# Patient Record
Sex: FEMALE | Race: BLACK OR AFRICAN AMERICAN | NOT HISPANIC OR LATINO | Employment: OTHER | URBAN - METROPOLITAN AREA
[De-identification: names, ages, dates, MRNs, and addresses within clinical notes are randomized per-mention and may not be internally consistent; named-entity substitution may affect disease eponyms.]

---

## 2017-12-12 ENCOUNTER — TELEPHONE (OUTPATIENT)
Dept: NEUROLOGY | Facility: CLINIC | Age: 37
End: 2017-12-12

## 2017-12-12 NOTE — TELEPHONE ENCOUNTER
Reviewed records and MRIs from 2014.  MRI brain is consistent with MS. Likely lesion at C4, but nothing obvious in T-spine.  She has existing diagnosis of MS. She was last seen by Dr. Bruce Goins on 11/30/17. He referred her for medication management. She is currently on Rebif, but Dr. Goins feels she may benefit from ocrelizuman.     She uses a walker.   She has left APD, right ELMA, nystagmus  Strength is 5/5 in UE; 4/5 LE, right Babsinki; unable to tandem walk  She is POLLO Virus positive.

## 2017-12-12 NOTE — TELEPHONE ENCOUNTER
----- Message from Saqib Sinclair sent at 12/12/2017  1:33 PM CST -----  Regarding: appt needed   Pt is being referred to Dr Bai for MS. I have scanned referral and records received into media. Please review and contact pt for scheduling. If any additional info is needed let me know, thanks.

## 2017-12-15 NOTE — TELEPHONE ENCOUNTER
Called to schedule.  Patient mailbox is full and unable to leave a message.  Appt letter will be mailed to patient

## 2017-12-22 ENCOUNTER — TELEPHONE (OUTPATIENT)
Dept: NEUROLOGY | Facility: CLINIC | Age: 37
End: 2017-12-22

## 2018-02-19 ENCOUNTER — TELEPHONE (OUTPATIENT)
Dept: NEUROLOGY | Facility: CLINIC | Age: 38
End: 2018-02-19

## 2018-02-19 ENCOUNTER — HOSPITAL ENCOUNTER (EMERGENCY)
Facility: HOSPITAL | Age: 38
Discharge: HOME | End: 2018-02-19
Attending: FAMILY MEDICINE
Payer: MEDICARE

## 2018-02-19 ENCOUNTER — OFFICE VISIT (OUTPATIENT)
Dept: NEUROLOGY | Facility: CLINIC | Age: 38
End: 2018-02-19
Payer: MEDICARE

## 2018-02-19 VITALS
WEIGHT: 250 LBS | BODY MASS INDEX: 41.65 KG/M2 | SYSTOLIC BLOOD PRESSURE: 225 MMHG | DIASTOLIC BLOOD PRESSURE: 93 MMHG | RESPIRATION RATE: 18 BRPM | TEMPERATURE: 98 F | HEIGHT: 65 IN | OXYGEN SATURATION: 100 % | HEART RATE: 69 BPM

## 2018-02-19 VITALS
SYSTOLIC BLOOD PRESSURE: 200 MMHG | BODY MASS INDEX: 39.15 KG/M2 | DIASTOLIC BLOOD PRESSURE: 106 MMHG | WEIGHT: 235 LBS | HEIGHT: 65 IN | HEART RATE: 70 BPM

## 2018-02-19 DIAGNOSIS — R20.2 NUMBNESS AND TINGLING IN BOTH HANDS: ICD-10-CM

## 2018-02-19 DIAGNOSIS — E55.9 HYPOVITAMINOSIS D: ICD-10-CM

## 2018-02-19 DIAGNOSIS — R53.83 FATIGUE, UNSPECIFIED TYPE: ICD-10-CM

## 2018-02-19 DIAGNOSIS — I10 HTN (HYPERTENSION), MALIGNANT: ICD-10-CM

## 2018-02-19 DIAGNOSIS — N39.46 URGE AND STRESS INCONTINENCE: ICD-10-CM

## 2018-02-19 DIAGNOSIS — R41.89 COGNITIVE CHANGE: ICD-10-CM

## 2018-02-19 DIAGNOSIS — R20.0 NUMBNESS AND TINGLING IN BOTH HANDS: ICD-10-CM

## 2018-02-19 DIAGNOSIS — G35 MULTIPLE SCLEROSIS: Primary | ICD-10-CM

## 2018-02-19 LAB
B-HCG UR QL: NEGATIVE
BILIRUB UR QL STRIP: NEGATIVE
BUN SERPL-MCNC: 12 MG/DL (ref 6–30)
CHLORIDE SERPL-SCNC: 101 MMOL/L (ref 95–110)
CLARITY UR REFRACT.AUTO: CLEAR
COLOR UR AUTO: YELLOW
CREAT SERPL-MCNC: 0.8 MG/DL (ref 0.5–1.4)
CTP QC/QA: YES
GLUCOSE SERPL-MCNC: 84 MG/DL (ref 70–110)
GLUCOSE UR QL STRIP: NEGATIVE
HCT VFR BLD CALC: 35 %PCV (ref 36–54)
HGB UR QL STRIP: NEGATIVE
KETONES UR QL STRIP: NEGATIVE
LEUKOCYTE ESTERASE UR QL STRIP: NEGATIVE
NITRITE UR QL STRIP: NEGATIVE
PH UR STRIP: 6 [PH] (ref 5–8)
POC IONIZED CALCIUM: 1.12 MMOL/L (ref 1.06–1.42)
POC TCO2 (MEASURED): 26 MMOL/L (ref 23–29)
POTASSIUM BLD-SCNC: 3.6 MMOL/L (ref 3.5–5.1)
PROT UR QL STRIP: NEGATIVE
SAMPLE: ABNORMAL
SODIUM BLD-SCNC: 138 MMOL/L (ref 136–145)
SP GR UR STRIP: 1.01 (ref 1–1.03)
URN SPEC COLLECT METH UR: NORMAL
UROBILINOGEN UR STRIP-ACNC: NEGATIVE EU/DL

## 2018-02-19 PROCEDURE — 25000003 PHARM REV CODE 250: Performed by: STUDENT IN AN ORGANIZED HEALTH CARE EDUCATION/TRAINING PROGRAM

## 2018-02-19 PROCEDURE — 99284 EMERGENCY DEPT VISIT MOD MDM: CPT | Mod: ,,, | Performed by: EMERGENCY MEDICINE

## 2018-02-19 PROCEDURE — 99284 EMERGENCY DEPT VISIT MOD MDM: CPT | Mod: 25

## 2018-02-19 PROCEDURE — 81025 URINE PREGNANCY TEST: CPT | Performed by: PHYSICIAN ASSISTANT

## 2018-02-19 PROCEDURE — 25000003 PHARM REV CODE 250: Performed by: EMERGENCY MEDICINE

## 2018-02-19 PROCEDURE — 99205 OFFICE O/P NEW HI 60 MIN: CPT | Mod: S$PBB,,, | Performed by: PSYCHIATRY & NEUROLOGY

## 2018-02-19 PROCEDURE — 93010 ELECTROCARDIOGRAM REPORT: CPT | Mod: ,,, | Performed by: INTERNAL MEDICINE

## 2018-02-19 PROCEDURE — 81003 URINALYSIS AUTO W/O SCOPE: CPT

## 2018-02-19 PROCEDURE — 99999 PR PBB SHADOW E&M-EST. PATIENT-LVL IV: CPT | Mod: PBBFAC,,, | Performed by: PSYCHIATRY & NEUROLOGY

## 2018-02-19 PROCEDURE — 93005 ELECTROCARDIOGRAM TRACING: CPT

## 2018-02-19 PROCEDURE — 99214 OFFICE O/P EST MOD 30 MIN: CPT | Mod: PBBFAC,25,27 | Performed by: PSYCHIATRY & NEUROLOGY

## 2018-02-19 RX ORDER — HYDROCHLOROTHIAZIDE 12.5 MG/1
12.5 TABLET ORAL
Status: COMPLETED | OUTPATIENT
Start: 2018-02-19 | End: 2018-02-19

## 2018-02-19 RX ORDER — CARVEDILOL 12.5 MG/1
12.5 TABLET ORAL 2 TIMES DAILY WITH MEALS
COMMUNITY
End: 2018-07-03 | Stop reason: SDUPTHER

## 2018-02-19 RX ORDER — IBUPROFEN 600 MG/1
600 TABLET ORAL
Status: COMPLETED | OUTPATIENT
Start: 2018-02-19 | End: 2018-02-19

## 2018-02-19 RX ORDER — CHOLECALCIFEROL (VITAMIN D3) 1250 MCG
1000 TABLET ORAL DAILY
COMMUNITY
End: 2018-06-11 | Stop reason: CLARIF

## 2018-02-19 RX ORDER — CARVEDILOL 25 MG/1
25 TABLET ORAL DAILY
COMMUNITY
End: 2018-02-19

## 2018-02-19 RX ORDER — CARVEDILOL 12.5 MG/1
12.5 TABLET ORAL ONCE
Status: COMPLETED | OUTPATIENT
Start: 2018-02-19 | End: 2018-02-19

## 2018-02-19 RX ORDER — HYDROCHLOROTHIAZIDE 25 MG/1
12.5 TABLET ORAL DAILY
Qty: 30 TABLET | Refills: 0 | Status: SHIPPED | OUTPATIENT
Start: 2018-02-19 | End: 2018-07-03

## 2018-02-19 RX ORDER — CARVEDILOL 12.5 MG/1
12.5 TABLET ORAL
Status: DISCONTINUED | OUTPATIENT
Start: 2018-02-19 | End: 2018-02-19

## 2018-02-19 RX ADMIN — HYDROCHLOROTHIAZIDE 12.5 MG: 12.5 TABLET ORAL at 06:02

## 2018-02-19 RX ADMIN — IBUPROFEN 600 MG: 600 TABLET, FILM COATED ORAL at 06:02

## 2018-02-19 RX ADMIN — CARVEDILOL 12.5 MG: 12.5 TABLET, FILM COATED ORAL at 06:02

## 2018-02-19 NOTE — ED NOTES
"Patient identifiers verified and correct for Ms Avery  C/C: Elevated blood pressure  APPEARANCE: awake and alert in NAD.  SKIN: warm, dry and intact. No breakdown or bruising.  MUSCULOSKELETAL: Patient moving all extremities spontaneously, no obvious swelling or deformities noted. Ambulates with walker, right eye "jumping" PTA  RESPIRATORY: Denies shortness of breath.Respirations unlabored.   CARDIAC: Denies CP, 2+ distal pulses; no peripheral edema  ABDOMEN: S/ND/NT, Denies nausea  : voids spontaneously, denies difficulty, smaller amounts  Neurologic: AAO x 4; follows commands equal strength in all extremities; denies numbness/tingling. Denies dizziness denies weakness frequent headaches, none on admit. Left leg  And left arm weakness PTA with h/o MS.   "

## 2018-02-19 NOTE — PROVIDER PROGRESS NOTES - EMERGENCY DEPT.
Encounter Date: 2/19/2018    ED Physician Progress Notes           37 year old female with medical history of Multiple Sclerosis presenting to the ED with the chief complaint of elevated blood pressure. Patient is normally followed by [facility] and was referred to [facility] for establishment of care. Patient's blood pressure was elevated at her Neurology appointment today (200/106) and was referred to the ED for evaluation. She reports she has baseline left-sided hemiparesis and confusion 2/2 to her multiple sclerosis. She ambulates with a walker at her baseline. She denies headache, speech changes, chest pain, shortness of breath, abdominal pain, dysuria, hematuria. She reports having reduced urinary output, otherwise denies other complaints at this time. She believes she takes Coreg BID for her blood pressure, but does not know the dosing.     Blood pressure elevated 251/123 on arrival. Physical exam reveals 3/5 RUE and RLE strength. 5/5 LUE and LLE strength. There is mild horizontal nystagmus of the right eye.. Patient has slowed cerebellar functioning. Patient reports these deficits are chronic 2/2 to her MS. Patient otherwise neurovascularly intact.     Will sign out to a pod given concern for hypertensive urgency versus emergency. Chem8 and UA ordered. Will hold off on BP meds at this time. I have discussed the care of this patient with my supervising physician.     I initially evaluated this patient and ordered workup while in intake.  The patient will receive a full evaluation in an ED pod when space is available.  All results from tests ordered in intake will not be followed by the intake team, including myself. All results will be followed by the ED Pod team.

## 2018-02-19 NOTE — ED TRIAGE NOTES
Patient states she was sent from neurology office for high blood pressure, h/o same. Did not take meds this am. States supposed to be taking Coreg BID, has only been taking only once daily x 1 month, not taking BID as rx. Usually goes to [facility]

## 2018-02-19 NOTE — PROGRESS NOTES
"Subjective:       Patient ID: Jaylon Bowles is a 37 y.o. female who presents today to establish care for MS.  Previously seen by [facility] in 2014.    MS HPI:  Symptom onset was in 2006, had episode of bilateral ON. Diagnosed in 2007 in [place]. Symptoms of dizziness, vertigo, numbness in legs, tingling in fingers, and gait imbalance with falls. Had MRI, LP - based on this diagnosed with Ms. Initially on Avonex (ineffective), but switched to Copaxone. Had one relapse in 2008 with symptoms of worse gait imbalance. Received steroids which helped. Then switched to Betaseron (allergic, broke out in hives).  Started on Rebif in 2012. No further relapses. Reports more imbalance, uses walker outside the home but cane indoors. Suffers from pain in left leg and stiffness. Pain in left leg, responds to prednisone in past.    Migraines: previously with intermittent headaches in past, but worse in past couple months. Bifrontal pain than radiates back. Phonophobia, photophobia present. +Nausea, sometimes vomiting. +retroorbital pain behind right eye. Takes ibuprofen (daily, 800mg daily or more), sleep, and rest in quiet area with meditation.       SOCIAL HISTORY  Social History   Substance Use Topics    Smoking status: Never Smoker    Smokeless tobacco: Not on file    Alcohol use No      Comment: social     Living arrangements - the patient lives with her mother.  Employment: Works at [employer].    MS ROS:  · Fatigue: Yes - take multiple breaks during the day  · Seizures: none  · Sleep Disturbance: Yes - restless, no sleep study. "Mind is always running."  · Bladder Dysfunction: Yes - polyuria, urge incontinence. No urologist.  · Bowel Dysfunction: none   · Spasticity: Yes - mostly affects left side, leg > arm. Baclofen as needed  · Visual Symptoms: Yes - wears glasses. Feels as if vision is worsen, also describes oscillopsia in right eye.  · Cognitive: Yes - poor memory and slowed cognitive processing. Coworkers have to " keep repeating themselves.  · Mood Disorder: Yes - depression. Worse after Rebif injections  · Gait Disturbance: Yes - uses walker. Never tried Ampyra  · Falls: Yes - lots of near falls, infrequent falls when she uses walker  · Hand Dysfunction: Yes - numbness in hands, sometimes tremors  · Pain: Yes - left leg pain  · Sexual Dysfunction: Not Assessed  · Skin Breakdown: Yes - scarring on abdomen and backs of arms  · Tremors: Yes - b/l hands with reaching for things  · Dysphagia:  Not daily, but worse prior to flare  · Dysarthria:    · Heat sensitivity:  Yes - symptoms, balance and cognition, worsens  · Copay Assist?  Yes - needed        Objective:        1. 25 foot timed walk:  No flowsheet data found.  2. SDMT:  3. 9 Hole Peg Test:  No flowsheet data found.    Neurologic Exam      MENTAL STATUS: language is fluent, normal verbal comprehension, short-term and remote memory is intact, attention is normal, patient is alert and oriented x 3, fund of knowledge is appropriate by vocabulary .     CRANIAL NERVE EXAM: b/l ELMA with intermittent nystagmus. Vertical gaze intact. PERRL. No facial asymmetry. Facial sensation is intact bilaterally. There is no dysarthria. Uvula is midline, and palate moves symmetrically. Shoulder shrug intact bilaterally. Tongue protrusion is midline. Hearing is grossly intact.     MOTOR EXAM: Normal bulk and tone throughout UE and LE bilaterally.   No pronator drift; rapid sequential movements are normal; Strength is  5/5 except  5- left biceps, DF and Hf.     REFLEXES: 3+ biceps and patellar, rest 2+    SENSORY EXAM: LT decreased on left body    COORDINATION: Normal finger-to-nose exam     GAIT: wide based, required walker      Imaging:     Results for orders placed during the hospital encounter of 06/12/14   MRI Brain W WO Contrast    Impression  Age advanced cerebral volume loss with numerous scattered foci of T2/flair signal abnormality throughout the supra-and infratentorial parenchyma  majority centered within the periventricular white matter which in light of history is concerning for   sequela of prior demyelination.  Overall most compatible with a moderate degree demyelinating plaque burden.    No evidence for diffusion signal abnormality or enhancement to suggest active demyelination.  Clinical correlation and follow-up advised       Electronically signed by: NAIN DOOLEY DO  Date:     06/12/14  Time:    11:43      Results for orders placed during the hospital encounter of 06/12/14   MRI Cervical Spine W WO Cont    Impression  Ill-defined short segment cord edema signal ventrally at the C4 vertebral body level which in light of history and intracranial pathology concerning for sequela of prior demyelination.    Well-circumscribed central area of cystic change within the cervical spinal cord at the C6 vertebral body level most compatible with syringohydromyelia.  No evidence for abnormal intrathecal enhancement    Otherwise study by motion artifact.  Within limits of the study no definite cord signal abnormality to suggest edema throughout the thoracic spine.    No definite abnormal intrathecal enhancement.            Electronically signed by: NAIN DOOLEY DO  Date:     06/12/14  Time:    11:29      Results for orders placed during the hospital encounter of 06/12/14   MRI Thoracic Spine W WO Cont    Addendum Addendum: There is a partially visualized pedunculated lesion extending  off the inferior aspect of the left lobe of the thyroid gland measuring  approximately 1.8 cm clinical correlation and further evaluation as  warranted.   Question enlargement of the ascending aorta though limited by technique  and motion measuring at least 3.8 cm in transverse dimension.  Clinical  correlation and further evaluation advised.    Electronically signed by: NAIN DOOLEY DO Date:     06/12/14 Time:    11:31       Nain Dooley DO 6/12/2014 11:32 AM          Impression  Ill-defined short segment cord  edema signal ventrally at the C4 vertebral body level which in light of history and intracranial pathology concerning for sequela of prior demyelination.    Well-circumscribed central area of cystic change within the cervical spinal cord at the C6 vertebral body level most compatible with syringohydromyelia.  No evidence for abnormal intrathecal enhancement    Otherwise study by motion artifact.  Within limits of the study no definite cord signal abnormality to suggest edema throughout the thoracic spine.    No definite abnormal intrathecal enhancement.            Electronically signed by: ALBAN HAIR DO  Date:     06/12/14  Time:    11:29          Labs:     No results found for: XJBGOMZE32RE  No results found for: JCVINDEX, JCVANTIBODY  No results found for: RC5DJRKN, ABSOLUTECD3, YW1PIAKI, ABSOLUTECD8, KV6OZFYO, ABSOLUTECD4, LABCD48  Lab Results   Component Value Date    WBC 9.58 06/12/2014    RBC 4.63 06/12/2014    HGB 12.6 06/12/2014    HCT 38.1 06/12/2014    MCV 82 06/12/2014    MCH 27.2 06/12/2014    MCHC 33.1 06/12/2014    RDW 14.0 06/12/2014     06/12/2014    MPV 12.2 06/12/2014    GRAN 6.5 06/12/2014    GRAN 67.8 06/12/2014    LYMPH 2.2 06/12/2014    LYMPH 22.8 06/12/2014    MONO 0.7 06/12/2014    MONO 7.2 06/12/2014    EOS 0.2 06/12/2014    BASO 0.04 06/12/2014    EOSINOPHIL 1.6 06/12/2014    BASOPHIL 0.4 06/12/2014     Sodium   Date Value Ref Range Status   03/29/2014 137 136 - 145 mmol/L Final     Potassium   Date Value Ref Range Status   03/29/2014 3.5 3.5 - 5.1 mmol/L Final     Chloride   Date Value Ref Range Status   03/29/2014 101 95 - 110 mmol/L Final     CO2   Date Value Ref Range Status   03/29/2014 29 23 - 29 mmol/L Final     Glucose   Date Value Ref Range Status   03/29/2014 75 70 - 110 mg/dL Final     BUN, Bld   Date Value Ref Range Status   03/29/2014 10 6 - 20 mg/dL Final     Creatinine   Date Value Ref Range Status   03/29/2014 0.9 0.5 - 1.4 mg/dL Final     Calcium   Date Value Ref  Range Status   03/29/2014 9.3 8.7 - 10.5 mg/dL Final     Total Protein   Date Value Ref Range Status   06/12/2014 8.6 (H) 6.0 - 8.4 g/dL Final     Albumin   Date Value Ref Range Status   06/12/2014 3.6 3.5 - 5.2 g/dL Final     Total Bilirubin   Date Value Ref Range Status   06/12/2014 0.6 0.1 - 1.0 mg/dL Final     Comment:     For infants and newborns, interpretation of results should be based  on gestational age, weight and in agreement with clinical  observations.  Premature Infant recommended reference ranges:  Up to 24 hours.............<8.0 mg/dL  Up to 48 hours............<12.0 mg/dL  3-5 days..................<15.0 mg/dL  6-29 days.................<15.0 mg/dL       Alkaline Phosphatase   Date Value Ref Range Status   06/12/2014 79 55 - 135 U/L Final     AST   Date Value Ref Range Status   06/12/2014 33 10 - 40 U/L Final     ALT   Date Value Ref Range Status   06/12/2014 24 10 - 44 U/L Final     Anion Gap   Date Value Ref Range Status   03/29/2014 7 (L) 8 - 16 mmol/L Final     eGFR if    Date Value Ref Range Status   03/29/2014 >60.0 >60 mL/min/1.73 m^2 Final     eGFR if non    Date Value Ref Range Status   03/29/2014 >60.0 >60 mL/min/1.73 m^2 Final     Comment:     Calculation used to obtain the estimated glomerular filtration  rate (eGFR) is the CKD-EPI equation. Since race is unknown   in our information system, the eGFR values for   -American and Non--American patients are given   for each creatinine result.         Diagnosis/Assessment/Plan:    1. Multiple Sclerosis  · Assessment: Agree with diagnosis, however, patient reports subjective worsening in the last several years in terms of walking and pain in left leg. She has no new imaging available to me today and is in need of updated MRI to eval for new radiographic worsening, which indicates that she need more effective DMT to help manage her MS.   · Imaging: MRI brain and c/t spine w/wo contrast  · Labs:  cbc, cmp, helper/suppressor ratio, folate, tsh, free t4, vit b12, vit d  · Disease Modifying Therapies: continue Rebif for now  · The the patient was counseled about the importance of vitamin D supplementation in multiple sclerosis, and the fact that recent data suggests that high circulating blood levels of vitamin D has an ameliorating effect on the disease course in multiple sclerosis in general. Will check vit D today and supplement as needed for goal of 50-75ng/dL    2. MS Symptom Assessment / Management  · Fatigue: eval for thyroid or nutritional disorder as cause of symptoms. If negative, consider sleep eval for SATYA  · Bladder Dysfunction: referral to urology  · Visual Symptoms: referral to ophthalmology  · Cognitive: neuropsych test to eval cognitive changes  · Mood Disorder: currently taking prozac, seems to help, but may benefit from therapy as well  · Gait Disturbance: may benefit from PT/OT to help with strength exercises and gait imbalance. Consider Ampyra at next visit  · Migraines: migraines and analgesic overuse counseling given. Advised to decreased OTC pain meds to 2 days per week. Start magnesium oxide 400mg daily for preventative        Over 50% of this 40 minute visit was spent in direct face to face counseling of the patient about MS, DMT considerations, and MS symptom management.         There are no diagnoses linked to this encounter.

## 2018-02-20 ENCOUNTER — LAB VISIT (OUTPATIENT)
Dept: LAB | Facility: HOSPITAL | Age: 38
End: 2018-02-20
Attending: PSYCHIATRY & NEUROLOGY
Payer: MEDICARE

## 2018-02-20 DIAGNOSIS — R41.89 COGNITIVE CHANGE: ICD-10-CM

## 2018-02-20 DIAGNOSIS — E55.9 HYPOVITAMINOSIS D: ICD-10-CM

## 2018-02-20 DIAGNOSIS — G35 MULTIPLE SCLEROSIS: ICD-10-CM

## 2018-02-20 DIAGNOSIS — R20.0 NUMBNESS AND TINGLING IN BOTH HANDS: ICD-10-CM

## 2018-02-20 DIAGNOSIS — R20.2 NUMBNESS AND TINGLING IN BOTH HANDS: ICD-10-CM

## 2018-02-20 DIAGNOSIS — R53.83 FATIGUE, UNSPECIFIED TYPE: ICD-10-CM

## 2018-02-20 LAB
25(OH)D3+25(OH)D2 SERPL-MCNC: 26 NG/ML
ALBUMIN SERPL BCP-MCNC: 3.6 G/DL
ALP SERPL-CCNC: 76 U/L
ALT SERPL W/O P-5'-P-CCNC: 34 U/L
ANION GAP SERPL CALC-SCNC: 9 MMOL/L
AST SERPL-CCNC: 44 U/L
BASOPHILS # BLD AUTO: 0.04 K/UL
BASOPHILS NFR BLD: 0.4 %
BILIRUB SERPL-MCNC: 0.6 MG/DL
BUN SERPL-MCNC: 12 MG/DL
CALCIUM SERPL-MCNC: 9.8 MG/DL
CHLORIDE SERPL-SCNC: 103 MMOL/L
CO2 SERPL-SCNC: 27 MMOL/L
CREAT SERPL-MCNC: 1 MG/DL
DIFFERENTIAL METHOD: ABNORMAL
EOSINOPHIL # BLD AUTO: 0.2 K/UL
EOSINOPHIL NFR BLD: 1.5 %
ERYTHROCYTE [DISTWIDTH] IN BLOOD BY AUTOMATED COUNT: 14.3 %
EST. GFR  (AFRICAN AMERICAN): >60 ML/MIN/1.73 M^2
EST. GFR  (NON AFRICAN AMERICAN): >60 ML/MIN/1.73 M^2
FOLATE SERPL-MCNC: 7.3 NG/ML
GLUCOSE SERPL-MCNC: 116 MG/DL
HCT VFR BLD AUTO: 34.8 %
HCYS SERPL-SCNC: 11.5 UMOL/L
HGB BLD-MCNC: 11.7 G/DL
IMM GRANULOCYTES # BLD AUTO: 0.04 K/UL
IMM GRANULOCYTES NFR BLD AUTO: 0.4 %
LYMPHOCYTES # BLD AUTO: 1.9 K/UL
LYMPHOCYTES NFR BLD: 18.9 %
MCH RBC QN AUTO: 27.1 PG
MCHC RBC AUTO-ENTMCNC: 33.6 G/DL
MCV RBC AUTO: 81 FL
MONOCYTES # BLD AUTO: 0.7 K/UL
MONOCYTES NFR BLD: 6.5 %
NEUTROPHILS # BLD AUTO: 7.4 K/UL
NEUTROPHILS NFR BLD: 72.3 %
NRBC BLD-RTO: 0 /100 WBC
PLATELET # BLD AUTO: 251 K/UL
PMV BLD AUTO: 13 FL
POTASSIUM SERPL-SCNC: 4 MMOL/L
PROT SERPL-MCNC: 8.9 G/DL
RBC # BLD AUTO: 4.31 M/UL
SODIUM SERPL-SCNC: 139 MMOL/L
T4 FREE SERPL-MCNC: 0.81 NG/DL
TSH SERPL DL<=0.005 MIU/L-ACNC: 18.07 UIU/ML
VIT B12 SERPL-MCNC: 375 PG/ML
WBC # BLD AUTO: 10.24 K/UL

## 2018-02-20 PROCEDURE — 82607 VITAMIN B-12: CPT

## 2018-02-20 PROCEDURE — 36415 COLL VENOUS BLD VENIPUNCTURE: CPT

## 2018-02-20 PROCEDURE — 86359 T CELLS TOTAL COUNT: CPT

## 2018-02-20 PROCEDURE — 80053 COMPREHEN METABOLIC PANEL: CPT

## 2018-02-20 PROCEDURE — 83090 ASSAY OF HOMOCYSTEINE: CPT

## 2018-02-20 PROCEDURE — 84443 ASSAY THYROID STIM HORMONE: CPT

## 2018-02-20 PROCEDURE — 85025 COMPLETE CBC W/AUTO DIFF WBC: CPT

## 2018-02-20 PROCEDURE — 82746 ASSAY OF FOLIC ACID SERUM: CPT

## 2018-02-20 PROCEDURE — 84439 ASSAY OF FREE THYROXINE: CPT

## 2018-02-20 PROCEDURE — 82306 VITAMIN D 25 HYDROXY: CPT

## 2018-02-20 PROCEDURE — 86360 T CELL ABSOLUTE COUNT/RATIO: CPT

## 2018-02-20 NOTE — ED PROVIDER NOTES
Encounter Date: 2/19/2018    SCRIBE #1 NOTE: I, Bernard Aguilar, am scribing for, and in the presence of,  Dr. Morgan. I have scribed the following portions of the note - the Resident attestation and the EKG reading.       History     Chief Complaint   Patient presents with    Hypertension     States was told by clinic to come to ER for hypertension. Patient states she forgot to take BP medication today.      Ms. Bowles is a 38 y/o lady with MS and HTN who presents today from Neurology clinic for elevated BP. Patient states she forgot to take her BP medication this AM, and BP recorded at clinic was 200/100, so she was referred to the ED by her neurologist. Today, she endorses a mild, right frontal/orbital headache, which she states is chronic with her MS. Otherwise, she denies light-headedness/dizziness, changes in vision, CP, SOB, n/v/d.           Review of patient's allergies indicates:   Allergen Reactions    Betaseron [interferon beta-1b] Hives     Past Medical History:   Diagnosis Date    Depression     Hypertension     Migraine headache     Multiple sclerosis     Thyroid disease      History reviewed. No pertinent surgical history.  Family History   Problem Relation Age of Onset    Hypertension Mother     Diabetes Father     Stroke Father      Social History   Substance Use Topics    Smoking status: Never Smoker    Smokeless tobacco: Never Used    Alcohol use No      Comment: social     Review of Systems   Constitutional: Negative for chills and fever.   HENT: Negative for congestion and sore throat.    Eyes: Negative for photophobia and visual disturbance.   Respiratory: Negative for cough, chest tightness and shortness of breath.    Cardiovascular: Negative for chest pain, palpitations and leg swelling.   Gastrointestinal: Negative for abdominal pain, constipation, diarrhea, nausea and vomiting.   Genitourinary: Negative for dysuria and hematuria.   Musculoskeletal: Negative for arthralgias and  myalgias.   Skin: Negative for color change and rash.   Neurological: Positive for headaches. Negative for dizziness, weakness, light-headedness and numbness.       Physical Exam     Initial Vitals [02/19/18 1656]   BP Pulse Resp Temp SpO2   (!) 251/123 88 18 98.3 °F (36.8 °C) 100 %      MAP       165.67         Physical Exam    Constitutional: She appears well-developed and well-nourished. She is not diaphoretic. No distress.   Patient pleasant and resting comfortably in chair.    HENT:   Head: Normocephalic and atraumatic.   Mouth/Throat: Oropharynx is clear and moist.   Eyes: Conjunctivae are normal. Pupils are equal, round, and reactive to light.   Horizontal nystagmus.    Neck: Normal range of motion. Neck supple. No JVD present.   Cardiovascular: Normal rate, regular rhythm, normal heart sounds and intact distal pulses.   No murmur heard.  Pulmonary/Chest: Breath sounds normal. No respiratory distress. She has no wheezes. She has no rales.   Abdominal: Soft. Bowel sounds are normal. She exhibits no distension. There is no tenderness.   Musculoskeletal: Normal range of motion. She exhibits no edema.   Neurological: She is alert and oriented to person, place, and time. She has normal strength.   Skin: Skin is warm and dry. Capillary refill takes less than 2 seconds.         ED Course   Procedures  Labs Reviewed   ISTAT PROCEDURE - Abnormal; Notable for the following:        Result Value    POC Hematocrit 35 (*)     All other components within normal limits   URINALYSIS, REFLEX TO URINE CULTURE    Narrative:     Preferred Collection Type->Urine, Clean Catch   POCT URINE PREGNANCY     EKG Readings: (Independently Interpreted)   NSR at rate of 67 bpm. Left axis deviation. T-wave inversion in lead 3. There is LVH. ST segments are normal.           Medical Decision Making:   History:   Old Medical Records: I decided to obtain old medical records.  Initial Assessment:   38 y/o lady presents with elevated BP.    Differential Diagnosis:   DDx includes HTN urgency, HTN emergency, secondary HTN, electrolyte derangement  Independently Interpreted Test(s):   I have ordered and independently interpreted EKG Reading(s) - see prior notes  Clinical Tests:   Lab Tests: Ordered and Reviewed  Medical Tests: Ordered and Reviewed  ED Management:  38 y/o lady presents with HTN urgency. On arrival, patient resting comfortably, afebrile, with asymptomatic /123. Patient restarted on home Coreg 12.5 and was also given HCTZ 12.5. BMP WNL, EKG showed no evidence of ischemia. Patient's BP came down to 225/93 within one hour of starting medications. She was discharged to home with a Rx of HCTZ 12.5 QD and PCP f/u for HTN in one week.             Scribe Attestation:   Scribe #1: I performed the above scribed service and the documentation accurately describes the services I performed. I attest to the accuracy of the note.    Attending Attestation:   Physician Attestation Statement for Resident:  As the supervising MD   Physician Attestation Statement: I have personally seen and examined this patient.   I agree with the above history. -: Hypertensive urgency given noncompliance with home coreg. Neuro exam with mild horizontal nystagmus of the right eye and mild weakness in LUE when compared to the right. No acute changes from baseline. Pt denies any CP, SOB, change in visions, weakness, or paresthesias. She does endorse mild right-sided headache consistent with previous migraines. It resolved with ibuprofen in ED. ISTAT revealed creatinine at baseline. I see no signs of hypertensive emergency at this time. We added low dose of HCTZ to her outpatient medication regimen. Pt was encouraged compliance with home BP medications and instructed to perform BP journals at home. Extensively advised indications to return.    As the supervising MD I agree with the above PE.    As the supervising MD I agree with the above treatment, course, plan, and  disposition.  I have reviewed and agree with the residents interpretation of the following: lab data and EKG.  I have reviewed the following: old records at this facility.          Physician Attestation for Scribe:      Comments: I, Dr. Stevo Morgan, personally performed the services described in this documentation. All medical record entries made by the scribe were at my direction and in my presence.  I have reviewed the chart and agree that the record reflects my personal performance and is accurate and complete. Stevo Morgan MD.  9:34 PM 02/19/2018                 Clinical Impression:   The encounter diagnosis was HTN (hypertension), malignant.                           Petar Guerrero MD  Resident  02/19/18 2048       Stevo Morgan MD  02/19/18 213

## 2018-02-21 LAB
ABSOLUTE CD3: 1231 CELLS/UL (ref 700–2100)
ABSOLUTE CD8: 284 CELLS/UL (ref 200–900)
CD3%: 62.1 % (ref 55–83)
CD3+CD4+ CELLS # BLD: 947 CELLS/UL (ref 300–1400)
CD3+CD4+ CELLS NFR BLD: 47.7 % (ref 28–57)
CD4/CD8 RATIO: 3.34 (ref 0.9–3.6)
CD8 % SUPPRESSOR T CELL: 14.3 % (ref 10–39)

## 2018-02-26 ENCOUNTER — INITIAL CONSULT (OUTPATIENT)
Dept: OPHTHALMOLOGY | Facility: CLINIC | Age: 38
End: 2018-02-26
Payer: MEDICARE

## 2018-02-26 DIAGNOSIS — H51.23 INO (INTERNUCLEAR OPHTHALMOPLEGIA), BILATERAL: ICD-10-CM

## 2018-02-26 DIAGNOSIS — G35 MULTIPLE SCLEROSIS: Primary | ICD-10-CM

## 2018-02-26 DIAGNOSIS — H55.04: ICD-10-CM

## 2018-02-26 DIAGNOSIS — H47.293 PARTIAL OPTIC ATROPHY OF BOTH EYES: ICD-10-CM

## 2018-02-26 PROCEDURE — 99999 PR PBB SHADOW E&M-EST. PATIENT-LVL II: CPT | Mod: PBBFAC,,, | Performed by: OPHTHALMOLOGY

## 2018-02-26 PROCEDURE — 92004 COMPRE OPH EXAM NEW PT 1/>: CPT | Mod: S$PBB,,, | Performed by: OPHTHALMOLOGY

## 2018-02-26 PROCEDURE — 99212 OFFICE O/P EST SF 10 MIN: CPT | Mod: PBBFAC | Performed by: OPHTHALMOLOGY

## 2018-02-26 RX ORDER — BUTALBITAL, ACETAMINOPHEN AND CAFFEINE 50; 325; 40 MG/1; MG/1; MG/1
TABLET ORAL
COMMUNITY
Start: 2017-11-30 | End: 2018-07-03

## 2018-02-26 RX ORDER — NORTRIPTYLINE HYDROCHLORIDE 10 MG/1
CAPSULE ORAL
COMMUNITY
Start: 2017-11-30 | End: 2018-06-11

## 2018-02-26 RX ORDER — METHYLPREDNISOLONE 4 MG/1
TABLET ORAL
COMMUNITY
Start: 2017-11-30 | End: 2018-06-11

## 2018-02-26 NOTE — LETTER
February 26, 2018      Portia Mitchell MD  [address]  [facility] [address]           [facility]  [address]  [facility] [address]  Phone: [phone]  Fax: [phone]          Patient: Jaylon Bowles   MR Number: [id number]   YOB: 1980   Date of Visit: 2/26/2018       Dear Dr. Portia Mitchell:    Thank you for referring Jaylon Bowles to me for evaluation. Attached you will find relevant portions of my assessment and plan of care.    If you have questions, please do not hesitate to call me. I look forward to following Jaylon Bowles along with you.    Sincerely,    Tonio Barth MD    Enclosure  CC:  Kunal Amaro MD    If you would like to receive this communication electronically, please contact [email or web address] or [phone] to request more information on [employer] access.    For providers and/or their staff who would like to refer a patient to [facility], please contact us through our one-stop-shop provider referral line, [facility], at [phone].    If you feel you have received this communication in error or would no longer like to receive these types of communications, please e-mail [email or web address]

## 2018-03-02 DIAGNOSIS — G35 MULTIPLE SCLEROSIS: Primary | ICD-10-CM

## 2018-03-02 NOTE — TELEPHONE ENCOUNTER
----- Message from Carla Bolaños sent at 3/2/2018  9:08 AM CST -----  Contact: self @ [phone]  Pt is req a refill for REBIF, WITH ALBUMIN, 44 mcg/0.5 mL injection.      [facility] - - [place] [id number]  [facility]ly [facility] [address]  [facility] [address]  Phone: [phone] Fax: [phone]

## 2018-03-04 ENCOUNTER — PATIENT MESSAGE (OUTPATIENT)
Dept: NEUROLOGY | Facility: CLINIC | Age: 38
End: 2018-03-04

## 2018-03-05 ENCOUNTER — INITIAL CONSULT (OUTPATIENT)
Dept: UROGYNECOLOGY | Facility: CLINIC | Age: 38
End: 2018-03-05
Payer: MEDICARE

## 2018-03-05 VITALS
SYSTOLIC BLOOD PRESSURE: 122 MMHG | HEIGHT: 65 IN | BODY MASS INDEX: 39.2 KG/M2 | WEIGHT: 235.25 LBS | DIASTOLIC BLOOD PRESSURE: 88 MMHG

## 2018-03-05 DIAGNOSIS — N39.46 MIXED INCONTINENCE: Primary | ICD-10-CM

## 2018-03-05 DIAGNOSIS — R39.15 URINARY URGENCY: ICD-10-CM

## 2018-03-05 LAB
BILIRUB SERPL-MCNC: NORMAL MG/DL
BLOOD URINE, POC: NORMAL
COLOR, POC UA: YELLOW
GLUCOSE UR QL STRIP: NORMAL
KETONES UR QL STRIP: NORMAL
LEUKOCYTE ESTERASE URINE, POC: NORMAL
NITRITE, POC UA: NORMAL
PH, POC UA: 5
PROTEIN, POC: NORMAL
SPECIFIC GRAVITY, POC UA: 1.01
UROBILINOGEN, POC UA: NORMAL

## 2018-03-05 PROCEDURE — 99999 PR PBB SHADOW E&M-EST. PATIENT-LVL V: CPT | Mod: PBBFAC,,, | Performed by: OBSTETRICS & GYNECOLOGY

## 2018-03-05 PROCEDURE — 99204 OFFICE O/P NEW MOD 45 MIN: CPT | Mod: S$PBB,,, | Performed by: OBSTETRICS & GYNECOLOGY

## 2018-03-05 PROCEDURE — 99215 OFFICE O/P EST HI 40 MIN: CPT | Mod: PBBFAC | Performed by: OBSTETRICS & GYNECOLOGY

## 2018-03-05 PROCEDURE — 81002 URINALYSIS NONAUTO W/O SCOPE: CPT | Mod: PBBFAC | Performed by: OBSTETRICS & GYNECOLOGY

## 2018-03-05 PROCEDURE — 87086 URINE CULTURE/COLONY COUNT: CPT

## 2018-03-05 RX ORDER — OXYBUTYNIN CHLORIDE 10 MG/1
10 TABLET, EXTENDED RELEASE ORAL DAILY
Qty: 30 TABLET | Refills: 3 | Status: SHIPPED | OUTPATIENT
Start: 2018-03-05 | End: 2018-04-04

## 2018-03-05 NOTE — PROGRESS NOTES
Subjective:       Patient ID: Jaylon Bowles is a 37 y.o. female.    Chief Complaint:  Urinary Incontinence and Urinary Frequency      History of Present Illness  HPI 37 Y O F with  has a past medical history of Depression; Hypertension; Migraine headache; Multiple sclerosis; and Thyroid disease.  Referred by Dr. Mitchell for evaluation for urinary urge and stress incontinence.     [facility]     Question 3/5/2018  9:57 AM CST   General Urogynecology: Are you experiencing the following?    Dysuria (painful urination) No   Nocturia:  waking up at night to empty your bladder  Yes   If you answered yes to the previous question, how many times does this happen per night? 3-4   Enuresis (urine loss during sleep) No   Dribbling urine after you urinate No   Hematuria (urine appears red) No   Type of stream Strong   Urinary frequency: How often a day are you going to the bathroom per day?  10-15   Urinary Tract Infections: How many Urinary Tract Infections have you had in the past year? I have not had a UTI in the past year   If you have had a UTI in the past year, what treatments have you had so far?  I have not had a UTI in the past year   Urinary Incontinence (General): Are you experiencing the following?    Past consultation for incontinence: Have you ever seen someone for the evaluation of incontinence? No   If you answered yes to the previous question, please select all the therapies you have tried.  N/a- I answered no to the previous question   Please note the effectiveness of the therapies.    Need to wear protection to keep clothes dry  Yes   If you answered yes to the previous question, please jewels the protection you use.  None   If you wear protection, how much wetness is typically on each pad? N/a- I do not wear protection- feels like she would need to at some point    If you wear protection, how often do you have to change per day, if applicable?     Stress Symptoms: Are you experiencing the following?   "  Leakage of urine with cough, laugh and/or sneeze Yes   If you answered yes to the previous question, what is the frequency in days, weeks and/or months? Several times a week   Leakage of urine with sex No   Leakage of urine with bending/ lifting No   Leakage of urine with briskly walking or jogging No   If you lose urine for any other reason not previously mentioned, please note it below, if applicable.     Urge Symptoms: Are you experiencing the following?    Urgency ("got to go" feeling) Yes   Urge: How frequently do you feel an urge to urinate (feeling like you "gotta go" to the bathroom and can't wait) Monthly   Do you experience a leakage of urine when you have a feeling of urgency?  Yes   Leakage of urine when unaware No   Past use of anticholinergics (medications used to treat overactive bladder) No   If you answered yes to the previous question, please jewels the anticholinergics you have used:     Have you ever used Mirbetriq (aka Mirabegron)?  No   Prolapse Symptoms: Are you experiencing any of the following?     Falling out/ Bulging/ Heaviness in the vagina No   Vaginal/ Abdominal Pain/ Pressure No   Need to strain/ Push to void No   Need to wait on the toilet before you void No   Unusual position to urinate (using your hands to push back the vaginal bulge) No   Sensation of incomplete emptying No   Past use of pessary device No   If you answered yes to the previous question, please list the devices you have used below.     Bowel Symptoms: Are you experiencing any of the following?    Constipation No   Diarrhea  No   Hematochezia (bloody stool) No   Incomplete evacuation of stool No   Involuntary loss of formed stool No   Fecal smearing/urgency No   Involuntary loss of gas Yes,    Vaginal Symptoms: Are you experiencing any of the following?     Abnormal vaginal bleeding  No   Vaginal dryness No   Sexually active  No   Dyspareunia (painful intercourse) No   Estrogen use  No     GYN & OB History  No LMP " recorded.   Date of Last Pap: 2014    OB History    Para Term  AB Living   0             SAB TAB Ectopic Multiple Live Births                       Past Medical History:   Diagnosis Date    Depression     Hypertension     Migraine headache     Multiple sclerosis     Thyroid disease      History reviewed. No pertinent surgical history.     Current Outpatient Prescriptions:     baclofen (LIORESAL) 10 MG tablet, Take 10 mg by mouth 2 (two) times daily as needed. , Disp: , Rfl:     carvedilol (COREG) 12.5 MG tablet, Take 12.5 mg by mouth 2 (two) times daily with meals. Taking daily, Disp: , Rfl:     fluoxetine (PROZAC) 20 MG capsule, TAKE 1 CAPSULE BY MOUTH TWICE DAILY, Disp: 60 capsule, Rfl: 0    hydroCHLOROthiazide (HYDRODIURIL) 25 MG tablet, Take 0.5 tablets (12.5 mg total) by mouth once daily., Disp: 30 tablet, Rfl: 0    nortriptyline (PAMELOR) 10 MG capsule, , Disp: , Rfl:     nystatin-triamcinolone (MYCOLOG) ointment, Apply topically 2 (two) times daily., Disp: 60 g, Rfl: 0    REBIF, WITH ALBUMIN, 44 mcg/0.5 mL injection, INJECT 44MCG UNDER THE SKIN 3 TIMES WEEKLY, Disp: 12 mL, Rfl: 3    betamethasone dipropionate (DIPROLENE) 0.05 % ointment, Apply topically 2 (two) times daily., Disp: 30 g, Rfl: 0    butalbital-acetaminophen-caffeine -40 mg (FIORICET, ESGIC) -40 mg per tablet, , Disp: , Rfl:     cholecalciferol, vitamin D3, 50,000 unit Tab, Take 1,000 Units by mouth once daily., Disp: , Rfl:     methylPREDNISolone (MEDROL DOSEPACK) 4 mg tablet, , Disp: , Rfl:     oxybutynin (DITROPAN-XL) 10 MG 24 hr tablet, Take 1 tablet (10 mg total) by mouth once daily., Disp: 30 tablet, Rfl: 3      Review of patient's allergies indicates:   Allergen Reactions    Betaseron [interferon beta-1b] Hives     Review of Systems  Review of Systems   Constitutional: Negative.  Negative for activity change, appetite change, chills, diaphoresis, fatigue, fever and unexpected weight change.    HENT: Negative.    Eyes: Negative.    Respiratory: Negative.  Negative for apnea, cough and wheezing.    Cardiovascular: Negative.  Negative for chest pain and palpitations.   Gastrointestinal: Negative for abdominal distention, abdominal pain, anal bleeding, blood in stool, constipation, diarrhea, nausea, rectal pain and vomiting.   Endocrine: Negative.    Genitourinary: Positive for frequency and urgency. Negative for decreased urine volume, difficulty urinating, dyspareunia, dysuria, enuresis, flank pain, genital sores, hematuria, menstrual problem, pelvic pain, vaginal bleeding, vaginal discharge and vaginal pain.   Musculoskeletal: Negative for back pain and gait problem.   Skin: Negative for color change, pallor, rash and wound.   Allergic/Immunologic: Negative for immunocompromised state.   Neurological: Negative.  Negative for dizziness and speech difficulty.   Hematological: Negative for adenopathy.   Psychiatric/Behavioral: Negative for agitation, behavioral problems, confusion and sleep disturbance.       Objective:     Physical Exam   Constitutional: She is oriented to person, place, and time. She appears well-developed.   HENT:   Head: Normocephalic and atraumatic.   Eyes: Conjunctivae and EOM are normal.   Neck: Normal range of motion. Neck supple.   Cardiovascular: Normal rate, regular rhythm, S1 normal, S2 normal, normal heart sounds and intact distal pulses.    Pulmonary/Chest: Effort normal and breath sounds normal. She exhibits no tenderness.   Abdominal: Soft. Bowel sounds are normal. She exhibits no distension and no mass. There is no hepatosplenomegaly, splenomegaly or hepatomegaly. There is no tenderness. There is no rigidity, no rebound, no guarding and no CVA tenderness. No hernia.   Genitourinary: Pelvic exam was performed with patient supine. Rectum normal, vagina normal, uterus normal, cervix normal, skenes normal and bartholins normal. Right labia normal and left labia normal. Urethra  exhibits hypermobility. Urethra exhibits no urethral caruncle, no urethral diverticulum and no urethral mass. Right bartholin is not enlarged and not tender. Left bartholin is not enlarged and not tender. Rectal exam shows resting tone normal and active tone normal. Rectal exam shows no external hemorrhoid, no fissure, no tenderness, anal tone normal and no dovetailing. Guaiac negative stool. No foreign body, tenderness, bleeding, unspecified prolapse of vaginal walls, atrophy, fistula, mesh exposure or lavator tenderness in the vagina. No vaginal discharge found. Right adnexum displays no mass and no tenderness. Left adnexum displays no mass and no tenderness. Cervix exhibits no motion tenderness and no discharge. Uterus is not tender and not experiencing uterine prolapse.   PVR: 70 ML  Empty cough stress test: Negative.  Kegel: 1/5    POP-Q  Aa: -2 Ba: -2 C: -5   GH: 3 PB: 2 TVL: 8   Ap: -2 Bp: -2 D: -6                     Musculoskeletal: Normal range of motion.   Lymphadenopathy:     She has no axillary adenopathy.        Right: No inguinal adenopathy present.        Left: No inguinal adenopathy present.   Neurological: She is alert and oriented to person, place, and time. She has normal strength and normal reflexes. Cranial nerves II through XII intact. No cranial nerve deficit.   Skin: Skin is warm, dry and intact.   Psychiatric: She has a normal mood and affect. Her speech is normal and behavior is normal. Judgment normal. Cognition and memory are normal.          Assessment:        1. Mixed incontinence    2. Urinary urgency               Plan:       1.  Hx of MS:    We reviewed that bladder dysfunction can be seen in the majority of patients with MS. The symptoms range from not being able to emptying to urinary frequency and urinary incontinence.  This may be as a results of  block or delay transmission of nerve signals in areas of the central nervous system (CNS)  that control the bladder and urinary  sphincters due to the MS brain lesions. A spastic (overactive) bladder that is unable to hold the normal amount of urine, or a bladder that does not empty properly (retains some urine in it). Many symptoms are associated with MS vary and sometimes need additional testing to better evaluate these urinary problems.    Frequency and/or urgency of urination   Hesitancy in starting urination   Frequent nighttime urination (nocturia)   Incontinence (the inability to hold in urine)   Inability to empty the bladder completely    2.  Mixed urinary incontinence, urge << stress:   --Bladder diary for 3-7 days, write the number of times you go to the rest room and associated symptoms of urgency or leakage.   --Empty bladder every 3 hours.  Empty well: wait a minute, lean forward on toilet.    --Avoid dietary irritants (see sheet).  Keep diary x 3-5 days to determine your irritants.  --KEGELS: do 10 in AM and 10 in PM, holding each x 10 seconds.  When you feel urge to go, STOP, KEGEL, and when urge has passed, then go to bathroom.   Start pelvic floor PT.    --URGE: Start Ditropan 10 mg daily.  SE profile reviewed.    Takes 2-4 weeks to see if will have effect.  For dry mouth: get sour, sugar free lozenge or gum.    --STRESS:  Pessary vs. Sling.     3. Nocturia (nighttime urination): stop fluids 2 hours before bed.  If have leg swelling:  Elevate feet above chest x 1 hour before bed to get excess fluid off.  Can also use support hose (knee highs).      Approximately 55 min were spent in consult, 90 % in discussion.     Thank you for requesting consultation of your patient.  I look forward to participating in her care.    Mary Stokes DO  Female Pelvic Medicine and Reconstructive Surgery  [facility] [place], [place]

## 2018-03-05 NOTE — LETTER
March 5, 2018      Portia Mitchell MD  [address]  [facility] [address]           [facility] [address] [place] [address]  Phone: [phone]          Patient: Jaylon Bowles   MR Number: [id number]   YOB: 1980   Date of Visit: 3/5/2018       Dear Dr. Portia Mitchell:    Thank you for referring Jaylon Bowles to me for evaluation. Attached you will find relevant portions of my assessment and plan of care.    If you have questions, please do not hesitate to call me. I look forward to following Jaylon oBwles along with you.    Sincerely,    DO Cal Miller  CC:  No Recipients    If you would like to receive this communication electronically, please contact [email or web address] or [phone] to request more information on [email or web address] [employer] access.    For providers and/or their staff who would like to refer a patient to [facility], please contact us through our one-stop-shop provider referral line, [facility], at [phone].    If you feel you have received this communication in error or would no longer like to receive these types of communications, please e-mail [email or web address]

## 2018-03-05 NOTE — PATIENT INSTRUCTIONS
1.  Hx of MS:   We reviewed that bladder dysfunction can be seen in the majority of patients with MS. The symptoms range from not being able to emptying to urinary frequency and urinary incontinence.  This may be as a results of  block or delay transmission of nerve signals in areas of the central nervous system (CNS)  that control the bladder and urinary sphincters due to the MS brain lesions. A spastic (overactive) bladder that is unable to hold the normal amount of urine, or a bladder that does not empty properly (retains some urine in it). Many symptoms are associated with MS vary and sometimes need additional testing to better evaluate these urinary problems.    Frequency and/or urgency of urination   Hesitancy in starting urination   Frequent nighttime urination (nocturia)   Incontinence (the inability to hold in urine)   Inability to empty the bladder completely    2.  Mixed urinary incontinence, urge << stress:   --Bladder diary for 3-7 days, write the number of times you go to the rest room and associated symptoms of urgency or leakage.   --Empty bladder every 3 hours.  Empty well: wait a minute, lean forward on toilet.    --Avoid dietary irritants (see sheet).  Keep diary x 3-5 days to determine your irritants.  --KEGELS: do 10 in AM and 10 in PM, holding each x 10 seconds.  When you feel urge to go, STOP, KEGEL, and when urge has passed, then go to bathroom.   Start pelvic floor PT.    --URGE: start Ditropan 10 mg daily.  SE profile reviewed.    Takes 2-4 weeks to see if will have effect.  For dry mouth: get sour, sugar free lozenge or gum.    --STRESS:  Pessary vs. Sling.     --Nocturia (nighttime urination): stop fluids 2 hours before bed.  If have leg swelling:  Elevate feet above chest x 1 hour before bed to get excess fluid off.  Can also use support hose (knee highs).

## 2018-03-06 LAB — BACTERIA UR CULT: NO GROWTH

## 2018-03-08 ENCOUNTER — PATIENT MESSAGE (OUTPATIENT)
Dept: NEUROLOGY | Facility: CLINIC | Age: 38
End: 2018-03-08

## 2018-03-08 ENCOUNTER — PATIENT MESSAGE (OUTPATIENT)
Dept: UROGYNECOLOGY | Facility: CLINIC | Age: 38
End: 2018-03-08

## 2018-03-09 ENCOUNTER — PATIENT MESSAGE (OUTPATIENT)
Dept: NEUROLOGY | Facility: CLINIC | Age: 38
End: 2018-03-09

## 2018-03-29 ENCOUNTER — PATIENT MESSAGE (OUTPATIENT)
Dept: NEUROLOGY | Facility: CLINIC | Age: 38
End: 2018-03-29

## 2018-04-04 ENCOUNTER — PATIENT MESSAGE (OUTPATIENT)
Dept: UROGYNECOLOGY | Facility: CLINIC | Age: 38
End: 2018-04-04

## 2018-04-11 ENCOUNTER — CLINICAL SUPPORT (OUTPATIENT)
Dept: REHABILITATION | Facility: OTHER | Age: 38
End: 2018-04-11
Attending: OBSTETRICS & GYNECOLOGY
Payer: MEDICARE

## 2018-04-11 DIAGNOSIS — N39.46 MIXED INCONTINENCE: Primary | ICD-10-CM

## 2018-04-11 DIAGNOSIS — M62.81 MUSCLE WEAKNESS: ICD-10-CM

## 2018-04-11 PROCEDURE — 97162 PT EVAL MOD COMPLEX 30 MIN: CPT

## 2018-04-11 PROCEDURE — G8987 SELF CARE CURRENT STATUS: HCPCS | Mod: CK

## 2018-04-11 PROCEDURE — G8988 SELF CARE GOAL STATUS: HCPCS | Mod: CJ

## 2018-04-11 PROCEDURE — 97535 SELF CARE MNGMENT TRAINING: CPT

## 2018-04-11 NOTE — PROGRESS NOTES
OUTPATIENT PHYSICAL THERAPY EVALUATION        [facility]   [facility] #:  [id number]    Date of treatment: 04/11/2018   Time in: 1015  Time out: 1100  Billable time: 45  # Visits: 1/20  POC expiration: 7/11/2018      HISTORY      Jaylon is a 37 y.o. female evaluated on 04/11/2018    Physician:  Mary Stokes,*   Diagnosis: No diagnosis found.   Treatment ordered: Physical Therapy  Medical History:   Past Medical History:   Diagnosis Date    Depression     Hypertension     Migraine headache     Multiple sclerosis     Thyroid disease       Surgical History: No past surgical history on file.   Medications:   Current Outpatient Prescriptions   Medication Sig    baclofen (LIORESAL) 10 MG tablet Take 10 mg by mouth 2 (two) times daily as needed.     betamethasone dipropionate (DIPROLENE) 0.05 % ointment Apply topically 2 (two) times daily.    butalbital-acetaminophen-caffeine -40 mg (FIORICET, ESGIC) -40 mg per tablet     carvedilol (COREG) 12.5 MG tablet Take 12.5 mg by mouth 2 (two) times daily with meals. Taking daily    cholecalciferol, vitamin D3, 50,000 unit Tab Take 1,000 Units by mouth once daily.    fluoxetine (PROZAC) 20 MG capsule TAKE 1 CAPSULE BY MOUTH TWICE DAILY    hydroCHLOROthiazide (HYDRODIURIL) 25 MG tablet Take 0.5 tablets (12.5 mg total) by mouth once daily.    interferon beta-1a, albumin, (REBIF, WITH ALBUMIN,) 44 mcg/0.5 mL injection INJECT 44MCG UNDER THE SKIN 3 TIMES WEEKLY    methylPREDNISolone (MEDROL DOSEPACK) 4 mg tablet     nortriptyline (PAMELOR) 10 MG capsule     nystatin-triamcinolone (MYCOLOG) ointment Apply topically 2 (two) times daily.    oxybutynin (DITROPAN-XL) 10 MG 24 hr tablet Take 1 tablet (10 mg total) by mouth once daily.     No current facility-administered medications for this visit.        Allergies:   Review of patient's allergies indicates:   Allergen Reactions    Betaseron [interferon beta-1b] Hives        Precautions:  "universal    OB/GYN History:  none    Bladder/Bowel History: trouble initiating urine stream, dribbling after urination, trouble emptying bladder completely and urinary incontinence      SUBJECTIVE     Date of onset: 2017  History of current complaint: Patient was diagnosed with Multiple Sclerosis at age 26yo. She reports urinary urgency and frequency, nocturia voiding 5 times at night, and urinary leakage with laughing and walking to the bathroom. She walks using a rolling walker or straight cane. She waits until he bladder is almost full to go to the bathroom.  She uses the treadmill and recumbent bicycle at the gym 3-5 times a week. She reports occasional constipation and just started oxybutin. She us now voiding every 5 hours during the day and 2 times at night.   Patient's goals for therapy: "be able to hold pee until I get to the restroom."  Pain: Patient reports 0/10, with 0 being the lowest and 10 being the highest.    Activities that cause symptoms: strong urge to go, walking to the toilet, full bladder and with cough/sneeze/laugh/yell    Previous treatment included Oxybutin    Sexually active? No    Frequency of urination:   Daytime: Q5            Nighttime: 2-5 times    Difficulty initiating urine stream: Yes  Urine stream: weak  Complete emptying: No  Bladder leakage: Yes  Frequency of incidents: daily  Amount leaked (urine): drops and teaspoons    Frequency of bowel movements: once a day  Difficulty initiating BM: Yes  Number of pads required in 24 hours: Wears incontinence underwear    Types of fluid intake: water    Occupation: Pt does not work.     OBJECTIVE     ORTHO SCREEN  Posture: flexed posturing  Pelvic alignment: no sign of deviations noted in supine    ABDOMINALS  Chaperone: refused  Consent: Verbal consent given    VAGINAL PELVIC FLOOR EXAM - patient requested to not perform internal assessment due to menstrual cycle.     Functional Limitations Reports - G Codes  Category: Self " Care  Current/ : CK = at least 40% but < 60% impaired, limited or restricted  Goal/ : CJ = at least 20% but < 40% impaired, limited or restricted       Modifier  Impairment Limitation Restriction    CH  0 % impaired, limited or restricted    CI  @ least 1% but less than 20% impaired, limited or restricted    CJ  @ least 20%<40% impaired, limited or restricted    CK  @ least 40%<60% impaired, limited or restricted    CL  @ least 60% <80% impaired, limited or restricted    CM  @ least 80%<100% impaired limited or restricted    CN  100% impaired, limited or restricted       Education: instructed on general anatomy/physiology of urinary/bowel system; discussed plan of care with patient and parent/guardian; instructed in benefits/risks of treatment; instructed in alternative methods of treatment; instructed in risks of refusing treatment; patient a parent agreed to treatment plan.     Also educated in: bladder irritants, anatomy/physiology of pelvic floor, posture/body mechanices, hygiene of pelvic floor, bladder retraining, diaphragmatic breathing, double voiding techniques and fluid intake/dietary modifications    ASSESSMENT      This is a 37 y.o. female referred to outpatient physical therapy and presents with a medical diagnosis of urinary incontinence. Patient will benefit from skilled physical therapy to decrease incontinence and improve function.    SEMG Problems: did not evaluate  No skin irritation, erythema, or other adverse effects observed from electrode placement.    Educational/Spiritual/Cultural needs: visual impairment  Abuse/Neglect: no signs  Nutritional Status: WDWN   Fall Risk: potential due to MS    Pt's spiritual, cultural and educational needs considered and pt agreeable to plan of care and goals as stated below:     Medical necessity is demonstrated by the following IMPAIRMENTS/PROMBLEMS     History  Co-morbidities and personal factors that may impact the plan of care Examination  Body  Structures and Functions, activity limitations and participation restrictions that may impact the plan of care Clinical Presentation   Decision Making/ Complexity Score   Co-morbidities:                 Personal Factors:    Body Regions/Systems/Functions:    altered posture, poor knowledge of body mechanics and posture, poor trunk stability and increased nocturia           Activity limitations:   Barriers to Learning: none  Environmental Barriers: none noted    Participation Restrictions:           moderate           PLAN    Frequency: 1 x week  Duration: 12 weeks  Short Term Goals: 4 weeks   Pt will be independent with double voiding techniques 100% of the time to ensure full bladder emptying and decrease pt's risk of infection.   Pt will demonstrate minimal increase in PFM activity on SEMG with bearing down for improved ability to evacuate bowels.    Long Term Goals: 12 weeks   Pt will report improved ability to cough/sneeze/laugh/yell with little (drops) to no urinary leakage 5/7 days per week.   Pt will report improved ability to sleep uninterrupted by excessive nocturia 5/7 days per week.     Physical therapy will include: therapeutic exercises, neuromuscular re-education, gait training, manual therapy, modalities PRN, patient/family education and self care/home management     Perform intravaginal assessmnet, review voiding mechanics/double voiding and defecation mechanics.       Therapist: Melissa Mendoza, PT PT, DPT, WCS  4/11/2018

## 2018-04-11 NOTE — PATIENT INSTRUCTIONS
Treadmill/walking/bike  For 10-20 minutes daily\    Pooping: Feet on stool on squatty potty or books and blow out birthday candles when trying to poop    Peeing: Sit down, relax, and empty. Double void  By standing up and sitting down again      Limit coffee intake, increase water intake    Stool softener (colace) as needed

## 2018-04-25 ENCOUNTER — PATIENT MESSAGE (OUTPATIENT)
Dept: NEUROLOGY | Facility: CLINIC | Age: 38
End: 2018-04-25

## 2018-05-01 PROBLEM — M62.81 MUSCLE WEAKNESS: Status: ACTIVE | Noted: 2018-05-01

## 2018-05-01 NOTE — PLAN OF CARE
OUTPATIENT PHYSICAL THERAPY EVALUATION        [facility]   [facility] #:  [id number]    Date of treatment: 04/11/2018   Time in: 1015  Time out: 1100  Billable time: 45  # Visits: 1/20  POC expiration: 7/11/2018      HISTORY      Jaylon is a 37 y.o. female evaluated on 04/11/2018    Physician:  Mary Stokes,*   Diagnosis: No diagnosis found.   Treatment ordered: Physical Therapy  Medical History:   Past Medical History:   Diagnosis Date    Depression     Hypertension     Migraine headache     Multiple sclerosis     Thyroid disease       Surgical History: No past surgical history on file.   Medications:   Current Outpatient Prescriptions   Medication Sig    baclofen (LIORESAL) 10 MG tablet Take 10 mg by mouth 2 (two) times daily as needed.     betamethasone dipropionate (DIPROLENE) 0.05 % ointment Apply topically 2 (two) times daily.    butalbital-acetaminophen-caffeine -40 mg (FIORICET, ESGIC) -40 mg per tablet     carvedilol (COREG) 12.5 MG tablet Take 12.5 mg by mouth 2 (two) times daily with meals. Taking daily    cholecalciferol, vitamin D3, 50,000 unit Tab Take 1,000 Units by mouth once daily.    fluoxetine (PROZAC) 20 MG capsule TAKE 1 CAPSULE BY MOUTH TWICE DAILY    hydroCHLOROthiazide (HYDRODIURIL) 25 MG tablet Take 0.5 tablets (12.5 mg total) by mouth once daily.    interferon beta-1a, albumin, (REBIF, WITH ALBUMIN,) 44 mcg/0.5 mL injection INJECT 44MCG UNDER THE SKIN 3 TIMES WEEKLY    methylPREDNISolone (MEDROL DOSEPACK) 4 mg tablet     nortriptyline (PAMELOR) 10 MG capsule     nystatin-triamcinolone (MYCOLOG) ointment Apply topically 2 (two) times daily.    oxybutynin (DITROPAN-XL) 10 MG 24 hr tablet Take 1 tablet (10 mg total) by mouth once daily.     No current facility-administered medications for this visit.        Allergies:   Review of patient's allergies indicates:   Allergen Reactions    Betaseron [interferon beta-1b] Hives        Precautions:  "universal    OB/GYN History:  none    Bladder/Bowel History: trouble initiating urine stream, dribbling after urination, trouble emptying bladder completely and urinary incontinence      SUBJECTIVE     Date of onset: 2017  History of current complaint: Patient was diagnosed with Multiple Sclerosis at age 26yo. She reports urinary urgency and frequency, nocturia voiding 5 times at night, and urinary leakage with laughing and walking to the bathroom. She walks using a rolling walker or straight cane. She waits until he bladder is almost full to go to the bathroom.  She uses the treadmill and recumbent bicycle at the gym 3-5 times a week. She reports occasional constipation and just started oxybutin. She us now voiding every 5 hours during the day and 2 times at night.   Patient's goals for therapy: "be able to hold pee until I get to the restroom."  Pain: Patient reports 0/10, with 0 being the lowest and 10 being the highest.    Activities that cause symptoms: strong urge to go, walking to the toilet, full bladder and with cough/sneeze/laugh/yell    Previous treatment included Oxybutin    Sexually active? No    Frequency of urination:   Daytime: Q5            Nighttime: 2-5 times    Difficulty initiating urine stream: Yes  Urine stream: weak  Complete emptying: No  Bladder leakage: Yes  Frequency of incidents: daily  Amount leaked (urine): drops and teaspoons    Frequency of bowel movements: once a day  Difficulty initiating BM: Yes  Number of pads required in 24 hours: Wears incontinence underwear    Types of fluid intake: water    Occupation: Pt does not work.     OBJECTIVE     ORTHO SCREEN  Posture: flexed posturing  Pelvic alignment: no sign of deviations noted in supine    ABDOMINALS  Chaperone: refused  Consent: Verbal consent given    VAGINAL PELVIC FLOOR EXAM - patient requested to not perform internal assessment due to menstrual cycle.     Functional Limitations Reports - G Codes  Category: Self " Care  Current/ : CK = at least 40% but < 60% impaired, limited or restricted  Goal/ : CJ = at least 20% but < 40% impaired, limited or restricted       Modifier  Impairment Limitation Restriction    CH  0 % impaired, limited or restricted    CI  @ least 1% but less than 20% impaired, limited or restricted    CJ  @ least 20%<40% impaired, limited or restricted    CK  @ least 40%<60% impaired, limited or restricted    CL  @ least 60% <80% impaired, limited or restricted    CM  @ least 80%<100% impaired limited or restricted    CN  100% impaired, limited or restricted       Education: instructed on general anatomy/physiology of urinary/bowel system; discussed plan of care with patient and parent/guardian; instructed in benefits/risks of treatment; instructed in alternative methods of treatment; instructed in risks of refusing treatment; patient a parent agreed to treatment plan.     Also educated in: bladder irritants, anatomy/physiology of pelvic floor, posture/body mechanices, hygiene of pelvic floor, bladder retraining, diaphragmatic breathing, double voiding techniques and fluid intake/dietary modifications    ASSESSMENT      This is a 37 y.o. female referred to outpatient physical therapy and presents with a medical diagnosis of urinary incontinence. Patient will benefit from skilled physical therapy to decrease incontinence and improve function.    SEMG Problems: did not evaluate  No skin irritation, erythema, or other adverse effects observed from electrode placement.    Educational/Spiritual/Cultural needs: visual impairment  Abuse/Neglect: no signs  Nutritional Status: WDWN   Fall Risk: potential due to MS    Pt's spiritual, cultural and educational needs considered and pt agreeable to plan of care and goals as stated below:     Medical necessity is demonstrated by the following IMPAIRMENTS/PROMBLEMS     History  Co-morbidities and personal factors that may impact the plan of care Examination  Body  Structures and Functions, activity limitations and participation restrictions that may impact the plan of care Clinical Presentation   Decision Making/ Complexity Score   Co-morbidities:                 Personal Factors:    Body Regions/Systems/Functions:    altered posture, poor knowledge of body mechanics and posture, poor trunk stability and increased nocturia           Activity limitations:   Barriers to Learning: none  Environmental Barriers: none noted    Participation Restrictions:           moderate           PLAN    Frequency: 1 x week  Duration: 12 weeks  Short Term Goals: 4 weeks   Pt will be independent with double voiding techniques 100% of the time to ensure full bladder emptying and decrease pt's risk of infection.   Pt will demonstrate minimal increase in PFM activity on SEMG with bearing down for improved ability to evacuate bowels.    Long Term Goals: 12 weeks   Pt will report improved ability to cough/sneeze/laugh/yell with little (drops) to no urinary leakage 5/7 days per week.   Pt will report improved ability to sleep uninterrupted by excessive nocturia 5/7 days per week.     Physical therapy will include: therapeutic exercises, neuromuscular re-education, gait training, manual therapy, modalities PRN, patient/family education and self care/home management     Perform intravaginal assessmnet, review voiding mechanics/double voiding and defecation mechanics.       Therapist: Melissa Mendoza, PT PT, DPT, WCS  4/11/2018

## 2018-05-08 ENCOUNTER — CLINICAL SUPPORT (OUTPATIENT)
Dept: REHABILITATION | Facility: HOSPITAL | Age: 38
End: 2018-05-08
Attending: OBSTETRICS & GYNECOLOGY
Payer: MEDICARE

## 2018-05-08 DIAGNOSIS — N39.46 MIXED INCONTINENCE: ICD-10-CM

## 2018-05-08 DIAGNOSIS — M62.81 MUSCLE WEAKNESS: Primary | ICD-10-CM

## 2018-05-08 PROCEDURE — 97110 THERAPEUTIC EXERCISES: CPT | Mod: PO

## 2018-05-08 PROCEDURE — 97140 MANUAL THERAPY 1/> REGIONS: CPT | Mod: PO

## 2018-05-08 NOTE — PATIENT INSTRUCTIONS
Knee-to-Chest Stretch: Unilateral        With hand behind right knee, pull knee in to chest until a comfortable stretch is felt in lower back and buttocks. Keep back relaxed. Hold 5 breaths.  Repeat 3 times per set. Repeat on Left and Right sides. Do 1 set per session. Do 2-3 sessions per day.     https://[email or web address].us/126     Copyright © I. All rights reserved.   Knee-to-Chest Stretch: Bilateral

## 2018-05-08 NOTE — PROGRESS NOTES
OUTPATIENT PHYSICAL THERAPY DAILY NOTE       Patient: Jaylon Bowles   Clinic #:  [id number]    Diagnosis: No diagnosis found.   Date of treatment: 05/08/2018     Time in: 1000  Time out: 1100  Billable time: 60  Visit #: 2    SUBJECTIVE   Pt reports: decreased urinary urgency and frequency during the daytime since starting Oxybutin. She voids every 3-4 hours and does not delay the urge to void which results in decreased urinary incontinence.  She continues to have nocturia and voids 5 times at night. She denies pain. She has significant constipation and has 2 bowel movements per week.   Pain: 0/10 on VAS scale    OBJECTIVE     Therapeutic Exercise to develop  flexibility for 10 minutes including:   diaphragmatic breathing and passive stretching of pelvic floor muscles with knee to chest stretches    Manual Therapy to develop flexibility for 15 minutes including: manual therapy and STM to LLQ of lower abdomen, intravaginal MT to levator anus bilaterally    Education: Discussed progression of plan of care with patient; educated pt in activity modification; reviewed HEP with pt. Pt demonstrated and verbalized understanding of all instruction and was provided with a handout of HEP (see Patient Instructions).      ASSESSMENT      Pt tolerated entire treatment  with no visible adverse effects. She has overactive pfm bilaterally and trigger points in LLQ of abdominal wall. Reviewed defecation mechanics.   Will the patient continue to benefit from skilled PT intervention? Yes      PLAN     Continue with established Plan of Care, working toward established PT goals. Continue pfm releases

## 2018-05-21 ENCOUNTER — TELEPHONE (OUTPATIENT)
Dept: UROGYNECOLOGY | Facility: CLINIC | Age: 38
End: 2018-05-21

## 2018-05-21 NOTE — TELEPHONE ENCOUNTER
----- Message from Fatoumata Carballo sent at 5/21/2018 10:15 AM CDT -----  Name of Who is Calling: KENDELL NI [[id number]]      What is the request in detail: Pt would like to reschedule her appt      Can the clinic reply by [facility]:  Yes       [facility]: [phone]

## 2018-05-21 NOTE — TELEPHONE ENCOUNTER
Spoke to pt and r/s her appointment on 6/14 and informed her I'd send her appointment letter in the mail.

## 2018-06-01 ENCOUNTER — OFFICE VISIT (OUTPATIENT)
Dept: UROGYNECOLOGY | Facility: CLINIC | Age: 38
End: 2018-06-01
Payer: MEDICARE

## 2018-06-01 VITALS
HEIGHT: 65 IN | WEIGHT: 242.31 LBS | BODY MASS INDEX: 40.37 KG/M2 | DIASTOLIC BLOOD PRESSURE: 88 MMHG | SYSTOLIC BLOOD PRESSURE: 140 MMHG

## 2018-06-01 DIAGNOSIS — K59.01 SLOW TRANSIT CONSTIPATION: ICD-10-CM

## 2018-06-01 DIAGNOSIS — G35 MULTIPLE SCLEROSIS: ICD-10-CM

## 2018-06-01 DIAGNOSIS — M62.81 MUSCLE WEAKNESS: ICD-10-CM

## 2018-06-01 DIAGNOSIS — N39.46 MIXED INCONTINENCE: Primary | ICD-10-CM

## 2018-06-01 PROCEDURE — 99999 PR PBB SHADOW E&M-EST. PATIENT-LVL III: CPT | Mod: PBBFAC,,, | Performed by: OBSTETRICS & GYNECOLOGY

## 2018-06-01 PROCEDURE — 99213 OFFICE O/P EST LOW 20 MIN: CPT | Mod: PBBFAC | Performed by: OBSTETRICS & GYNECOLOGY

## 2018-06-01 PROCEDURE — 99214 OFFICE O/P EST MOD 30 MIN: CPT | Mod: S$PBB,,, | Performed by: OBSTETRICS & GYNECOLOGY

## 2018-06-01 RX ORDER — DOCUSATE SODIUM 100 MG/1
100 CAPSULE, LIQUID FILLED ORAL 2 TIMES DAILY
Qty: 60 CAPSULE | Refills: 11 | Status: SHIPPED | OUTPATIENT
Start: 2018-06-01 | End: 2018-07-01

## 2018-06-01 RX ORDER — HYDROCHLOROTHIAZIDE 12.5 MG/1
TABLET ORAL
COMMUNITY
Start: 2018-05-19 | End: 2018-07-03

## 2018-06-01 RX ORDER — BETAMETHASONE DIPROPIONATE 0.5 MG/G
CREAM TOPICAL
COMMUNITY
Start: 2018-04-18 | End: 2022-06-14

## 2018-06-01 RX ORDER — OXYBUTYNIN CHLORIDE 10 MG/1
TABLET, EXTENDED RELEASE ORAL
COMMUNITY
Start: 2018-04-27 | End: 2018-06-01 | Stop reason: SDUPTHER

## 2018-06-01 RX ORDER — OXYBUTYNIN CHLORIDE 10 MG/1
10 TABLET, EXTENDED RELEASE ORAL DAILY
Qty: 30 TABLET | Refills: 11 | Status: SHIPPED | OUTPATIENT
Start: 2018-06-01 | End: 2021-01-25

## 2018-06-01 NOTE — PATIENT INSTRUCTIONS
1. Annual with NP Morena     2.  Mixed urinary incontinence, urge << stress:   --Bladder diary for 3-7 days,    --Empty bladder every 3 hours.  Empty well: wait a minute, lean forward on toilet.    --Avoid dietary irritants (see sheet).  Keep diary x 3-5 days to determine your irritants.  --KEGELS: do 10 in AM and 10 in PM, holding each x 10 seconds.  When you feel urge to go, STOP, KEGEL, and when urge has passed, then go to bathroom.   Has stopped pelvic floor   --URGE: Continue Ditropan 10 mg daily.  SE profile reviewed.    Takes 2-4 weeks to see if will have effect.  For dry mouth: get sour, sugar free lozenge or gum.    --STRESS:  Pessary vs. Sling.     3. Constipation:  Controlling constipation may help bladder urgency/leakage and fiber may better control cholesterol and blood glucose.  Start daily fiber.  Take 1 tsp of fiber powder (psyllium or other sugar-free powder).  Mix in 8 oz of water.  Take x 3-5 days.  Then, increase fiber by 1 tsp every 3-5 days until stool is easy to pass.  Stop and continue at that dose.   Do not exceed 6 tsps/day.  May also use over the counter stool softener 1-2 x/day.  AVOID laxatives.    4. Nocturia (nighttime urination): stop fluids 2 hours before bed.  If have leg swelling:  Elevate feet above chest x 1 hour before bed to get excess fluid off.  Can also use support hose (knee highs).

## 2018-06-01 NOTE — PROGRESS NOTES
Subjective:       Patient ID: Jaylon Bowles is a 37 y.o. female.    Chief Complaint:  mixed incontinence (follow up)      History of Present Illness  HPI 37 Y O F with  has a past medical history of Depression; Hypertension; Migraine headache; Multiple sclerosis; and Thyroid disease. Referred by Dr. Mitchell for evaluation for urinary urge and stress incontinence.    Interval  HP since the last visit   1)  UI:  (--) SERENITY  (+) UUI   (-) pads:.  Daytime frequency: Q 4 -5 hours.  Nocturia: yes: now 5 per night from 10 times a night    (-) dysuria-  (--) hematuria,  (--) frequent UTIs.  (+) complete bladder emptying.     2)  POP:  Symptoms:(--)  .  (--) vaginal bleeding. (--) vaginal discharge. (+) sexually active.  (--) dyspareunia.   (--)  Vaginal dryness.  (--) vaginal estrogen use.      3)  BM:  (+) constipation/straining.  (--) chronic diarrhea. (--) hematochezia.  (--) fecal incontinence.  (--) fecal smearing/urgency.  (--) incomplete evacuation.       [facility]     Question 3/5/2018  9:57 AM CST   General Urogynecology: Are you experiencing the following?    Dysuria (painful urination) No   Nocturia:  waking up at night to empty your bladder  Yes   If you answered yes to the previous question, how many times does this happen per night? 3-4   Enuresis (urine loss during sleep) No   Dribbling urine after you urinate No   Hematuria (urine appears red) No   Type of stream Strong   Urinary frequency: How often a day are you going to the bathroom per day?  10-15   Urinary Tract Infections: How many Urinary Tract Infections have you had in the past year? I have not had a UTI in the past year   If you have had a UTI in the past year, what treatments have you had so far?  I have not had a UTI in the past year   Urinary Incontinence (General): Are you experiencing the following?    Past consultation for incontinence: Have you ever seen someone for the evaluation of incontinence? No   If you answered yes to the  "previous question, please select all the therapies you have tried.  N/a- I answered no to the previous question   Please note the effectiveness of the therapies.    Need to wear protection to keep clothes dry  Yes   If you answered yes to the previous question, please jewels the protection you use.  None   If you wear protection, how much wetness is typically on each pad? N/a- I do not wear protection- feels like she would need to at some point    If you wear protection, how often do you have to change per day, if applicable?     Stress Symptoms: Are you experiencing the following?    Leakage of urine with cough, laugh and/or sneeze Yes   If you answered yes to the previous question, what is the frequency in days, weeks and/or months? Several times a week   Leakage of urine with sex No   Leakage of urine with bending/ lifting No   Leakage of urine with briskly walking or jogging No   If you lose urine for any other reason not previously mentioned, please note it below, if applicable.     Urge Symptoms: Are you experiencing the following?    Urgency ("got to go" feeling) Yes   Urge: How frequently do you feel an urge to urinate (feeling like you "gotta go" to the bathroom and can't wait) Monthly   Do you experience a leakage of urine when you have a feeling of urgency?  Yes   Leakage of urine when unaware No   Past use of anticholinergics (medications used to treat overactive bladder) No   If you answered yes to the previous question, please jewels the anticholinergics you have used:     Have you ever used Mirbetriq (aka Mirabegron)?  No   Prolapse Symptoms: Are you experiencing any of the following?     Falling out/ Bulging/ Heaviness in the vagina No   Vaginal/ Abdominal Pain/ Pressure No   Need to strain/ Push to void No   Need to wait on the toilet before you void No   Unusual position to urinate (using your hands to push back the vaginal bulge) No   Sensation of incomplete emptying No   Past use of pessary device No "   If you answered yes to the previous question, please list the devices you have used below.     Bowel Symptoms: Are you experiencing any of the following?    Constipation No   Diarrhea  No   Hematochezia (bloody stool) No   Incomplete evacuation of stool No   Involuntary loss of formed stool No   Fecal smearing/urgency No   Involuntary loss of gas Yes,    Vaginal Symptoms: Are you experiencing any of the following?     Abnormal vaginal bleeding  No   Vaginal dryness No   Sexually active  No   Dyspareunia (painful intercourse) No   Estrogen use  No     GYN & OB History  No LMP recorded.   Date of Last Pap: 2014    OB History    Para Term  AB Living   0             SAB TAB Ectopic Multiple Live Births                       Past Medical History:   Diagnosis Date    Depression     Hypertension     Migraine headache     Multiple sclerosis     Thyroid disease      History reviewed. No pertinent surgical history.     Current Outpatient Prescriptions:     baclofen (LIORESAL) 10 MG tablet, Take 10 mg by mouth 2 (two) times daily as needed. , Disp: , Rfl:     betamethasone dipropionate (DIPROLENE) 0.05 % cream, , Disp: , Rfl:     butalbital-acetaminophen-caffeine -40 mg (FIORICET, ESGIC) -40 mg per tablet, , Disp: , Rfl:     carvedilol (COREG) 12.5 MG tablet, Take 12.5 mg by mouth 2 (two) times daily with meals. Taking daily, Disp: , Rfl:     cholecalciferol, vitamin D3, 50,000 unit Tab, Take 1,000 Units by mouth once daily., Disp: , Rfl:     fluoxetine (PROZAC) 20 MG capsule, TAKE 1 CAPSULE BY MOUTH TWICE DAILY, Disp: 60 capsule, Rfl: 0    hydroCHLOROthiazide (HYDRODIURIL) 12.5 MG Tab, , Disp: , Rfl:     hydroCHLOROthiazide (HYDRODIURIL) 25 MG tablet, Take 0.5 tablets (12.5 mg total) by mouth once daily., Disp: 30 tablet, Rfl: 0    interferon beta-1a, albumin, (REBIF, WITH ALBUMIN,) 44 mcg/0.5 mL injection, INJECT 44MCG UNDER THE SKIN 3 TIMES WEEKLY, Disp: 36 mL, Rfl: 1     methylPREDNISolone (MEDROL DOSEPACK) 4 mg tablet, , Disp: , Rfl:     nortriptyline (PAMELOR) 10 MG capsule, , Disp: , Rfl:     nystatin-triamcinolone (MYCOLOG) ointment, Apply topically 2 (two) times daily., Disp: 60 g, Rfl: 0    oxybutynin (DITROPAN-XL) 10 MG 24 hr tablet, Take 1 tablet (10 mg total) by mouth once daily., Disp: 30 tablet, Rfl: 11    docusate sodium (COLACE) 100 MG capsule, Take 1 capsule (100 mg total) by mouth 2 (two) times daily., Disp: 60 capsule, Rfl: 11      Review of patient's allergies indicates:   Allergen Reactions    Betaseron [interferon beta-1b] Hives     Review of Systems  Review of Systems   Constitutional: Negative.  Negative for activity change, appetite change, chills, diaphoresis, fatigue, fever and unexpected weight change.   HENT: Negative.    Eyes: Negative.    Respiratory: Negative.  Negative for apnea, cough and wheezing.    Cardiovascular: Negative.  Negative for chest pain and palpitations.   Gastrointestinal: Negative for abdominal distention, abdominal pain, anal bleeding, blood in stool, constipation, diarrhea, nausea, rectal pain and vomiting.   Endocrine: Negative.    Genitourinary: Positive for frequency and urgency. Negative for decreased urine volume, difficulty urinating, dyspareunia, dysuria, enuresis, flank pain, genital sores, hematuria, menstrual problem, pelvic pain, vaginal bleeding, vaginal discharge and vaginal pain.   Musculoskeletal: Negative for back pain and gait problem.   Skin: Negative for color change, pallor, rash and wound.   Allergic/Immunologic: Negative for immunocompromised state.   Neurological: Negative.  Negative for dizziness and speech difficulty.   Hematological: Negative for adenopathy.   Psychiatric/Behavioral: Negative for agitation, behavioral problems, confusion and sleep disturbance.       Objective:     Physical Exam   Constitutional: She is oriented to person, place, and time. She appears well-developed and well-nourished.    HENT:   Head: Normocephalic and atraumatic.   Neck: Normal range of motion. Neck supple.   Pulmonary/Chest: No respiratory distress. She has no wheezes.   Musculoskeletal: Normal range of motion.   Neurological: She is alert and oriented to person, place, and time.   Skin: No cyanosis. Nails show no clubbing.   Psychiatric: She has a normal mood and affect. Her behavior is normal. Judgment and thought content normal.          Assessment:        1. Mixed incontinence    2. Slow transit constipation    3. Multiple sclerosis    4. Muscle weakness               Plan:       1.  Hx of MS:    We reviewed that bladder dysfunction can be seen in the majority of patients with MS. The symptoms range from not being able to emptying to urinary frequency and urinary incontinence.  This may be as a results of  block or delay transmission of nerve signals in areas of the central nervous system (CNS)  that control the bladder and urinary sphincters due to the MS brain lesions. A spastic (overactive) bladder that is unable to hold the normal amount of urine, or a bladder that does not empty properly (retains some urine in it). Many symptoms are associated with MS vary and sometimes need additional testing to better evaluate these urinary problems.    Frequency and/or urgency of urination   Hesitancy in starting urination   Frequent nighttime urination (nocturia)   Incontinence (the inability to hold in urine)   Inability to empty the bladder completely    2.  Mixed urinary incontinence, urge << stress:   --Bladder diary for 3-7 days,  not done   --Empty bladder every 3 hours.  Empty well: wait a minute, lean forward on toilet.    --Avoid dietary irritants (see sheet).  Keep diary x 3-5 days to determine your irritants.  --KEGELS: do 10 in AM and 10 in PM, holding each x 10 seconds.  When you feel urge to go, STOP, KEGEL, and when urge has passed, then go to bathroom.   Has stopped pelvic floor   --URGE: Continue Ditropan 10 mg  daily.  SE profile reviewed.    Takes 2-4 weeks to see if will have effect.  For dry mouth: get sour, sugar free lozenge or gum.    --STRESS:  Pessary vs. Sling.     3. Constipation:  Controlling constipation may help bladder urgency/leakage and fiber may better control cholesterol and blood glucose.  Start daily fiber.  Take 1 tsp of fiber powder (psyllium or other sugar-free powder).  Mix in 8 oz of water.  Take x 3-5 days.  Then, increase fiber by 1 tsp every 3-5 days until stool is easy to pass.  Stop and continue at that dose.   Do not exceed 6 tsps/day.  May also use over the counter stool softener 1-2 x/day.  AVOID laxatives.    4. Nocturia (nighttime urination): stop fluids 2 hours before bed.  If have leg swelling:  Elevate feet above chest x 1 hour before bed to get excess fluid off.  Can also use support hose (knee highs).      Approximately 30 min were spent in consult, 90 % in discussion.    Mary Stokes DO  Female Pelvic Medicine and Reconstructive Surgery  [facility] [place], [place]

## 2018-06-11 ENCOUNTER — TELEPHONE (OUTPATIENT)
Dept: NEUROLOGY | Facility: CLINIC | Age: 38
End: 2018-06-11

## 2018-06-11 ENCOUNTER — TELEPHONE (OUTPATIENT)
Dept: PHARMACY | Facility: CLINIC | Age: 38
End: 2018-06-11

## 2018-06-11 ENCOUNTER — LAB VISIT (OUTPATIENT)
Dept: LAB | Facility: HOSPITAL | Age: 38
End: 2018-06-11
Attending: PSYCHIATRY & NEUROLOGY
Payer: MEDICARE

## 2018-06-11 ENCOUNTER — OFFICE VISIT (OUTPATIENT)
Dept: NEUROLOGY | Facility: CLINIC | Age: 38
End: 2018-06-11
Payer: MEDICARE

## 2018-06-11 VITALS
HEIGHT: 65 IN | SYSTOLIC BLOOD PRESSURE: 176 MMHG | HEART RATE: 58 BPM | WEIGHT: 241.94 LBS | BODY MASS INDEX: 40.31 KG/M2 | DIASTOLIC BLOOD PRESSURE: 101 MMHG

## 2018-06-11 DIAGNOSIS — E55.9 VITAMIN D DEFICIENCY: ICD-10-CM

## 2018-06-11 DIAGNOSIS — F32.89 OTHER DEPRESSION: ICD-10-CM

## 2018-06-11 DIAGNOSIS — Z51.81 ENCOUNTER FOR THERAPEUTIC DRUG MONITORING: ICD-10-CM

## 2018-06-11 DIAGNOSIS — R26.9 GAIT DISORDER: ICD-10-CM

## 2018-06-11 DIAGNOSIS — F32.1 CURRENT MODERATE EPISODE OF MAJOR DEPRESSIVE DISORDER WITHOUT PRIOR EPISODE: ICD-10-CM

## 2018-06-11 DIAGNOSIS — I10 HYPERTENSION, UNSPECIFIED TYPE: ICD-10-CM

## 2018-06-11 DIAGNOSIS — M62.81 MUSCLE WEAKNESS: ICD-10-CM

## 2018-06-11 DIAGNOSIS — G35 MULTIPLE SCLEROSIS: Primary | ICD-10-CM

## 2018-06-11 DIAGNOSIS — G35 MS (MULTIPLE SCLEROSIS): ICD-10-CM

## 2018-06-11 DIAGNOSIS — G35 MULTIPLE SCLEROSIS: ICD-10-CM

## 2018-06-11 LAB
25(OH)D3+25(OH)D2 SERPL-MCNC: 39 NG/ML
ALBUMIN SERPL BCP-MCNC: 4 G/DL
ALP SERPL-CCNC: 68 U/L
ALT SERPL W/O P-5'-P-CCNC: 14 U/L
ANION GAP SERPL CALC-SCNC: 8 MMOL/L
AST SERPL-CCNC: 20 U/L
BASOPHILS # BLD AUTO: 0.04 K/UL
BASOPHILS NFR BLD: 0.3 %
BILIRUB SERPL-MCNC: 0.6 MG/DL
BUN SERPL-MCNC: 18 MG/DL
CALCIUM SERPL-MCNC: 10 MG/DL
CHLORIDE SERPL-SCNC: 102 MMOL/L
CO2 SERPL-SCNC: 26 MMOL/L
CREAT SERPL-MCNC: 1.1 MG/DL
DIFFERENTIAL METHOD: ABNORMAL
EOSINOPHIL # BLD AUTO: 0.2 K/UL
EOSINOPHIL NFR BLD: 1.8 %
ERYTHROCYTE [DISTWIDTH] IN BLOOD BY AUTOMATED COUNT: 14.3 %
EST. GFR  (AFRICAN AMERICAN): >60 ML/MIN/1.73 M^2
EST. GFR  (NON AFRICAN AMERICAN): >60 ML/MIN/1.73 M^2
GLUCOSE SERPL-MCNC: 78 MG/DL
HCT VFR BLD AUTO: 38.8 %
HGB BLD-MCNC: 12.9 G/DL
IMM GRANULOCYTES # BLD AUTO: 0.05 K/UL
IMM GRANULOCYTES NFR BLD AUTO: 0.4 %
LYMPHOCYTES # BLD AUTO: 1.9 K/UL
LYMPHOCYTES NFR BLD: 16.4 %
MCH RBC QN AUTO: 27.8 PG
MCHC RBC AUTO-ENTMCNC: 33.2 G/DL
MCV RBC AUTO: 84 FL
MONOCYTES # BLD AUTO: 0.8 K/UL
MONOCYTES NFR BLD: 6.8 %
NEUTROPHILS # BLD AUTO: 8.5 K/UL
NEUTROPHILS NFR BLD: 74.3 %
NRBC BLD-RTO: 0 /100 WBC
PLATELET # BLD AUTO: 274 K/UL
PMV BLD AUTO: 12.7 FL
POTASSIUM SERPL-SCNC: 4.5 MMOL/L
PROT SERPL-MCNC: 9.2 G/DL
RBC # BLD AUTO: 4.64 M/UL
SODIUM SERPL-SCNC: 136 MMOL/L
WBC # BLD AUTO: 11.51 K/UL

## 2018-06-11 PROCEDURE — 36415 COLL VENOUS BLD VENIPUNCTURE: CPT

## 2018-06-11 PROCEDURE — 99215 OFFICE O/P EST HI 40 MIN: CPT | Mod: PBBFAC | Performed by: PSYCHIATRY & NEUROLOGY

## 2018-06-11 PROCEDURE — 82306 VITAMIN D 25 HYDROXY: CPT

## 2018-06-11 PROCEDURE — 85025 COMPLETE CBC W/AUTO DIFF WBC: CPT

## 2018-06-11 PROCEDURE — 80053 COMPREHEN METABOLIC PANEL: CPT

## 2018-06-11 PROCEDURE — 99999 PR PBB SHADOW E&M-EST. PATIENT-LVL V: CPT | Mod: PBBFAC,,, | Performed by: PSYCHIATRY & NEUROLOGY

## 2018-06-11 PROCEDURE — 99215 OFFICE O/P EST HI 40 MIN: CPT | Mod: S$PBB,,, | Performed by: PSYCHIATRY & NEUROLOGY

## 2018-06-11 RX ORDER — POLYETHYLENE GLYCOL 3350 17 G/17G
17 POWDER, FOR SOLUTION ORAL DAILY
Qty: 1530 G | Refills: 2 | Status: SHIPPED | OUTPATIENT
Start: 2018-06-11 | End: 2019-01-17

## 2018-06-11 RX ORDER — DALFAMPRIDINE 10 MG/1
10 TABLET, FILM COATED, EXTENDED RELEASE ORAL 2 TIMES DAILY
Qty: 60 TABLET | Refills: 3 | Status: SHIPPED | OUTPATIENT
Start: 2018-06-11 | End: 2018-06-13 | Stop reason: CLARIF

## 2018-06-11 RX ORDER — CHOLECALCIFEROL (VITAMIN D3) 25 MCG
1000 TABLET ORAL DAILY
COMMUNITY
End: 2018-09-13

## 2018-06-11 NOTE — PROGRESS NOTES
Subjective:       Patient ID: Jaylon Bowles is a 37 y.o. female who presents today for a routine clinic visit for MS.      MS HPI:  Symptom onset was in 2006, had episode of bilateral ON. Diagnosed in 2007 in [place]. Symptoms of dizziness, vertigo, numbness in legs, tingling in fingers, and gait imbalance with falls. Had MRI, LP - based on this diagnosed with MS. Initially on Avonex (ineffective), but switched to Copaxone. Had one relapse in 2008 with symptoms of worse gait imbalance. Received steroids which helped. Then switched to Betaseron (allergic, broke out in hives).  Started on Rebif in 2012. No further relapses. Reports more imbalance, uses walker outside the home but cane indoors. Suffers from pain in left leg and stiffness. Pain in left leg, responds to prednisone in past.   Migraines: previously with intermittent headaches in past, but worse 12/2017. Bifrontal pain than radiates back. Phonophobia, photophobia present. +Nausea, sometimes vomiting. +retroorbital pain behind right eye. Takes ibuprofen (daily, 800mg daily or more), sleep, and rest in quiet area with meditation.     Subjective/Today:  · DMT: Rebif  · Side effects from DMT? Yes - needle fatigue, depression  · Taking vitamin D3 as recommended? Yes Dose: thinks she's taking 1500 units, but could have been 15,000  · No new symptoms. Would definitely like to switch to Tecfidera for above reasons. Never really emotionally stable since starting this medication.   · Feels that depression is worse, more teary-eyed. More emotional than before.   · Would like to be a patient speaker.  · Following with urology, going to pelvic therapy. Taking oxybutinin but makes her constipated. Taking colace everyday.  · Needs a cooling vest. Letter and application given.     SOCIAL HISTORY  Social History   Substance Use Topics    Smoking status: Never Smoker    Smokeless tobacco: Never Used    Alcohol use No      Comment: social     Living arrangements -  "the patient lives with mother.  Employment recently quit [employer] as she felt she was being bullied at work - cant move as fast as others, also had difficult time understanding new software. Currently not working. Does receive disability, but would like to resume part time job as previously.   Doing leg lifts. Plans on doing [facility]    MS ROS:  · Fatigue: Yes - take multiple breaks during the day  · Seizures: none  · Sleep Disturbance: Yes - restless, no sleep study. "Mind is always running."  · Bladder Dysfunction: Yes - polyuria, urge incontinence. Following with urology  · Bowel Dysfunction: constipation with oxybutynin  · Spasticity: Yes - mostly affects left side, leg > arm. Baclofen as needed  · Visual Symptoms: Yes - wears glasses. Feels as if vision is worsen, also describes oscillopsia in right eye.  · Cognitive: Yes - poor memory and slowed cognitive processing. Coworkers have to keep repeating themselves.  · Mood Disorder: Yes - depression. Worse after Rebif injections  · Gait Disturbance: Yes - uses walker. Never tried Ampyra  · Falls: Yes - lots of near falls, infrequent falls when she uses walker. Last fall was last week, and another one week prior to that.   · Hand Dysfunction: Yes - numbness in hands, sometimes tremors  · Pain: Yes - left leg pain  · Sexual Dysfunction: Not Assessed  · Skin Breakdown: Yes - scarring on abdomen and backs of arms  · Tremors: Yes - b/l hands with reaching for things  · Dysphagia:  Not daily, but worse prior to flare  · Dysarthria:    · Heat sensitivity:  Yes - symptoms, balance and cognition, worsens  · Copay Assist?  Yes - needed        Objective:        1. 25 foot timed walk: 14.11  No flowsheet data found.  2. SDMT:  3. 9 Hole Peg Test:  No flowsheet data found.    Neurologic Exam      GEN: intermittently tearful with little prompting.     MENTAL STATUS: language is fluent, normal verbal comprehension, attention is normal, patient is alert and oriented x 3. "      CRANIAL NERVE EXAM: b/l ELAM with intermittent nystagmus. Vertical gaze intact. PERRL. No facial asymmetry. Facial sensation is intact bilaterally. There is no dysarthria. Uvula is midline, and palate moves symmetrically.      MOTOR EXAM: Normal bulk and tone throughout UE and LE bilaterally.   No pronator drift; rapid sequential movements are normal; Strength is  5/5 except  5- left biceps, KE, KF, HF, DF.      REFLEXES: 3+ biceps and patellar, rest 2+     SENSORY EXAM: LT decreased on left body     COORDINATION: Normal finger-to-nose exam      GAIT: wide based, required walker    Imaging:     Results for orders placed during the hospital encounter of 06/12/14   MRI Brain W WO Contrast    Impression  Age advanced cerebral volume loss with numerous scattered foci of T2/flair signal abnormality throughout the supra-and infratentorial parenchyma majority centered within the periventricular white matter which in light of history is concerning for   sequela of prior demyelination.  Overall most compatible with a moderate degree demyelinating plaque burden.    No evidence for diffusion signal abnormality or enhancement to suggest active demyelination.  Clinical correlation and follow-up advised       Electronically signed by: ALBAN HAIR DO  Date:     06/12/14  Time:    11:43      Results for orders placed during the hospital encounter of 06/12/14   MRI Cervical Spine W WO Cont    Impression  Ill-defined short segment cord edema signal ventrally at the C4 vertebral body level which in light of history and intracranial pathology concerning for sequela of prior demyelination.    Well-circumscribed central area of cystic change within the cervical spinal cord at the C6 vertebral body level most compatible with syringohydromyelia.  No evidence for abnormal intrathecal enhancement    Otherwise study by motion artifact.  Within limits of the study no definite cord signal abnormality to suggest edema throughout the  thoracic spine.    No definite abnormal intrathecal enhancement.            Electronically signed by: NAIN DOOLEY DO  Date:     06/12/14  Time:    11:29      Results for orders placed during the hospital encounter of 06/12/14   MRI Thoracic Spine W WO Cont    Addendum Addendum: There is a partially visualized pedunculated lesion extending  off the inferior aspect of the left lobe of the thyroid gland measuring  approximately 1.8 cm clinical correlation and further evaluation as  warranted.   Question enlargement of the ascending aorta though limited by technique  and motion measuring at least 3.8 cm in transverse dimension.  Clinical  correlation and further evaluation advised.    Electronically signed by: NAIN DOOLEY DO Date:     06/12/14 Time:    11:31       Nain Dooley DO 6/12/2014 11:32 AM          Impression  Ill-defined short segment cord edema signal ventrally at the C4 vertebral body level which in light of history and intracranial pathology concerning for sequela of prior demyelination.    Well-circumscribed central area of cystic change within the cervical spinal cord at the C6 vertebral body level most compatible with syringohydromyelia.  No evidence for abnormal intrathecal enhancement    Otherwise study by motion artifact.  Within limits of the study no definite cord signal abnormality to suggest edema throughout the thoracic spine.    No definite abnormal intrathecal enhancement.            Electronically signed by: NAIN DOOLEY DO  Date:     06/12/14  Time:    11:29      Most recent MRI's uploaded into system      Labs:     Lab Results   Component Value Date    LECWTBLG52KD 26 (L) 02/20/2018     No results found for: JCVINDEX, JCVANTIBODY  Lab Results   Component Value Date    MD6QFCSG 62.1 02/20/2018    ABSOLUTECD3 1231 02/20/2018    VT5SVMKC 14.3 02/20/2018    ABSOLUTECD8 284 02/20/2018    CT1FQIOZ 47.7 02/20/2018    ABSOLUTECD4 947 02/20/2018    LABCD48 3.34 02/20/2018     Lab Results    Component Value Date    WBC 10.24 02/20/2018    RBC 4.31 02/20/2018    HGB 11.7 (L) 02/20/2018    HCT 34.8 (L) 02/20/2018    MCV 81 (L) 02/20/2018    MCH 27.1 02/20/2018    MCHC 33.6 02/20/2018    RDW 14.3 02/20/2018     02/20/2018    MPV 13.0 (H) 02/20/2018    GRAN 7.4 02/20/2018    GRAN 72.3 02/20/2018    LYMPH 1.9 02/20/2018    LYMPH 18.9 02/20/2018    MONO 0.7 02/20/2018    MONO 6.5 02/20/2018    EOS 0.2 02/20/2018    BASO 0.04 02/20/2018    EOSINOPHIL 1.5 02/20/2018    BASOPHIL 0.4 02/20/2018       Chemistry        Component Value Date/Time     06/11/2018 1131    K 4.5 06/11/2018 1131     06/11/2018 1131    CO2 26 06/11/2018 1131    BUN 18 06/11/2018 1131    CREATININE 1.1 06/11/2018 1131    GLU 78 06/11/2018 1131        Component Value Date/Time    CALCIUM 10.0 06/11/2018 1131    ALKPHOS 68 06/11/2018 1131    AST 20 06/11/2018 1131    ALT 14 06/11/2018 1131    BILITOT 0.6 06/11/2018 1131    ESTGFRAFRICA >60.0 06/11/2018 1131    EGFRNONAA >60.0 06/11/2018 1131                      Diagnosis/Assessment/Plan:    1.  Multiple Sclerosis  · Assessment: Diagnosed by MRI and LP in 2007 by outside specialist. Has failed Avonex and Copaxone, intolerant to Betaseron, and has been on Rebif since 2012. Given emotional instability, needle fatigue, and concern for relapse, will transition patient to new DMT at this time. After discussing several options, she would like to try Tecfidera.  · Imaging: MRI brain and c/t spine w/wo contrast, 6 months post-start new DMT  · Labs: cbc, cmp, vit d  · Disease Modifying Therapies: continue Rebif for now, but transition to Tecfidera  · The the patient was counseled about the importance of vitamin D supplementation in multiple sclerosis, and the fact that recent data suggests that high circulating blood levels of vitamin D has an ameliorating effect on the disease course in multiple sclerosis in general. Will check vit D today and supplement as needed for goal of  50-75ng/dL. Repeat level today     2.   MS Symptom Assessment / Management  · Fatigue: little improved with better bladder functioning.  · Bladder Dysfunction: following with urology  · Visual Symptoms: following with ophthalmology  · Cognitive: neuropsych test ordered last visit to eval cognitive changes, still pending  · Mood Disorder: currently taking prozac, seems to help, but may benefit from therapy as well. Referral to  for talk therapy as well as to help with vocational rehab.   · Gait Disturbance: may benefit from PT/OT to help with strength exercises and gait imbalance; will order today. Also discussed Ampyra.  A PARQ discussion was held for Ampyra in the treatment of gait fatigue due to MS and all questions/concerns were addressed to patient's satisfaction. Initially hesitant but agreed to try it.  · Migraines: migraines and analgesic overuse counseling given. Advised to decreased OTC pain meds to 2 days per week. Continue magnesium oxide 400mg daily for preventative    3. Internal medicine referral - HTN    Over 50% of this 40 minute visit was spent in direct face to face counseling of the patient about MS, DMT considerations, and MS symptom management.         There are no diagnoses linked to this encounter.

## 2018-06-11 NOTE — Clinical Note
I can't find Aleida in the Epic system, so I'm tagging you even though I know that she will be getting counseling from Aleida. I also asked her to look into vocational rehab for Ms. Bowles

## 2018-06-11 NOTE — LETTER
June 11, 2018    Jaylon Bowles  [address]  [place] [address]             [facility]  [address]  [place] [address]  Phone: [phone] To whom it may concern:      Jaylon Bowles is a patient with relapsing multiple sclerosis and heat sensitivity. She would greatly benefit from receiving a cooling vest for the hot summer months.      Thanks,        Portia Mitchell MD

## 2018-06-11 NOTE — TELEPHONE ENCOUNTER
----- Message from Portia Mitchell MD sent at 6/11/2018  2:46 PM CDT -----  Please start Ampyra, please!

## 2018-06-13 RX ORDER — DALFAMPRIDINE 10 MG/1
10 TABLET, FILM COATED, EXTENDED RELEASE ORAL 2 TIMES DAILY
Qty: 60 TABLET | Refills: 3 | Status: SHIPPED | OUTPATIENT
Start: 2018-06-13 | End: 2018-09-20 | Stop reason: SDUPTHER

## 2018-06-13 NOTE — TELEPHONE ENCOUNTER
Troy Mitchell and staff,    At this time [facility] does not have access to dispense Ampyra.     Please send to [facility].   The prior authorization has been completed.        To complete this in EPIC  1. The original order MUST be discontinued and re-typed as a new prescription with the updated pharmacy listed.     2. I have added [facility] to the patients preferred pharmacies.      3. Uncheck the box that says [facility] AND Check box with [facility] (pharmacy)    *do not  click reorder -- as it will continue to route the rx to [facility] even if the pharmacy is changed.     Please opt the patient out of [facility] when the BPA is fired.     Thank you,  Caprice AKBAR

## 2018-06-13 NOTE — TELEPHONE ENCOUNTER
DOCUMENTATION ONLY  FYI  Ampyra does not require a prior authorization through the patient's insurance.    Copay: $8.35    Patient Assistance IS NOT required.  Ampyra is MILKA.  TOMY

## 2018-06-14 ENCOUNTER — PATIENT MESSAGE (OUTPATIENT)
Dept: NEUROLOGY | Facility: CLINIC | Age: 38
End: 2018-06-14

## 2018-06-15 ENCOUNTER — TELEPHONE (OUTPATIENT)
Dept: PSYCHIATRY | Facility: CLINIC | Age: 38
End: 2018-06-15

## 2018-06-15 DIAGNOSIS — G35 MULTIPLE SCLEROSIS: Primary | ICD-10-CM

## 2018-06-15 NOTE — TELEPHONE ENCOUNTER
Called pt to discuss counseling services, application for cooling vest, and [place] rehab.     Will schedule pt for Friday, July 13 at 11am.  Will discuss [place] Re[facility] with pt during session.    Sent application to pt with envelope pre-addressed to ELENA

## 2018-07-03 ENCOUNTER — TELEPHONE (OUTPATIENT)
Dept: DERMATOLOGY | Facility: CLINIC | Age: 38
End: 2018-07-03

## 2018-07-03 ENCOUNTER — OFFICE VISIT (OUTPATIENT)
Dept: INTERNAL MEDICINE | Facility: CLINIC | Age: 38
End: 2018-07-03
Payer: MEDICARE

## 2018-07-03 ENCOUNTER — LAB VISIT (OUTPATIENT)
Dept: LAB | Facility: HOSPITAL | Age: 38
End: 2018-07-03
Attending: INTERNAL MEDICINE
Payer: MEDICARE

## 2018-07-03 VITALS
WEIGHT: 246.94 LBS | HEIGHT: 65 IN | HEART RATE: 70 BPM | DIASTOLIC BLOOD PRESSURE: 78 MMHG | SYSTOLIC BLOOD PRESSURE: 140 MMHG | TEMPERATURE: 98 F | BODY MASS INDEX: 41.14 KG/M2

## 2018-07-03 DIAGNOSIS — Z00.00 ANNUAL PHYSICAL EXAM: ICD-10-CM

## 2018-07-03 DIAGNOSIS — F32.1 CURRENT MODERATE EPISODE OF MAJOR DEPRESSIVE DISORDER WITHOUT PRIOR EPISODE: ICD-10-CM

## 2018-07-03 DIAGNOSIS — Z00.00 ANNUAL PHYSICAL EXAM: Primary | ICD-10-CM

## 2018-07-03 DIAGNOSIS — R11.0 NAUSEA: ICD-10-CM

## 2018-07-03 DIAGNOSIS — Z11.4 ENCOUNTER FOR SCREENING FOR HIV: ICD-10-CM

## 2018-07-03 DIAGNOSIS — R71.8 OTHER ABNORMALITY OF RED BLOOD CELLS: ICD-10-CM

## 2018-07-03 DIAGNOSIS — Z11.3 SCREEN FOR STD (SEXUALLY TRANSMITTED DISEASE): ICD-10-CM

## 2018-07-03 DIAGNOSIS — E03.9 HYPOTHYROIDISM, UNSPECIFIED TYPE: ICD-10-CM

## 2018-07-03 DIAGNOSIS — R21 RASH AND NONSPECIFIC SKIN ERUPTION: ICD-10-CM

## 2018-07-03 DIAGNOSIS — E66.01 MORBID OBESITY WITH BMI OF 40.0-44.9, ADULT: ICD-10-CM

## 2018-07-03 DIAGNOSIS — G35 MULTIPLE SCLEROSIS: ICD-10-CM

## 2018-07-03 DIAGNOSIS — I10 ESSENTIAL HYPERTENSION: ICD-10-CM

## 2018-07-03 LAB
BASOPHILS # BLD AUTO: 0.05 K/UL
BASOPHILS NFR BLD: 0.4 %
DIFFERENTIAL METHOD: ABNORMAL
EOSINOPHIL # BLD AUTO: 0.2 K/UL
EOSINOPHIL NFR BLD: 1.6 %
ERYTHROCYTE [DISTWIDTH] IN BLOOD BY AUTOMATED COUNT: 14.8 %
HCT VFR BLD AUTO: 36.3 %
HGB BLD-MCNC: 12.1 G/DL
IMM GRANULOCYTES # BLD AUTO: 0.04 K/UL
IMM GRANULOCYTES NFR BLD AUTO: 0.3 %
LYMPHOCYTES # BLD AUTO: 1.8 K/UL
LYMPHOCYTES NFR BLD: 14.5 %
MCH RBC QN AUTO: 28.2 PG
MCHC RBC AUTO-ENTMCNC: 33.3 G/DL
MCV RBC AUTO: 85 FL
MONOCYTES # BLD AUTO: 0.8 K/UL
MONOCYTES NFR BLD: 6.4 %
NEUTROPHILS # BLD AUTO: 9.5 K/UL
NEUTROPHILS NFR BLD: 76.8 %
NRBC BLD-RTO: 0 /100 WBC
PLATELET # BLD AUTO: 300 K/UL
PMV BLD AUTO: 12.4 FL
RBC # BLD AUTO: 4.29 M/UL
T3FREE SERPL-MCNC: 2.3 PG/ML
T4 FREE SERPL-MCNC: 0.87 NG/DL
THYROGLOB AB SERPL IA-ACNC: 561.1 IU/ML
THYROPEROXIDASE IGG SERPL-ACNC: 1370.1 IU/ML
TSH SERPL DL<=0.005 MIU/L-ACNC: 20.24 UIU/ML
WBC # BLD AUTO: 12.43 K/UL

## 2018-07-03 PROCEDURE — 86592 SYPHILIS TEST NON-TREP QUAL: CPT

## 2018-07-03 PROCEDURE — 84439 ASSAY OF FREE THYROXINE: CPT

## 2018-07-03 PROCEDURE — 99204 OFFICE O/P NEW MOD 45 MIN: CPT | Mod: S$PBB,,, | Performed by: INTERNAL MEDICINE

## 2018-07-03 PROCEDURE — 86696 HERPES SIMPLEX TYPE 2 TEST: CPT

## 2018-07-03 PROCEDURE — 84481 FREE ASSAY (FT-3): CPT

## 2018-07-03 PROCEDURE — 99999 PR PBB SHADOW E&M-EST. PATIENT-LVL V: CPT | Mod: PBBFAC,,, | Performed by: INTERNAL MEDICINE

## 2018-07-03 PROCEDURE — 84445 ASSAY OF TSI GLOBULIN: CPT

## 2018-07-03 PROCEDURE — 86376 MICROSOMAL ANTIBODY EACH: CPT

## 2018-07-03 PROCEDURE — 85025 COMPLETE CBC W/AUTO DIFF WBC: CPT

## 2018-07-03 PROCEDURE — 99215 OFFICE O/P EST HI 40 MIN: CPT | Mod: PBBFAC,PO | Performed by: INTERNAL MEDICINE

## 2018-07-03 PROCEDURE — 84443 ASSAY THYROID STIM HORMONE: CPT

## 2018-07-03 PROCEDURE — 36415 COLL VENOUS BLD VENIPUNCTURE: CPT | Mod: PO

## 2018-07-03 PROCEDURE — 80074 ACUTE HEPATITIS PANEL: CPT

## 2018-07-03 PROCEDURE — 86800 THYROGLOBULIN ANTIBODY: CPT

## 2018-07-03 PROCEDURE — 86703 HIV-1/HIV-2 1 RESULT ANTBDY: CPT

## 2018-07-03 RX ORDER — CARVEDILOL 25 MG/1
25 TABLET ORAL 2 TIMES DAILY WITH MEALS
Qty: 60 TABLET | Refills: 2 | Status: SHIPPED | OUTPATIENT
Start: 2018-07-03 | End: 2018-10-25 | Stop reason: SDUPTHER

## 2018-07-03 RX ORDER — DIMETHYL FUMARATE 120-240 MG
KIT ORAL
COMMUNITY
Start: 2018-06-14 | End: 2019-01-17

## 2018-07-03 RX ORDER — CLONIDINE HYDROCHLORIDE 0.1 MG/1
0.1 TABLET ORAL
Status: COMPLETED | OUTPATIENT
Start: 2018-07-03 | End: 2018-07-03

## 2018-07-03 RX ADMIN — CLONIDINE HYDROCHLORIDE 0.1 MG: 0.1 TABLET ORAL at 11:07

## 2018-07-03 NOTE — LETTER
July 4, 2018      Portia Mitchell MD  [address]  [facility] [address]           [facility]  [address] [address]  Phone: [phone]  Fax: [phone]          Patient: Jaylon Bowles   MR Number: [id number]   YOB: 1980   Date of Visit: 7/3/2018       Dear Dr. Portia Mitchell:    Thank you for referring Jaylon Bowles to me for evaluation. Attached you will find relevant portions of my assessment and plan of care.    If you have questions, please do not hesitate to call me. I look forward to following Jaylon Bowles along with you.    Sincerely,    Cat Ramachandran MD    Enclosure  CC:  No Recipients    If you would like to receive this communication electronically, please contact [email or web address] or [phone] to request more information on [employer] Link access.    For providers and/or their staff who would like to refer a patient to [facility], please contact us through our one-stop-shop provider referral line, [facility], at [phone].    If you feel you have received this communication in error or would no longer like to receive these types of communications, please e-mail [email or web address]

## 2018-07-03 NOTE — TELEPHONE ENCOUNTER
----- Message from Netta Lisa sent at 7/3/2018 11:44 AM CDT -----  Dr Cat Ramachandran entered a referral for patient to be seen for a rash.  Patient is wanting to be seen sooner if possible.  Please call patient to advise.    Rash and nonspecific skin eruption [R21]    ThanksGloria

## 2018-07-03 NOTE — PROGRESS NOTES
Subjective:      Jaylon Bowles is a 37 y.o. female who presents for annual exam.    Previously on coreg and HCTZ but she stopped HCTZ due to drowsiness.    Family History:  family history includes Diabetes in her father; Hypertension in her mother; Stroke in her father; Thyroid disease in her mother.    Health Maintenance:  Health Maintenance       Date Due Completion Date    TETANUS VACCINE 08/10/1997 ---    Pap Smear with HPV Cotest 04/23/2017 4/23/2014    Influenza Vaccine 08/01/2018 ---        Eye exam: regularly  Dental Exam: no complaints  Last Pap: 4/2014, due    STD testing: requests    Body mass index is 41.09 kg/m².    Meds:   Current Outpatient Prescriptions:     baclofen (LIORESAL) 10 MG tablet, Take 10 mg by mouth 2 (two) times daily as needed. , Disp: , Rfl:     betamethasone dipropionate (DIPROLENE) 0.05 % cream, , Disp: , Rfl:     carvedilol (COREG) 25 MG tablet, Take 1 tablet (25 mg total) by mouth 2 (two) times daily with meals. Taking daily, Disp: 60 tablet, Rfl: 2    dalfampridine (AMPYRA) 10 mg Tb12, Take 10 mg by mouth 2 (two) times daily., Disp: 60 tablet, Rfl: 3    fluoxetine (PROZAC) 20 MG capsule, TAKE 1 CAPSULE BY MOUTH TWICE DAILY, Disp: 60 capsule, Rfl: 0    oxybutynin (DITROPAN-XL) 10 MG 24 hr tablet, Take 1 tablet (10 mg total) by mouth once daily., Disp: 30 tablet, Rfl: 11    polyethylene glycol (GLYCOLAX) 17 gram/dose powder, Take 17 g by mouth once daily., Disp: 1530 g, Rfl: 2    TECFIDERA 120 mg (14)- 240 mg (46) CpDR, , Disp: , Rfl:     vitamin D 1000 units Tab, Take 1,000 Units by mouth once daily., Disp: , Rfl:     REBIF, WITH ALBUMIN, 44 mcg/0.5 mL injection, INJECT 44 mcg SUBCUTANEOUSLY THREE times PER WEEK, Disp: , Rfl: 11    PMHx:   Past Medical History:   Diagnosis Date    Depression     Hypertension     Migraine headache     Multiple sclerosis     Thyroid disease        PSHx:   History reviewed. No pertinent surgical history.    SocHx:   Social History      Social History    Marital status: Single     Spouse name: N/A    Number of children: N/A    Years of education: N/A     Social History Main Topics    Smoking status: Never Smoker    Smokeless tobacco: Never Used    Alcohol use No      Comment: social    Drug use: No    Sexual activity: Not Currently     Partners: Male     Birth control/ protection: None     Other Topics Concern    None     Social History Narrative    None       Review of Systems   Constitutional: Negative for chills, fatigue and fever.   HENT: Negative for congestion, dental problem, ear discharge, ear pain, mouth sores, postnasal drip, rhinorrhea and sinus pressure.    Eyes: Negative for redness and visual disturbance.   Respiratory: Negative for cough, chest tightness and shortness of breath.    Cardiovascular: Negative for chest pain, palpitations and leg swelling.   Gastrointestinal: Negative for abdominal pain, constipation, diarrhea, nausea and vomiting.   Endocrine: Positive for polydipsia and polyphagia. Negative for cold intolerance and heat intolerance.   Genitourinary: Negative.  Negative for dysuria, frequency, hematuria and urgency.   Musculoskeletal: Positive for gait problem. Negative for arthralgias and myalgias.        Walks with rollator   Skin: Positive for rash (scaly rash on right lower leg).   Neurological: Positive for weakness and headaches (migraines). Negative for dizziness, light-headedness and numbness.   Hematological: Negative for adenopathy.   Psychiatric/Behavioral: Positive for dysphoric mood. The patient is not nervous/anxious.        Objective:      Physical Exam   Constitutional: She is oriented to person, place, and time. Vital signs are normal. She appears well-developed and well-nourished. No distress.   HENT:   Head: Normocephalic and atraumatic.   Right Ear: Hearing, tympanic membrane, external ear and ear canal normal. Tympanic membrane is not erythematous and not bulging.   Left Ear: Hearing,  external ear and ear canal normal. Tympanic membrane is erythematous. Tympanic membrane is not bulging.   Nose: Nose normal.   Mouth/Throat: Uvula is midline, oropharynx is clear and moist and mucous membranes are normal. No oropharyngeal exudate or posterior oropharyngeal erythema.   Eyes: Conjunctivae and lids are normal. Pupils are equal, round, and reactive to light. No scleral icterus. Right eye exhibits nystagmus. Left eye exhibits nystagmus.   Neck: Normal range of motion. Neck supple. No thyroid mass and no thyromegaly present.   Cardiovascular: Normal rate, regular rhythm, normal heart sounds and intact distal pulses.    No murmur heard.  Pulmonary/Chest: Effort normal and breath sounds normal. She has no wheezes.   Abdominal: Soft. Bowel sounds are normal. She exhibits no distension. There is no tenderness. There is no rigidity, no rebound and no guarding.   Musculoskeletal: Normal range of motion. She exhibits no edema.   Lymphadenopathy:     She has no cervical adenopathy.        Right: No supraclavicular adenopathy present.        Left: No supraclavicular adenopathy present.   Neurological: She is alert and oriented to person, place, and time. Coordination and gait abnormal.   Skin: Skin is warm, dry and intact. Lesion (raised, reddened lesion 3-4 cm in diameter, mildly pustular, no open lesions, no drainage) noted. No rash noted. She is not diaphoretic.   Psychiatric: She has a normal mood and affect.   Vitals reviewed.                Assessment:       1. Annual physical exam    2. Essential hypertension    3. Hypothyroidism, unspecified type    4. Current moderate episode of major depressive disorder without prior episode    5. Rash and nonspecific skin eruption    6. Multiple sclerosis    7. Nausea     8. Morbid obesity with BMI of 40.0-44.9, adult    9. Screen for STD (sexually transmitted disease)    10. Other abnormality of red blood cells     11. Encounter for screening for HIV         Plan:        1. Annual physical exam  - CBC auto differential; Future  - Ambulatory consult to Optometry  - reviewed recent labs    2. Essential hypertension  - cloNIDine tablet 0.1 mg; Take 1 tablet (0.1 mg total) by mouth one time.  - carvedilol (COREG) 25 MG tablet; Take 1 tablet (25 mg total) by mouth 2 (two) times daily with meals. Taking daily  Dispense: 60 tablet; Refill: 2  - monitor BP closely, consider adding ACE-I    3. Hypothyroidism, unspecified type  - T3, free; Future  - T4, free; Future  - Anti-thyroglobulin antibody; Future  - Thyroid peroxidase antibody; Future  - Thyroid stimulating immunoglobulin; Future  - TSH; Future    4. Current moderate episode of major depressive disorder without prior episode  - change prozac to lexapro    5. Rash and nonspecific skin eruption  - C. trachomatis/N. gonorrhoeae by AMP DNA Urine; Future  - Ambulatory Referral to Dermatology    6. Multiple sclerosis  - managed by neurology    7. Nausea   - Hepatitis panel, acute; Future    8. Morbid obesity with BMI of 40.0-44.9, adult  - reduce intake    9. Screen for STD (sexually transmitted disease)  - C. trachomatis/N. gonorrhoeae by AMP DNA Urine; Future  - HIV-1 and HIV-2 antibodies; Future  - Herpes simplex type 1&2 IgG,Herpes titer; Future  - Hepatitis panel, acute; Future  - RPR; Future    10. Other abnormality of red blood cells   - CBC auto differential; Future    11. Encounter for screening for HIV   - HIV-1 and HIV-2 antibodies; Future      RTC in 1 month or sooner if needed      Cat Ramachandran MD

## 2018-07-04 ENCOUNTER — PATIENT MESSAGE (OUTPATIENT)
Dept: INTERNAL MEDICINE | Facility: CLINIC | Age: 38
End: 2018-07-04

## 2018-07-04 DIAGNOSIS — E06.3 HASHIMOTO'S THYROIDITIS: ICD-10-CM

## 2018-07-04 PROBLEM — I10 ESSENTIAL HYPERTENSION: Status: ACTIVE | Noted: 2018-07-04

## 2018-07-04 PROBLEM — E66.01 MORBID OBESITY WITH BMI OF 40.0-44.9, ADULT: Status: ACTIVE | Noted: 2018-07-04

## 2018-07-04 RX ORDER — LEVOTHYROXINE SODIUM 100 UG/1
100 TABLET ORAL DAILY
Qty: 30 TABLET | Refills: 1 | Status: SHIPPED | OUTPATIENT
Start: 2018-07-04 | End: 2018-08-27 | Stop reason: SDUPTHER

## 2018-07-04 RX ORDER — INTERFERON BETA-1A 44 UG/.5ML
INJECTION, SOLUTION SUBCUTANEOUS
Refills: 11 | COMMUNITY
Start: 2018-05-16 | End: 2018-09-09

## 2018-07-04 RX ORDER — ESCITALOPRAM OXALATE 10 MG/1
10 TABLET ORAL DAILY
Qty: 30 TABLET | Refills: 0 | Status: SHIPPED | OUTPATIENT
Start: 2018-07-04 | End: 2018-08-07 | Stop reason: SDUPTHER

## 2018-07-05 LAB
HAV IGM SERPL QL IA: NEGATIVE
HBV CORE IGM SERPL QL IA: NEGATIVE
HBV SURFACE AG SERPL QL IA: NEGATIVE
HCV AB SERPL QL IA: NEGATIVE
HIV 1+2 AB+HIV1 P24 AG SERPL QL IA: NEGATIVE
RPR SER QL: NORMAL

## 2018-07-06 LAB
HSV1 IGG SERPL QL IA: POSITIVE
HSV2 IGG SERPL QL IA: NEGATIVE
TSI SER-ACNC: <0.1 IU/L

## 2018-07-11 ENCOUNTER — OFFICE VISIT (OUTPATIENT)
Dept: OPTOMETRY | Facility: CLINIC | Age: 38
End: 2018-07-11
Payer: MEDICARE

## 2018-07-11 DIAGNOSIS — H47.293 PARTIAL OPTIC ATROPHY OF BOTH EYES: ICD-10-CM

## 2018-07-11 DIAGNOSIS — H51.23 INO (INTERNUCLEAR OPHTHALMOPLEGIA), BILATERAL: ICD-10-CM

## 2018-07-11 DIAGNOSIS — H52.13 MYOPIA OF BOTH EYES: ICD-10-CM

## 2018-07-11 DIAGNOSIS — H55.04: ICD-10-CM

## 2018-07-11 DIAGNOSIS — G35 MS (MULTIPLE SCLEROSIS): Primary | ICD-10-CM

## 2018-07-11 PROCEDURE — 99212 OFFICE O/P EST SF 10 MIN: CPT | Mod: PBBFAC,PO | Performed by: OPTOMETRIST

## 2018-07-11 PROCEDURE — 92015 DETERMINE REFRACTIVE STATE: CPT | Mod: ,,, | Performed by: OPTOMETRIST

## 2018-07-11 PROCEDURE — 92014 COMPRE OPH EXAM EST PT 1/>: CPT | Mod: S$PBB,,, | Performed by: OPTOMETRIST

## 2018-07-11 PROCEDURE — 99999 PR PBB SHADOW E&M-EST. PATIENT-LVL II: CPT | Mod: PBBFAC,,, | Performed by: OPTOMETRIST

## 2018-07-11 NOTE — LETTER
July 11, 2018      Cat Ramachandran MD  [address]  [place] [address]           [place] [facility]  [address]  [place] [address]  Phone: [phone]  Fax: [phone]          Patient: Jaylon Bowles   MR Number: [id number]   YOB: 1980   Date of Visit: 7/11/2018       Dear Dr. Cat Ramachandran:    Thank you for referring Jaylon Bowles to me for evaluation. Attached you will find relevant portions of my assessment and plan of care.    If you have questions, please do not hesitate to call me. I look forward to following Jaylon Bowles along with you.    Sincerely,    Caprice Zaidi, OD    Enclosure  CC:  No Recipients    If you would like to receive this communication electronically, please contact [email or web address] or [phone] to request more information on [employer] Link access.    For providers and/or their staff who would like to refer a patient to [facility], please contact us through our one-stop-shop provider referral line, [facility] [place], at [phone].    If you feel you have received this communication in error or would no longer like to receive these types of communications, please e-mail [email or web address]

## 2018-07-11 NOTE — PROGRESS NOTES
HPI     DERRICK: 2 months ago.     Pt was diagnosed with MS in 2006. Pt says also reports getting migraines   everyday that last about an 1hr and has tried many different medications   but none have worked.  Pt says that she feels like her vision in her OD   has gotten worse and fee like its time for a new Rx but reports a h/o   Optic nerve inflammation.     (-)pain, irritation  (-)flashes, floaters  No gtts      Last edited by Caprice Zaidi, OD on 7/11/2018  2:29 PM. (History)            Assessment /Plan     For exam results, see Encounter Report.    MS (multiple sclerosis)    ELMA (internuclear ophthalmoplegia), bilateral    Nystagmus, dissociated    Partial optic atrophy of both eyes    Myopia of both eyes      1-4. Pt has a longstanding h/o of MS since 2006 and has had a series of optic neuritis issues in the past which had led to optic atrophy Ou. Educated pt of today's findings and that glasses would no longer improve vision OD due to atrophy. Continue annual exam with Dr. Barth.  5. Educated pt on change in Rx. Rx Final Rx. Recommend polycarb.  Glasses Prescription (7/11/2018)        [facility]    Right -5.25 Sphere  20/400    Left -5.75 +0.50 082 20/20    Type:  SVL    Expiration Date:  7/12/2019        RTC in 1 yr or prn.

## 2018-07-13 ENCOUNTER — OFFICE VISIT (OUTPATIENT)
Dept: PSYCHIATRY | Facility: CLINIC | Age: 38
End: 2018-07-13

## 2018-07-13 DIAGNOSIS — F43.23 ADJUSTMENT DISORDER WITH MIXED ANXIETY AND DEPRESSED MOOD: Primary | ICD-10-CM

## 2018-07-13 PROCEDURE — 99499 UNLISTED E&M SERVICE: CPT | Mod: S$GLB,,, | Performed by: SOCIAL WORKER

## 2018-07-13 NOTE — PROGRESS NOTES
Psychiatry Initial Visit (PhD/LCSW) - MSW Intern  Not a billable service    Date: 7/13/2018    Site: [facility]    Referral source: Patient was referred by their Neurology provider Portia Mitchell MD.    Clinical status of patient: Outpatient    Jaylon Bowles, a 37 y.o. female, for initial evaluation visit.  Met with patient and obtained additional information from available medical records. .    Chief complaint/reason for encounter: depression    History of present illness: Patient reports a history of depressed mood most days, loss of interest in activities, increased appetite and weight gain, restlessness and feeling keyed up, difficulty staying asleep and waking up fatigued, feelings of worthlessness, and irritability. She does not report suicidal ideation.     Pain: noncontributory    Symptoms:   · Mood: depressed mood, diminished interest, weight gain, hypersomnia, fatigue, worthlessness/guilt and social isolation  · Anxiety: restlessness/keyed up  · Substance abuse: See substance use below.   · Cognitive functioning: Patient reports some cognitive dysfunction secondary to Multiple Sclerosis.   · Health behaviors: noncontributory    Psychiatric history: has participated in counseling/psychotherapy on an outpatient basis in the past and found it somewhat helpful.    Medical history: Patient was diagnosed with Multiple Sclerosis in 2007.    Family history of psychiatric illness: Patient reports that her mother is on medication for anxiety.     Social history (marriage, employment, etc.): Patient was born and raised in [place]; she is the youngest of two sisters and one brother.  She reports her mother taking care of everything and her father never being around. She reports doing well in school until the 5th grade when she felt that her teacher was prejudiced against her. She had a difficult time making friends and reports being bullied because of her hair, skin, and weight. She shares that she  enjoyed reading and writing and had a good relationship with her siblings during childhood.     Patient moved to [place] with her parents 5 years ago and reports that she did not want to make this move and that she has been struggling with her social life ever since. Patient's father  within a few years of the move. Patient continues to live with her mother however, her oldest sister and brother live in [place] and her other sister lives in [place], [place] . She reports not being very close with her siblings anymore.     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 She shares that she is studying to become a CPA and would like to open her own tax office. Patient worked at [employer] but quit after feeling like she was being discriminated against by employer. Currently, patient is on disability but is interested in working part time. We discussed [facility] and she was given information on how they could help her. She was informed that she could contact SW outside of sessions for assistance in contacting [facility].     Patient reports being very spiritual and Anglican, she reads the bible and meditates often. She reports that her father was a  and that Caodaism has always been a positive force in her life. Unfortunately, she has not found a Nondenominational in [place] where she feels a part of the community and instead she chooses to Protestant alone or with her mother.     Patient's main goal for therapy is clarity about the way that she thinks.     Substance use:   Alcohol: infrequent   Drugs: Patient reports having smoked marijuana to ease symptoms of Multiple Sclerosis when she lived in [place].    Tobacco: none   Caffeine: not assessed    Current medications and drug reactions (include OTC, herbal): see medication list     Strengths and liabilities: Strength: Patient accepts guidance/feedback, Strength: Patient is  expressive/articulate., Strength: Patient is intelligent., Strength: Patient is motivated for change., Strength: Patient has reasonable judgment., Strength: Patient is stable., Liability: Patient is dependent., Liability: Patient has no suport network.    Current Evaluation:     Mental Status Exam:  General Appearance:  age appropriate, used a rollater.   Speech: normal tone, normal rate, normal pitch, normal volume      Level of Cooperation: cooperative      Thought Processes: normal and logical   Mood: depressed      Thought Content: normal, no suicidality, no homicidality, delusions, or paranoia   Affect: congruent and appropriate   Orientation: Oriented x3   Memory: intact   Attention Span & Concentration: intact   Fund of General Knowledge: intact and appropriate to age and level of education   Abstract Reasoning: intact   Judgment & Insight: good     Language  intact     Diagnostic Impression - Plan:       ICD-10-CM ICD-9-CM   1. Adjustment disorder with mixed anxiety and depressed mood F43.23 309.28       Plan:individual psychotherapy    Return to Clinic: 2 weeks, as scheduled, July 25 at 10:30 am.     Length of Service (minutes): 90

## 2018-07-25 ENCOUNTER — OFFICE VISIT (OUTPATIENT)
Dept: PSYCHIATRY | Facility: CLINIC | Age: 38
End: 2018-07-25

## 2018-07-25 DIAGNOSIS — F32.1 CURRENT MODERATE EPISODE OF MAJOR DEPRESSIVE DISORDER WITHOUT PRIOR EPISODE: Primary | ICD-10-CM

## 2018-07-25 PROCEDURE — 99499 UNLISTED E&M SERVICE: CPT | Mod: S$GLB,,, | Performed by: SOCIAL WORKER

## 2018-07-27 ENCOUNTER — CLINICAL SUPPORT (OUTPATIENT)
Dept: REHABILITATION | Facility: HOSPITAL | Age: 38
End: 2018-07-27
Attending: PSYCHIATRY & NEUROLOGY
Payer: MEDICARE

## 2018-07-27 DIAGNOSIS — Z74.09 IMPAIRED MOBILITY AND ADLS: ICD-10-CM

## 2018-07-27 DIAGNOSIS — Z78.9 IMPAIRED MOBILITY AND ADLS: ICD-10-CM

## 2018-07-27 DIAGNOSIS — M62.81 DECREASED MUSCLE STRENGTH: ICD-10-CM

## 2018-07-27 DIAGNOSIS — R27.8 DECREASED COORDINATION: ICD-10-CM

## 2018-07-27 DIAGNOSIS — Z74.09 IMPAIRED FUNCTIONAL MOBILITY, BALANCE, GAIT, AND ENDURANCE: ICD-10-CM

## 2018-07-27 DIAGNOSIS — R29.898 BILATERAL LEG WEAKNESS: ICD-10-CM

## 2018-07-27 PROCEDURE — 97162 PT EVAL MOD COMPLEX 30 MIN: CPT | Mod: PO

## 2018-07-27 PROCEDURE — G8988 SELF CARE GOAL STATUS: HCPCS | Mod: CJ,PO

## 2018-07-27 PROCEDURE — G8978 MOBILITY CURRENT STATUS: HCPCS | Mod: CK,PO

## 2018-07-27 PROCEDURE — G8979 MOBILITY GOAL STATUS: HCPCS | Mod: CJ,PO

## 2018-07-27 PROCEDURE — 97166 OT EVAL MOD COMPLEX 45 MIN: CPT | Mod: PO

## 2018-07-27 PROCEDURE — G8987 SELF CARE CURRENT STATUS: HCPCS | Mod: CK,PO

## 2018-07-27 NOTE — PLAN OF CARE
OUTPATIENT NEUROLOGICAL REHABILITATION  PHYSICAL THERAPY EVALUATION    Name: Jaylon Bowles  Clinic Number: [id number]    Diagnosis:   Encounter Diagnoses   Name Primary?    Impaired functional mobility, balance, gait, and endurance     Bilateral leg weakness      Physician: Portia Mitchell MD  Treatment Orders: PT Eval and Treat  Past Medical History:   Diagnosis Date    Depression     Hypertension     Migraine headache     Multiple sclerosis     Thyroid disease        Evaluation Date: 07/27/2018  Visit #: 1  Plan of care expiration: 9/21/18  Precautions: Fall, universal    History     Medical Diagnosis: Multiple Sclerosis  PT Diagnosis:   Encounter Diagnoses   Name Primary?    Impaired functional mobility, balance, gait, and endurance     Bilateral leg weakness        History of Present Illness: Jaylon is a 37 y.o. female that presents to [facility] secondary to Multiple Sclerosis. Pt had sx onset in 2006 with bilateral ON. She was diagnosed in 2007.  Pt complains of weakness in her LE and balance problems. Pt states her L leg is more affected- hurts more/stiffer. Pt states she tires easily and is affected by the heat. Pt states she is sleepy all the time. Pt mainly uses her rollator in the community and at house, but she does use a cane sometimes when she is planning to only go short distances in the community.     Chief complaints:  1. Impaired balance  2. Decreased LE strength  3. Decreased endurance  4. Frequent falls    Falls in the past year: about 7 in the past year- most falls happen when her legs give out while walking  Prior Therapy: prior pelvic PT this year, prior PT over the years  Nutrition:  Overweight  Social History/Support systems: lives with mom  Place of Residence (Steps/Adaptations/Levels): first floor apartment, no steps  Previous functional status includes: Pt has had balance and gait difficulties for several years now  Current functional status:  Mod  "I, fatigues easily  Exercise routine prior to onset : none- just downloaded workouts on phone to be more active  DME owned:  Straight cane and Rollator  Work/Job description includes:  None currently- accounting      Subjective   Pt stated goals: Pt would like to feel more steady and have more freedom. She would also like to not have to use her Rollator.   Family present/states: none present  Pain: 5/10 L leg- usually 8/10, stretching helps, laying on it/heat makes it worse    Objective     Patient's mobility presenting to therapy evaluation: walks with device- rollator  - Follows commands: 100% of time   - Speech: no deficits     Mental status: alert, oriented to person, place, and time  Appearance: Casually dressed, Showered and Well groomed  Behavior:  calm, cooperative and adequate rapport can be established  Attention Span and Concentration:  Normal    Dominant hand:  right     Posture Alignment in sitting:   Head: forward head   Scapulae: slouched posture   Pelvis: sacral sitting     Posture Alignment in standing:   Head: forward head   Scapulae: slouched posture     Sensation: Light Touch: Intact         Tone: normal    Visual/Auditory: R eye has gotten a lot worse- "keeps moving"- has been to opthalmologist recently; denies hearing changes    Coordination:   - LE coordination:   Impaired: difficulty changing speed, slow with alternating foot taps    ROM:          RANGE OF MOTION--LOWER EXTREMITIES  (R) LE Hip: normal   Knee: normal   Ankle: normal    (L) LE: Hip: normal   Knee: normal   Ankle: normal    Strength: manual muscle test grades below     Lower Extremity Strength  Right LE  Left LE    Hip Flexion: 3+/5 Hip Flexion: 3-/5   Hip Extension:  4/5 Hip Extension: 4/5   Hip Abduction: 5/5 Hip Abduction: 5/5   Hip Adduction: 4/5 Hip Adduction 4/5   Knee Extension: 5/5 Knee Extension: 4+/5   Knee Flexion: 4-/5 Knee Flexion: 3+/5   Ankle Dorsiflexion: 4+/5 Ankle Dorsiflexion: 3+/5   Ankle Plantarflexion: 5/5 " Ankle Plantarflexion: 3+/5     Flexibility: intact     Evaluation 07/27/2018   30 second Chair Rise 4 completed with arms from 18 inch mat   Tandem Stance L foot forward= 8 seconds  R foot forward= <2 seconds       Postural control: MCTSIB: Evaluation 07/27/2018   1. Eyes Open/feet together/Firm:  30 seconds   2. Eyes Closed/feet together/Firm:  30 seconds (on 3rd trial)  (7 and 16 seconds for 1st 2)   3. Eyes Open/feet together/Foam:  30 seconds   4. Eyes Closed/feet together/Foam:  23 seconds       GAIT ASSESSMENT  - AD used: Rollator  - Assistance: supervision  - Distance: limited community distances    Observed Gait Deviations:  Tymesha displays the following deviations with ambulation:  Abnormal and Drop-foot, decreased quincy, decreased step length, decreased stride length, decreased toe-to-floor clearance, decreased weight-shifting ability and excessive B toe out     Impairments contributing to deviations/impairments: impaired balance, impaired coordination, pain and decreased strength     Evaluation 07/27/2018   Timed Up and Go 29 sec with  Rollator   Self Selected Walking Speed 0.6 m/sec (6m/10s) with  Rollator     Endurance Deficit: moderate    Functional Mobility (Bed mobility, transfers)- occasionally needs Min A from mother to help with transfers and getting dressed  Bed mobility:  Mod I  Supine to sit:  Mod I  Sit to supine:  Mod I  Sit to stand:   Mod I  Stand pivot:    Mod I  Transfers to bed:  Mod I  Transfers to toilet: Mod I  Transfers to shower / tub:   Mod I  Car transfers:   Mod I  Wheelchair mobility: N/A    ADL's:  Feeding: I  Grooming: I  Hygiene: I  UB Dressing: Mod I  LB Dressing: Mod I  Toileting: Mod I  Bathing: Mod I    Functional Limitations Reports - G Codes  Category: Mobility   Tool: SSWS  Score: 0.6 m/sec  Current: CK at least 40% < 60% impaired, limited or restricted  Goal: CJ at least 20% < 40% impaired, limited or restricted    Education provided re:role of PT, goals for PT,  scheduling - pt verbalized understanding. Discussed insurance limitations with pt.     Jaylon verbalized good understanding of education provided.   Pt has no cultural, educational or language barriers to learning provided.      Assessment   This is a 37 y.o. female referred to outpatient physical therapy and presents with a medical diagnosis of Multiple Sclerosis.  Patient presents with impaired balance, LE strength, and endurance. Pt reports she falls frequently. Decreased muscular endurance evidenced by limited 30 second chair rise score and MMT. During the MMT for her LLE, she was unable to hold against resistance. She would break and then hold the resistance repeatedly. Pt also showed limitations with her balance. While she was able to hold the first 3 MCTSIB conditions for the entire 30 seconds, it took her several tries to reach this point and she felt very unsteady during conditions 2-4. Pt also had difficulty standing in tandem stance and was unable to attempt SLS. Pt's decreased LE strength and endurance is the likely cause for impaired balance. As her muscles fatigue, she becomes more unsteady.  Pt's TUG score puts her at an increased risk of falling. Her SSWS makes her a limited community Ambulator. Pt also walks with excessive B toe out. Her feet caught on her rollator several times while ambulating in the clinic because of this.  PT is warranted to increase the pt's strength, endurance, and balance so that he can participate more freely and safely in the community.    PT/PTA met face to face to discuss pt's treatment plan and established goals. Pt will be seen by physical therapy every 6th visit and minimally once per month.     Pt rehab potential is Good. Pt will benefit from continuing skilled outpatient physical therapy to address the deficits listed below in the problem list, provide pt/family education and to maximize pt's level of independence in the home and community environment.      History  Co-morbidities and personal factors that may impact the plan of care Examination  Body Structures and Functions, activity limitations and participation restrictions that may impact the plan of care    Clinical Presentation   Co-morbidities:   depression, high BMI and HTN        Personal Factors:   no deficits Body Regions:   lower extremities  upper extremities  trunk    Body Systems:    strength  gross coordinated movement  balance  gait            Participation Restrictions:   Limited community ambulation     Activity limitations:   Learning and applying knowledge  no deficits    General Tasks and Commands  no deficits    Communication  no deficits    Mobility  lifting and carrying objects  fine hand use (grasping/picking up)  walking    Self care  dressing  looking after one's health    Domestic Life  shopping  cooking  doing house work (cleaning house, washing dishes, laundry)  assisting others    Interactions/Relationships  no deficits    Life Areas  employment    Community and Social Life  community life  recreation and leisure         evolving clinical presentation with changing clinical characteristics                      moderate   high  high Decision Making/ Complexity Score:  moderate       Pt's spiritual, cultural and educational needs considered and pt agreeable to plan of care and goals as stated below:     GOALS:   Short term goals: 4 weeks, pt agrees to goals set.  1. Pt will increase her TUG speed to 24 seconds with a rollator in order to decrease her fall risk  2. Pt will increase SSWS to 0.75 m/sec with Rollator in order to increase her participation in the community  3. Pt will be able to perform 5 sit<>stands during 30 second chair rise test for increased muscular endurance  4. Pt will be able to perform MCTSIB condition 4 for 30 seconds to decrease fall risk  5. Pt will increase L hip flexion MMT to 3/5 to aid with gait ability  6. Pt will increase L knee flexion MMT to at least  4-/5 for improved gait mechanics  7. Pt will increase L ankle PF and DF MMT scores to at least 4-/5 for improved gait mechanics  8. Pt will have strength and endurance HEP in place    Long term goals: 8 weeks, pt agrees to goals set  9. Pt will increase her TUG speed to 19 seconds with a rollator in order to decrease her fall risk  10. Pt will increase SSWS to 0.85 m/sec with Rollator in order to increase her participation in the community  11. Pt will be able to perform 7 sit<>stands during 30 second chair rise test for increased muscular endurance  12. Pt will be able to stand with her R foot forward in tandem stance for at least 10 seconds to decrease fall risk  13. Pt will increase L hip flexion MMT to 3+/5 to aid with gait ability      Plan   Outpatient physical therapy 2 times weekly to include: Pt Education, Home Exercise Program, Therapeutic Exercises, Neuromuscular Re-education, Therapeutic Activities, Gait Training, Manual Therapy,  and modalities(Ultrasound/Phonophoresis, Electrical Stimulation/TENS/Interferential and Moist Heat/Ice) PRN to achieve established goals. Pt may be seen by PTA as part of the rehabilitation team.     Cont PT for  8 weeks.     Marilyn Pate, SPT  07/27/2018    I certify that I was present in the room directing the student in service delivery and guiding them using my skilled judgment. As the co-signing therapist I have reviewed the students documentation and am responsible for the treatment, assessment, and plan.     Marlys Mcclelland DPT

## 2018-07-27 NOTE — PLAN OF CARE
Occupational Therapy Initial Evaluation - Outpatient Neuro     Patient Name: Jaylon Bowles   Date: 7/27/2018  Physician: Portia Mitchell MD  Medical Diagnosis:   G35 (ICD-10-CM) - Multiple sclerosis   R26.9 (ICD-10-CM) - Gait disorder   M62.81 (ICD-10-CM) - Muscle weakness     OT Diagnosis:  1. Impaired mobility and ADLs     2. Decreased muscle strength     3. Decreased coordination         Date of service: 7/27/18   Visit # 1 / 10 auth   Authorization period: 12/31/18  Treatment certification period: 9/21/18     Start time: 1030  End time: 1115  Total time: 45 min     Orders: Eval and Treat    Subjective/ History      Past Medical History:   Diagnosis Date    Depression     Hypertension     Migraine headache     Multiple sclerosis     Thyroid disease      Review of patient's allergies indicates:   Allergen Reactions    Betaseron [interferon beta-1b] Hives       Medical Diagnosis: Multiple Sclerosis     History of present illness: Jaylon Bowles is a 37 y.o. female who presents to [facility] for evaluation and treatment secondary to MS.  Patient reports onset of symptoms in 2006 and diagnosis was confirmed in 2007.   She presents today with complaints of mm weakness (upper and lower extremities), fatigue, and  occassional tingling of fingers.  She reports these symptoms worsen in the heat.  She is mainly using her rollator for functional mobility in the home and community environment.  She reports increased lower extremity weakness has affected her gait balance and mobility.  She reports increased upper extremity weakness has affected her performance and participation in self care skills, especially bathing and dressing.       Precautions: standard, falls     Patient goals: improve strength and endurance for self care   Chief Complaint:    1. Decreased strength   2. Decreased self care skills    3. Fatigue/decreased endurance     Pain: 5/10 at rest.  8/10 with movement. Pain established using the Numbers pain scale.   Pain location: LLE   Pain description: mm tightness   Relieved by: rest   Aggravating factors: heat     Social Hx/Support Systems: lives with her mother   Home access: apartment on 1st floor.  Bathroom: tub-shower combo. No grab bars   DME: Straight cane and Rollator  Past treatment includes: OT OP in past for self care skills and memory   Dominant hand: right  Occupation/hobbies/homemaking: Currently not working. Previously worked in accounting.  Hobbies: writing, journaling - increased difficulty due to hand fatigue.   Driving status: active     Special Tests: see EPIC imaging     Objective     Cognitive Exam:  Oriented: Person, Place, Time and Situation  Behaviors: normal, cooperative  Follows Commands/attention: Follows multistep commands in evaluation   Communication: clear/fluent  Memory: Impaired STM per pt report - OT will assess memory in follow-up session   Safety awareness/insight to disability: aware of diagnosis, treatment, and prognosis  Coping skills/emotional control: pt reports problems with mood and depression - no problems noted in eval   Comments - pt reports heat sensitivity with symptoms of worsening cognition     Visual/Perceptual:  Tracking: impaired - visible eye fatigue, difficulty tracking to the L   Comments: Pt wears prescription glasses.  Pt reports R eye visual impairment with increased blurriness.     Physical Exam:  Postural examination/scapula alignment: Rounded shoulders, slouched posture, Increased kyphosis, R scapula slightly depressed   Joint integrity: WNL  Skin integrity: visibly intact. Skin discoloration - due to medication per pt report   Edema: none noted   Palpation: tenderness with palpation   Tremors: negative per report. None noted during eval     AROM:   - RUE: WNL   - LUE: WNL      Fist: B normal  Opposition: B normal     Strength      Right   Left    Shoulder flex 5/5 5/5   Shoulder abd 4/5 4/5    Shoulder ER 4/5 4/5   Shoulder IR 4/5 3+/5   Shoulder Extension 4+/5 4/5   Shoulder Horizontal adduction 4/5 4/5   Elbow flex 4+/5 4-/5   Elbow ext 4+/5 4-/5   Wrist flex 4+/5 4+/5   Wrist ext 4+/5 4+/5   Supination 4+/5 4-/5   Pronation 4+/5 4-/5   UD 5/5 5/5   RD 5/5 5/5      Strength: (SEGUN Dynamometer in lbs.) Average 3 trials, Position II:     7/27/2018 7/27/2018    Left Right   Rung II 35.5# 35#     Pinch Strength (Measured in psi)     7/27/2018 7/27/2018    Left Right   Key Pinch 6.5 psi 11 psi   3pt Pinch 6 psi 8 psi   2pt Pinch 4 psi 6 psi     Fine Motor Coordination: 9 Hole Peg Test  Right Left   35 sec  55 sec      Gross motor coordination:   - FIDELINA: B decreased movement speed   - Finger to Nose: R WNL, L impaired motor control   - Finger Flicks: B decreased movement speed     Tone:  Modified Catie Scale:   0 - No increase in muscle tone    Sensation:  Tymesha  reports occasional numbness in fingertips. Denies changes in sensation.   Light touch: bilateral intact    Balance:   Static Sit - normal   Dynamic sit- good   Static Stand - see PT eval    Dynamic stand - see PT eval      Endurance Deficit: moderate    Functional Status:  PLOF: independent - mod I                               Current:   Functional Mobility:  Bed mobility: Mod I   Roll to left: Mod I  Roll to right: Mod I  Supine to sit: Mod I  Sit to supine: Mod I  Transfers to bed: Min A.  Occasionally requires assistance from mother for transfers to bed   Transfers to toilet: Mod I  Car transfers: Mod I  Wheelchair mobility: N/A     ADL's:  Feeding: Mod I. Occasionally requires Min A to cut meats   Grooming: Mod I  Hygiene: Mod I  UB Dressing: Min A to don bra   LB Dressing: Mod A due to lower extremity weakness to lift legs and UE weakness to pull up clothing   Toileting: Mod I  Bathing: Mod A to wash lower body     IADL's:  Homecare: D  Cooking: D  Laundry: D  Yard work: N/A   Use of telephone: Mod I - slower hand movements   Money  management: Mod I  Medication management: Min A - reminders for medication   Comments: pt lives with her mother. Pt reports her mom performs most IADLs       OUTCOME MEASURE: FOTO    Category: Self Care      Current Score  = 48% or 52% impaired  Goal at Discharge Score = 50% or 50% impaired    Score interpretation is as follows:  CK = at least 40% but < 60% impaired, limited or restricted    TODAY'S TREATMENT  Pt was educated on role of OT, POC, OT goals, and scheduling.     Assessment     Jaylon Bowles is a 37 y.o. female with a medical diagnosis of Multiple Sclerosis referred to occupational therapy for evaluation and treatment. She presents with performance deficits of  physical nature including decreased mm strength, decreased UE coordination and movement speed, decreased tolerance to activities, and impaired functional mobility.  Patient reports recent exacerbation in symptoms as her deficits worsen in the hot weather.  Patient reports visual impairment and demonstrated visible eye fatigue with difficulty tracking to her L.  OT recommends continued routine care by Optometry for visual disturbances.  Additionally, pt reports change in cognition including problems with memory and mood.  She reports she is seeing Psychiatry for mental health issues.  OT will assess subjective problems with memory at first follow-up treatment session.      OT is warranted at this time to improve the patient's strength, endurance, and coordination as these deficits have limited Jaylon Bowles performance and participation in desired occupations, including but not limited to: self care skills, IADLs, community and social participation, and vocational and leisurely pursuits.      Jaylon will benefit from outpatient occupational therapy and a home program to address the stated goals to improve impairments and functional limitations, provide pt/family education, and to maximize pt's level of independence in the home and community  environment.   Rehab potential is fair - good 2/2 nature of diagnosis.      Profile and History Assessment of Occupational Performance Level of Clinical Decision Making Complexity Score   Occupational Profile:   Jaylon Bowles is a 37 y.o. female who lives with her mom and is currently not able to work due to worsening of MS related symptoms.  Jaylon Bowles has difficulty with  feeding, bathing, grooming and dressing  shopping, housework/household chores and medication management  affecting her daily functional abilities and overall quality of life. Her main goal for therapy is improve strength and endurance for self care skills.     Comorbidities:   Depression, Migraines, and HTN     Medical and Therapy History Review:   Expanded  ?  ? Performance Deficits  Physical:  Muscle Power/Strength  Muscle Endurance   Strength  Pinch Strength  Gross Motor Coordination  Fine Motor Coordination  Cognitive:  Memory  Emotional Control  Psychosocial:   limited community and social participation   ? Clinical Decision Making:  moderate  Assessment Process:  Detailed Assessments  Modification/Need for Assistance:  Minimal-Moderate Modifications/Assistance  Intervention Selection:  Several Treatment Options  ? moderate  Based on PMHX, co morbidities , data from assessments and functional level of assistance required with task and clinical presentation directly impacting function.        STG GOALS:  Time frame: 4 weeks   1. Patient will be independent in performance and progression of HEP.   2. Pt and caregiver will evaluate home for safety including checking lighting and hazards on floor such as rugs and cords in walkways to decrease risk of falls.    3. Pt will verbalize knowledge and understanding of energy conservation techniques and task simplification methods in order to conserve energy for performance of self care skills and home management tasks.   4. Patient will demonstrate knowledge and understanding of available  AD and HME to improve performance of self care skills, hobbies, and functional tasks such as dressing, feeding, writing, and home care.     LTG GOALS:  Time frame: 8 weeks or by discharge   1. Pt will display a one grade increase in MMT of all assessed muscles in order to improve performance and participation in self care skills and home management tasks.     2. Pt will increase B  strength 10 pounds to improve functional grasp, handling, carrying, and manipulation of objects.   3. Pt perform 9HPT in </= 30 sec to demonstrate improved B fine motor skills for daily self care including grooming and dressing.    4. Pt will tolerate x 10 min of level 3 UBE to demonstrate improved endurance for sustained functional tasks in the home environment.   5. Pt will tolerate x 15 min of dynamic standing tasks to demonstrate improved functional mobility and tolerance to functional activities.   6. Patient to score at 50% or more on FOTO to demonstrate improved perception of overall functional performance in the home environment.       Discussed Plan of Care with patient: Yes  Patient/caregiver understands and agrees with plan of care.       Plan     Outpatient occupational therapy 2  times weekly for 8 weeks.  Treatment to include: Manual therapy/joint mobilizations, Therapeutic exercises/activities., Strengthening, Neuromuscular re-ed, ADL training, AD and HME training/education, and Energy Conservation, as well as any other treatments deemed necessary based on the patient's needs or progress.     Treatment Certification Period: 9/21/18          Treatment Authorization Period: 12/31/18  Will avoid: excessive fatigue/ overheating      I certify the need for these services furnished under this plan of treatment and while under my care.____________________________________ Physician/Referring _______________________Practitioner Date of Signature

## 2018-07-27 NOTE — PROGRESS NOTES
Individual Psychotherapy (PhD/LCSW) - [facility] Intern  Not a billable service    7/25/2018    Site:  [facility]         Therapeutic Intervention: Met with patient.  Outpatient - Behavior modifying psychotherapy 60 min and Outpatient - Supportive psychotherapy 60 min    Chief complaint/reason for encounter: depression     Interval history and content of current session: Patient continues to report a depressed mood, loss of interest, increase in appetite and potential weight gain, difficulty sleeping, fatigue, and feelings of worthlessness everyday. She also reports restlessness and irritability.   Our conversation focused on her family, she shared that she feels like her mother and sister are conspiring against her and don't want her to be happy. She believes that her family wants her to remain dependent on them.    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   She also shared that she would like to lose weight, eat healthier, and improve her low self esteem. We discussed ways that she might be able to do this and she shared that she often has trouble just getting out of bed. We discussed the behavior activation chart and she was agreeable to doing at least one pleasurable activity everyday and completing at least one task that she is responsible for.   We also discussed how she sometimes gets frustrated and feels like she wants to just scream so we talked about journaling those experiences and we went through the process of how to complete the thought journal. She agreed to use this guide in between sessions when she has similar experiences.     Treatment plan:  · Target symptoms: depression  · Why chosen therapy is appropriate versus another modality: relevant to diagnosis  · Outcome monitoring methods: self-report,  observation  · Therapeutic intervention type: behavior modifying psychotherapy, supportive psychotherapy    Risk parameters:  Patient reports no suicidal ideation  Patient reports no homicidal ideation  Patient reports no self-injurious behavior  Patient reports no violent behavior    Verbal deficits: None    Patient's response to intervention:  The patient's response to intervention is accepting.    Progress toward goals and other mental status changes:  The patient's progress toward goals is limited.    Diagnosis:     ICD-10-CM ICD-9-CM   1. Current moderate episode of major depressive disorder without prior episode F32.1 296.22       Plan:  individual psychotherapy   - Behavioral activation  - Thought Journal    [facility]     Return to clinic: 2 weeks    Length of Service (minutes): 60

## 2018-07-30 ENCOUNTER — CLINICAL SUPPORT (OUTPATIENT)
Dept: REHABILITATION | Facility: HOSPITAL | Age: 38
End: 2018-07-30
Attending: PSYCHIATRY & NEUROLOGY
Payer: MEDICARE

## 2018-07-30 ENCOUNTER — DOCUMENTATION ONLY (OUTPATIENT)
Dept: REHABILITATION | Facility: HOSPITAL | Age: 38
End: 2018-07-30

## 2018-07-30 ENCOUNTER — PATIENT MESSAGE (OUTPATIENT)
Dept: PSYCHIATRY | Facility: CLINIC | Age: 38
End: 2018-07-30

## 2018-07-30 ENCOUNTER — PATIENT MESSAGE (OUTPATIENT)
Dept: INTERNAL MEDICINE | Facility: CLINIC | Age: 38
End: 2018-07-30

## 2018-07-30 DIAGNOSIS — M62.81 DECREASED MUSCLE STRENGTH: ICD-10-CM

## 2018-07-30 DIAGNOSIS — Z74.09 IMPAIRED FUNCTIONAL MOBILITY, BALANCE, GAIT, AND ENDURANCE: ICD-10-CM

## 2018-07-30 DIAGNOSIS — R29.898 BILATERAL LEG WEAKNESS: ICD-10-CM

## 2018-07-30 DIAGNOSIS — Z74.09 IMPAIRED MOBILITY AND ADLS: ICD-10-CM

## 2018-07-30 DIAGNOSIS — Z78.9 IMPAIRED MOBILITY AND ADLS: ICD-10-CM

## 2018-07-30 DIAGNOSIS — R27.8 DECREASED COORDINATION: ICD-10-CM

## 2018-07-30 PROCEDURE — 97110 THERAPEUTIC EXERCISES: CPT | Mod: PO

## 2018-07-30 PROCEDURE — 97530 THERAPEUTIC ACTIVITIES: CPT | Mod: PO

## 2018-07-30 NOTE — PROGRESS NOTES
"                                                    Physical Therapy Progress Note     Name: Jaylon Bowles  Clinic Number: [id number]  Diagnosis:   Encounter Diagnoses   Name Primary?    Impaired functional mobility, balance, gait, and endurance     Bilateral leg weakness      Physician: Portia Mitchell MD  Treatment Orders: PT Eval and Treat  Past Medical History:   Diagnosis Date    Depression     Hypertension     Migraine headache     Multiple sclerosis     Thyroid disease        Evaluation Date: 07/27/2018  Visit #: 2  Plan of care expiration: 9/21/18  Precautions: Fall, universal    G codes 2/10              Subjective   Pt reports: "I'm doing ok.  I'm a little tired."   Pain Scale:  A little back ache, unrated    Objective     Patient received individual therapy with activities as follows:     X 8 min of Nu Step recumbent stepper.  Level 2, B UE/B LE  Seated therex including:   2 x 30 sec of B LE HS Stretch with blue strap  X 30 reps of B LE DF/PF with OTB  X 15 reps of B LE LAQ with 3 sec hold  X 15 reps of B LE hip abduction   X 15 reps of B LE hip adduction squeeze with 3 sec hold  X 8 reps of sit to stand from EOM       Written Home Exercises: Seated exercises including: APs,   Pt demo good understanding of the education provided. Jaylon demonstrated good return demonstration of activities.     Education provided re: POC, HEP  No spiritual or educational barriers to learning provided    Pt has no cultural, educational or language barriers to learning provided.    Assessment   Jaylon tolerated her first tx session following initial evaluation well and did not have any problem.  Pt began cardiovascular endurance on the stepper and began B LE sitting strengthening exercises.  Pt was educated on sitting HEP and was able to demonstrate understanding.  Cont with POC.     Pt rehab potential is Good. Pt will benefit from continuing skilled outpatient physical therapy to address the deficits listed " below in the problem list, provide pt/family education and to maximize pt's level of independence in the home and community environment.              History  Co-morbidities and personal factors that may impact the plan of care Examination  Body Structures and Functions, activity limitations and participation restrictions that may impact the plan of care      Clinical Presentation   Co-morbidities:   depression, high BMI and HTN           Personal Factors:   no deficits Body Regions:   lower extremities  upper extremities  trunk     Body Systems:    strength  gross coordinated movement  balance  gait                 Participation Restrictions:   Limited community ambulation       Activity limitations:   Learning and applying knowledge  no deficits     General Tasks and Commands  no deficits     Communication  no deficits     Mobility  lifting and carrying objects  fine hand use (grasping/picking up)  walking     Self care  dressing  looking after one's health     Domestic Life  shopping  cooking  doing house work (cleaning house, washing dishes, laundry)  assisting others     Interactions/Relationships  no deficits     Life Areas  employment     Community and Social Life  community life  recreation and leisure             evolving clinical presentation with changing clinical characteristics                                moderate   high   high Decision Making/ Complexity Score:  moderate         Pt's spiritual, cultural and educational needs considered and pt agreeable to plan of care and goals as stated below:      GOALS:   Short term goals: 4 weeks, pt agrees to goals set.  1. Pt will increase her TUG speed to 24 seconds with a rollator in order to decrease her fall risk  2. Pt will increase SSWS to 0.75 m/sec with Rollator in order to increase her participation in the community  3. Pt will be able to perform 5 sit<>stands during 30 second chair rise test for increased muscular endurance  4. Pt will be able to  perform MCTSIB condition 4 for 30 seconds to decrease fall risk  5. Pt will increase L hip flexion MMT to 3/5 to aid with gait ability  6. Pt will increase L knee flexion MMT to at least 4-/5 for improved gait mechanics  7. Pt will increase L ankle PF and DF MMT scores to at least 4-/5 for improved gait mechanics  8. Pt will have strength and endurance HEP in place     Long term goals: 8 weeks, pt agrees to goals set  9. Pt will increase her TUG speed to 19 seconds with a rollator in order to decrease her fall risk  10. Pt will increase SSWS to 0.85 m/sec with Rollator in order to increase her participation in the community  11. Pt will be able to perform 7 sit<>stands during 30 second chair rise test for increased muscular endurance  12. Pt will be able to stand with her R foot forward in tandem stance for at least 10 seconds to decrease fall risk  13. Pt will increase L hip flexion MMT to 3+/5 to aid with gait ability          Plan   Outpatient physical therapy 2 times weekly to include: Pt Education, Home Exercise Program, Therapeutic Exercises, Neuromuscular Re-education, Therapeutic Activities, Gait Training, Manual Therapy,  and modalities(Ultrasound/Phonophoresis, Electrical Stimulation/TENS/Interferential and Moist Heat/Ice) PRN to achieve established goals. Pt may be seen by PTA as part of the rehabilitation team.     Lynn Fragoso, PTA   07/30/2018

## 2018-07-30 NOTE — PROGRESS NOTES
PT/PTA met face to face to discuss pt's treatment plan and progress towards established goals.  Continue with current PT POC with focus on endurance, strengthening, establish an HEP.  Patient will be seen by physical therapist at least every sixth treatment or 30 days, whichever occurs first.    Lynn Fragoso, PTA  07/30/2018

## 2018-07-30 NOTE — Clinical Note
Hey, just wanted to let you know that Ms. Bowles has expressed interest in participating in speech therapy. She voiced concerns about a worsening stutter as well as decreased attention and memory. I administered the MOCA assessment today showing problems in those areas. I believe she would benefit from an ST eval to further assess these deficits. If you believe this would be appropriate, please send an ST order.   Thanks,  MANAN Wallace LOTR

## 2018-07-30 NOTE — TELEPHONE ENCOUNTER
Has she used fioricet in past for migraines? She may want to OK the choice of agents with her Neurologist treating the MS.    She would need evaluation in clinic for evaluation of the flank pain and ear problems.

## 2018-07-30 NOTE — PROGRESS NOTES
"OCCUPATIONAL THERAPY DAILY TREATMENT NOTE   Patient:  Jaylon Bowles  Clinic #:  [id number]   Date of Note: 07/30/2018   Referring Physician:  Portia Mitchell MD  Diagnosis:  MS   Encounter Diagnoses   Name Primary?    Impaired mobility and ADLs     Decreased muscle strength     Decreased coordination         Visit Date: 07/30/2018  Visit #: 2 / 10  Authorization period expiration: 12/31/18  Treatment period expiration: 9/21/18  Precautions: standard, falls     Time In: 0900   Time Out: 0945    Total 1:1 Treatment Time: 45 min      Subjective   Pt reports: "I felt so sluggish this morning. I didn't want to get out of the bed. Are you going to work on my speech? I feel like I've been stuttering a lot."   Pain Scale: none reported   Pain Location: N/A      Objective      Patient seen by OT this date. Patient received individual therapy with activities as follows:     Patient participated in the MOCA assessment to assess cognitive skills x 17 min:   Total Score: 23/30 indicating a decline in cognitive functioning    - visuospatial/executive: 4/5    - naming: 3/3    - attention: 5/6    - language: 1/3   - abstraction: 2/2   - delayed recall: 2/5   - orientation: 6/6     Patient participated in therapeutic exercises x 28 min to maximize ROM, strength, and endurance.  The following exercises were included:     - UE pulleys for flexion x 2 min of continuous motion   - UBE level 1 x 3 min and x 3 min reverse   - 35#  PHG for sustained gripping, moving large pegs from pegboard to basket located in various planes in space to challenge functional reach x 1 trial each hand.   - patient was educated on HEP for  strengthening: pink theraputty gross gripping (see pt instructions). Patient performed x 5  squeezes on each hand to demonstrate understanding of education provided.       Home Exercise Program and Education:      Written Home Exercises:  strengthening (see pt instructions)   Pt demo good understanding of " the education provided. Jaylon demonstrated good return demonstration of activities.     Education provided re: POC, HEP  No spiritual or educational barriers to learning provided    Pt has no cultural, educational or language barriers to learning provided.    Assessment    Patient tolerated treatment session well.  MOCA assessment administered this date 2/2 patient reporting problems with short tem memory and attention during her initial evaluation.  Based on the results of the assessment, patient demonstrates deficts of language, attention, and delayed recall (STM) affecting her performance and participation in desired vocational pursuits and daily functioning.  Patient expressed interest in participating in speech therapy for cognitive functioning and for her stutter.  OT believes patient would be an appropriate candidate for a Speech Therapy evaluation to further evaluate these impairments.  OT initiated strength and mm endurance training this date without any problems.  She demonstrated impaired sustained grasp bilaterally and required min rest breaks throughout the treatment session 2/2 fatigue.  Pt will continue to benefit from skilled OT intervention.   Patient is making good progress toward established goals.  Patient continues to demonstrate limitation with decreased mm strength, decreased UE coordination and movement speed, decreased tolerance to activities, and impaired functional mobility.     STG GOALS:  Time frame: 4 weeks   1. Patient will be independent in performance and progression of HEP.   2. Pt and caregiver will evaluate home for safety including checking lighting and hazards on floor such as rugs and cords in walkways to decrease risk of falls.    3. Pt will verbalize knowledge and understanding of energy conservation techniques and task simplification methods in order to conserve energy for performance of self care skills and home management tasks.   4. Patient will demonstrate knowledge  and understanding of available AD and HME to improve performance of self care skills, hobbies, and functional tasks such as dressing, feeding, writing, and home care.      LTG GOALS:  Time frame: 8 weeks or by discharge   1. Pt will display a one grade increase in MMT of all assessed muscles in order to improve performance and participation in self care skills and home management tasks.     2. Pt will increase B  strength 10 pounds to improve functional grasp, handling, carrying, and manipulation of objects.   3. Pt perform 9HPT in </= 30 sec to demonstrate improved B fine motor skills for daily self care including grooming and dressing.    4. Pt will tolerate x 10 min of level 3 UBE to demonstrate improved endurance for sustained functional tasks in the home environment.   5. Pt will tolerate x 15 min of dynamic standing tasks to demonstrate improved functional mobility and tolerance to functional activities.   6. Patient to score at 50% or more on FOTO to demonstrate improved perception of overall functional performance in the home environment.     Plan      Continue 2 x week x 8 weeks during the certification period from 7/27/18 to 9/21/18  in pursuit of  OT goals.

## 2018-07-30 NOTE — PATIENT INSTRUCTIONS
KNEE: Extension, Long Arc Quads - Sitting        Raise leg until knee is straight.  __2_ reps per set, 10___ sets per day    Copyright © [employer]. All rights reserved.   High Stepping in Place (Sitting)        Sitting, alternately lift knees as high as possible. Keep torso erect.  Repeat _20___ times, each leg.    Copyright © I. All rights reserved.   Abduction: Isometric - Bilateral (Sitting)        Position Patient: Keep legs slightly apart, feet flat. [place]: Place hands on outside of both knees. Motion - Cue patient to press legs apart. - [place] blocks movement.  Hold ___ seconds. Relax. Repeat _20__ times. Do _1__ sessions per day. Variation:     Copyright © [employer]. All rights reserved.   Adduction: Hip - Knees Together (Sitting)        Sit with towel roll between knees. Push knees together. Hold for _5__ seconds. Rest for _5__ seconds.  Repeat 20___ times.     Copyright © [employer]. All rights reserved.   Ankle Bend: Dorsiflexion / Plantar Flexion, Sitting        Sit with feet on floor. Point toes up, keeping both heels on floor. Then press toes to floor raising heels. Hold each position _3__ seconds.  Repeat _20__ times per session.     Copyright © [employer]. All rights reserved.   KNEE: Extension, Long Arc Quads - Sitting      Copyright © I. All rights reserved.

## 2018-07-30 NOTE — PATIENT INSTRUCTIONS
Strengthening (Resistive Putty)        Squeeze putty using thumb and all fingers.  Repeat 15 times. Do 3-5 sessions per day. Every other day.     Copyright © [employer]. All rights reserved.

## 2018-08-01 ENCOUNTER — PATIENT MESSAGE (OUTPATIENT)
Dept: INTERNAL MEDICINE | Facility: CLINIC | Age: 38
End: 2018-08-01

## 2018-08-01 ENCOUNTER — TELEPHONE (OUTPATIENT)
Dept: INTERNAL MEDICINE | Facility: CLINIC | Age: 38
End: 2018-08-01

## 2018-08-01 RX ORDER — BUTALBITAL, ACETAMINOPHEN AND CAFFEINE 50; 325; 40 MG/1; MG/1; MG/1
1 TABLET ORAL EVERY 6 HOURS PRN
Qty: 20 TABLET | Refills: 0 | Status: SHIPPED | OUTPATIENT
Start: 2018-08-01 | End: 2018-08-31

## 2018-08-01 NOTE — TELEPHONE ENCOUNTER
Attempted to leave vm but mailbox was full. Will send mychart message to patient with Dr Ramachandran' recommendations.

## 2018-08-01 NOTE — TELEPHONE ENCOUNTER
----- Message from Ghada Ledesma sent at 8/1/2018 11:04 AM CDT -----  Contact: Self [phone]  Patient is returning a phone call.  Who left a message for the patient: Lesley  Does patient know what this is regarding:  Unknown  Comments:

## 2018-08-02 ENCOUNTER — DOCUMENTATION ONLY (OUTPATIENT)
Dept: REHABILITATION | Facility: HOSPITAL | Age: 38
End: 2018-08-02

## 2018-08-02 NOTE — PROGRESS NOTES
Patient: Jaylon Bowles  Date of Session: 8/2/2018  Diagnosis: No diagnosis found.  MRN: [id number]    Jaylon Bowles  no-showed today's appointment; appointment was scheduled for 10:30 am.  Pt did not call to cancel or reschedule today's appointment. This is the 1st appointment that she did not attend. Next appointment is scheduled for 8/7/18 and will follow up with patient at that time.  No charges have been posted today.

## 2018-08-03 ENCOUNTER — PATIENT MESSAGE (OUTPATIENT)
Dept: NEUROLOGY | Facility: CLINIC | Age: 38
End: 2018-08-03

## 2018-08-07 ENCOUNTER — CLINICAL SUPPORT (OUTPATIENT)
Dept: REHABILITATION | Facility: HOSPITAL | Age: 38
End: 2018-08-07
Attending: PSYCHIATRY & NEUROLOGY
Payer: MEDICARE

## 2018-08-07 DIAGNOSIS — Z74.09 IMPAIRED FUNCTIONAL MOBILITY, BALANCE, GAIT, AND ENDURANCE: ICD-10-CM

## 2018-08-07 DIAGNOSIS — R27.8 DECREASED COORDINATION: ICD-10-CM

## 2018-08-07 DIAGNOSIS — Z78.9 IMPAIRED MOBILITY AND ADLS: ICD-10-CM

## 2018-08-07 DIAGNOSIS — Z74.09 IMPAIRED MOBILITY AND ADLS: ICD-10-CM

## 2018-08-07 DIAGNOSIS — R29.898 BILATERAL LEG WEAKNESS: ICD-10-CM

## 2018-08-07 DIAGNOSIS — M62.81 DECREASED MUSCLE STRENGTH: ICD-10-CM

## 2018-08-07 PROCEDURE — 97110 THERAPEUTIC EXERCISES: CPT | Mod: PO

## 2018-08-07 PROCEDURE — 97530 THERAPEUTIC ACTIVITIES: CPT | Mod: PO

## 2018-08-07 RX ORDER — ESCITALOPRAM OXALATE 10 MG/1
TABLET ORAL
Qty: 30 TABLET | Refills: 0 | Status: SHIPPED | OUTPATIENT
Start: 2018-08-07 | End: 2018-09-05 | Stop reason: SDUPTHER

## 2018-08-07 RX ORDER — ESCITALOPRAM OXALATE 10 MG/1
10 TABLET ORAL DAILY
Qty: 30 TABLET | Refills: 0 | Status: SHIPPED | OUTPATIENT
Start: 2018-08-07 | End: 2018-08-07 | Stop reason: SDUPTHER

## 2018-08-07 NOTE — TELEPHONE ENCOUNTER
Faxed request.  Voicemail is full so I sent her a message on [employer] to make her follow up appt this month to continue refills.    1 refill ok on lexapro?  Last filled 7/4 and last seen 7/3/18.    Thanks shae

## 2018-08-07 NOTE — PROGRESS NOTES
"                                                    Physical Therapy Progress Note     Name: Jaylon Bowles  Clinic Number: [id number]  Diagnosis:   Encounter Diagnoses   Name Primary?    Impaired functional mobility, balance, gait, and endurance     Bilateral leg weakness      Physician: Portia Mitchell MD  Treatment Orders: PT Eval and Treat  Past Medical History:   Diagnosis Date    Depression     Hypertension     Migraine headache     Multiple sclerosis     Thyroid disease        Evaluation Date: 07/27/2018  Visit #: 3  Plan of care expiration: 9/21/18  Precautions: Fall, universal    G codes 3/10              Subjective   Pt reports: "My legs feel stiff and weak."   Pain Scale:  A little back ache, unrated    Objective     Patient received individual therapy with activities as follows:     X 8 min of Sci Fit recumbent stepper.  Level 1, B UE/B LE    therex including:    Supine:   2 x 30 sec of B LE HS Stretch with blue strap  2 x 8 SLR of B LE with Josie  2 x 8 bridges with glut set   X 15 reps of B LE hip adduction squeeze with 3 sec hold    Seated:  X 30 reps of B LE DF/PF with OTB  X 15 reps of B LE LAQ with 3 sec hold    Standing;  X 15 Heel raises with glut set  X 10 reps of B LE hip abduction   X 8 reps of sit to stand from EOM   2 x 10 mini-squats with UE support     Written Home Exercises: Seated exercises including: APs,   Pt demo good understanding of the education provided. Jaylon demonstrated good return demonstration of activities.     Education provided re: POC, HEP  No spiritual or educational barriers to learning provided    Pt has no cultural, educational or language barriers to learning provided.    Assessment     Pt tolerated today's tx well and reported feeling challenged throughout the session. Pt could benefit from hip/knee flexor and ankle DF strengthening during subsequent tx visits in order to promote proper gait mechanics. Will progress as tolerated. Cont with POC. Pt will " benefit from continuing skilled outpatient physical therapy to address the deficits listed below in the problem list, provide pt/family education and to maximize pt's level of independence in the home and community environment.              History  Co-morbidities and personal factors that may impact the plan of care Examination  Body Structures and Functions, activity limitations and participation restrictions that may impact the plan of care      Clinical Presentation   Co-morbidities:   depression, high BMI and HTN           Personal Factors:   no deficits Body Regions:   lower extremities  upper extremities  trunk     Body Systems:    strength  gross coordinated movement  balance  gait                 Participation Restrictions:   Limited community ambulation       Activity limitations:   Learning and applying knowledge  no deficits     General Tasks and Commands  no deficits     Communication  no deficits     Mobility  lifting and carrying objects  fine hand use (grasping/picking up)  walking     Self care  dressing  looking after one's health     Domestic Life  shopping  cooking  doing house work (cleaning house, washing dishes, laundry)  assisting others     Interactions/Relationships  no deficits     Life Areas  employment     Community and Social Life  community life  recreation and leisure             evolving clinical presentation with changing clinical characteristics                                moderate   high   high Decision Making/ Complexity Score:  moderate         Pt's spiritual, cultural and educational needs considered and pt agreeable to plan of care and goals as stated below:      GOALS:   Short term goals: 4 weeks, pt agrees to goals set.  1. Pt will increase her TUG speed to 24 seconds with a rollator in order to decrease her fall risk  2. Pt will increase SSWS to 0.75 m/sec with Rollator in order to increase her participation in the community  3. Pt will be able to perform 5 sit<>stands  during 30 second chair rise test for increased muscular endurance  4. Pt will be able to perform MCTSIB condition 4 for 30 seconds to decrease fall risk  5. Pt will increase L hip flexion MMT to 3/5 to aid with gait ability  6. Pt will increase L knee flexion MMT to at least 4-/5 for improved gait mechanics  7. Pt will increase L ankle PF and DF MMT scores to at least 4-/5 for improved gait mechanics  8. Pt will have strength and endurance HEP in place     Long term goals: 8 weeks, pt agrees to goals set  9. Pt will increase her TUG speed to 19 seconds with a rollator in order to decrease her fall risk  10. Pt will increase SSWS to 0.85 m/sec with Rollator in order to increase her participation in the community  11. Pt will be able to perform 7 sit<>stands during 30 second chair rise test for increased muscular endurance  12. Pt will be able to stand with her R foot forward in tandem stance for at least 10 seconds to decrease fall risk  13. Pt will increase L hip flexion MMT to 3+/5 to aid with gait ability          Plan   Outpatient physical therapy 2 times weekly to include: Pt Education, Home Exercise Program, Therapeutic Exercises, Neuromuscular Re-education, Therapeutic Activities, Gait Training, Manual Therapy,  and modalities(Ultrasound/Phonophoresis, Electrical Stimulation/TENS/Interferential and Moist Heat/Ice) PRN to achieve established goals. Pt may be seen by PTA as part of the rehabilitation team.     Rolando Bear, PTA   08/07/2018

## 2018-08-07 NOTE — PROGRESS NOTES
"OCCUPATIONAL THERAPY DAILY TREATMENT NOTE   Patient:  Jaylon Bowles  Clinic #:  [id number]   Date of Note: 08/07/2018   Referring Physician:  Portia Mitchell MD  Diagnosis:  MS   Encounter Diagnoses   Name Primary?    Impaired mobility and ADLs     Decreased muscle strength     Decreased coordination         Visit Date: 08/07/2018  Visit #: 3 / 10  Authorization period expiration: 12/31/18  Treatment period expiration: 9/21/18  Precautions: standard, falls     Time In: 0735    Time Out: 0815   Total 1:1 Treatment Time: 40 min      Subjective   Pt reports: "I've been doing my putty exercises. They're hard." "I want to be able to work again. I just wish I wasn't always so tired."   Pain Scale: min LBP  Pain Location: lower back     Objective      Patient seen by OT this date. Patient received individual therapy with activities as follows:     Patient participated in therapeutic exercises x 12 min to maximize ROM, strength, and endurance.  The following exercises were included:     - UE pulleys for flexion and abduction x 2 min each of continuous motion   - UBE level 1 x 4 min and x 4 min reverse     Not performed this date:   - 35#  PHG for sustained gripping, moving large pegs from pegboard to basket located in various planes in space to challenge functional reach x 1 trial each hand.     Patient participated in therapeutic activities x 28 min to improve independence and functional performance in the home environment.  The following activities were included:     - education on energy conversation and task simplification methods to improve performance and participation in ADLs and IADLs   - energy conservation planning sheet: patient brainstorming on how to conserve energy during meal prep in her kitchen   - education on available adaptive equipment to simplify self care, home management tasks, and other desired pursuits. AD reviewed included button hook, ADL kit, rocker knife, built up  utensils, built up " gripper on writing device, grab bars in shower, and shower chair.     Home Exercise Program and Education:      Written Home Exercises: Continue HEP   Pt demo good understanding of the education provided. Jaylon demonstrated good return demonstration of activities.     Education provided re: POC, HEP, energy conservation/task simplification, energy conservation planning   No spiritual or educational barriers to learning provided    Pt has no cultural, educational or language barriers to learning provided.    Assessment    Patient tolerated treatment session well.  Treatment session focused on knowledge and understanding of energy conservation techniques, task simplification methods, and available AD to improve independence and participation in self care and functional tasks in the home environment.  Patient would benefit from application of energy conservation and task simplification methods as she reports she fatigues easily and requires more assistance from her mother 2/2 onset of fatigue with tasks.  Patient was able to apply these strategies to real life situations and desired pursuits in the home environment by the end of the treatment session.  Patient demonstrated and verbalized knowledge and understanding of all education provided in treatment session.  Pt will continue to benefit from skilled OT intervention.   Patient is making good progress toward established goals.  Patient continues to demonstrate limitation with decreased mm strength, decreased UE coordination and movement speed, decreased tolerance to activities, and impaired functional mobility.     STG GOALS:  Time frame: 4 weeks   1. Patient will be independent in performance and progression of HEP.   2. Pt and caregiver will evaluate home for safety including checking lighting and hazards on floor such as rugs and cords in walkways to decrease risk of falls.  - met   3. Pt will verbalize knowledge and understanding of energy conservation  techniques and task simplification methods in order to conserve energy for performance of self care skills and home management tasks. - met   4. Patient will demonstrate knowledge and understanding of available AD and HME to improve performance of self care skills, hobbies, and functional tasks such as dressing, feeding, writing, and home care. - met      LTG GOALS:  Time frame: 8 weeks or by discharge   1. Pt will display a one grade increase in MMT of all assessed muscles in order to improve performance and participation in self care skills and home management tasks.     2. Pt will increase B  strength 10 pounds to improve functional grasp, handling, carrying, and manipulation of objects.   3. Pt perform 9HPT in </= 30 sec to demonstrate improved B fine motor skills for daily self care including grooming and dressing.    4. Pt will tolerate x 10 min of level 3 UBE to demonstrate improved endurance for sustained functional tasks in the home environment.   5. Pt will tolerate x 15 min of dynamic standing tasks to demonstrate improved functional mobility and tolerance to functional activities.   6. Patient to score at 50% or more on FOTO to demonstrate improved perception of overall functional performance in the home environment.     Plan      Continue 2 x week x 8 weeks during the certification period from 7/27/18 to 9/21/18  in pursuit of  OT goals.

## 2018-08-07 NOTE — PATIENT INSTRUCTIONS
Energy Conservation  TAKE TIME to plan a good work program.   Pace yourself daily and build activity level slowly.   Space activities over a period of time; use a calendar to plan ahead.   Plan a balance of work, recreation, exercise and rest.     CANCEL all tasks that are not necessary.   Let dishes air dry; eliminate ironing if possible.   Put away furnishing that need extra work. For example, knick knacks that  need dusting.    DELEGATE responsibility to others   Allow family and friends to help with tasks that are difficult or tiring.    ARRANGE equipment and supplies to save time and movement.    Organize work areas to make tools or appliances easy to reach and grasp.    SIT to work whenever possible.   Keep a stool or chair at different work areas; specifically kitchen and  Bathroom. Sit to fold clothes or to do food prep are examples of when to sit.    AVOID holding and carrying objects.   Use a cart on wheels to move items like garbage cans or laundry. Use rolling    Mop bucket.    Slide whenever possible like pots on countertop.    ADJUST the heights of work surfaces for good posture.   Work surface should be 2 inches below elbows. Have materials located near  you.     INCLUDE rest periods in your daily schedule.   Avoid fatigue, rest before you tire.   Hurry and worry will increase fatigue.    SIMPLIFY your methods of work until they are habit.   Use prepared foods, one step or one meal dishes.   Serve from pots on stove instead of transferring to serving dishes.   Do not begin activities that cannot be stopped if it is too fatiguing.   Use energy saving equipment (, electric can opener, etc.).        Grooming: Brushing Teeth        Use electric toothbrush to brush teeth. Sit to brush. Support elbow on table or counter.  Copyright © [employer]. All rights reserved.     Grooming: Drying Hair        Support elbows to dry hair. This is a good technique for other grooming tasks. Move head to save arm  movements.  Copyright © [employer]. All rights reserved.     Grooming: Shaving        Sit in chair to use electric shaver.  Support arm using other hand, or rest elbow on vanity.  Copyright © [employer]. All rights reserved.     Bathing: Drying Off        Put on philip gonzalez immediately after stepping out of shower or bath.  Sit down and pat to dry off.  Copyright © [employer]. All rights reserved.     Food Preparation: Pots / Dishware        Do not lift pots to serve food. Instead, tip pots and ladle food onto plates. Avoid heavy pans and dishes as much as possible.  Copyright © [employer]. All rights reserved.     Home Management: Pots / Pans        Leave frequently used pots on stove or counter instead of putting away and taking out each time.  Copyright © [employer]. All rights reserved.     Kitchen Clean Up        Place towel on work surface and washable non-skid mat on floor to minimize need to wipe counter or mop floor.  Copyright © [employer]. All rights reserved.     Home / Work Management: Laundry - Loading Wash        When preparing clothes for washing, keep items at waist height. Choose a top-loading rather than front-loading washing machine, if possible.  Copyright © [employer]. All rights reserved.     Housework: Sweeping        When sweeping, avoid bending over. Instead use a long-handled broom and dustpan.  Copyright © [employer]. All rights reserved.     Making Bed        Try sitting when tucking sheets.  Place bed in area where you can reach all corners without straining.  Use lightweight bedding. Down comforter or cotton coverlet can serve as cover.  Copyright © [employer]. All rights reserved.     Gardening        Use a tool belt or vest to carry tools. This saves extra trips to the tool shed. Alternate heavy work such as pruning, with lighter tasks. Sit whenever possible. Rest often.  Copyright © [employer]. All rights reserved.           Saving Energy ( Planning)    SELECT THE JOB to be improved. One that takes too long or makes you tired.  BREAK DOWN THE  "TASK.   Collect the items needed.   Decrease Body motions and look at positions.   What do we need to clean up and put away.  QUESTION THE JOB   Why is it necessary?   What is the purpose?   When should it be done?   Where should it be done?   How is the "best way" for me to do it?   Do I have to do it or can someone else do it?    DEVELOP THE METHOD   Eliminate unnecessary details   Combine motions and activities   Rearrange sequence of job   Simplify     APPLY NEW METHOD   Rearrange tools and equipment   Readjust working heights and areas   Set up Working areas: dishwashing, storage, cooking, rest/relaxation,  Mixing/chopping, sorting   Throw away things you do not use (unclutter).           "

## 2018-08-09 ENCOUNTER — LAB VISIT (OUTPATIENT)
Dept: LAB | Facility: HOSPITAL | Age: 38
End: 2018-08-09
Attending: INTERNAL MEDICINE
Payer: MEDICARE

## 2018-08-09 DIAGNOSIS — E06.3 HASHIMOTO'S THYROIDITIS: ICD-10-CM

## 2018-08-09 LAB — TSH SERPL DL<=0.005 MIU/L-ACNC: 3.38 UIU/ML

## 2018-08-09 PROCEDURE — 36415 COLL VENOUS BLD VENIPUNCTURE: CPT | Mod: PO

## 2018-08-09 PROCEDURE — 84443 ASSAY THYROID STIM HORMONE: CPT

## 2018-08-13 ENCOUNTER — PATIENT MESSAGE (OUTPATIENT)
Dept: OPTOMETRY | Facility: CLINIC | Age: 38
End: 2018-08-13

## 2018-08-14 ENCOUNTER — PATIENT MESSAGE (OUTPATIENT)
Dept: UROGYNECOLOGY | Facility: CLINIC | Age: 38
End: 2018-08-14

## 2018-08-14 ENCOUNTER — CLINICAL SUPPORT (OUTPATIENT)
Dept: REHABILITATION | Facility: HOSPITAL | Age: 38
End: 2018-08-14
Attending: PSYCHIATRY & NEUROLOGY
Payer: MEDICARE

## 2018-08-14 ENCOUNTER — PATIENT MESSAGE (OUTPATIENT)
Dept: DERMATOLOGY | Facility: CLINIC | Age: 38
End: 2018-08-14

## 2018-08-14 DIAGNOSIS — Z74.09 IMPAIRED FUNCTIONAL MOBILITY, BALANCE, GAIT, AND ENDURANCE: ICD-10-CM

## 2018-08-14 DIAGNOSIS — R27.8 DECREASED COORDINATION: ICD-10-CM

## 2018-08-14 DIAGNOSIS — R29.898 BILATERAL LEG WEAKNESS: ICD-10-CM

## 2018-08-14 DIAGNOSIS — Z74.09 IMPAIRED MOBILITY AND ADLS: ICD-10-CM

## 2018-08-14 DIAGNOSIS — M62.81 DECREASED MUSCLE STRENGTH: ICD-10-CM

## 2018-08-14 DIAGNOSIS — Z78.9 IMPAIRED MOBILITY AND ADLS: ICD-10-CM

## 2018-08-14 PROCEDURE — 97110 THERAPEUTIC EXERCISES: CPT | Mod: PO

## 2018-08-14 NOTE — PROGRESS NOTES
"                                                    Physical Therapy Progress Note     Name: Jaylon Bowles  Clinic Number: [id number]  Diagnosis:   Encounter Diagnoses   Name Primary?    Impaired functional mobility, balance, gait, and endurance     Bilateral leg weakness      Physician: Portia Mitchell MD  Treatment Orders: PT Eval and Treat  Past Medical History:   Diagnosis Date    Depression     Hypertension     Migraine headache     Multiple sclerosis     Thyroid disease        Evaluation Date: 07/27/2018  Visit #: 4  Plan of care expiration: 9/21/18  Precautions: Fall, universal    G codes 4/10              Subjective   Pt reports: "My legs are feeling weak and shaky today."  Pain Scale: denies    Objective     Patient received individual therapy with activities as follows:     Jaylon received therapeutic exercises to develop strength, endurance, ROM, flexibility, posture and core stabilization for 45 minutes including:   X 10 min of Sci Fit recumbent stepper.  Level 1, B UE/B LE    therex including:    Supine:   2 x 8 bridges with AD ball squeeze, 3" hold    Seated:  3 x 10 seated hip AB with GTB  X 20 alternating marching   2 X 10 reps of B LE LAQ, 1.5# cuff weights    Standing;  X 8 reps of sit to stand from EOM   2 X 10 standing heel raises with BUE support    Written Home Exercises: seated marching, seated hip AB with GTB, supine bridges with AD ball squeezes  Pt demo good understanding of the education provided. Jaylon demonstrated good return demonstration of activities.     Education provided re: POC, HEP  No spiritual or educational barriers to learning provided    Pt has no cultural, educational or language barriers to learning provided.    Assessment   Pt tolerated today's tx well despite reports of feeling tired prior to starting therapy. Therapy session focused on re-education of HEP as patient reports confusion and decreased compliance with current HEP. HEP focused mainly on hip " strengthening for improved stability at this time. Mobility remains limited by decreased strength and poor muscular endurance. Cont. POC to progress as tolerated.                History  Co-morbidities and personal factors that may impact the plan of care Examination  Body Structures and Functions, activity limitations and participation restrictions that may impact the plan of care      Clinical Presentation   Co-morbidities:   depression, high BMI and HTN           Personal Factors:   no deficits Body Regions:   lower extremities  upper extremities  trunk     Body Systems:    strength  gross coordinated movement  balance  gait                 Participation Restrictions:   Limited community ambulation       Activity limitations:   Learning and applying knowledge  no deficits     General Tasks and Commands  no deficits     Communication  no deficits     Mobility  lifting and carrying objects  fine hand use (grasping/picking up)  walking     Self care  dressing  looking after one's health     Domestic Life  shopping  cooking  doing house work (cleaning house, washing dishes, laundry)  assisting others     Interactions/Relationships  no deficits     Life Areas  employment     Community and Social Life  community life  recreation and leisure             evolving clinical presentation with changing clinical characteristics                                moderate   high   high Decision Making/ Complexity Score:  moderate         Pt's spiritual, cultural and educational needs considered and pt agreeable to plan of care and goals as stated below:      GOALS:   Short term goals: 4 weeks, pt agrees to goals set.  1. Pt will increase her TUG speed to 24 seconds with a rollator in order to decrease her fall risk  2. Pt will increase SSWS to 0.75 m/sec with Rollator in order to increase her participation in the community  3. Pt will be able to perform 5 sit<>stands during 30 second chair rise test for increased muscular  endurance  4. Pt will be able to perform MCTSIB condition 4 for 30 seconds to decrease fall risk  5. Pt will increase L hip flexion MMT to 3/5 to aid with gait ability  6. Pt will increase L knee flexion MMT to at least 4-/5 for improved gait mechanics  7. Pt will increase L ankle PF and DF MMT scores to at least 4-/5 for improved gait mechanics  8. Pt will have strength and endurance HEP in place     Long term goals: 8 weeks, pt agrees to goals set  9. Pt will increase her TUG speed to 19 seconds with a rollator in order to decrease her fall risk  10. Pt will increase SSWS to 0.85 m/sec with Rollator in order to increase her participation in the community  11. Pt will be able to perform 7 sit<>stands during 30 second chair rise test for increased muscular endurance  12. Pt will be able to stand with her R foot forward in tandem stance for at least 10 seconds to decrease fall risk  13. Pt will increase L hip flexion MMT to 3+/5 to aid with gait ability          Plan   Outpatient physical therapy 2 times weekly to include: Pt Education, Home Exercise Program, Therapeutic Exercises, Neuromuscular Re-education, Therapeutic Activities, Gait Training, Manual Therapy,  and modalities(Ultrasound/Phonophoresis, Electrical Stimulation/TENS/Interferential and Moist Heat/Ice) PRN to achieve established goals. Pt may be seen by PTA as part of the rehabilitation team.     Marlys Mcclelland, PT   08/14/2018

## 2018-08-14 NOTE — PROGRESS NOTES
"OCCUPATIONAL THERAPY DAILY TREATMENT NOTE   [facility]  Clinic #:  [id number]   Date of Note: 08/14/2018   Referring Physician:  Portia Mitchell MD  Diagnosis:  MS   Encounter Diagnoses   Name Primary?    Impaired mobility and ADLs     Decreased muscle strength     Decreased coordination         Visit Date: 08/14/2018  Visit #: 4 / 10  Authorization period expiration: 12/31/18  Treatment period expiration: 9/21/18  Precautions: standard, falls     Time In: 1345  Time Out: 1430   Total 1:1 Treatment Time: 45 min      Subjective   Pt reports: "I feel weak today. I think it's the heat."  Patient reported she fell this weekend while walking to her mom's bedroom.  She reports she doesn't remember how it happened.  She reports she sustained no serious injuries, but has some bruising on her LUE.      Pain Scale: min LBP  Pain Location: lower back     Objective      Patient seen by OT this date. Patient received individual therapy with activities as follows:     Patient participated in therapeutic exercises x 45 min to maximize ROM, strength, and endurance.  The following exercises were included:     - UE pulleys for flexion and abduction x 2 min each of continuous motion   - sitting EOM, patient performed BUE exercises with 3# dowel bhavani for 1 set of 10 reps of each of the following:    - chest press   - shoulder flex/ext   - overhead press    - shoulder abd/add    - rows    - bicep curls    - wrist flex/ext   - 35#  PHG for sustained gripping, moving large pegs from pegboard to basket located in various planes in space to challenge functional reach x 2 trials each hand.     - Education on TTB and shower bars as patient expressed difficulty with bathing including getting in and out of the bottom of the tub.     Home Exercise Program and Education:      Written Home Exercises: No new, Continue HEP   Pt demo good understanding of the education provided. Jaylon demonstrated good return demonstration of " activities.     Education provided re: POC, HEP, HME/AD, energy conservation/task simplification, energy conservation planning   No spiritual or educational barriers to learning provided    Pt has no cultural, educational or language barriers to learning provided.    Assessment:    Patient tolerated treatment session well.  OT initiated light resistance shoulder strengthening this date.  Patient demonstrated min exercise induced fatigue with progression and repetitions of shoulder exercises and required min rest breaks 2/2 fatigue.  She tolerated increased repetitions for  strengthening exercises without any problems. OT will continue to progress in mm strengthening and endurance as tolerated.  OT reviewed available DME to improve performance and independence for self care as patient reported continued difficulty with bathing.  Patient verbalized knowledge and understanding of education provided.  Pt will continue to benefit from skilled OT intervention.   Patient is making good progress toward established goals.  Patient continues to demonstrate limitation with decreased mm strength, decreased UE coordination and movement speed, decreased tolerance to activities, and impaired functional mobility.     STG GOALS:  Time frame: 4 weeks   1. Patient will be independent in performance and progression of HEP.   2. Pt and caregiver will evaluate home for safety including checking lighting and hazards on floor such as rugs and cords in walkways to decrease risk of falls.  - met   3. Pt will verbalize knowledge and understanding of energy conservation techniques and task simplification methods in order to conserve energy for performance of self care skills and home management tasks. - met   4. Patient will demonstrate knowledge and understanding of available AD and HME to improve performance of self care skills, hobbies, and functional tasks such as dressing, feeding, writing, and home care. - met      LTG GOALS:  Time  frame: 8 weeks or by discharge   1. Pt will display a one grade increase in MMT of all assessed muscles in order to improve performance and participation in self care skills and home management tasks.     2. Pt will increase B  strength 10 pounds to improve functional grasp, handling, carrying, and manipulation of objects.   3. Pt perform 9HPT in </= 30 sec to demonstrate improved B fine motor skills for daily self care including grooming and dressing.    4. Pt will tolerate x 10 min of level 3 UBE to demonstrate improved endurance for sustained functional tasks in the home environment.   5. Pt will tolerate x 15 min of dynamic standing tasks to demonstrate improved functional mobility and tolerance to functional activities.   6. Patient to score at 50% or more on FOTO to demonstrate improved perception of overall functional performance in the home environment.     Plan      Continue 2 x week x 8 weeks during the certification period from 7/27/18 to 9/21/18  in pursuit of  OT goals.

## 2018-08-15 ENCOUNTER — PATIENT MESSAGE (OUTPATIENT)
Dept: PSYCHIATRY | Facility: CLINIC | Age: 38
End: 2018-08-15

## 2018-08-16 ENCOUNTER — CLINICAL SUPPORT (OUTPATIENT)
Dept: REHABILITATION | Facility: HOSPITAL | Age: 38
End: 2018-08-16
Attending: PSYCHIATRY & NEUROLOGY
Payer: MEDICARE

## 2018-08-16 DIAGNOSIS — R27.8 DECREASED COORDINATION: ICD-10-CM

## 2018-08-16 DIAGNOSIS — Z74.09 IMPAIRED MOBILITY AND ADLS: ICD-10-CM

## 2018-08-16 DIAGNOSIS — R29.898 BILATERAL LEG WEAKNESS: ICD-10-CM

## 2018-08-16 DIAGNOSIS — Z78.9 IMPAIRED MOBILITY AND ADLS: ICD-10-CM

## 2018-08-16 DIAGNOSIS — M62.81 DECREASED MUSCLE STRENGTH: ICD-10-CM

## 2018-08-16 DIAGNOSIS — Z74.09 IMPAIRED FUNCTIONAL MOBILITY, BALANCE, GAIT, AND ENDURANCE: ICD-10-CM

## 2018-08-16 PROCEDURE — 97530 THERAPEUTIC ACTIVITIES: CPT | Mod: PO

## 2018-08-16 PROCEDURE — 97110 THERAPEUTIC EXERCISES: CPT | Mod: PO

## 2018-08-16 NOTE — PROGRESS NOTES
"OCCUPATIONAL THERAPY DAILY TREATMENT NOTE   Patient:  Jaylon Bowles  Clinic #:  [id number]   Date of Note: 08/16/2018   Referring Physician:  Portia Mitchell MD  Diagnosis:  MS   Encounter Diagnoses   Name Primary?    Impaired mobility and ADLs     Decreased muscle strength     Decreased coordination         Visit Date: 08/16/2018  Authorization period expiration: 12/31/18  Treatment period expiration: 9/21/18  Precautions: standard, falls     Visit #: 5 / 10   Time In: 1345   Time Out: 1430   Total 1:1 Treatment Time: 45 min      Subjective   Pt reports: "I still feel a little weak.  I've been doing my exercises."       Pain Scale: none reported   Pain Location: NA      Objective      Patient seen by OT this date. Patient received individual therapy with activities as follows:     Patient participated in therapeutic exercises x 35 min to maximize ROM, strength, and endurance.  The following exercises were included:   - UBE level 1 x 4 min and x 4 min reverse   - Red theraband flexbar wrist flexion/extension, pronation, supination, and RD, and UD x 15 reps each R and L   - Blue digiflex for composite  x 1 min x 2 sets on R and L   - Green digiflex for individual digit flexion x 1 min x 2 sets on R and L     Not performed this date:   - UE pulleys for flexion and abduction x 2 min each of continuous motion   - 35#  PHG for sustained gripping, moving large pegs from pegboard to basket located in various planes in space to challenge functional reach x 2 trials each hand.     Patient participated in therapeutic activities x 10 min to improve motor skills and overall independence and performance in the home environment.  The following activities were included:   - patient alternating hands to  marbles executing a fine point pinch to transfer them across the board   - finger to palm translation and palm to finger translation with marbles     Home Exercise Program and Education:      Written Home " Exercises: No new, Continue HEP   Pt demo good understanding of the education provided. Jaylon demonstrated good return demonstration of activities.     Education provided re: POC, HEP, HME/AD, energy conservation/task simplification, energy conservation planning   No spiritual or educational barriers to learning provided    Pt has no cultural, educational or language barriers to learning provided.    Assessment:    Patient tolerated treatment session well despite reports of feeling weak at arrival.  OT continues to focus on BUE strengthening to improve self care skills and independence in the home environment.  She was able to perform the above exercises without any problems and demonstrated only min exercise induced fatigue.  She continues with fine motor control and coordination limitations - L more impaired than R.  Pt will continue to benefit from skilled OT intervention.   Patient is making good progress toward established goals.  Patient continues to demonstrate limitation with decreased mm strength, decreased UE coordination and movement speed, decreased tolerance to activities, and impaired functional mobility.     STG GOALS:  Time frame: 4 weeks   1. Patient will be independent in performance and progression of HEP.   2. Pt and caregiver will evaluate home for safety including checking lighting and hazards on floor such as rugs and cords in walkways to decrease risk of falls.  - met   3. Pt will verbalize knowledge and understanding of energy conservation techniques and task simplification methods in order to conserve energy for performance of self care skills and home management tasks. - met   4. Patient will demonstrate knowledge and understanding of available AD and HME to improve performance of self care skills, hobbies, and functional tasks such as dressing, feeding, writing, and home care. - met      LTG GOALS:  Time frame: 8 weeks or by discharge   1. Pt will display a one grade increase in MMT of  all assessed muscles in order to improve performance and participation in self care skills and home management tasks.     2. Pt will increase B  strength 10 pounds to improve functional grasp, handling, carrying, and manipulation of objects.   3. Pt perform 9HPT in </= 30 sec to demonstrate improved B fine motor skills for daily self care including grooming and dressing.    4. Pt will tolerate x 10 min of level 3 UBE to demonstrate improved endurance for sustained functional tasks in the home environment.   5. Pt will tolerate x 15 min of dynamic standing tasks to demonstrate improved functional mobility and tolerance to functional activities.   6. Patient to score at 50% or more on FOTO to demonstrate improved perception of overall functional performance in the home environment.     Plan      Continue 2 x week x 8 weeks during the certification period from 7/27/18 to 9/21/18  in pursuit of  OT goals.

## 2018-08-16 NOTE — PROGRESS NOTES
"                                                    Physical Therapy Progress Note     Name: Jaylon Bowles  Clinic Number: [id number]  Diagnosis:   Encounter Diagnoses   Name Primary?    Impaired functional mobility, balance, gait, and endurance     Bilateral leg weakness      Physician: Portia Mitchell MD  Treatment Orders: PT Eval and Treat  Past Medical History:   Diagnosis Date    Depression     Hypertension     Migraine headache     Multiple sclerosis     Thyroid disease        Evaluation Date: 07/27/2018  Visit #: 5  Plan of care expiration: 9/21/18  Precautions: Fall, universal    G codes 5/10              Subjective   Pt reports: "I feel like I am walking slower and I feel like my thighs are stiff."  Pain Scale: denies    Objective     Patient received individual therapy with activities as follows:     Jaylon received therapeutic exercises to develop strength, endurance, ROM, flexibility, posture and core stabilization for 45 minutes including:   X 10 min of Sci Fit recumbent stepper.  Level 1, B UE/B LE    therex including:      Seated:  3 x 10 seated hip AB with GTB  3 x 10 reps of B LE DF/PF with GTB  X 20 alternating marching , #1.5 lb cuff weights  2 X 10 reps of B LE LAQ, 1.5# cuff weights  X 20 reps of B LE hip adduction squeeze with yellow ball  X 2 min of LTR with yellow weighted ball      Written Home Exercises: seated marching, seated hip AB with GTB, supine bridges with AD ball squeezes  Pt demo good understanding of the education provided. Jaylon demonstrated good return demonstration of activities.     Education provided re: POC, HEP  No spiritual or educational barriers to learning provided    Pt has no cultural, educational or language barriers to learning provided.    Assessment   Pt tolerated today's tx session well and did not have any problems.  Pt was able to progress to marching with #1.5 lb cuff weights and began core strengthening exercises with LTR.   POC to progress as " tolerated.                History  Co-morbidities and personal factors that may impact the plan of care Examination  Body Structures and Functions, activity limitations and participation restrictions that may impact the plan of care      Clinical Presentation   Co-morbidities:   depression, high BMI and HTN           Personal Factors:   no deficits Body Regions:   lower extremities  upper extremities  trunk     Body Systems:    strength  gross coordinated movement  balance  gait                 Participation Restrictions:   Limited community ambulation       Activity limitations:   Learning and applying knowledge  no deficits     General Tasks and Commands  no deficits     Communication  no deficits     Mobility  lifting and carrying objects  fine hand use (grasping/picking up)  walking     Self care  dressing  looking after one's health     Domestic Life  shopping  cooking  doing house work (cleaning house, washing dishes, laundry)  assisting others     Interactions/Relationships  no deficits     Life Areas  employment     Community and Social Life  community life  recreation and leisure             evolving clinical presentation with changing clinical characteristics                                moderate   high   high Decision Making/ Complexity Score:  moderate         Pt's spiritual, cultural and educational needs considered and pt agreeable to plan of care and goals as stated below:      GOALS:   Short term goals: 4 weeks, pt agrees to goals set.  1. Pt will increase her TUG speed to 24 seconds with a rollator in order to decrease her fall risk  2. Pt will increase SSWS to 0.75 m/sec with Rollator in order to increase her participation in the community  3. Pt will be able to perform 5 sit<>stands during 30 second chair rise test for increased muscular endurance  4. Pt will be able to perform MCTSIB condition 4 for 30 seconds to decrease fall risk  5. Pt will increase L hip flexion MMT to 3/5 to aid with gait  ability  6. Pt will increase L knee flexion MMT to at least 4-/5 for improved gait mechanics  7. Pt will increase L ankle PF and DF MMT scores to at least 4-/5 for improved gait mechanics  8. Pt will have strength and endurance HEP in place     Long term goals: 8 weeks, pt agrees to goals set  9. Pt will increase her TUG speed to 19 seconds with a rollator in order to decrease her fall risk  10. Pt will increase SSWS to 0.85 m/sec with Rollator in order to increase her participation in the community  11. Pt will be able to perform 7 sit<>stands during 30 second chair rise test for increased muscular endurance  12. Pt will be able to stand with her R foot forward in tandem stance for at least 10 seconds to decrease fall risk  13. Pt will increase L hip flexion MMT to 3+/5 to aid with gait ability          Plan   Outpatient physical therapy 2 times weekly to include: Pt Education, Home Exercise Program, Therapeutic Exercises, Neuromuscular Re-education, Therapeutic Activities, Gait Training, Manual Therapy,  and modalities(Ultrasound/Phonophoresis, Electrical Stimulation/TENS/Interferential and Moist Heat/Ice) PRN to achieve established goals. Pt may be seen by PTA as part of the rehabilitation team.     Lynn Fragoso, PTA   08/16/2018

## 2018-08-17 ENCOUNTER — OFFICE VISIT (OUTPATIENT)
Dept: PSYCHIATRY | Facility: CLINIC | Age: 38
End: 2018-08-17

## 2018-08-17 ENCOUNTER — PATIENT MESSAGE (OUTPATIENT)
Dept: INTERNAL MEDICINE | Facility: CLINIC | Age: 38
End: 2018-08-17

## 2018-08-17 DIAGNOSIS — F32.1 CURRENT MODERATE EPISODE OF MAJOR DEPRESSIVE DISORDER WITHOUT PRIOR EPISODE: Primary | ICD-10-CM

## 2018-08-17 PROCEDURE — 99499 UNLISTED E&M SERVICE: CPT | Mod: S$GLB,,, | Performed by: SOCIAL WORKER

## 2018-08-17 NOTE — PROGRESS NOTES
"Individual Psychotherapy (PhD/LCSW) - MSW Intern  Not a billable service    8/17/2018    Site:  [facility]         Therapeutic Intervention: Met with patient.  Outpatient - Behavior modifying psychotherapy 60 min and Outpatient - Supportive psychotherapy 60 min    Chief complaint/reason for encounter: depression     Interval history and content of current session: Patient denies any new symptoms of depression. Patient discussed how homework has helped and not helped her. She reports how the behavioral activation piece has motivated her to  old hobbies and maintain a more organized living space. The journaling has been harder for her to complete, instead she has been working with poetry more and reports that this has helped her find her passion again. She reports seeing patterns in her frustration that was previously "random", she claims her frustration stems from feeling trapped and like she cannot move forward with her life. We discussed her negative narration of her life and how reframing situations could be useful for gaining another perspective. She reports that financial aid for online classes didn't come through for her and she will have to wait another semester to begin, we are using this as an opportunity for her to work on her mental health and productivity.     Treatment plan:  · Target symptoms: depression  · Why chosen therapy is appropriate versus another modality: relevant to diagnosis  · Outcome monitoring methods: self-report, observation  · Therapeutic intervention type: behavior modifying psychotherapy, supportive psychotherapy    Risk parameters:  Patient reports no suicidal ideation  Patient reports no homicidal ideation  Patient reports no self-injurious behavior  Patient reports no violent behavior    Verbal deficits: None    Patient's response to intervention:  The patient's response to intervention is accepting.    Progress toward goals and other mental status changes:  The " patient's progress toward goals is fair .    Diagnosis:     ICD-10-CM ICD-9-CM   1. Current moderate episode of major depressive disorder without prior episode F32.1 296.22       Plan:  individual psychotherapy   Pt will continue journaling but will include positive encounters  Behavioral activation  Thought catalog     Return to clinic: 2 weeks    Length of Service (minutes): 60

## 2018-08-17 NOTE — TELEPHONE ENCOUNTER
She would need to be seen first before determining more about the problem before a referral could be done.

## 2018-08-21 ENCOUNTER — CLINICAL SUPPORT (OUTPATIENT)
Dept: REHABILITATION | Facility: HOSPITAL | Age: 38
End: 2018-08-21
Attending: PSYCHIATRY & NEUROLOGY
Payer: MEDICARE

## 2018-08-21 DIAGNOSIS — R27.8 DECREASED COORDINATION: ICD-10-CM

## 2018-08-21 DIAGNOSIS — R29.898 BILATERAL LEG WEAKNESS: ICD-10-CM

## 2018-08-21 DIAGNOSIS — Z74.09 IMPAIRED FUNCTIONAL MOBILITY, BALANCE, GAIT, AND ENDURANCE: ICD-10-CM

## 2018-08-21 DIAGNOSIS — M62.81 DECREASED MUSCLE STRENGTH: ICD-10-CM

## 2018-08-21 DIAGNOSIS — Z78.9 IMPAIRED MOBILITY AND ADLS: ICD-10-CM

## 2018-08-21 DIAGNOSIS — Z74.09 IMPAIRED MOBILITY AND ADLS: ICD-10-CM

## 2018-08-21 PROCEDURE — 97110 THERAPEUTIC EXERCISES: CPT | Mod: PO

## 2018-08-21 PROCEDURE — 97112 NEUROMUSCULAR REEDUCATION: CPT | Mod: PO

## 2018-08-21 PROCEDURE — 97530 THERAPEUTIC ACTIVITIES: CPT | Mod: PO

## 2018-08-21 NOTE — PROGRESS NOTES
"                                                    Physical Therapy Progress Note     Name: Jaylon Bowles  Clinic Number: [id number]  Diagnosis:   Encounter Diagnoses   Name Primary?    Impaired functional mobility, balance, gait, and endurance     Bilateral leg weakness      Physician: Portia Mitchell MD  Treatment Orders: PT Eval and Treat  Past Medical History:   Diagnosis Date    Depression     Hypertension     Migraine headache     Multiple sclerosis     Thyroid disease        Evaluation Date: 07/27/2018  Visit #: 6  Plan of care expiration: 9/21/18  Precautions: Fall, universal    G codes 6/10              Subjective   Pt reports: "Can we work on my balance today?"  Pain Scale: denies    Objective     Patient received individual therapy with activities as follows:     Jaylon received therapeutic exercises to develop strength, endurance, ROM, flexibility, posture and core stabilization for 15 minutes including:   X 10 min of recumbent bike.  Level 3, B UE/B LE  2 x 30 sec of B LE standing HS stretches on steps with 2 HR  2 x 30 sec of B LE standing HC stretches on incline    Neuromuscular re-education x 30 min to improve balance and coordination including:   4 x 30 sec of tandem stance with 1 UE support to occasional no UE support and CGA  X 4 laps of forward tandem ambulation with 1 UE support and CGA  X 4 laps of backward ambulation with 1 UE support and CGA  X 4 laps of marching with 3 sec hold and 1 UE support and CGA    Written Home Exercises: seated marching, seated hip AB with GTB, supine bridges with AD ball squeezes  Pt demo good understanding of the education provided. Jaylon demonstrated good return demonstration of activities.     Education provided re: POC, HEP  No spiritual or educational barriers to learning provided    Pt has no cultural, educational or language barriers to learning provided.    Assessment   Pt tolerated today's tx session well and did not have any problems.  Pt " began dynamic standing balance in the // bars and requires 1 UE support for safety.  No loss of balance noted.  Cont with POC.               History  Co-morbidities and personal factors that may impact the plan of care Examination  Body Structures and Functions, activity limitations and participation restrictions that may impact the plan of care      Clinical Presentation   Co-morbidities:   depression, high BMI and HTN           Personal Factors:   no deficits Body Regions:   lower extremities  upper extremities  trunk     Body Systems:    strength  gross coordinated movement  balance  gait                 Participation Restrictions:   Limited community ambulation       Activity limitations:   Learning and applying knowledge  no deficits     General Tasks and Commands  no deficits     Communication  no deficits     Mobility  lifting and carrying objects  fine hand use (grasping/picking up)  walking     Self care  dressing  looking after one's health     Domestic Life  shopping  cooking  doing house work (cleaning house, washing dishes, laundry)  assisting others     Interactions/Relationships  no deficits     Life Areas  employment     Community and Social Life  community life  recreation and leisure             evolving clinical presentation with changing clinical characteristics                                moderate   high   high Decision Making/ Complexity Score:  moderate         Pt's spiritual, cultural and educational needs considered and pt agreeable to plan of care and goals as stated below:      GOALS:   Short term goals: 4 weeks, pt agrees to goals set.  1. Pt will increase her TUG speed to 24 seconds with a rollator in order to decrease her fall risk  2. Pt will increase SSWS to 0.75 m/sec with Rollator in order to increase her participation in the community  3. Pt will be able to perform 5 sit<>stands during 30 second chair rise test for increased muscular endurance  4. Pt will be able to perform  MCTSIB condition 4 for 30 seconds to decrease fall risk  5. Pt will increase L hip flexion MMT to 3/5 to aid with gait ability  6. Pt will increase L knee flexion MMT to at least 4-/5 for improved gait mechanics  7. Pt will increase L ankle PF and DF MMT scores to at least 4-/5 for improved gait mechanics  8. Pt will have strength and endurance HEP in place     Long term goals: 8 weeks, pt agrees to goals set  9. Pt will increase her TUG speed to 19 seconds with a rollator in order to decrease her fall risk  10. Pt will increase SSWS to 0.85 m/sec with Rollator in order to increase her participation in the community  11. Pt will be able to perform 7 sit<>stands during 30 second chair rise test for increased muscular endurance  12. Pt will be able to stand with her R foot forward in tandem stance for at least 10 seconds to decrease fall risk  13. Pt will increase L hip flexion MMT to 3+/5 to aid with gait ability          Plan   Outpatient physical therapy 2 times weekly to include: Pt Education, Home Exercise Program, Therapeutic Exercises, Neuromuscular Re-education, Therapeutic Activities, Gait Training, Manual Therapy,  and modalities(Ultrasound/Phonophoresis, Electrical Stimulation/TENS/Interferential and Moist Heat/Ice) PRN to achieve established goals. Pt may be seen by PTA as part of the rehabilitation team.     Lynn Fragoso, PTA   08/21/2018

## 2018-08-21 NOTE — PROGRESS NOTES
"OCCUPATIONAL THERAPY DAILY TREATMENT NOTE   Patient:  Jaylon Bowles  Clinic #:  [id number]   Date of Note: 08/21/2018   Referring Physician:  Portia Mitchell MD  Diagnosis:  MS   Encounter Diagnoses   Name Primary?    Impaired mobility and ADLs     Decreased muscle strength     Decreased coordination         Visit Date: 08/21/2018  Authorization period expiration: 12/31/18  Treatment period expiration: 9/21/18  Precautions: standard, falls     Visit #: 6 / 10   Time In: 1030   Time Out: 1115    Total 1:1 Treatment Time: 45 min      Subjective   Pt reports: "I feel a little tired today."     Pain Scale: none reported   Pain Location: NA      Objective      Patient seen by OT this date. Patient received individual therapy with activities as follows:     Patient participated in therapeutic exercises x 30 min to maximize ROM, strength, and endurance.  The following exercises were included:   - UE pulleys for flexion and abduction x 2 min each of continuous motion   - UBE level 1 x 4 min and x 4 min reverse    - 35#  PHG for sustained gripping, moving large pegs from pegboard to basket located in various planes in space to challenge functional reach x 1 trial each hand     Not performed this date:   - Red theraband flexbar wrist flexion/extension, pronation, supination, and RD, and UD x 15 reps each R and L   - Blue digiflex for composite  x 1 min x 2 sets on R and L   - Green digiflex for individual digit flexion x 1 min x 2 sets on R and L       Patient participated in therapeutic activities x 15 min to improve motor skills and overall independence and performance in the home environment.  The following activities were included:     - purdue pegboard: patient alternating hands to insert long pegs into pegboard and then remove pegs using tweezers     Not performed this date:   - patient alternating hands to  marbles executing a fine point pinch to transfer them across the board   - finger to palm " translation and palm to finger translation with mayco     Home Exercise Program and Education:      Written Home Exercises: No new, Continue HEP   Pt demo good understanding of the education provided. Jaylon demonstrated good return demonstration of activities.     Education provided re: POC, HEP, HME/AD, energy conservation/task simplification, energy conservation planning   No spiritual or educational barriers to learning provided    Pt has no cultural, educational or language barriers to learning provided.    Assessment:    Patient tolerated treatment session well.  She was able to perform the above exercises without any problems or complaints.  OT will continue to focus on strengthening and mm endurance as she continues to demonstrate min exercise induced fatigue while performing the above exercises.  She continues with fine motor control and coordination limitations - L more impaired than R - and requires increased time for completion of FM tasks.  Pt will continue to benefit from skilled OT intervention.   Patient is making good progress toward established goals.  Patient continues to demonstrate limitation with decreased mm strength, decreased UE coordination and movement speed, decreased tolerance to activities, and impaired functional mobility affecting her performance and independence in self care skills and functional tasks in the home environment.     STG GOALS:  Time frame: 4 weeks   1. Patient will be independent in performance and progression of HEP.   2. Pt and caregiver will evaluate home for safety including checking lighting and hazards on floor such as rugs and cords in walkways to decrease risk of falls.  - met   3. Pt will verbalize knowledge and understanding of energy conservation techniques and task simplification methods in order to conserve energy for performance of self care skills and home management tasks. - met   4. Patient will demonstrate knowledge and understanding of available  AD and HME to improve performance of self care skills, hobbies, and functional tasks such as dressing, feeding, writing, and home care. - met      LTG GOALS:  Time frame: 8 weeks or by discharge   1. Pt will display a one grade increase in MMT of all assessed muscles in order to improve performance and participation in self care skills and home management tasks.     2. Pt will increase B  strength 10 pounds to improve functional grasp, handling, carrying, and manipulation of objects.   3. Pt perform 9HPT in </= 30 sec to demonstrate improved B fine motor skills for daily self care including grooming and dressing.    4. Pt will tolerate x 10 min of level 3 UBE to demonstrate improved endurance for sustained functional tasks in the home environment.   5. Pt will tolerate x 15 min of dynamic standing tasks to demonstrate improved functional mobility and tolerance to functional activities.   6. Patient to score at 50% or more on FOTO to demonstrate improved perception of overall functional performance in the home environment.     Plan      Continue 2 x week x 8 weeks during the certification period from 7/27/18 to 9/21/18  in pursuit of  OT goals.

## 2018-08-24 ENCOUNTER — DOCUMENTATION ONLY (OUTPATIENT)
Dept: REHABILITATION | Facility: HOSPITAL | Age: 38
End: 2018-08-24

## 2018-08-24 DIAGNOSIS — R29.898 BILATERAL LEG WEAKNESS: ICD-10-CM

## 2018-08-24 DIAGNOSIS — Z74.09 IMPAIRED FUNCTIONAL MOBILITY, BALANCE, GAIT, AND ENDURANCE: ICD-10-CM

## 2018-08-24 NOTE — PROGRESS NOTES
PT/PTA met face to face to discuss pt's treatment plan and progress towards established goals.  Continue with current PT POC with focus on overall endurance, BLE strength, and standing balance.  Patient will be seen by physical therapist at least every sixth treatment or 30 days, whichever occurs first.

## 2018-08-27 ENCOUNTER — PATIENT MESSAGE (OUTPATIENT)
Dept: INTERNAL MEDICINE | Facility: CLINIC | Age: 38
End: 2018-08-27

## 2018-08-27 RX ORDER — LEVOTHYROXINE SODIUM 100 UG/1
100 TABLET ORAL DAILY
Qty: 30 TABLET | Refills: 0 | Status: SHIPPED | OUTPATIENT
Start: 2018-08-27 | End: 2018-09-05 | Stop reason: SDUPTHER

## 2018-08-28 ENCOUNTER — CLINICAL SUPPORT (OUTPATIENT)
Dept: REHABILITATION | Facility: HOSPITAL | Age: 38
End: 2018-08-28
Attending: PSYCHIATRY & NEUROLOGY
Payer: MEDICARE

## 2018-08-28 DIAGNOSIS — Z78.9 IMPAIRED MOBILITY AND ADLS: ICD-10-CM

## 2018-08-28 DIAGNOSIS — Z74.09 IMPAIRED MOBILITY AND ADLS: ICD-10-CM

## 2018-08-28 DIAGNOSIS — R27.8 DECREASED COORDINATION: ICD-10-CM

## 2018-08-28 DIAGNOSIS — M62.81 DECREASED MUSCLE STRENGTH: ICD-10-CM

## 2018-08-28 PROCEDURE — 97110 THERAPEUTIC EXERCISES: CPT | Mod: PO

## 2018-08-28 PROCEDURE — 97530 THERAPEUTIC ACTIVITIES: CPT | Mod: PO

## 2018-08-28 NOTE — PROGRESS NOTES
"OCCUPATIONAL THERAPY DAILY TREATMENT NOTE   Patient:  Jaylon Bowles  Clinic #:  [id number]   Date of Note: 08/28/2018   Referring Physician:  Portia Mitchell MD  Diagnosis:  MS   Encounter Diagnoses   Name Primary?    Impaired mobility and ADLs     Decreased muscle strength     Decreased coordination         Visit Date: 08/28/2018  Authorization period expiration: 12/31/18  Treatment period expiration: 9/21/18  Precautions: standard, falls     Visit #: 7 / 10   Time In: 0907   Time Out: 0950  Total 1:1 Treatment Time: 43 min      Subjective   Pt reports: "my legs are a little tired, but I feel pretty good."      Pain Scale: none reported   Pain Location: NA      Objective      Patient seen by OT this date. Patient received individual therapy with activities as follows:     Patient participated in therapeutic exercises x 31 min to maximize ROM, strength, and endurance.  The following exercises were included:   - UBE level 1 x 4 min and x 4 min reverse    - sitting EOM, patient performed BUE exercises with 3# dowel bhavani for 1 set of 12 reps of each of the following:               - chest press              - shoulder flex/ext              - overhead press               - shoulder abd/add               - rows    - reverse rows               - bicep curls   - reverse curls               - wrist flex/ext   - Blue digiflex for composite  x 1 min x 2 sets on R and L   - Green digiflex for individual digit flexion x 1 min x 2 sets on R and L     Not performed this date:   - Red theraband flexbar wrist flexion/extension, pronation, supination, and RD, and UD x 15 reps each R and L   - UE pulleys for flexion and abduction x 2 min each of continuous motion   - 35#  PHG for sustained gripping, moving large pegs from pegboard to basket located in various planes in space to challenge functional reach x 1 trial each hand     Patient participated in therapeutic activities x 12 min to improve motor skills and overall " independence and performance in the home environment.  The following activities were included:     - manipulation of deck of cards with R and L hands     Not performed this date:   - patient alternating hands to  marbles executing a fine point pinch to transfer them across the board   - finger to palm translation and palm to finger translation with marbles   - purdue pegboard: patient alternating hands to insert long pegs into pegboard and then remove pegs using tweezers     Home Exercise Program and Education:      Written Home Exercises: No new, Continue HEP   Pt demo good understanding of the education provided. Tymesha demonstrated good return demonstration of activities.     Education provided re: POC, HEP, HME/AD, energy conservation/task simplification, energy conservation planning   No spiritual or educational barriers to learning provided    Pt has no cultural, educational or language barriers to learning provided.    Assessment:    Patient tolerated treatment session well.  She was able to perform the above exercises without any problems or complaints.  Increased dowel bhavani repetitions to promote increased B UE strength and mm endurance.  OT will continue to focus on strengthening and mm endurance as she continues to demonstrate min exercise induced fatigue while performing the above exercises.  She continues with fine motor control and coordination limitations - L more impaired than R - and requires increased time for completion of FM tasks.  Pt will continue to benefit from skilled OT intervention.   Patient is making good progress toward established goals.  Patient continues to demonstrate limitation with decreased mm strength, decreased UE coordination and movement speed, decreased tolerance to activities, and impaired functional mobility affecting her performance and independence in self care skills and functional tasks in the home environment.     STG GOALS:  Time frame: 4 weeks   1. Patient  will be independent in performance and progression of HEP.   2. Pt and caregiver will evaluate home for safety including checking lighting and hazards on floor such as rugs and cords in walkways to decrease risk of falls.  - met   3. Pt will verbalize knowledge and understanding of energy conservation techniques and task simplification methods in order to conserve energy for performance of self care skills and home management tasks. - met   4. Patient will demonstrate knowledge and understanding of available AD and HME to improve performance of self care skills, hobbies, and functional tasks such as dressing, feeding, writing, and home care. - met      LTG GOALS:  Time frame: 8 weeks or by discharge   1. Pt will display a one grade increase in MMT of all assessed muscles in order to improve performance and participation in self care skills and home management tasks.     2. Pt will increase B  strength 10 pounds to improve functional grasp, handling, carrying, and manipulation of objects.   3. Pt perform 9HPT in </= 30 sec to demonstrate improved B fine motor skills for daily self care including grooming and dressing.    4. Pt will tolerate x 10 min of level 3 UBE to demonstrate improved endurance for sustained functional tasks in the home environment.   5. Pt will tolerate x 15 min of dynamic standing tasks to demonstrate improved functional mobility and tolerance to functional activities.   6. Patient to score at 50% or more on FOTO to demonstrate improved perception of overall functional performance in the home environment.     Plan      Continue 2 x week x 8 weeks during the certification period from 7/27/18 to 9/21/18  in pursuit of  OT goals.

## 2018-08-31 ENCOUNTER — CLINICAL SUPPORT (OUTPATIENT)
Dept: REHABILITATION | Facility: HOSPITAL | Age: 38
End: 2018-08-31
Attending: PSYCHIATRY & NEUROLOGY
Payer: MEDICARE

## 2018-08-31 DIAGNOSIS — Z78.9 IMPAIRED MOBILITY AND ADLS: ICD-10-CM

## 2018-08-31 DIAGNOSIS — M62.81 DECREASED MUSCLE STRENGTH: ICD-10-CM

## 2018-08-31 DIAGNOSIS — Z74.09 IMPAIRED MOBILITY AND ADLS: ICD-10-CM

## 2018-08-31 DIAGNOSIS — Z74.09 IMPAIRED FUNCTIONAL MOBILITY, BALANCE, GAIT, AND ENDURANCE: ICD-10-CM

## 2018-08-31 DIAGNOSIS — R27.8 DECREASED COORDINATION: ICD-10-CM

## 2018-08-31 DIAGNOSIS — R29.898 BILATERAL LEG WEAKNESS: ICD-10-CM

## 2018-08-31 PROCEDURE — 97110 THERAPEUTIC EXERCISES: CPT | Mod: PO

## 2018-08-31 PROCEDURE — 97112 NEUROMUSCULAR REEDUCATION: CPT | Mod: PO

## 2018-08-31 NOTE — PROGRESS NOTES
"OCCUPATIONAL THERAPY DAILY TREATMENT NOTE   Patient:  Jaylon Bowles  Clinic #:  [id number]   Date of Note: 08/31/2018   Referring Physician:  Portia Mitchell MD  Diagnosis:  MS   Encounter Diagnoses   Name Primary?    Impaired mobility and ADLs     Decreased muscle strength     Decreased coordination         Visit Date: 08/31/2018  Authorization period expiration: 12/31/18  Treatment period expiration: 9/21/18  Precautions: standard, falls     Visit #: 8 / 10   Time In: 0815   Time Out: 0900   Total 1:1 Treatment Time: 45 min      Subjective   Pt reports: "I'm ok. Tired again this morning."     Pain Scale: none reported   Pain Location: NA      Objective      Patient seen by OT this date. Patient received individual therapy with activities as follows:     Patient participated in therapeutic exercises x 39 min to maximize ROM, strength, and endurance.  The following exercises were included:   - UE pulleys for flexion and abduction x 2 min each of continuous motion   - UBE level 1 x 5 min and x 5 min reverse    - Red theraband flexbar wrist flexion/extension, pronation, supination, and RD, and UD x 15 reps each R and L   - 35#  PHG for sustained gripping, moving large pegs from pegboard to basket located in various planes in space to challenge functional reach x 2 trials each hand     Not performed this date:   - Blue digiflex for composite  x 1 min x 2 sets on R and L   - Green digiflex for individual digit flexion x 1 min x 2 sets on R and L       Patient participated in therapeutic activities x 6 min to improve motor skills and overall independence and performance in the home environment.  The following activities were included:     - patient alternating hands to string various sized beads onto string     Not performed this date:   - patient alternating hands to  marbles executing a fine point pinch to transfer them across the board   - finger to palm translation and palm to finger translation with " marbles   - purdue pegboard: patient alternating hands to insert long pegs into pegboard and then remove pegs using tweezers   - manipulation of deck of cards with R and L hands     Home Exercise Program and Education:      Written Home Exercises: No new, Continue HEP   Pt demo good understanding of the education provided. Jaylon demonstrated good return demonstration of activities.     Education provided re: POC, HEP, HME/AD, energy conservation/task simplification, energy conservation planning   No spiritual or educational barriers to learning provided    Pt has no cultural, educational or language barriers to learning provided.    Assessment:    Patient tolerated treatment session well.  She tolerated increased time on the UBE without any problems.  She demonstrated B hand fatigue with progression of gripping exercise - increased number of drops of pegs 2/2 difficulty with sustained grasp.  OT will continue to focus on strengthening and mm endurance in order to improve performance and participation in the home environment.  Pt will continue to benefit from skilled OT intervention.   Patient is making good progress toward established goals.  Patient continues to demonstrate limitation with decreased mm strength, decreased UE coordination and movement speed, decreased tolerance to activities, and impaired functional mobility affecting her performance and independence in self care skills and functional tasks in the home environment.     STG GOALS:  Time frame: 4 weeks   1. Patient will be independent in performance and progression of HEP.   2. Pt and caregiver will evaluate home for safety including checking lighting and hazards on floor such as rugs and cords in walkways to decrease risk of falls.  - met   3. Pt will verbalize knowledge and understanding of energy conservation techniques and task simplification methods in order to conserve energy for performance of self care skills and home management tasks. - met    4. Patient will demonstrate knowledge and understanding of available AD and HME to improve performance of self care skills, hobbies, and functional tasks such as dressing, feeding, writing, and home care. - met      LTG GOALS:  Time frame: 8 weeks or by discharge   1. Pt will display a one grade increase in MMT of all assessed muscles in order to improve performance and participation in self care skills and home management tasks.     2. Pt will increase B  strength 10 pounds to improve functional grasp, handling, carrying, and manipulation of objects.   3. Pt perform 9HPT in </= 30 sec to demonstrate improved B fine motor skills for daily self care including grooming and dressing.    4. Pt will tolerate x 10 min of level 3 UBE to demonstrate improved endurance for sustained functional tasks in the home environment.   5. Pt will tolerate x 15 min of dynamic standing tasks to demonstrate improved functional mobility and tolerance to functional activities.   6. Patient to score at 50% or more on FOTO to demonstrate improved perception of overall functional performance in the home environment.     Plan      Continue 2 x week x 8 weeks during the certification period from 7/27/18 to 9/21/18  in pursuit of  OT goals.

## 2018-08-31 NOTE — PROGRESS NOTES
"                                                    Physical Therapy Progress Note     Name: Jaylon Bowles  Clinic Number: [id number]  Diagnosis:   Encounter Diagnoses   Name Primary?    Impaired functional mobility, balance, gait, and endurance     Bilateral leg weakness      Physician: Portia Mitchell MD  Treatment Orders: PT Eval and Treat  Past Medical History:   Diagnosis Date    Depression     Hypertension     Migraine headache     Multiple sclerosis     Thyroid disease        Evaluation Date: 07/27/2018  Visit #: 7  Plan of care expiration: 9/21/18  Precautions: Fall, universal    G codes 7/10              Subjective   Pt reports: that she is doing well this morning. She would like to work on strengthening and balance.   Pain Scale: 5/10 LBP    Objective     Patient received individual therapy with activities as follows:     Jaylon received therapeutic exercises to develop strength, endurance, ROM, flexibility, posture and core stabilization for 30 minutes including:   X 10 min of Sci Fit recumbent stepper  L1 for CV endurance  2 x 30 sec of B LE standing HS stretches on steps with 2 HR  2 x 30 sec of B LE standing HC stretches on incline    Seated:  3 x 10 seated hip AB with GTB  2 X 10 alternating marching   3 X 10 reps of B LE LAQ, 1.5# cuff weights  2 x 10 seated ball AD squeezes, 3" hold    Neuromuscular re-education x 15 min to improve balance and coordination including:   4 x 30 sec of tandem stance with 1 UE support to occasional no UE support and CGA  X 4 laps of forward tandem ambulation with 1 UE support and SBA  X 4 laps of backward ambulation with 1 UE support and SBA    Written Home Exercises: seated marching, seated hip AB with GTB, supine bridges with AD ball squeezes  Pt demo good understanding of the education provided. Jaylon demonstrated good return demonstration of activities.     Education provided re: POC, HEP  No spiritual or educational barriers to learning provided    Pt " has no cultural, educational or language barriers to learning provided.    Assessment   Pt tolerated today's tx session well and did not have any problems.  Pt required 1 UE support during static and dynamic balance activities, but no LOB.  Plan to progress difficulty next session. Mobility remains mainly limited by BLE weakness and decreased muscular endurance.  Cont with POC.               History  Co-morbidities and personal factors that may impact the plan of care Examination  Body Structures and Functions, activity limitations and participation restrictions that may impact the plan of care      Clinical Presentation   Co-morbidities:   depression, high BMI and HTN           Personal Factors:   no deficits Body Regions:   lower extremities  upper extremities  trunk     Body Systems:    strength  gross coordinated movement  balance  gait                 Participation Restrictions:   Limited community ambulation       Activity limitations:   Learning and applying knowledge  no deficits     General Tasks and Commands  no deficits     Communication  no deficits     Mobility  lifting and carrying objects  fine hand use (grasping/picking up)  walking     Self care  dressing  looking after one's health     Domestic Life  shopping  cooking  doing house work (cleaning house, washing dishes, laundry)  assisting others     Interactions/Relationships  no deficits     Life Areas  employment     Community and Social Life  community life  recreation and leisure             evolving clinical presentation with changing clinical characteristics                                moderate   high   high Decision Making/ Complexity Score:  moderate         Pt's spiritual, cultural and educational needs considered and pt agreeable to plan of care and goals as stated below:      GOALS:   Short term goals: 4 weeks, pt agrees to goals set.  1. Pt will increase her TUG speed to 24 seconds with a rollator in order to decrease her fall  risk  2. Pt will increase SSWS to 0.75 m/sec with Rollator in order to increase her participation in the community  3. Pt will be able to perform 5 sit<>stands during 30 second chair rise test for increased muscular endurance  4. Pt will be able to perform MCTSIB condition 4 for 30 seconds to decrease fall risk  5. Pt will increase L hip flexion MMT to 3/5 to aid with gait ability  6. Pt will increase L knee flexion MMT to at least 4-/5 for improved gait mechanics  7. Pt will increase L ankle PF and DF MMT scores to at least 4-/5 for improved gait mechanics  8. Pt will have strength and endurance HEP in place     Long term goals: 8 weeks, pt agrees to goals set  9. Pt will increase her TUG speed to 19 seconds with a rollator in order to decrease her fall risk  10. Pt will increase SSWS to 0.85 m/sec with Rollator in order to increase her participation in the community  11. Pt will be able to perform 7 sit<>stands during 30 second chair rise test for increased muscular endurance  12. Pt will be able to stand with her R foot forward in tandem stance for at least 10 seconds to decrease fall risk  13. Pt will increase L hip flexion MMT to 3+/5 to aid with gait ability          Plan   Outpatient physical therapy 2 times weekly to include: Pt Education, Home Exercise Program, Therapeutic Exercises, Neuromuscular Re-education, Therapeutic Activities, Gait Training, Manual Therapy,  and modalities(Ultrasound/Phonophoresis, Electrical Stimulation/TENS/Interferential and Moist Heat/Ice) PRN to achieve established goals. Pt may be seen by PTA as part of the rehabilitation team.     Marlys Mcclelland, PT   08/31/2018

## 2018-09-05 ENCOUNTER — OFFICE VISIT (OUTPATIENT)
Dept: INTERNAL MEDICINE | Facility: CLINIC | Age: 38
End: 2018-09-05
Payer: MEDICARE

## 2018-09-05 ENCOUNTER — HOSPITAL ENCOUNTER (OUTPATIENT)
Dept: RADIOLOGY | Facility: HOSPITAL | Age: 38
Discharge: HOME | End: 2018-09-05
Attending: INTERNAL MEDICINE
Payer: MEDICARE

## 2018-09-05 VITALS
HEIGHT: 65 IN | DIASTOLIC BLOOD PRESSURE: 94 MMHG | HEART RATE: 84 BPM | WEIGHT: 243.63 LBS | BODY MASS INDEX: 40.59 KG/M2 | RESPIRATION RATE: 16 BRPM | TEMPERATURE: 99 F | SYSTOLIC BLOOD PRESSURE: 140 MMHG

## 2018-09-05 DIAGNOSIS — M54.50 ACUTE RIGHT-SIDED LOW BACK PAIN WITHOUT SCIATICA: ICD-10-CM

## 2018-09-05 DIAGNOSIS — M54.14 THORACIC RADICULOPATHY: ICD-10-CM

## 2018-09-05 DIAGNOSIS — F41.9 ANXIETY AND DEPRESSION: ICD-10-CM

## 2018-09-05 DIAGNOSIS — R10.9 RIGHT FLANK PAIN: ICD-10-CM

## 2018-09-05 DIAGNOSIS — F32.A ANXIETY AND DEPRESSION: ICD-10-CM

## 2018-09-05 DIAGNOSIS — I10 UNCONTROLLED HYPERTENSION: Primary | ICD-10-CM

## 2018-09-05 PROCEDURE — 99999 PR PBB SHADOW E&M-EST. PATIENT-LVL IV: CPT | Mod: PBBFAC,,, | Performed by: INTERNAL MEDICINE

## 2018-09-05 PROCEDURE — 99214 OFFICE O/P EST MOD 30 MIN: CPT | Mod: PBBFAC,25,PO | Performed by: INTERNAL MEDICINE

## 2018-09-05 PROCEDURE — 99214 OFFICE O/P EST MOD 30 MIN: CPT | Mod: S$PBB,,, | Performed by: INTERNAL MEDICINE

## 2018-09-05 PROCEDURE — 72080 X-RAY EXAM THORACOLMB 2/> VW: CPT | Mod: 26,,, | Performed by: RADIOLOGY

## 2018-09-05 PROCEDURE — 72080 X-RAY EXAM THORACOLMB 2/> VW: CPT | Mod: TC,PO

## 2018-09-05 RX ORDER — ESCITALOPRAM OXALATE 20 MG/1
20 TABLET ORAL DAILY
Qty: 30 TABLET | Refills: 0 | Status: SHIPPED | OUTPATIENT
Start: 2018-09-05 | End: 2018-12-01 | Stop reason: SDUPTHER

## 2018-09-05 RX ORDER — LISINOPRIL 10 MG/1
10 TABLET ORAL DAILY
Qty: 90 TABLET | Refills: 0 | Status: SHIPPED | OUTPATIENT
Start: 2018-09-05 | End: 2018-09-11 | Stop reason: SDUPTHER

## 2018-09-05 RX ORDER — CLONIDINE HYDROCHLORIDE 0.1 MG/1
0.1 TABLET ORAL
Status: COMPLETED | OUTPATIENT
Start: 2018-09-05 | End: 2018-09-05

## 2018-09-05 RX ORDER — LEVOTHYROXINE SODIUM 100 UG/1
100 TABLET ORAL DAILY
Qty: 90 TABLET | Refills: 1 | Status: SHIPPED | OUTPATIENT
Start: 2018-09-05 | End: 2019-04-03 | Stop reason: SDUPTHER

## 2018-09-05 RX ADMIN — CLONIDINE HYDROCHLORIDE 0.1 MG: 0.1 TABLET ORAL at 12:09

## 2018-09-05 NOTE — PROGRESS NOTES
Subjective:       Patient ID: Jaylon Bowles is a 38 y.o. female who presents for Follow-up; Anxiety; Depression; Hypertension; and Back Pain      Anxiety   Presents for follow-up visit. Symptoms include depressed mood, excessive worry and nervous/anxious behavior. Patient reports no chest pain, dizziness, hyperventilation, insomnia, nausea, palpitations, panic or shortness of breath. Symptoms occur occasionally. The severity of symptoms is moderate. The quality of sleep is good.     Compliance with medications is %.   Depression   Visit Type: follow-up  Patient presents with the following symptoms: anhedonia, depressed mood, excessive worry, feelings of worthlessness and nervousness/anxiety.  Patient is not experiencing: dizziness, hyperventilation, insomnia, palpitations, panic, shortness of breath, weight gain and weight loss.  Frequency of symptoms: most days   Severity: moderate   Sleep quality: good  Compliance with medications:  %        Back Pain   This is a new problem. The current episode started 1 to 4 weeks ago. The problem occurs intermittently. The problem has been waxing and waning since onset. The pain is present in the lumbar spine and thoracic spine. The quality of the pain is described as aching. The pain does not radiate. The pain is moderate. The symptoms are aggravated by position. Pertinent negatives include no abdominal pain, chest pain, dysuria, fever, headaches, numbness, pelvic pain, tingling, weakness or weight loss. Risk factors include sedentary lifestyle. She has tried NSAIDs and analgesics (used Icy Hot) for the symptoms. The treatment provided mild relief.   Hypertension   This is a chronic problem. The current episode started more than 1 month ago. The problem is unchanged. The problem is uncontrolled. Associated symptoms include anxiety. Pertinent negatives include no chest pain, headaches, palpitations, peripheral edema or shortness of breath. Agents associated with  hypertension include thyroid hormones. Risk factors for coronary artery disease include obesity and family history. Past treatments include beta blockers and ACE inhibitors. The current treatment provides no improvement. Compliance problems include diet.  There is no history of kidney disease or heart failure. Identifiable causes of hypertension include a thyroid problem. There is no history of chronic renal disease.        Review of Systems   Constitutional: Negative for chills, fever, weight gain and weight loss.   HENT: Negative for congestion and sinus pressure.    Eyes: Negative for redness and visual disturbance.   Respiratory: Negative for cough and shortness of breath.    Cardiovascular: Negative for chest pain and palpitations.   Gastrointestinal: Negative for abdominal pain, diarrhea, nausea and vomiting.   Genitourinary: Positive for flank pain (left) and frequency. Negative for dysuria, genital sores, hematuria and pelvic pain.   Musculoskeletal: Positive for back pain. Negative for arthralgias, gait problem and myalgias.   Skin: Negative for rash.   Neurological: Negative for dizziness, tingling, weakness, light-headedness, numbness and headaches.   Psychiatric/Behavioral: Positive for depression and dysphoric mood. The patient is nervous/anxious. The patient does not have insomnia.        Objective:      Physical Exam   Constitutional: She is oriented to person, place, and time. Vital signs are normal. She appears well-developed and well-nourished. No distress.   HENT:   Head: Normocephalic and atraumatic.   Right Ear: Hearing and external ear normal.   Left Ear: Hearing and external ear normal.   Nose: Nose normal.   Mouth/Throat: Uvula is midline.   Eyes: Lids are normal.   Neck: Full passive range of motion without pain.   Cardiovascular: Normal rate, regular rhythm, normal heart sounds and intact distal pulses.   No murmur heard.  Pulmonary/Chest: Effort normal and breath sounds normal. She has no  wheezes.   Abdominal: Soft. Bowel sounds are normal. She exhibits no distension. There is no tenderness.   Musculoskeletal: Normal range of motion. She exhibits no edema.        Cervical back: She exhibits normal range of motion, no bony tenderness and no pain.        Thoracic back: She exhibits no bony tenderness and no pain.        Lumbar back: She exhibits tenderness, bony tenderness, pain and spasm.   Neurological: She is alert and oriented to person, place, and time. Coordination abnormal.   Skin: Skin is warm and dry. She is not diaphoretic.   Psychiatric: She has a normal mood and affect.   Vitals reviewed.      Assessment/Plan:         1. Uncontrolled hypertension  - clonidine 0.1mg x1  - continue coreg, add lisinopril 10mg daily  - monitor BP regularly, call with readings in the next few days    2. Right flank pain  - Urinalysis; Future  - X-Ray Thoracolumbar Spine AP Lateral; Future    3. Thoracic radiculopathy  - X-Ray Thoracic Spine AP Lateral; Future    4. Acute right-sided low back pain without sciatica  - X-Ray Thoracolumbar Spine AP Lateral; Future    5. Anxiety and depression  - increase Lexapro to 20mg daily    RTC in 1 month or sooner if needed    Cat Ramachandran MD

## 2018-09-06 ENCOUNTER — CLINICAL SUPPORT (OUTPATIENT)
Dept: REHABILITATION | Facility: HOSPITAL | Age: 38
End: 2018-09-06
Attending: PSYCHIATRY & NEUROLOGY
Payer: MEDICARE

## 2018-09-06 DIAGNOSIS — R27.8 DECREASED COORDINATION: ICD-10-CM

## 2018-09-06 DIAGNOSIS — Z74.09 IMPAIRED FUNCTIONAL MOBILITY, BALANCE, GAIT, AND ENDURANCE: ICD-10-CM

## 2018-09-06 DIAGNOSIS — M62.81 DECREASED MUSCLE STRENGTH: ICD-10-CM

## 2018-09-06 DIAGNOSIS — Z74.09 IMPAIRED MOBILITY AND ADLS: ICD-10-CM

## 2018-09-06 DIAGNOSIS — Z78.9 IMPAIRED MOBILITY AND ADLS: ICD-10-CM

## 2018-09-06 DIAGNOSIS — R29.898 BILATERAL LEG WEAKNESS: ICD-10-CM

## 2018-09-06 PROCEDURE — 97110 THERAPEUTIC EXERCISES: CPT | Mod: PO

## 2018-09-06 PROCEDURE — 97112 NEUROMUSCULAR REEDUCATION: CPT | Mod: PO

## 2018-09-06 PROCEDURE — G8978 MOBILITY CURRENT STATUS: HCPCS | Mod: CK,PO

## 2018-09-06 PROCEDURE — G8979 MOBILITY GOAL STATUS: HCPCS | Mod: CJ,PO

## 2018-09-06 NOTE — PROGRESS NOTES
"                                                    Physical Therapy Progress Note     Name: Jaylon Bowles  Clinic Number: [id number]  Diagnosis:   Encounter Diagnoses   Name Primary?    Impaired functional mobility, balance, gait, and endurance     Bilateral leg weakness      Physician: Portia Mitchell MD  Treatment Orders: PT Eval and Treat  Past Medical History:   Diagnosis Date    Depression     Hypertension     Migraine headache     Multiple sclerosis     Thyroid disease        Evaluation Date: 07/27/2018  Visit #: 8  Plan of care expiration: 9/21/18  Precautions: Fall, universal    G codes 1/10              Subjective   Pt reports: that she is doing well this morning but her knees feel a little weak. She does report feeling a little congested from her allergies this morning.   Pain Scale: 5/10 LBP    Objective     Patient received individual therapy with activities as follows:     Jaylon received therapeutic exercises to develop strength, endurance, ROM, flexibility, posture and core stabilization for 30 minutes including:   X 10 min of Sci Fit recumbent stepper  L1 for CV endurance  2 x 30 sec of B LE standing HS stretches on steps with 2 HR  2 x 30 sec of B LE standing HC stretches on incline    Seated:  3 x 10 seated hip AB with GTB  2 X 10 alternating marching   3 X 10 reps of B LE LAQ, 1.5# cuff weights  2 x 10 seated ball AD squeezes, 3" hold    Neuromuscular re-education x 15 min to improve balance and coordination including:     Foam pad in // bars: Min A   2 x 30" static stance with EO   2 x 30" R<>L head turns   2 x 30" up <> down head turns    X 4 laps alternating marching in // bars, 1 UE support    Functional Limitations Reports - G Codes  Category: Mobility   Tool: SSWS  Score: 0.43 m/sec (6m/14 sec), 57% impaired  Current: CK at least 40% < 60% impaired, limited or restricted  Goal: CJ at least 20% < 40% impaired, limited or restricted      Written Home Exercises: seated marching, " seated hip AB with GTB, supine bridges with AD ball squeezes  Pt demo good understanding of the education provided. Tymesha demonstrated good return demonstration of activities.     Education provided re: POC, HEP  No spiritual or educational barriers to learning provided    Pt has no cultural, educational or language barriers to learning provided.    Assessment   Pt tolerated today's tx session well despite reports of increased B knee weakness today. No LOB episodes with therapy. Pt does demonstrate decline in SSWS, requiring 4 more seconds to complete activity compared to prior assessment. Decline in score most likely attributed to increased B knee weakness this morning. Able to progress difficulty of static balance activities, but patient did require Min A from PT to maintain balance. Mobility remains mainly limited by BLE weakness and decreased muscular endurance.  Cont with POC.               History  Co-morbidities and personal factors that may impact the plan of care Examination  Body Structures and Functions, activity limitations and participation restrictions that may impact the plan of care      Clinical Presentation   Co-morbidities:   depression, high BMI and HTN           Personal Factors:   no deficits Body Regions:   lower extremities  upper extremities  trunk     Body Systems:    strength  gross coordinated movement  balance  gait                 Participation Restrictions:   Limited community ambulation       Activity limitations:   Learning and applying knowledge  no deficits     General Tasks and Commands  no deficits     Communication  no deficits     Mobility  lifting and carrying objects  fine hand use (grasping/picking up)  walking     Self care  dressing  looking after one's health     Domestic Life  shopping  cooking  doing house work (cleaning house, washing dishes, laundry)  assisting others     Interactions/Relationships  no deficits     Life Areas  employment     Community and Social  Life  community life  recreation and leisure             evolving clinical presentation with changing clinical characteristics                                moderate   high   high Decision Making/ Complexity Score:  moderate         Pt's spiritual, cultural and educational needs considered and pt agreeable to plan of care and goals as stated below:      GOALS:   Short term goals: 4 weeks, pt agrees to goals set.  1. Pt will increase her TUG speed to 24 seconds with a rollator in order to decrease her fall risk  2. Pt will increase SSWS to 0.75 m/sec with Rollator in order to increase her participation in the community  3. Pt will be able to perform 5 sit<>stands during 30 second chair rise test for increased muscular endurance  4. Pt will be able to perform MCTSIB condition 4 for 30 seconds to decrease fall risk  5. Pt will increase L hip flexion MMT to 3/5 to aid with gait ability  6. Pt will increase L knee flexion MMT to at least 4-/5 for improved gait mechanics  7. Pt will increase L ankle PF and DF MMT scores to at least 4-/5 for improved gait mechanics  8. Pt will have strength and endurance HEP in place     Long term goals: 8 weeks, pt agrees to goals set  9. Pt will increase her TUG speed to 19 seconds with a rollator in order to decrease her fall risk  10. Pt will increase SSWS to 0.85 m/sec with Rollator in order to increase her participation in the community  11. Pt will be able to perform 7 sit<>stands during 30 second chair rise test for increased muscular endurance  12. Pt will be able to stand with her R foot forward in tandem stance for at least 10 seconds to decrease fall risk  13. Pt will increase L hip flexion MMT to 3+/5 to aid with gait ability          Plan   Outpatient physical therapy 2 times weekly to include: Pt Education, Home Exercise Program, Therapeutic Exercises, Neuromuscular Re-education, Therapeutic Activities, Gait Training, Manual Therapy,  and  modalities(Ultrasound/Phonophoresis, Electrical Stimulation/TENS/Interferential and Moist Heat/Ice) PRN to achieve established goals. Pt may be seen by PTA as part of the rehabilitation team.     Marlys Mcclelland, PT   09/06/2018
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
## 2018-09-07 ENCOUNTER — PATIENT MESSAGE (OUTPATIENT)
Dept: INTERNAL MEDICINE | Facility: CLINIC | Age: 38
End: 2018-09-07

## 2018-09-07 ENCOUNTER — OFFICE VISIT (OUTPATIENT)
Dept: PSYCHIATRY | Facility: CLINIC | Age: 38
End: 2018-09-07

## 2018-09-07 DIAGNOSIS — I10 UNCONTROLLED HYPERTENSION: ICD-10-CM

## 2018-09-07 DIAGNOSIS — F32.1 CURRENT MODERATE EPISODE OF MAJOR DEPRESSIVE DISORDER WITHOUT PRIOR EPISODE: Primary | ICD-10-CM

## 2018-09-07 PROCEDURE — 99499 UNLISTED E&M SERVICE: CPT | Mod: S$GLB,,, | Performed by: SOCIAL WORKER

## 2018-09-07 NOTE — PROGRESS NOTES
"Individual Psychotherapy (PhD/LCSW) - MSW Intern  Not a billable service    9/7/2018    Site:  [facility]         Therapeutic Intervention: Met with patient.  Outpatient - Behavior modifying psychotherapy 60 min and Outpatient - Supportive psychotherapy 60 min    Chief complaint/reason for encounter: depression     Interval history and content of current session: Patient reports no new symptoms of depression. She shared that her financial aid came through and she was able to start online classes.   We discussed her journaling and she shared that she has been trying very hard to keep her self-talk positive, but she has noticed that she has a strong negative voice that makes this hard for her. We used the "floatback" technique and she identified the point in time where she first started feeling like she was rejected and wasn't good enough. We discussed this interaction she had with her mother and she shared that it made her "heart and joints tight" like they were "about to break". We discussed what she felt in that moment that has persisted until today and how it has played a part in her behaviors and relationships with others. She shared that she has a hard time trusting others and will often break contact with others as a way to avoid disappointment/rejection/hurt. She also shared a sense of powerlessness over her life, she often attributes the bad things that happen in her life to her family, her MS, or the move to [place]. We are working on the development of an internal locus of control and combating the negative self talk.     Treatment plan:  · Target symptoms: depression  · Why chosen therapy is appropriate versus another modality: relevant to diagnosis  · Outcome monitoring methods: self-report  · Therapeutic intervention type: behavior modifying psychotherapy, supportive psychotherapy    Risk parameters:  Patient reports no suicidal ideation  Patient reports no homicidal ideation  Patient reports " no self-injurious behavior  Patient reports no violent behavior    Verbal deficits: None    Patient's response to intervention:  The patient's response to intervention is accepting.    Progress toward goals and other mental status changes:  The patient's progress toward goals is fair .    Diagnosis:     ICD-10-CM ICD-9-CM   1. Current moderate episode of major depressive disorder without prior episode F32.1 296.22       Plan:  individual psychotherapy   Negative Self Talk   Journaling, thought catalogue  External Locus of Control   Completing tasks on her own (cooking meals, running errands, homework, etc.)    Return to clinic: 2 weeks    Length of Service (minutes): 60

## 2018-09-11 ENCOUNTER — CLINICAL SUPPORT (OUTPATIENT)
Dept: REHABILITATION | Facility: HOSPITAL | Age: 38
End: 2018-09-11
Attending: PSYCHIATRY & NEUROLOGY
Payer: MEDICARE

## 2018-09-11 ENCOUNTER — DOCUMENTATION ONLY (OUTPATIENT)
Dept: REHABILITATION | Facility: HOSPITAL | Age: 38
End: 2018-09-11

## 2018-09-11 DIAGNOSIS — Z74.09 IMPAIRED FUNCTIONAL MOBILITY, BALANCE, GAIT, AND ENDURANCE: ICD-10-CM

## 2018-09-11 DIAGNOSIS — Z78.9 IMPAIRED MOBILITY AND ADLS: ICD-10-CM

## 2018-09-11 DIAGNOSIS — R27.8 DECREASED COORDINATION: ICD-10-CM

## 2018-09-11 DIAGNOSIS — R29.898 BILATERAL LEG WEAKNESS: ICD-10-CM

## 2018-09-11 DIAGNOSIS — M62.81 DECREASED MUSCLE STRENGTH: ICD-10-CM

## 2018-09-11 DIAGNOSIS — Z74.09 IMPAIRED MOBILITY AND ADLS: ICD-10-CM

## 2018-09-11 PROCEDURE — G8989 SELF CARE D/C STATUS: HCPCS | Mod: CK,PO

## 2018-09-11 PROCEDURE — G8987 SELF CARE CURRENT STATUS: HCPCS | Mod: CK,PO

## 2018-09-11 PROCEDURE — G8988 SELF CARE GOAL STATUS: HCPCS | Mod: CJ,PO

## 2018-09-11 PROCEDURE — 97110 THERAPEUTIC EXERCISES: CPT | Mod: PO

## 2018-09-11 RX ORDER — LISINOPRIL 20 MG/1
20 TABLET ORAL DAILY
Start: 2018-09-11 | End: 2018-09-17 | Stop reason: SDUPTHER

## 2018-09-11 NOTE — PROGRESS NOTES
"OCCUPATIONAL THERAPY DAILY TREATMENT NOTE / DISCHARGE SUMMARY   Patient:  Jaylon Bowles  [facility] #:  [id number]   Date of Note: 09/11/2018   Referring Physician:  Portia Mitchell MD  Diagnosis:  MS   Encounter Diagnoses   Name Primary?    Impaired mobility and ADLs     Decreased muscle strength     Decreased coordination         Visit Date: 09/11/2018  Authorization period expiration: 12/31/18  Treatment period expiration: 9/21/18  Precautions: standard, falls     Visit #: 10/10   Time In: 0815  Time Out: 0900   Total 1:1 Treatment Time: 45 min      Subjective   Pt reports: "The MRI showed I have arthritis in my back." At end of today's treatment session, patient requests to discontinue with skilled OT services.  Per pt report, she needs more time to focus on her online classes (college courses at [employer]) and therefore she will not be able to participate in OT at this time period.      Pain Scale: 6/10   Pain Location: lower back       Objective      Patient seen by OT this date. Patient received individual therapy with activities as follows:     REASSESSMENT: (EVAL, 9/11/18)     AROM:   - RUE: WNL   - LUE: WNL       Fist: B normal  Opposition: B normal      Strength         Right (eval, 9/11/18)  Left (eval, 9/11/18)    Shoulder flex 5/5, 4+/8  5/5, 4+/5    Shoulder abd 4/5, 4+/5  4/5, 4/5    Shoulder ER 4/5, 4/5 4/5, 4/5   Shoulder IR 4/5, 4/5  3+/5, 4/5    Shoulder Extension 4+/5, 4+/5  4/5, 4+/5    Shoulder Horizontal adduction 4/5, 4+/5  4/5, 4/5    Elbow flex 4+/5, 4+/5 4-/5, 4-/5   Elbow ext 4+/5, 4+/5  4-/5, 4-/5   Wrist flex 4+/5, 4+/5 4+/5, 4+/5   Wrist ext 4+/5, 4+/5 4+/5, 4+/5   Supination 4+/5, 4/5 4-/5, 4-/5   Pronation 4+/5, 4+/5  4-/5, 4-/5   UD 5/5, 5/5 5/5, 5/5   RD 5/5, 5/5  5/5, 5/5        Strength: (SEGUN Dynamometer in lbs.) Average 3 trials, Position II:     Rung II   Left Right   7/27/18 35.5# 35#   9/11/18  33# 37#      Pinch Strength (Measured in psi)       7/27/2018 7/27/2018     " Left Right   Key Pinch 6.5 psi 11 psi   3pt Pinch 6 psi 8 psi   2pt Pinch 4 psi 6 psi        9/11/18 9/11/18     Left Right   Key Pinch 6 psi 10 psi   3pt Pinch 5 psi 8 psi   2pt Pinch 3 psi 5 psi       Fine Motor Coordination: 9 Hole Peg Test  Right Left   35 sec  55 sec       Right Left   47 sec  47 sec         Gross motor coordination:   · FIDELINA: B decreased movement speed   · Finger to Nose: R WNL, L impaired motor control   · Finger Flicks: B decreased movement speed      Tone:  Modified Catie Scale:   0 - No increase in muscle tone     Sensation:  Tymesha  reports occasional numbness in fingertips. Denies changes in sensation.   Light touch: bilateral intact     Balance:   Static Sit - normal   Dynamic sit- good   Static Stand - see PT eval    Dynamic stand - see PT eval       Endurance Deficit: moderate     ADL's:  Feeding: Mod I. Occasionally requires Min A to cut meats   Grooming: Mod I  Hygiene: Mod I  UB Dressing: Min A to don bra   LB Dressing: Min A. But occasionally requires Mod A due to lower extremity weakness to lift legs and UE weakness to pull up clothing.   Toileting: Mod I  Bathing: Mod A to wash lower body     IADL's:  Homecare: D  Cooking: D  Laundry: D  Yard work: N/A   Use of telephone: Mod I - slower hand movements   Money management: Mod I  Medication management: Min A - reminders for medication   Comments: pt lives with her mother. Pt reports her mom performs most IADLs      Patient participated in therapeutic exercises to maximize ROM, strength, and endurance.  The following exercises were included:   - UBE level 2 x 5 min and x 5 min reverse   - Reviewed HEP with patient reporting good understanding of same.     Home Exercise Program and Education:      Written Home Exercises re: Continue HEP as provided in previous tx sessions   Pt demo good understanding of the education provided. Tymesha demonstrated good return demonstration of activities.     Education provided re: POC, HEP   -  HME/AD, energy conservation/task simplification, energy conservation planning   No spiritual or educational barriers to learning provided    Pt has no cultural, educational or language barriers to learning provided.    Assessment:    Patient tolerated treatment session well.  Reassessment performed this date with patient demonstrating minimal objective progress in strength and coordination skills.  Despite minimal objective progress, patient reports improved perception of functional performance in the home environment including increased energy levels and strength for self care skills and desired functional, leisurely, and vocational pursuits.  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  Patient expressed interest in resuming skilled services at a more convenient time period to address remaining physical and functional limitations.  OT reviewed pt's HEP to ensure competence and compliance in order to continue to progress outside of therapy.  This patient is being discharged from skilled OT intervention per patient request.      STG GOALS:  Time frame: 4 weeks   1. Patient will be independent in performance and progression of HEP. - met   2. Pt and caregiver will evaluate home for safety including checking lighting and hazards on floor such as rugs and cords in walkways to decrease risk of falls.  - met   3. Pt will verbalize knowledge and understanding of energy conservation techniques and task simplification methods in order to conserve energy for performance of self care skills and home management tasks. - met   4. Patient will demonstrate knowledge and understanding of available AD and HME to improve performance of self care skills, hobbies, and functional tasks such as dressing, feeding, writing, and home care. - met      LTG GOALS:  Time frame: 8 weeks or by discharge   1. Pt will display a one  grade increase in MMT of all assessed muscles in order to improve performance and participation in self care skills and home management tasks.     2. Pt will increase B  strength 10 pounds to improve functional grasp, handling, carrying, and manipulation of objects.   3. Pt perform 9HPT in </= 30 sec to demonstrate improved B fine motor skills for daily self care including grooming and dressing.    4. Pt will tolerate x 10 min of level 3 UBE to demonstrate improved endurance for sustained functional tasks in the home environment.   5. Pt will tolerate x 15 min of dynamic standing tasks to demonstrate improved functional mobility and tolerance to functional activities.   6. Patient to score at 50% or more on FOTO to demonstrate improved perception of overall functional performance in the home environment.     Plan      Patient self discharged.

## 2018-09-11 NOTE — PROGRESS NOTES
PT/PTA met face to face to discuss pt's treatment plan and progress towards established goals.  Continue with current PT POC with focus on endurance, standing endurance, standing balance, progress as able.  Patient will be seen by physical therapist at least every sixth treatment or 30 days, whichever occurs first.    Lynn Fragoso, PTA  09/11/2018

## 2018-09-11 NOTE — TELEPHONE ENCOUNTER
Emailed patient.     Will adjust medications- increase lisinopril to 20mg daily (did not send new order). She may use the lisinopril 10mg tabs for now. Will send a new order if she needs refill.    Call or email in the next few days with BP readings.

## 2018-09-11 NOTE — PROGRESS NOTES
"                                                    Physical Therapy Progress Note     Name: Jaylon Bowles  Clinic Number: [id number]  Diagnosis:   Encounter Diagnoses   Name Primary?    Impaired functional mobility, balance, gait, and endurance     Bilateral leg weakness      Physician: Portia Mitchell MD  Treatment Orders: PT Eval and Treat  Past Medical History:   Diagnosis Date    Depression     Hypertension     Migraine headache     Multiple sclerosis     Thyroid disease        Evaluation Date: 07/27/2018  Visit #: 9  Plan of care expiration: 9/21/18  Precautions: Fall, universal    G codes 2/10              Subjective   Pt reports: " I pulled a muscle in my left shoulder getting off the toilet."   Pain Scale: L shoulder 8/10, R inner thigh from injection 9/10    Objective     Patient received individual therapy with activities as follows:     Jaylon received therapeutic exercises to develop strength, endurance, ROM, flexibility, posture and core stabilization for 45 minutes including:   X 5 min on recumbent bike.  Level 3.0  2 x 10 reps of B LE squats on leg press with #20 lbs applied.     3 x 10 reps of B LE standing therex including:                       Heel raises                     HS curls                     Hip abduction                    Hip extension                    Mini squats          Written Home Exercises: standing hip abd/add, heel raises, hip ext, HS curls, and mini squats.  Pt demo good understanding of the education provided. Jaylon demonstrated good return demonstration of activities.     Education provided re: POC, HEP  No spiritual or educational barriers to learning provided    Pt has no cultural, educational or language barriers to learning provided.    Assessment   Pt tolerated today's tx session well and did not have any problems.  Pt was educated on standing therex for HEP and was able to demonstrate understanding.   Cont with POC.             "   History  Co-morbidities and personal factors that may impact the plan of care Examination  Body Structures and Functions, activity limitations and participation restrictions that may impact the plan of care      Clinical Presentation   Co-morbidities:   depression, high BMI and HTN           Personal Factors:   no deficits Body Regions:   lower extremities  upper extremities  trunk     Body Systems:    strength  gross coordinated movement  balance  gait                 Participation Restrictions:   Limited community ambulation       Activity limitations:   Learning and applying knowledge  no deficits     General Tasks and Commands  no deficits     Communication  no deficits     Mobility  lifting and carrying objects  fine hand use (grasping/picking up)  walking     Self care  dressing  looking after one's health     Domestic Life  shopping  cooking  doing house work (cleaning house, washing dishes, laundry)  assisting others     Interactions/Relationships  no deficits     Life Areas  employment     Community and Social Life  community life  recreation and leisure             evolving clinical presentation with changing clinical characteristics                                moderate   high   high Decision Making/ Complexity Score:  moderate         Pt's spiritual, cultural and educational needs considered and pt agreeable to plan of care and goals as stated below:      GOALS:   Short term goals: 4 weeks, pt agrees to goals set.  1. Pt will increase her TUG speed to 24 seconds with a rollator in order to decrease her fall risk  2. Pt will increase SSWS to 0.75 m/sec with Rollator in order to increase her participation in the community  3. Pt will be able to perform 5 sit<>stands during 30 second chair rise test for increased muscular endurance  4. Pt will be able to perform MCTSIB condition 4 for 30 seconds to decrease fall risk  5. Pt will increase L hip flexion MMT to 3/5 to aid with gait ability  6. Pt will  increase L knee flexion MMT to at least 4-/5 for improved gait mechanics  7. Pt will increase L ankle PF and DF MMT scores to at least 4-/5 for improved gait mechanics  8. Pt will have strength and endurance HEP in place     Long term goals: 8 weeks, pt agrees to goals set  9. Pt will increase her TUG speed to 19 seconds with a rollator in order to decrease her fall risk  10. Pt will increase SSWS to 0.85 m/sec with Rollator in order to increase her participation in the community  11. Pt will be able to perform 7 sit<>stands during 30 second chair rise test for increased muscular endurance  12. Pt will be able to stand with her R foot forward in tandem stance for at least 10 seconds to decrease fall risk  13. Pt will increase L hip flexion MMT to 3+/5 to aid with gait ability          Plan   Outpatient physical therapy 2 times weekly to include: Pt Education, Home Exercise Program, Therapeutic Exercises, Neuromuscular Re-education, Therapeutic Activities, Gait Training, Manual Therapy,  and modalities(Ultrasound/Phonophoresis, Electrical Stimulation/TENS/Interferential and Moist Heat/Ice) PRN to achieve established goals. Pt may be seen by PTA as part of the rehabilitation team.     Lynn Fragoso, PTA   09/11/2018

## 2018-09-13 ENCOUNTER — OFFICE VISIT (OUTPATIENT)
Dept: NEUROLOGY | Facility: CLINIC | Age: 38
End: 2018-09-13
Payer: MEDICARE

## 2018-09-13 ENCOUNTER — DOCUMENTATION ONLY (OUTPATIENT)
Dept: REHABILITATION | Facility: HOSPITAL | Age: 38
End: 2018-09-13

## 2018-09-13 ENCOUNTER — LAB VISIT (OUTPATIENT)
Dept: LAB | Facility: HOSPITAL | Age: 38
End: 2018-09-13
Attending: PSYCHIATRY & NEUROLOGY
Payer: MEDICARE

## 2018-09-13 VITALS
WEIGHT: 242.5 LBS | SYSTOLIC BLOOD PRESSURE: 188 MMHG | HEART RATE: 64 BPM | DIASTOLIC BLOOD PRESSURE: 97 MMHG | BODY MASS INDEX: 40.4 KG/M2 | HEIGHT: 65 IN

## 2018-09-13 DIAGNOSIS — Z51.81 ENCOUNTER FOR THERAPEUTIC DRUG MONITORING: ICD-10-CM

## 2018-09-13 DIAGNOSIS — F06.8 ORGANIC EMOTIONALLY LABILE DISORDER: ICD-10-CM

## 2018-09-13 DIAGNOSIS — F32.1 CURRENT MODERATE EPISODE OF MAJOR DEPRESSIVE DISORDER WITHOUT PRIOR EPISODE: ICD-10-CM

## 2018-09-13 DIAGNOSIS — Z74.09 IMPAIRED FUNCTIONAL MOBILITY, BALANCE, GAIT, AND ENDURANCE: ICD-10-CM

## 2018-09-13 DIAGNOSIS — R29.898 BILATERAL LEG WEAKNESS: ICD-10-CM

## 2018-09-13 DIAGNOSIS — G35 MULTIPLE SCLEROSIS: Primary | ICD-10-CM

## 2018-09-13 DIAGNOSIS — G35 MULTIPLE SCLEROSIS: ICD-10-CM

## 2018-09-13 DIAGNOSIS — R40.0 DAYTIME SLEEPINESS: ICD-10-CM

## 2018-09-13 DIAGNOSIS — G47.30 SLEEP APNEA, UNSPECIFIED TYPE: ICD-10-CM

## 2018-09-13 DIAGNOSIS — I10 ESSENTIAL HYPERTENSION: ICD-10-CM

## 2018-09-13 LAB
ALBUMIN SERPL BCP-MCNC: 4 G/DL
ALP SERPL-CCNC: 55 U/L
ALT SERPL W/O P-5'-P-CCNC: 19 U/L
ANION GAP SERPL CALC-SCNC: 8 MMOL/L
AST SERPL-CCNC: 23 U/L
BASOPHILS # BLD AUTO: 0.06 K/UL
BASOPHILS NFR BLD: 0.7 %
BILIRUB SERPL-MCNC: 0.6 MG/DL
BUN SERPL-MCNC: 13 MG/DL
CALCIUM SERPL-MCNC: 9.6 MG/DL
CHLORIDE SERPL-SCNC: 104 MMOL/L
CO2 SERPL-SCNC: 28 MMOL/L
CREAT SERPL-MCNC: 0.9 MG/DL
DIFFERENTIAL METHOD: ABNORMAL
EOSINOPHIL # BLD AUTO: 0.2 K/UL
EOSINOPHIL NFR BLD: 2.1 %
ERYTHROCYTE [DISTWIDTH] IN BLOOD BY AUTOMATED COUNT: 14.5 %
EST. GFR  (AFRICAN AMERICAN): >60 ML/MIN/1.73 M^2
EST. GFR  (NON AFRICAN AMERICAN): >60 ML/MIN/1.73 M^2
GLUCOSE SERPL-MCNC: 88 MG/DL
HCT VFR BLD AUTO: 35.7 %
HGB BLD-MCNC: 12 G/DL
IMM GRANULOCYTES # BLD AUTO: 0.03 K/UL
IMM GRANULOCYTES NFR BLD AUTO: 0.3 %
LYMPHOCYTES # BLD AUTO: 1.6 K/UL
LYMPHOCYTES NFR BLD: 17.9 %
MCH RBC QN AUTO: 27.5 PG
MCHC RBC AUTO-ENTMCNC: 33.6 G/DL
MCV RBC AUTO: 82 FL
MONOCYTES # BLD AUTO: 0.7 K/UL
MONOCYTES NFR BLD: 7.2 %
NEUTROPHILS # BLD AUTO: 6.5 K/UL
NEUTROPHILS NFR BLD: 71.8 %
NRBC BLD-RTO: 0 /100 WBC
PLATELET # BLD AUTO: 225 K/UL
PMV BLD AUTO: 12 FL
POTASSIUM SERPL-SCNC: 4.4 MMOL/L
PROT SERPL-MCNC: 8.7 G/DL
RBC # BLD AUTO: 4.36 M/UL
SODIUM SERPL-SCNC: 140 MMOL/L
WBC # BLD AUTO: 9.11 K/UL

## 2018-09-13 PROCEDURE — 36415 COLL VENOUS BLD VENIPUNCTURE: CPT

## 2018-09-13 PROCEDURE — 85025 COMPLETE CBC W/AUTO DIFF WBC: CPT

## 2018-09-13 PROCEDURE — 99214 OFFICE O/P EST MOD 30 MIN: CPT | Mod: PBBFAC | Performed by: PSYCHIATRY & NEUROLOGY

## 2018-09-13 PROCEDURE — 86360 T CELL ABSOLUTE COUNT/RATIO: CPT

## 2018-09-13 PROCEDURE — 80053 COMPREHEN METABOLIC PANEL: CPT

## 2018-09-13 PROCEDURE — 86359 T CELLS TOTAL COUNT: CPT

## 2018-09-13 PROCEDURE — 99214 OFFICE O/P EST MOD 30 MIN: CPT | Mod: S$PBB,,, | Performed by: PSYCHIATRY & NEUROLOGY

## 2018-09-13 PROCEDURE — 99999 PR PBB SHADOW E&M-EST. PATIENT-LVL IV: CPT | Mod: PBBFAC,,, | Performed by: PSYCHIATRY & NEUROLOGY

## 2018-09-13 NOTE — Clinical Note
Jaylon García needs a letter.Going back to school at [employer]. Having some difficulty focusing on coursework, takes longer to do assignments and needs a letter for increased time for assignments and tests.She's here now if you can help now.

## 2018-09-13 NOTE — Clinical Note
Francheska, please help formulate a letter for her school - She is planning to go back to [employer] for school, taking 1-2 classes. However she needs a letter for increased time for assignments and tests as she has difficulty focusing on coursework and it takes longer to do assignments due to cognitive dysfunction form MS.Thanks!

## 2018-09-13 NOTE — PROGRESS NOTES
OUTPATIENT PHYSICAL THERAPY DISCHARGE SUMMARY     Name: Jaylon Bowles  Clinic Number: [id number]    Diagnosis:   Encounter Diagnoses   Name Primary?    Impaired functional mobility, balance, gait, and endurance     Bilateral leg weakness      Physician: Portia Mitchell  Treatment Orders: PT Eval and Treat  Past Medical History:   Diagnosis Date    Depression     Hypertension     Migraine headache     Multiple sclerosis     Thyroid disease        Initial visit: 07/27/18  Date of Last visit: 09/11/18  Date of Discharge Note:  09/13/18  Total Visits Received: 9    ASSESSMENT   Status Towards Goals Met:   Pt has not met any established STGs and LTGs at this time.      GOALS:   Short term goals: 4 weeks, pt agrees to goals set.  1. Pt will increase her TUG speed to 24 seconds with a rollator in order to decrease her fall risk  2. Pt will increase SSWS to 0.75 m/sec with Rollator in order to increase her participation in the community  3. Pt will be able to perform 5 sit<>stands during 30 second chair rise test for increased muscular endurance  4. Pt will be able to perform MCTSIB condition 4 for 30 seconds to decrease fall risk  5. Pt will increase L hip flexion MMT to 3/5 to aid with gait ability  6. Pt will increase L knee flexion MMT to at least 4-/5 for improved gait mechanics  7. Pt will increase L ankle PF and DF MMT scores to at least 4-/5 for improved gait mechanics  8. Pt will have strength and endurance HEP in place     Long term goals: 8 weeks, pt agrees to goals set  9. Pt will increase her TUG speed to 19 seconds with a rollator in order to decrease her fall risk  10. Pt will increase SSWS to 0.85 m/sec with Rollator in order to increase her participation in the community  11. Pt will be able to perform 7 sit<>stands during 30 second chair rise test for increased muscular endurance  12. Pt will be able to stand with her R foot forward in tandem stance for at least 10 seconds to decrease fall  risk  13. Pt will increase L hip flexion MMT to 3+/5 to aid with gait ability       Goals Not achieved and why: Pt requesting to to d/c from therapy as she reports that she will not have time to attend therapy sessions since the just started back with classes.       Discharge reason : Patient self discharge    PLAN   This patient is discharged from Outpatient Physical Therapy Services.     Marlys Mcclelland, PT  09/13/2018

## 2018-09-13 NOTE — PROGRESS NOTES
Subjective:       Patient ID: Jaylon Bowles is a 38 y.o. female who presents today for a routine clinic visit for MS.      MS HPI, updated last 9/13/18:  Symptom onset was in 2006, had episode of bilateral ON. Diagnosed in 2007 in [place]. Symptoms of dizziness, vertigo, numbness in legs, tingling in fingers, and gait imbalance with falls. Had MRI, LP - based on this diagnosed with MS. Initially on Avonex (ineffective), but switched to Copaxone. Had one relapse in 2008 with symptoms of worse gait imbalance. Received steroids which helped. Then switched to Betaseron (allergic, broke out in hives).  Started on Rebif in 2012. No further relapses. Reports more imbalance, uses walker outside the home but cane indoors. Suffers from pain in left leg and stiffness. Pain in left leg, responds to prednisone in past.       Subjective/Today:  · DMT: switched to Tecfidera 7/2018 due to emotional instability and needle fatigue.  · Side effects from DMT? Yes - some GI discomfort but not always taking with high protein meal. We discussed ways to increase protein. Also discussed can take aspirin 81mg 15min prior if experiencing flushing.  · Taking vitamin D3 as recommended? No Dose: thinks she's took 1500 units, but could have been 15,000  · No new symptoms. Less depression off Rebif and currently on Lexapro  · Following with [facility] for bladder, but stopped the pelvic therapy for now because it was too painful. Has started to to urinate more frequently more easily and with some accidents after stopping the oxybutin, but realizes now that it was working. Now planning to go resum it.   · Stopped PT/OT for now as she is going back to school; taking 1-2 classes. She did feel that it helped and is continuing exercises at home now. Started Ampyra 6/2018 - feels more steady and less motor fatigue.  · The patient also requested a mobility evaluation for a power wheelchair or scooter given her motor fatigue at longer distances,  "especially now that she will be starting school.  I am referring the patient to Bethany Mansfield MD at [facility].  · Received cooling vest and it helps on hot days.  · Going back to school at [employer]. Having some difficulty focusing on coursework, takes longer to do assignments and needs a letter for increased time for assignments and tests.    SOCIAL HISTORY  Social History     Tobacco Use    Smoking status: Never Smoker    Smokeless tobacco: Never Used   Substance Use Topics    Alcohol use: No     Comment: social    Drug use: No     Living arrangements - the patient lives with mother.  Employment quit [employer] around 6/2018 as she felt she was being bullied at work - cant move as fast as others, also had difficult time understanding new software. Currently not working. Does receive disability, but would like to resume part time job as previously.   Doing leg lifts. Plans on doing [facility]    MS ROS:  · Fatigue: Yes - take multiple breaks during the day  · Headaches: Migraines: previously with intermittent headaches in past, but worse 12/2017. Bifrontal pain than radiates back. Phonophobia, photophobia present. +Nausea, sometimes vomiting. +retroorbital pain behind right eye. Used to take ibuprofen (daily, 800mg daily or more), sleep, and rest in quiet area with meditation. After 6/2018, she stopped daily ibuprofen use. Doesn't like medication; tried magnesium but nonadherent initially.  · Seizures: none  · Sleep Disturbance: Yes - restless, no sleep study. "Mind is always running."  · Bladder Dysfunction: Yes - polyuria, urge incontinence. Following with urology, started on oxybutynin  · Bowel Dysfunction: constipation with oxybutynin  · Spasticity: Yes - mostly affects left side, leg > arm. Baclofen as needed  · Visual Symptoms: Yes - wears glasses. Feels as if vision is worsen, also describes oscillopsia in right eye.  · Cognitive: Yes - poor memory, word finding difficulty, and slowed " cognitive processing.   · Mood Disorder: Yes - depression. 9/13/18 - improved off Rebif and with antidepressant therapy  · Gait Disturbance: Yes - uses walker. Started Ampyra 6/2018 - feels more steady and less motor fatigue.  · Falls: Yes - lots of near falls, infrequent falls when she uses walker. Last fall was last week, and another one week prior to that.   · Hand Dysfunction: Yes - numbness in hands, sometimes tremors  · Pain: Yes - left leg pain  · Sexual Dysfunction: Not Assessed  · Skin Breakdown: Yes - scarring on abdomen and backs of arms  · Tremors: Yes - b/l hands with reaching for things  · Dysphagia:  Not daily, but worse prior to flare  · Dysarthria:    · Heat sensitivity:  Yes - symptoms, balance and cognition, worsens  · Sleep: snores, some apnea  · Copay Assist?  Yes - needed        Objective:        1. 25 foot timed walk: 14.11-->14.9 sec w/U-step  No flowsheet data found.  2. SDMT:  3. 9 Hole Peg Test:  No flowsheet data found.    Neurologic Exam      GEN: intermittently tearful with little prompting.     MENTAL STATUS: language is fluent, normal verbal comprehension, attention is normal, patient is alert and oriented x 3.      CRANIAL NERVE EXAM: b/l ELMA with intermittent nystagmus. Vertical gaze intact. PERRL. No facial asymmetry. Facial sensation is intact bilaterally. There is no dysarthria. Uvula is midline, and palate moves symmetrically.      MOTOR EXAM: Normal bulk and tone throughout UE and LE bilaterally.  Strength is  5/5 except  5- left biceps, KE, KF, DF and 4+HF b/l     REFLEXES: 3+ biceps and patellar, rest 2+     SENSORY EXAM: LT decreased on left body     COORDINATION: mod ataxia bilaterally with FNF     GAIT: wide based, required walker    Imaging:     Results for orders placed during the hospital encounter of 06/12/14   MRI Brain W WO Contrast    Impression  Age advanced cerebral volume loss with numerous scattered foci of T2/flair signal abnormality throughout the supra-and  infratentorial parenchyma majority centered within the periventricular white matter which in light of history is concerning for   sequela of prior demyelination.  Overall most compatible with a moderate degree demyelinating plaque burden.    No evidence for diffusion signal abnormality or enhancement to suggest active demyelination.  Clinical correlation and follow-up advised       Electronically signed by: NAIN DOOLEY DO  Date:     06/12/14  Time:    11:43      Results for orders placed during the hospital encounter of 06/12/14   MRI Cervical Spine W WO Cont    Impression  Ill-defined short segment cord edema signal ventrally at the C4 vertebral body level which in light of history and intracranial pathology concerning for sequela of prior demyelination.    Well-circumscribed central area of cystic change within the cervical spinal cord at the C6 vertebral body level most compatible with syringohydromyelia.  No evidence for abnormal intrathecal enhancement    Otherwise study by motion artifact.  Within limits of the study no definite cord signal abnormality to suggest edema throughout the thoracic spine.    No definite abnormal intrathecal enhancement.            Electronically signed by: NAIN DOOLEY DO  Date:     06/12/14  Time:    11:29      Results for orders placed during the hospital encounter of 06/12/14   MRI Thoracic Spine W WO Cont    Addendum Addendum: There is a partially visualized pedunculated lesion extending  off the inferior aspect of the left lobe of the thyroid gland measuring  approximately 1.8 cm clinical correlation and further evaluation as  warranted.   Question enlargement of the ascending aorta though limited by technique  and motion measuring at least 3.8 cm in transverse dimension.  Clinical  correlation and further evaluation advised.    Electronically signed by: NAIN DOOLEY DO Date:     06/12/14 Time:    11:31       Nain Dooley DO 6/12/2014 11:32 AM          Impression   Ill-defined short segment cord edema signal ventrally at the C4 vertebral body level which in light of history and intracranial pathology concerning for sequela of prior demyelination.    Well-circumscribed central area of cystic change within the cervical spinal cord at the C6 vertebral body level most compatible with syringohydromyelia.  No evidence for abnormal intrathecal enhancement    Otherwise study by motion artifact.  Within limits of the study no definite cord signal abnormality to suggest edema throughout the thoracic spine.    No definite abnormal intrathecal enhancement.            Electronically signed by: ALBAN HAIR DO  Date:     06/12/14  Time:    11:29      Most recent MRI's uploaded into system      Labs:     Lab Results   Component Value Date    WDNWYFJI43GC 39 06/11/2018    UTMOBVUL21DV 26 (L) 02/20/2018     No results found for: JCVINDEX, JCVANTIBODY  Lab Results   Component Value Date    GZ7KBKWL 62.1 02/20/2018    ABSOLUTECD3 1231 02/20/2018    MG0OBJOI 14.3 02/20/2018    ABSOLUTECD8 284 02/20/2018    ET0HMJST 47.7 02/20/2018    ABSOLUTECD4 947 02/20/2018    LABCD48 3.34 02/20/2018     Lab Results   Component Value Date    WBC 12.43 07/03/2018    RBC 4.29 07/03/2018    HGB 12.1 07/03/2018    HCT 36.3 (L) 07/03/2018    MCV 85 07/03/2018    MCH 28.2 07/03/2018    MCHC 33.3 07/03/2018    RDW 14.8 (H) 07/03/2018     07/03/2018    MPV 12.4 07/03/2018    GRAN 9.5 (H) 07/03/2018    GRAN 76.8 (H) 07/03/2018    LYMPH 1.8 07/03/2018    LYMPH 14.5 (L) 07/03/2018    MONO 0.8 07/03/2018    MONO 6.4 07/03/2018    EOS 0.2 07/03/2018    BASO 0.05 07/03/2018    EOSINOPHIL 1.6 07/03/2018    BASOPHIL 0.4 07/03/2018       Chemistry        Component Value Date/Time     06/11/2018 1131    K 4.5 06/11/2018 1131     06/11/2018 1131    CO2 26 06/11/2018 1131    BUN 18 06/11/2018 1131    CREATININE 1.1 06/11/2018 1131    GLU 78 06/11/2018 1131        Component Value Date/Time    CALCIUM 10.0  06/11/2018 1131    ALKPHOS 68 06/11/2018 1131    AST 20 06/11/2018 1131    ALT 14 06/11/2018 1131    BILITOT 0.6 06/11/2018 1131    ESTGFRAFRICA >60.0 06/11/2018 1131    EGFRNONAA >60.0 06/11/2018 1131          8/2018 - normal TSH   7/2018 - neg/normal RPR, HSV 2 IgG, HIV, thyroid stimulating hormone, T3, T4, acute hepatitis panel. Pos/elevated TSH, HSV 2 IgG, TPO ab, anti-thyroglobulin ab.   6/2018 - vit D 39  2/2018 - vit B12 375      Diagnosis/Assessment/Plan:    1.  Multiple Sclerosis  · Assessment: Diagnosed by MRI and LP in 2007 by outside specialist. Has failed Avonex and Copaxone, intolerant to Betaseron, and has been on Rebif since 2012. Transitioned to Tecfidera 6/2018 due to emotional instability, needle fatigue, and concern for relapse. No new signs of relapse since transitioning. Emotional instability improving  · Imaging: MRI brain and c/t spine w/wo contrast, 6 months post-start new DMT in 12/2018  · Labs: cbc, cmp, helper suppressor ratio  · Disease Modifying Therapies: continue Rebif for now, but transition to Tecfidera  · The the patient was counseled about the importance of vitamin D supplementation in multiple sclerosis, and the fact that recent data suggests that high circulating blood levels of vitamin D has an ameliorating effect on the disease course in multiple sclerosis in general. Advised 4000 units daily vit D3.     2.   MS Symptom Assessment / Management  · Fatigue: sleep referral placed.  · Bladder Dysfunction: following with urology, continue  · Visual Symptoms: following with ophthalmology  · Cognitive: neuropsych test ordered last visit to eval cognitive changes, still pending  · Mood Disorder: continue lexapro. Referral to  for talk therapy as well as to help with vocational rehab.   · Gait Disturbance: continue Ampyra as it helps with gait stability and gait fatigue. Referral placed to PMR for motorized wheelchair/scooter evaluation given motor fatigue. Encouraged to continue  PT/OT exercises.  · Other: will give letter for school for extensions on assignments. Also told about MS Society scholarship    3. Internal medicine referral - significantly elevated HTN today. Will message PCP.    4. Early morning headaches      Migraines  Migraines and analgesic overuse counseling given. Advised to decreased OTC pain meds to 2 days per week again. Continue magnesium oxide 400mg daily for preventative. Concern for sleep apnea contributing given snoring and body habitus. Referral to sleep medicine    RTC in 3 months    Over 50% of this 40 minute visit was spent in direct face to face counseling of the patient about MS, DMT considerations, and MS symptom management.         There are no diagnoses linked to this encounter.

## 2018-09-14 ENCOUNTER — OFFICE VISIT (OUTPATIENT)
Dept: UROGYNECOLOGY | Facility: CLINIC | Age: 38
End: 2018-09-14
Payer: MEDICARE

## 2018-09-14 VITALS
WEIGHT: 241.88 LBS | HEIGHT: 65 IN | SYSTOLIC BLOOD PRESSURE: 160 MMHG | DIASTOLIC BLOOD PRESSURE: 100 MMHG | BODY MASS INDEX: 40.3 KG/M2

## 2018-09-14 DIAGNOSIS — N89.8 VAGINAL DISCHARGE: ICD-10-CM

## 2018-09-14 DIAGNOSIS — Z01.419 WELL WOMAN EXAM: Primary | ICD-10-CM

## 2018-09-14 DIAGNOSIS — E66.9 OBESITY, UNSPECIFIED CLASSIFICATION, UNSPECIFIED OBESITY TYPE, UNSPECIFIED WHETHER SERIOUS COMORBIDITY PRESENT: ICD-10-CM

## 2018-09-14 DIAGNOSIS — Z12.4 ENCOUNTER FOR SCREENING FOR CERVICAL CANCER: ICD-10-CM

## 2018-09-14 LAB
ABSOLUTE CD3: 1045 CELLS/UL (ref 700–2100)
ABSOLUTE CD8: 308 CELLS/UL (ref 200–900)
CD3%: 69.4 % (ref 55–83)
CD3+CD4+ CELLS # BLD: 735 CELLS/UL (ref 300–1400)
CD3+CD4+ CELLS NFR BLD: 48.8 % (ref 28–57)
CD4/CD8 RATIO: 2.39 (ref 0.9–3.6)
CD8 % SUPPRESSOR T CELL: 20.4 % (ref 10–39)

## 2018-09-14 PROCEDURE — G0101 CA SCREEN;PELVIC/BREAST EXAM: HCPCS | Mod: S$PBB,,, | Performed by: NURSE PRACTITIONER

## 2018-09-14 PROCEDURE — 87624 HPV HI-RISK TYP POOLED RSLT: CPT

## 2018-09-14 PROCEDURE — 88175 CYTOPATH C/V AUTO FLUID REDO: CPT

## 2018-09-14 PROCEDURE — 99214 OFFICE O/P EST MOD 30 MIN: CPT | Mod: PBBFAC | Performed by: NURSE PRACTITIONER

## 2018-09-14 PROCEDURE — 99999 PR PBB SHADOW E&M-EST. PATIENT-LVL IV: CPT | Mod: PBBFAC,,, | Performed by: NURSE PRACTITIONER

## 2018-09-14 RX ORDER — METRONIDAZOLE 500 MG/1
500 TABLET ORAL EVERY 12 HOURS
Qty: 14 TABLET | Refills: 0 | Status: SHIPPED | OUTPATIENT
Start: 2018-09-14 | End: 2018-09-21

## 2018-09-14 NOTE — TELEPHONE ENCOUNTER
So she should use Lisinopril 20mg daily in addition to the Coreg 25mg bid.    Should e-mail BP readings over the weekend and will increase lisinopril 30mg daily if BP remains elevated.

## 2018-09-14 NOTE — PATIENT INSTRUCTIONS
1. Well woman  --takes 2-3 weeks for results    2. Vaginal discharge  --flagyl 500 mg twice daily x 7 days  --a. avoiding feminine products such as deoderant soaps, body wash, bubble bath, douches, scented toilet paper, deoderant tampons or pads, feminine wipes, chronic pad use, etc.  b. avoiding other vulvovaginal irritants such as long hot baths, humidity, tight, synthetic clothing, chlorine and sitting around in wet bathing suits  c. wearing cotton underwear, avoiding thong underwear and no underwear to bed  d. taking showers instead of baths and use a hair dryer on cool setting afterwards to dry  e. wearing cotton to exercise and shower immediately after exercise and change clothes  f. using polyurethane condoms without spermicide if sexually active and symptoms are triggered by intercourse    3. Contraception  --using condoms for now  --discussed options--limited to htn    4. Obesity  --medical fitness referral    5. RTC 1 year

## 2018-09-16 ENCOUNTER — PATIENT MESSAGE (OUTPATIENT)
Dept: INTERNAL MEDICINE | Facility: CLINIC | Age: 38
End: 2018-09-16

## 2018-09-16 DIAGNOSIS — I10 UNCONTROLLED HYPERTENSION: ICD-10-CM

## 2018-09-17 RX ORDER — LISINOPRIL 30 MG/1
30 TABLET ORAL DAILY
Start: 2018-09-17 | End: 2018-09-18 | Stop reason: SDUPTHER

## 2018-09-18 ENCOUNTER — PATIENT MESSAGE (OUTPATIENT)
Dept: INTERNAL MEDICINE | Facility: CLINIC | Age: 38
End: 2018-09-18

## 2018-09-18 DIAGNOSIS — I10 UNCONTROLLED HYPERTENSION: ICD-10-CM

## 2018-09-18 RX ORDER — LISINOPRIL 30 MG/1
30 TABLET ORAL DAILY
Qty: 30 TABLET | Refills: 0
Start: 2018-09-18 | End: 2018-09-26 | Stop reason: ALTCHOICE

## 2018-09-18 NOTE — TELEPHONE ENCOUNTER
Increase lisinopril to 30mg daily- [facility]     Call with BP readings in the next few days. Also start checking heart rates with each BP reading.

## 2018-09-19 ENCOUNTER — TELEPHONE (OUTPATIENT)
Dept: PSYCHIATRY | Facility: CLINIC | Age: 38
End: 2018-09-19

## 2018-09-19 ENCOUNTER — PATIENT MESSAGE (OUTPATIENT)
Dept: PSYCHIATRY | Facility: CLINIC | Age: 38
End: 2018-09-19

## 2018-09-19 LAB
HPV HR 12 DNA CVX QL NAA+PROBE: POSITIVE
HPV16 AG SPEC QL: NEGATIVE
HPV18 DNA SPEC QL NAA+PROBE: NEGATIVE

## 2018-09-20 ENCOUNTER — PATIENT MESSAGE (OUTPATIENT)
Dept: NEUROLOGY | Facility: CLINIC | Age: 38
End: 2018-09-20

## 2018-09-20 ENCOUNTER — PATIENT MESSAGE (OUTPATIENT)
Dept: UROGYNECOLOGY | Facility: CLINIC | Age: 38
End: 2018-09-20

## 2018-09-20 DIAGNOSIS — G35 MULTIPLE SCLEROSIS: Primary | ICD-10-CM

## 2018-09-20 DIAGNOSIS — R26.9 GAIT DISTURBANCE: ICD-10-CM

## 2018-09-20 RX ORDER — DALFAMPRIDINE 10 MG/1
10 TABLET, FILM COATED, EXTENDED RELEASE ORAL 2 TIMES DAILY
Qty: 180 TABLET | Refills: 1 | Status: CANCELLED | OUTPATIENT
Start: 2018-09-20 | End: 2019-09-20

## 2018-09-20 NOTE — TELEPHONE ENCOUNTER
Alejandro MICHAEL Dr.    I just received my test results. I have no idea where this HPV came from. I take my yearly pap smear. This is new. I just started having sex after 4yrs of celibacy. How do I treat this? Now I'm scared.

## 2018-09-21 ENCOUNTER — PATIENT MESSAGE (OUTPATIENT)
Dept: UROGYNECOLOGY | Facility: CLINIC | Age: 38
End: 2018-09-21

## 2018-09-21 ENCOUNTER — OFFICE VISIT (OUTPATIENT)
Dept: PSYCHIATRY | Facility: CLINIC | Age: 38
End: 2018-09-21

## 2018-09-21 ENCOUNTER — PATIENT MESSAGE (OUTPATIENT)
Dept: NEUROLOGY | Facility: CLINIC | Age: 38
End: 2018-09-21

## 2018-09-21 DIAGNOSIS — G35 MULTIPLE SCLEROSIS: ICD-10-CM

## 2018-09-21 DIAGNOSIS — R39.9 UTI SYMPTOMS: ICD-10-CM

## 2018-09-21 DIAGNOSIS — N31.9 NEUROGENIC BLADDER: Primary | ICD-10-CM

## 2018-09-21 DIAGNOSIS — F32.1 CURRENT MODERATE EPISODE OF MAJOR DEPRESSIVE DISORDER WITHOUT PRIOR EPISODE: Primary | ICD-10-CM

## 2018-09-21 PROCEDURE — 99499 UNLISTED E&M SERVICE: CPT | Mod: S$GLB,,, | Performed by: SOCIAL WORKER

## 2018-09-21 RX ORDER — DALFAMPRIDINE 10 MG/1
10 TABLET, FILM COATED, EXTENDED RELEASE ORAL 2 TIMES DAILY
Qty: 180 TABLET | Refills: 0 | Status: SHIPPED | OUTPATIENT
Start: 2018-09-21 | End: 2018-12-17 | Stop reason: SDUPTHER

## 2018-09-21 NOTE — TELEPHONE ENCOUNTER
Spoke with patient.  Questions answered.  Understands she needs to follow her pap smear next year.  Jamee Berg, FNP-BC

## 2018-09-21 NOTE — TELEPHONE ENCOUNTER
Reviewed normal pap with+ HPV with patient.  Will repeat pap next year.  Reviewed pelvic floor PT expectations.  Verbalized understanding.  Jamee Berg, NUP-BC

## 2018-09-21 NOTE — TELEPHONE ENCOUNTER
FYI,    I have a few more questions about this HPV.  Do you mind calling me later?  How so I treat this? I don't want cervical cancer. No woman does. Please advise.    Thanks    Jaylon

## 2018-09-21 NOTE — PROGRESS NOTES
"Individual Psychotherapy (PhD/LCSW) - [facility]  Not a billable service    9/21/2018    Site:  [facility]         Therapeutic Intervention: Met with patient.  Outpatient - Behavior modifying psychotherapy 60 min and Outpatient - Supportive psychotherapy 60 min    Chief complaint/reason for encounter: depression and mood swings     Interval history and content of current session: Patient reports being "emotionally all over the place", she partially attributes this to the heat she's been experiencing. She presented in tears and would often giggle while crying. This lasted throughout most of the session. We discussed why she was feeling so emotionally unstable today and she was unable to articulate the cause. Patient's affect was sometimes exaggerated and sometimes inappropriate to conversation. Will discuss possible pseudobulbar affect with neurology provider.  We briefly discussed her journaling as well as her mother and how the patient often feels suffocated by her lack of freedom. SW guided patient in a mindfulness exercise, patient was tearful for majority of the exercise. She would like to continue exercises in future sessions when she is less emotional. We discussed the patient's "negative voice" and how she thinks other people in her family also have it, they just haven't tried fighting it before. We discussed how the voice has and has not been helpful to her. Unfortunately, due to the patient's emotionality, conversation was frequently interrupted.     Treatment plan:  · Target symptoms: depression  · Why chosen therapy is appropriate versus another modality: relevant to diagnosis  · Outcome monitoring methods: self-report, observation  · Therapeutic intervention type: behavior modifying psychotherapy, supportive psychotherapy    Risk parameters:  Patient reports no suicidal ideation  Patient reports no homicidal ideation  Patient reports no self-injurious behavior  Patient reports no violent " behavior    Verbal deficits: None    Patient's response to intervention:  The patient's response to intervention is accepting.    Progress toward goals and other mental status changes:  The patient's progress toward goals is fair .    Diagnosis:     ICD-10-CM ICD-9-CM   1. Current moderate episode of major depressive disorder without prior episode F32.1 296.22       Plan:  individual psychotherapy    - pt will continue to journal   - SW will research other mindfullness techniques to bring to sessions    - self soothing techniques   - per note above, will discuss pt's affect with neurology provider, Portia Mitchell MD.     Return to clinic: as scheduled, October 5 at 9:30 am.     Length of Service (minutes): 90

## 2018-09-23 ENCOUNTER — PATIENT MESSAGE (OUTPATIENT)
Dept: INTERNAL MEDICINE | Facility: CLINIC | Age: 38
End: 2018-09-23

## 2018-09-25 ENCOUNTER — PATIENT MESSAGE (OUTPATIENT)
Dept: INTERNAL MEDICINE | Facility: CLINIC | Age: 38
End: 2018-09-25

## 2018-09-26 ENCOUNTER — PATIENT MESSAGE (OUTPATIENT)
Dept: INTERNAL MEDICINE | Facility: CLINIC | Age: 38
End: 2018-09-26

## 2018-09-26 RX ORDER — LISINOPRIL AND HYDROCHLOROTHIAZIDE 12.5; 2 MG/1; MG/1
2 TABLET ORAL DAILY
Qty: 60 TABLET | Refills: 0 | Status: SHIPPED | OUTPATIENT
Start: 2018-09-26 | End: 2018-11-12 | Stop reason: SDUPTHER

## 2018-09-26 NOTE — TELEPHONE ENCOUNTER
Spoke to patient. Also sent email.    Drowsiness with HCTZ but was taking other medications. She is not completely sure about side effects.    Patient to email which dose of lisinopril she has- 10mg or 30mg.    Will plan to change to lisinopril with HCTZ and monitor for drowsiness.

## 2018-09-28 ENCOUNTER — PATIENT MESSAGE (OUTPATIENT)
Dept: NEUROLOGY | Facility: CLINIC | Age: 38
End: 2018-09-28

## 2018-10-03 ENCOUNTER — PATIENT MESSAGE (OUTPATIENT)
Dept: UROGYNECOLOGY | Facility: CLINIC | Age: 38
End: 2018-10-03

## 2018-10-03 NOTE — TELEPHONE ENCOUNTER
Reviewed with patient that she tested positive for one or more of the following hpv types:  31,33,35,39,45,51,52,56,58,59,66 and 68.  She verbalized understanding.  Jamee Berg, NUP-BC

## 2018-10-03 NOTE — TELEPHONE ENCOUNTER
Kelechi MICHAEL is requesting an explanation of the type of HPV she tested positive for.    Can you explain to me the type of HPV I tested positive for?    HPV other High Risk types, PCR     Positive       Negative  Other HPV genotypes include:   31,33,35,39,45,51,52,56,58,59,66 and 68.

## 2018-10-05 ENCOUNTER — OFFICE VISIT (OUTPATIENT)
Dept: PSYCHIATRY | Facility: CLINIC | Age: 38
End: 2018-10-05

## 2018-10-05 DIAGNOSIS — F32.1 CURRENT MODERATE EPISODE OF MAJOR DEPRESSIVE DISORDER WITHOUT PRIOR EPISODE: Primary | ICD-10-CM

## 2018-10-05 PROCEDURE — 99499 UNLISTED E&M SERVICE: CPT | Mod: S$GLB,,, | Performed by: SOCIAL WORKER

## 2018-10-05 NOTE — PROGRESS NOTES
"Individual Psychotherapy (PhD/LCSW) - MS[facility] Intern   Not a billable service     10/5/2018    Site:  [facility]         Therapeutic Intervention: Met with patient.  Outpatient - Behavior modifying psychotherapy 60 min and Outpatient - Supportive psychotherapy 60 min     Chief complaint/reason for encounter: depression     Interval history and content of current session: Patient reports no new symptoms of depression, however she is still having difficulty staying motivated and modulating her emotions. She described the past two weeks as "draining" but she did identify a "win" when she was able to run errands by herself. We practiced a self-soothing method (square breathing) and completed another guided meditation (body and sound). She reported that since our last session, it has been easier to fall asleep. We discussed using the square breathing techniques once per night and she was agreeable to this plan.   Discussed termination and she understands that SW will be leaving in December.     Treatment plan:  · Target symptoms: depression  · Why chosen therapy is appropriate versus another modality: relevant to diagnosis  · Outcome monitoring methods: self-report, observation  · Therapeutic intervention type: behavior modifying psychotherapy, supportive psychotherapy    Risk parameters:  Patient reports no suicidal ideation  Patient reports no homicidal ideation  Patient reports no self-injurious behavior  Patient reports no violent behavior    Verbal deficits: None    Patient's response to intervention:  The patient's response to intervention is accepting.    Progress toward goals and other mental status changes:  The patient's progress toward goals is fair .    Diagnosis:     ICD-10-CM ICD-9-CM   1. Current moderate episode of major depressive disorder without prior episode F32.1 296.22       Plan:  individual psychotherapy   - Pt will practice square breathing 1/day  - Pt will continue journaling    Return to " clinic: 2 weeks    Length of Service (minutes): 60

## 2018-10-07 ENCOUNTER — PATIENT MESSAGE (OUTPATIENT)
Dept: INTERNAL MEDICINE | Facility: CLINIC | Age: 38
End: 2018-10-07

## 2018-10-07 DIAGNOSIS — Z13.6 ENCOUNTER FOR SCREENING FOR CARDIOVASCULAR DISORDERS: Primary | ICD-10-CM

## 2018-10-08 ENCOUNTER — PATIENT MESSAGE (OUTPATIENT)
Dept: PSYCHIATRY | Facility: CLINIC | Age: 38
End: 2018-10-08

## 2018-10-09 ENCOUNTER — PATIENT MESSAGE (OUTPATIENT)
Dept: INTERNAL MEDICINE | Facility: CLINIC | Age: 38
End: 2018-10-09

## 2018-10-09 NOTE — TELEPHONE ENCOUNTER
Pt advised via portal she can take medication with water; but not to eat or drink anything else until after labs are drawn.

## 2018-10-09 NOTE — TELEPHONE ENCOUNTER
Attempted to call pt; incorrect phone number; sent pt message via portal informing pt lipid panel has been ordered and to call scheduling staff for appt and update contact information.

## 2018-10-10 ENCOUNTER — OFFICE VISIT (OUTPATIENT)
Dept: DERMATOLOGY | Facility: CLINIC | Age: 38
End: 2018-10-10
Payer: MEDICARE

## 2018-10-10 ENCOUNTER — LAB VISIT (OUTPATIENT)
Dept: LAB | Facility: HOSPITAL | Age: 38
End: 2018-10-10
Attending: INTERNAL MEDICINE
Payer: MEDICARE

## 2018-10-10 VITALS — WEIGHT: 241 LBS | BODY MASS INDEX: 40.1 KG/M2

## 2018-10-10 DIAGNOSIS — Z13.6 ENCOUNTER FOR SCREENING FOR CARDIOVASCULAR DISORDERS: ICD-10-CM

## 2018-10-10 DIAGNOSIS — L28.0 LICHEN SIMPLEX: Primary | ICD-10-CM

## 2018-10-10 LAB
CHOLEST SERPL-MCNC: 186 MG/DL
CHOLEST/HDLC SERPL: 3.4 {RATIO}
HDLC SERPL-MCNC: 55 MG/DL
HDLC SERPL: 29.6 %
LDLC SERPL CALC-MCNC: 115 MG/DL
NONHDLC SERPL-MCNC: 131 MG/DL
TRIGL SERPL-MCNC: 80 MG/DL

## 2018-10-10 PROCEDURE — 11900 INJECT SKIN LESIONS </W 7: CPT | Mod: PBBFAC,PO | Performed by: DERMATOLOGY

## 2018-10-10 PROCEDURE — 99213 OFFICE O/P EST LOW 20 MIN: CPT | Mod: PBBFAC,PO,25 | Performed by: DERMATOLOGY

## 2018-10-10 PROCEDURE — 11900 INJECT SKIN LESIONS </W 7: CPT | Mod: S$PBB,,, | Performed by: DERMATOLOGY

## 2018-10-10 PROCEDURE — 80061 LIPID PANEL: CPT

## 2018-10-10 PROCEDURE — 99999 PR PBB SHADOW E&M-EST. PATIENT-LVL III: CPT | Mod: PBBFAC,,, | Performed by: DERMATOLOGY

## 2018-10-10 PROCEDURE — 99202 OFFICE O/P NEW SF 15 MIN: CPT | Mod: 25,S$PBB,, | Performed by: DERMATOLOGY

## 2018-10-10 PROCEDURE — 36415 COLL VENOUS BLD VENIPUNCTURE: CPT | Mod: PO

## 2018-10-10 RX ORDER — MOMETASONE FUROATE 1 MG/G
OINTMENT TOPICAL DAILY
Qty: 45 G | Refills: 3 | Status: SHIPPED | OUTPATIENT
Start: 2018-10-10 | End: 2018-10-20

## 2018-10-10 NOTE — PROGRESS NOTES
Subjective:       Patient ID:  Jaylon Bowles is a 38 y.o. female who presents for   Chief Complaint   Patient presents with    Skin Discoloration    Dry Skin     History of Present Illness: The patient presents with chief complaint of rash.  Location: right arm and right leg  Duration: arm 2 years, leg one year  Signs/Symptoms: itch    Prior treatments: cortisone creams          Review of Systems   Constitutional: Negative for fever.   Skin: Positive for itching and rash.   Hematologic/Lymphatic: Does not bruise/bleed easily.        Objective:    Physical Exam   Skin:   Areas Examined (abnormalities noted in diagram):   Head / Face Inspection Performed  Neck Inspection Performed  RUE Inspected  LUE Inspection Performed  RLE Inspected  Nails and Digits Inspection Performed              Diagram Legend     Erythematous scaling macule/papule c/w actinic keratosis       Vascular papule c/w angioma      Pigmented verrucoid papule/plaque c/w seborrheic keratosis      Yellow umbilicated papule c/w sebaceous hyperplasia      Irregularly shaped tan macule c/w lentigo     1-2 mm smooth white papules consistent with Milia      Movable subcutaneous cyst with punctum c/w epidermal inclusion cyst      Subcutaneous movable cyst c/w pilar cyst      Firm pink to brown papule c/w dermatofibroma      Pedunculated fleshy papule(s) c/w skin tag(s)      Evenly pigmented macule c/w junctional nevus     Mildly variegated pigmented, slightly irregular-bordered macule c/w mildly atypical nevus      Flesh colored to evenly pigmented papule c/w intradermal nevus       Pink pearly papule/plaque c/w basal cell carcinoma      Erythematous hyperkeratotic cursted plaque c/w SCC      Surgical scar with no sign of skin cancer recurrence      Open and closed comedones      Inflammatory papules and pustules      Verrucoid papule consistent consistent with wart     Erythematous eczematous patches and plaques     Dystrophic onycholytic nail with  subungual debris c/w onychomycosis     Umbilicated papule    Erythematous-base heme-crusted tan verrucoid plaque consistent with inflamed seborrheic keratosis     Erythematous Silvery Scaling Plaque c/w Psoriasis     See annotation      Assessment / Plan:        Lichen simplex  Intralesional Kenalog 5mg/cc (0.5 cc total) injected into 2 lesions on the arm and leg today after obtaining verbal consent including risk of surrounding hypopigmentation. Patient tolerated procedure well.    Units: 1  NDC for Kenalog 10mg/cc:  9229-0392-71          -     mometasone (ELOCON) 0.1 % ointment; Apply topically once daily. for 10 days  Dispense: 45 g; Refill: 3        cerave with pramoxine prn itching  Dc rubbing areas             Follow-up if symptoms worsen or fail to improve.

## 2018-10-12 ENCOUNTER — OFFICE VISIT (OUTPATIENT)
Dept: INTERNAL MEDICINE | Facility: CLINIC | Age: 38
End: 2018-10-12
Payer: MEDICARE

## 2018-10-12 ENCOUNTER — LAB VISIT (OUTPATIENT)
Dept: LAB | Facility: HOSPITAL | Age: 38
End: 2018-10-12
Attending: INTERNAL MEDICINE
Payer: MEDICARE

## 2018-10-12 DIAGNOSIS — F32.A ANXIETY AND DEPRESSION: ICD-10-CM

## 2018-10-12 DIAGNOSIS — E06.3 HASHIMOTO'S THYROIDITIS: ICD-10-CM

## 2018-10-12 DIAGNOSIS — I10 ESSENTIAL HYPERTENSION: Primary | ICD-10-CM

## 2018-10-12 DIAGNOSIS — F41.9 ANXIETY AND DEPRESSION: ICD-10-CM

## 2018-10-12 LAB — TSH SERPL DL<=0.005 MIU/L-ACNC: 1 UIU/ML

## 2018-10-12 PROCEDURE — 99213 OFFICE O/P EST LOW 20 MIN: CPT | Mod: PBBFAC,PO | Performed by: INTERNAL MEDICINE

## 2018-10-12 PROCEDURE — 99214 OFFICE O/P EST MOD 30 MIN: CPT | Mod: S$PBB,,, | Performed by: INTERNAL MEDICINE

## 2018-10-12 PROCEDURE — 36415 COLL VENOUS BLD VENIPUNCTURE: CPT | Mod: PO

## 2018-10-12 PROCEDURE — 99999 PR PBB SHADOW E&M-EST. PATIENT-LVL III: CPT | Mod: PBBFAC,,, | Performed by: INTERNAL MEDICINE

## 2018-10-12 PROCEDURE — 84443 ASSAY THYROID STIM HORMONE: CPT

## 2018-10-12 NOTE — PROGRESS NOTES
Subjective:       Patient ID: Jaylon Bowles is a 38 y.o. female who presents for Follow-up; Hypertension; Thyroid Problem; and Depression      Hypertension   This is a chronic problem. The current episode started more than 1 month ago. The problem has been gradually improving since onset. The problem is controlled. Pertinent negatives include no chest pain, headaches, palpitations, peripheral edema or shortness of breath. There are no associated agents to hypertension. Risk factors for coronary artery disease include family history. Past treatments include beta blockers, ACE inhibitors and diuretics. The current treatment provides moderate improvement. Compliance problems include exercise.  There is no history of kidney disease or heart failure. Identifiable causes of hypertension include a thyroid problem. There is no history of chronic renal disease or a hypertension causing med.   Thyroid Problem   Presents for follow-up visit. Symptoms include depressed mood. Patient reports no anxiety, cold intolerance, diarrhea, heat intolerance, leg swelling or palpitations. There is no history of heart failure.   Depression   Visit Type: follow-up  Patient presents with the following symptoms: anhedonia, depressed mood and feelings of hopelessness.  Patient is not experiencing: chest pain, feelings of worthlessness, hyperventilation, irritability, nausea, nervousness/anxiety, palpitations, panic and shortness of breath.  Frequency of symptoms: most days   Severity: moderate   Compliance with medications:  %           Review of Systems   Constitutional: Negative for chills, fever and irritability.   HENT: Negative for congestion, rhinorrhea and sinus pressure.    Eyes: Negative for redness and itching.   Respiratory: Negative for cough, chest tightness and shortness of breath.    Cardiovascular: Negative for chest pain and palpitations.   Gastrointestinal: Negative for abdominal pain, diarrhea, nausea and vomiting.    Endocrine: Negative for cold intolerance and heat intolerance.   Musculoskeletal: Positive for back pain (uses tylenol arthritis, Icy Hot), gait problem and myalgias.   Skin: Negative for rash.   Neurological: Negative for dizziness, numbness and headaches.   Psychiatric/Behavioral: Positive for depression and dysphoric mood (reports that she has some good days and some bad days). The patient is not nervous/anxious.        Objective:      Physical Exam   Constitutional: She is oriented to person, place, and time. Vital signs are normal. She appears well-developed and well-nourished. No distress.   HENT:   Head: Normocephalic and atraumatic.   Right Ear: Hearing and external ear normal.   Left Ear: Hearing and external ear normal.   Nose: Nose normal.   Mouth/Throat: Uvula is midline.   Eyes: Lids are normal.   Cardiovascular: Normal rate, regular rhythm, normal heart sounds and intact distal pulses.   No murmur heard.  Pulmonary/Chest: Effort normal and breath sounds normal. She has no wheezes.   Abdominal: Soft. Bowel sounds are normal. She exhibits no distension. There is no tenderness.   Musculoskeletal: Normal range of motion. She exhibits no edema.   Neurological: She is alert and oriented to person, place, and time.   Skin: Skin is warm, dry and intact. Rash noted. Rash is maculopapular (improving). She is not diaphoretic.   Psychiatric: She exhibits a depressed mood.   Vitals reviewed.      Assessment/Plan:       1. Essential hypertension  - better BP control, continue coreg, lisinopril, HCTZ    2. Hashimoto's thyroiditis  - continue synthroid  - TSH; Future    3. Anxiety and depression  - stable, continue lexapro    RTC in 3 months or sooner if needed    Cat Ramachandran MD

## 2018-10-14 ENCOUNTER — PATIENT MESSAGE (OUTPATIENT)
Dept: INTERNAL MEDICINE | Facility: CLINIC | Age: 38
End: 2018-10-14

## 2018-10-15 ENCOUNTER — PATIENT MESSAGE (OUTPATIENT)
Dept: NEUROLOGY | Facility: CLINIC | Age: 38
End: 2018-10-15

## 2018-10-15 VITALS
DIASTOLIC BLOOD PRESSURE: 88 MMHG | WEIGHT: 240.94 LBS | RESPIRATION RATE: 16 BRPM | TEMPERATURE: 98 F | BODY MASS INDEX: 40.14 KG/M2 | SYSTOLIC BLOOD PRESSURE: 138 MMHG | HEIGHT: 65 IN | HEART RATE: 62 BPM

## 2018-10-16 ENCOUNTER — TELEPHONE (OUTPATIENT)
Dept: INTERNAL MEDICINE | Facility: CLINIC | Age: 38
End: 2018-10-16

## 2018-10-18 ENCOUNTER — PATIENT MESSAGE (OUTPATIENT)
Dept: INTERNAL MEDICINE | Facility: CLINIC | Age: 38
End: 2018-10-18

## 2018-10-18 DIAGNOSIS — I10 ESSENTIAL HYPERTENSION: ICD-10-CM

## 2018-10-19 ENCOUNTER — TELEPHONE (OUTPATIENT)
Dept: NEUROLOGY | Facility: CLINIC | Age: 38
End: 2018-10-19

## 2018-10-19 NOTE — TELEPHONE ENCOUNTER
Left VM informing patient that her appointment with Jackelyn has been canceled due to provider being sick.

## 2018-10-22 ENCOUNTER — PATIENT MESSAGE (OUTPATIENT)
Dept: PSYCHIATRY | Facility: CLINIC | Age: 38
End: 2018-10-22

## 2018-10-25 RX ORDER — CARVEDILOL 25 MG/1
25 TABLET ORAL 2 TIMES DAILY WITH MEALS
Qty: 60 TABLET | Refills: 2 | Status: SHIPPED | OUTPATIENT
Start: 2018-10-25 | End: 2019-04-03 | Stop reason: SDUPTHER

## 2018-10-28 ENCOUNTER — PATIENT MESSAGE (OUTPATIENT)
Dept: INTERNAL MEDICINE | Facility: CLINIC | Age: 38
End: 2018-10-28

## 2018-11-02 ENCOUNTER — OFFICE VISIT (OUTPATIENT)
Dept: PSYCHIATRY | Facility: CLINIC | Age: 38
End: 2018-11-02

## 2018-11-02 ENCOUNTER — PATIENT MESSAGE (OUTPATIENT)
Dept: PSYCHIATRY | Facility: CLINIC | Age: 38
End: 2018-11-02

## 2018-11-02 DIAGNOSIS — F32.1 CURRENT MODERATE EPISODE OF MAJOR DEPRESSIVE DISORDER WITHOUT PRIOR EPISODE: Primary | ICD-10-CM

## 2018-11-02 PROCEDURE — 99499 UNLISTED E&M SERVICE: CPT | Mod: S$GLB,,, | Performed by: SOCIAL WORKER

## 2018-11-02 NOTE — PROGRESS NOTES
"Individual Psychotherapy (PhD/LCSW) - MSW Intern  Not a billable service    11/2/2018    Site:  [facility]         Therapeutic Intervention: Met with patient.  Outpatient - Behavior modifying psychotherapy 60 min - Supportive psychotherapy 60 min -     Chief complaint/reason for encounter: depression     Interval history and content of current session: Patient reports that since our last encounter, she experienced a decrease in motivation and began to see her grades fall. Since then, she has self-corrected by giving herself a creative outlet ([employer]) as a reward for completing homework at night. She has started going back to the gym for exercise and reports that this has helped her overall mood. She also reports that the square breathing technique has helped reduce her "spiraling" irritation and that it "calms" her headaches. She also shared that she has begun drinking coffee again to help with her fatigue but that sometimes it doesn't help at all and she wanted to know about any health benefits/harm. SW provided some education and suggested keeping track of any anxiety symptoms, drinking no more than three 8oz cups per day, and not drinking coffee on an empty stomach.     Patient has repeatedly complained about her mother not supporting her "growth". She claims that her mother prevents her from going places alone and insists on going with her even when she is just going to the coffee shop to study. We have discussed this previously so today we role played how she can ask her mother for personal space by establishing a time frame and holding her ground. She agreed to try the technique at least once between sessions.     Treatment plan:  · Target symptoms: depression  · Why chosen therapy is appropriate versus another modality: relevant to diagnosis  · Outcome monitoring methods: self-report, observation  · Therapeutic intervention type: behavior modifying psychotherapy, supportive " psychotherapy    Risk parameters:  Patient reports no suicidal ideation  Patient reports no homicidal ideation  Patient reports no self-injurious behavior  Patient reports no violent behavior    Verbal deficits: None    Patient's response to intervention:  The patient's response to intervention is accepting.    Progress toward goals and other mental status changes:  The patient's progress toward goals is good.    Diagnosis:     ICD-10-CM ICD-9-CM   1. Current moderate episode of major depressive disorder without prior episode F32.1 296.22       Plan:  individual psychotherapy    Pt will have a conversation with mother to establish some boundaries    Return to clinic: 1 week    Length of Service (minutes): 60

## 2018-11-05 ENCOUNTER — PATIENT MESSAGE (OUTPATIENT)
Dept: UROGYNECOLOGY | Facility: CLINIC | Age: 38
End: 2018-11-05

## 2018-11-06 ENCOUNTER — PATIENT MESSAGE (OUTPATIENT)
Dept: UROGYNECOLOGY | Facility: CLINIC | Age: 38
End: 2018-11-06

## 2018-11-07 ENCOUNTER — TELEPHONE (OUTPATIENT)
Dept: UROGYNECOLOGY | Facility: CLINIC | Age: 38
End: 2018-11-07

## 2018-11-07 NOTE — TELEPHONE ENCOUNTER
Pt called to discuss test results. I informed the pt I'd relay this message to SANG Berg and she would get back to her. Pt voiced understanding and call ended.

## 2018-11-07 NOTE — TELEPHONE ENCOUNTER
----- Message from Lupe Jonah sent at 11/7/2018  4:02 PM CST -----  Contact: pt   Name of Who is Calling: KENDELL NI [[id number]]      What is the request in detail: Patient is requesting a call from staff in regards to her HSV results. Please contact to further discuss and advise      Can the clinic reply by [facility]: Yes       [facility]: [phone]

## 2018-11-07 NOTE — TELEPHONE ENCOUNTER
----- Message from Lupe Jonah sent at 11/7/2018  4:02 PM CST -----  Contact: pt   Name of Who is Calling: KENDELL NI [[id number]]      What is the request in detail: Patient is requesting a call from staff in regards to her HSV results. Please contact to further discuss and advise      Can the clinic reply by [facility]: Yes       [facility]: [phone]

## 2018-11-09 ENCOUNTER — OFFICE VISIT (OUTPATIENT)
Dept: PSYCHIATRY | Facility: CLINIC | Age: 38
End: 2018-11-09
Payer: MEDICARE

## 2018-11-09 DIAGNOSIS — F32.1 CURRENT MODERATE EPISODE OF MAJOR DEPRESSIVE DISORDER WITHOUT PRIOR EPISODE: Primary | ICD-10-CM

## 2018-11-09 PROCEDURE — 99499 UNLISTED E&M SERVICE: CPT | Mod: S$PBB,,, | Performed by: SOCIAL WORKER

## 2018-11-12 ENCOUNTER — PATIENT MESSAGE (OUTPATIENT)
Dept: INTERNAL MEDICINE | Facility: CLINIC | Age: 38
End: 2018-11-12

## 2018-11-12 RX ORDER — LISINOPRIL AND HYDROCHLOROTHIAZIDE 12.5; 2 MG/1; MG/1
2 TABLET ORAL DAILY
Qty: 60 TABLET | Refills: 2 | Status: SHIPPED | OUTPATIENT
Start: 2018-11-12 | End: 2019-04-03 | Stop reason: SDUPTHER

## 2018-11-12 NOTE — PROGRESS NOTES
"Individual Psychotherapy (PhD/LCSW) - MSW Intern  Not a billable service    11/9/2018    Site:  [facility]         Therapeutic Intervention: Met with patient.  Outpatient - Behavior modifying psychotherapy 60 min and Outpatient - Supportive psychotherapy 60 min    Chief complaint/reason for encounter: depression     Interval history and content of current session: Patient reports no new symptoms of depression. Discussed why she did not complete her homework to have a conversation with her mother. She felt like it just "wouldn't be worth it" and that her mother wasn't ready and she with be in a bad mood afterwards. When pressed further, she recognized that her mother was usually in a bad mood already and she agreed to try again before next session. We discussed her feelings about why she did not try and she expressed fears of rejection, loneliness, and not being accepted for herself.  She shared that she feels that she is "constantly instilled with fear" of everything when her mother is alone and she just wants to be "free" of it.   She shared that her sister in [place] may have MS, but the diagnosis is not clear. She felt that her sister wanted the attention associated with the disease and is now struggling with disability and a bad relationship with her  and may move to [place]. This prompted her to begin looking for apartments and she shared that she found a place she could afford on her own. However, she does not want to leave her mom with the extra financial stress and hopes that her sister would consider moving in with their mom.   We ended the session with a body and sound meditation.     Treatment plan:  · Target symptoms: depression  · Why chosen therapy is appropriate versus another modality: relevant to diagnosis  · Outcome monitoring methods: self-report, observation  · Therapeutic intervention type: behavior modifying psychotherapy, supportive psychotherapy    Risk parameters:  Patient " reports no suicidal ideation  Patient reports no homicidal ideation  Patient reports no self-injurious behavior  Patient reports no violent behavior    Verbal deficits: None    Patient's response to intervention:  The patient's response to intervention is accepting.    Progress toward goals and other mental status changes:  The patient's progress toward goals is fair .    Diagnosis:     ICD-10-CM ICD-9-CM   1. Current moderate episode of major depressive disorder without prior episode F32.1 296.22       Plan:  individual psychotherapy    Pt will have a conversation with her mother to set boundaries   Meditation for working w/Difficulties, discuss fears of rejection, and loneliness, end with loving kindness meditation    Return to clinic: 1 week    Length of Service (minutes): 60

## 2018-11-16 ENCOUNTER — OFFICE VISIT (OUTPATIENT)
Dept: PSYCHIATRY | Facility: CLINIC | Age: 38
End: 2018-11-16
Payer: MEDICARE

## 2018-11-16 DIAGNOSIS — F32.1 CURRENT MODERATE EPISODE OF MAJOR DEPRESSIVE DISORDER WITHOUT PRIOR EPISODE: Primary | ICD-10-CM

## 2018-11-16 PROCEDURE — 99499 UNLISTED E&M SERVICE: CPT | Mod: S$PBB,,, | Performed by: SOCIAL WORKER

## 2018-11-16 NOTE — PROGRESS NOTES
"Individual Psychotherapy (PhD/LCSW) - MSW Intern  Not a billable service    11/16/2018    Site:  [facility]         Therapeutic Intervention: Met with patient.  Outpatient - Behavior modifying psychotherapy 60 min - and Outpatient - Supportive psychotherapy 60 min    Chief complaint/reason for encounter: depression     Interval history and content of current session: Patient reports no new symptoms of depression. She shares that she did follow through on her homework to have a conversation with her mother regarding establishing a timeframe to have some time alone. She got her mother to agree but then she was so discouraged by the end of their conversation she ended up not even going. We discussed her fears of being alone and leaving her mother alone. She expressed her fears and how this led to her staying home.   We started with a meditation for handling difficulties, and this brought up a lot of mixed feelings for her. She felt that her head and heart were always at war and it created tension throughout her body. After the meditation, we discussed the earliest moment she could remember when she felt that "internal war" and how it still hurts her to this day.   We ended the session with a meditation for loving kindness. She indicated that she felt "unrestrained" and "content" afterwards, she would like to end future sessions with this meditation.     Treatment plan:  · Target symptoms: alcohol abuse  · Why chosen therapy is appropriate versus another modality: relevant to diagnosis  · Outcome monitoring methods: self-report, observation  · Therapeutic intervention type: behavior modifying psychotherapy, supportive psychotherapy    Risk parameters:  Patient reports no suicidal ideation  Patient reports no homicidal ideation  Patient reports no self-injurious behavior  Patient reports no violent behavior    Verbal deficits: None    Patient's response to intervention:  The patient's response to intervention is " accepting.    Progress toward goals and other mental status changes:  The patient's progress toward goals is fair .    Diagnosis:     ICD-10-CM ICD-9-CM   1. Current moderate episode of major depressive disorder without prior episode F32.1 296.22       Plan:  individual psychotherapy    Return to clinic: 2 weeks    Length of Service (minutes): 60

## 2018-11-19 ENCOUNTER — INITIAL CONSULT (OUTPATIENT)
Dept: NEUROLOGY | Facility: CLINIC | Age: 38
End: 2018-11-19
Payer: MEDICARE

## 2018-11-19 DIAGNOSIS — G35 MULTIPLE SCLEROSIS: Primary | ICD-10-CM

## 2018-11-19 DIAGNOSIS — R26.9 GAIT DISTURBANCE: ICD-10-CM

## 2018-11-19 DIAGNOSIS — R29.898 BILATERAL LEG WEAKNESS: ICD-10-CM

## 2018-11-19 DIAGNOSIS — N31.9 NEUROGENIC BLADDER: ICD-10-CM

## 2018-11-19 PROCEDURE — 99211 OFF/OP EST MAY X REQ PHY/QHP: CPT | Mod: PBBFAC | Performed by: PHYSICAL MEDICINE & REHABILITATION

## 2018-11-19 PROCEDURE — 99204 OFFICE O/P NEW MOD 45 MIN: CPT | Mod: S$PBB,,, | Performed by: PHYSICAL MEDICINE & REHABILITATION

## 2018-11-19 PROCEDURE — 99999 PR PBB SHADOW E&M-EST. PATIENT-LVL I: CPT | Mod: PBBFAC,,, | Performed by: PHYSICAL MEDICINE & REHABILITATION

## 2018-11-19 NOTE — PROGRESS NOTES
"  Initial PM&R Clinic Evaluation  CC: Impaired mobility and ADLs  Referral: Dr. Mitchell     HPI: Jaylon Bowles is a 38 y.o. female who presents today for an initial evaluation of the above complaint. Following With Dr. Mitchell.     Dx with MS in March 2006    Functional Hx -   Ambulates with Rollator, and sometimes a cane. Can walk about 5-10 minutes before she needs a break.       Most limited by fatigue and heat; sometimes has brain fog. Usually wakes up tired, and has the "brain fog" in the day. Then takes a nap, and can feel better for sometime. In online school, taking general classes in accounting. She has requested extra time for taking tests.    When going to grocery store uses scooter to get around.     Falls - none   Bladder - stable, minimal/infrequent accidents; on oxybutynin   Bowel - stable  Diet - eating well/appetite is good   Mood - Dx Depression, stable , on Lexapro (sometimes forgets to take it)   - attends psychotherapy  Pain - LBP, stable. Controlled w Tylenol    Function In the home and community uses cane or Rollator depending on how she is feeling. As stated about need to take frequent rest breaks.   Bathing - requires assistance with moving legs into/out of bathtub.   Also has assistance w some  Dressing (bra mgmt, putting on underwear, and threading legs into pants)     SH - Lives w mom, in apt, on [address]. Doors are wide and can fit a scooter.    -currently not working         Past Medical History:   Diagnosis Date    Depression     Hypertension     Migraine headache     Multiple sclerosis     Thyroid disease      No past surgical history on file.  Current Outpatient Medications on File Prior to Visit   Medication Sig Dispense Refill    baclofen (LIORESAL) 10 MG tablet Take 10 mg by mouth 2 (two) times daily as needed.       betamethasone dipropionate (DIPROLENE) 0.05 % cream       carvedilol (COREG) 25 MG tablet Take 1 tablet (25 mg total) by mouth 2 (two) times daily with meals. " Taking daily 60 tablet 2    dalfampridine (AMPYRA) 10 mg Tb12 Take 10 mg by mouth 2 (two) times daily. 180 tablet 0    escitalopram oxalate (LEXAPRO) 20 MG tablet Take 1 tablet (20 mg total) by mouth once daily. 30 tablet 0    levothyroxine (SYNTHROID) 100 MCG tablet Take 1 tablet (100 mcg total) by mouth once daily. 90 tablet 1    lisinopril-hydrochlorothiazide (PRINZIDE,ZESTORETIC) 20-12.5 mg per tablet Take 2 tablets by mouth once daily. 60 tablet 2    mometasone (ELOCON) 0.1 % ointment Apply topically once daily. for 10 days 45 g 3    oxybutynin (DITROPAN-XL) 10 MG 24 hr tablet Take 1 tablet (10 mg total) by mouth once daily. 30 tablet 11    polyethylene glycol (GLYCOLAX) 17 gram/dose powder Take 17 g by mouth once daily. 1530 g 2    TECFIDERA 120 mg (14)- 240 mg (46) CpDR        No current facility-administered medications on file prior to visit.      Review of patient's allergies indicates:   Allergen Reactions    Betaseron [interferon beta-1b] Hives       Family History:   Family History   Problem Relation Age of Onset    Hypertension Mother     Thyroid disease Mother     Diabetes Father     Stroke Father     Vaginal cancer Neg Hx     Endometrial cancer Neg Hx     Cervical cancer Neg Hx     Breast cancer Neg Hx           ROS:   CONSTITUTIONAL:  (-) weight change, fever, chills, night sweats   EYES:  (-)  double vision (-) blurry vision  EARS, NOSE, THROAT: (-) dysphagia (-) hearing loss  RESPIRATORY: (-)   shortness of breath  (-) cough  CARDIOVASCULAR (-) chest pain  (-) swelling  GENITOURINARY  (-) bladder incontinence  (-) hematuria  GASTROINTESTINAL(-)  bowel incontinence (-) nausea/vomiting   ENDOCRINE  (-)  heat or cold intolerance  (-) hair loss  PSYCHIATRIC  (-) depression  (-) anxiety  HEMATOL/LYMPHATIC (-) easy bruising (-) enlarged lymph nodes  ALLERGIC/IMMUN  (-) seasonal allergies (-) allergies to environmental stimuli    SKIN (-) changes (-) rashes (-) wounds  The rest of the  review of systems is unremarkable.          Pertinent Prior Work Up (Imaging/EMGs):  Study Date Pertinent findings                        Physical Exam  There were no vitals taken for this visit.  Gen: Well developed female in no acute distress  Neuro: Awake, alert, oriented x 3   Mood/affect: pleasant and appropriate   Cerebellar: Finger to nose:  Cardiovascular:   ( -) edema      MMT -   R HF 3/5, Left HF 2/5  R KE 5/5, L KE 2/5 DF/PF 2/5   No calf tenderness      Gait -  slow, veers to left, need to stop to redirect.   Sensation - Decereased LT  to LLE        Impression: 38 y.o. female with impaired mobility and ADLs in setting of MS         Plan:    Diagnostics: non at this time   Medications: RNO new Rx given   Therapy: will consider      Consults: referral placed for  NeuroPsych testing    Other: rx placed for Scooter     Pt with Dx of MS, and Impaired endurance and gait mechanics as well as balace will benefit from power mobility. Cognition WNL.    Impairements- Patient will benefit from power mobility given decrease in balance, endurance, and decreased sensation   Function - Patient is trying to remain in independent in home, and attend online school. Power mobility will allow patient to conserve energy and maximize her daily routine   Limitations - Can walk about 5-10 minutes before she needs a break. Would benefit from a scooter given fatigue/decreased endurance in setting of MS.         Follow up: 3 mo/PRN      Bethany Mansfield MD      ADDENDUM to NOTE 12/19/2018 (Only an update to note, please note patient's condition and diagnosis have not changed)    Plan - Patient will need a power mobility device per the further evaluation of occupational or physical therapist.     Bethany Mansfield

## 2018-11-19 NOTE — LETTER
November 26, 2018      Portia Mitchell MD  [address]  [facility] [address]           [facility] Multiple S[facility]  [address]  [facility] [address]  Phone: [phone]          Patient: Jaylon Bowles   MR Number: [id number]   YOB: 1980   Date of Visit: 11/19/2018       Dear Dr. Portia Mitchell:    Thank you for referring Jaylon Bowles to me for evaluation. Attached you will find relevant portions of my assessment and plan of care.    If you have questions, please do not hesitate to call me. I look forward to following Jaylon Bowles along with you.    Sincerely,    Bethany Mansfield MD    Enclosure  CC:  No Recipients    If you would like to receive this communication electronically, please contact [email or web address] or [phone] to request more information on [employer] access.    For providers and/or their staff who would like to refer a patient to [facility], please contact us through our one-stop-shop provider referral line, [facility] [place], at [phone].    If you feel you have received this communication in error or would no longer like to receive these types of communications, please e-mail [email or web address]

## 2018-11-27 ENCOUNTER — PATIENT MESSAGE (OUTPATIENT)
Dept: PSYCHIATRY | Facility: CLINIC | Age: 38
End: 2018-11-27

## 2018-11-27 ENCOUNTER — PATIENT MESSAGE (OUTPATIENT)
Dept: NEUROLOGY | Facility: CLINIC | Age: 38
End: 2018-11-27

## 2018-11-27 ENCOUNTER — PATIENT MESSAGE (OUTPATIENT)
Dept: OPHTHALMOLOGY | Facility: CLINIC | Age: 38
End: 2018-11-27

## 2018-11-30 ENCOUNTER — OFFICE VISIT (OUTPATIENT)
Dept: PSYCHIATRY | Facility: CLINIC | Age: 38
End: 2018-11-30

## 2018-11-30 DIAGNOSIS — F32.1 CURRENT MODERATE EPISODE OF MAJOR DEPRESSIVE DISORDER WITHOUT PRIOR EPISODE: Primary | ICD-10-CM

## 2018-11-30 PROCEDURE — 99499 UNLISTED E&M SERVICE: CPT | Mod: S$GLB,,, | Performed by: SOCIAL WORKER

## 2018-11-30 NOTE — PROGRESS NOTES
"Individual Psychotherapy (PhD/LCSW) - MSW Intern  Not a billable service    11/30/2018    Site:  [facility]         Therapeutic Intervention: Met with patient.  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 We discussed how she is feeling about termination and she feels like she is "mostly fine" with it, however, we did have to discuss professional boundaries and that after I leave the clinic, she will not be able to call me. SW intern did share that there will still be a full time  at the clinic who will be able to help her with case management and/or therapy if, in the future, she decides she wants to start again. We also discussed her family and how it was stressful visiting her siblings in [place]. She thinks her mom "babies" her more when they are with her sister and was frustrated by how much her family limits her. Discussed the concept of efficacy and how she allows others to limit her and why. She acknowledged that she might do this because she is scared of disappointing family and it just feels easier than fighting it most of the time. She shared that this was why she never followed through on her homework to set boundaries with her mother and that she felt recommitted to that goal.   During the loving kindness meditation, patient broke down in tears after trying to imagine her mother giving her loving kindness. This led to a discussion about serenity and "accepting the things we cannot change". Patient still expresses a lot of frustration about family and SW suggested scheduling time in her " day to vent her frustrations in her journal each day and afterwards, do something small and enjoyable such as, listen to music and/or do her crafts. Patient was willing to try this and said she would do it before bed.     Treatment plan:  · Target symptoms: depression  · Why chosen therapy is appropriate versus another modality: relevant to diagnosis  · Outcome monitoring methods: self-report, observation  · Therapeutic intervention type: behavior modifying psychotherapy, supportive psychotherapy    Risk parameters:  Patient reports no suicidal ideation  Patient reports no homicidal ideation  Patient reports no self-injurious behavior  Patient reports no violent behavior    Verbal deficits: None    Patient's response to intervention:  The patient's response to intervention is accepting.    Progress toward goals and other mental status changes:  The patient's progress toward goals is good.    Diagnosis:     ICD-10-CM ICD-9-CM   1. Current moderate episode of major depressive disorder without prior episode F32.1 296.22       Plan:  individual psychotherapy   -[place] setting exercise with mother  -scheduled frustration routine     Return to clinic: 1 week for final session    Length of Service (minutes): 90

## 2018-12-01 DIAGNOSIS — I10 UNCONTROLLED HYPERTENSION: ICD-10-CM

## 2018-12-01 DIAGNOSIS — F32.A ANXIETY AND DEPRESSION: ICD-10-CM

## 2018-12-01 DIAGNOSIS — F41.9 ANXIETY AND DEPRESSION: ICD-10-CM

## 2018-12-02 RX ORDER — LISINOPRIL 10 MG/1
TABLET ORAL
Qty: 90 TABLET | Refills: 0 | OUTPATIENT
Start: 2018-12-02

## 2018-12-02 RX ORDER — ESCITALOPRAM OXALATE 20 MG/1
TABLET ORAL
Qty: 30 TABLET | Refills: 2 | Status: SHIPPED | OUTPATIENT
Start: 2018-12-02 | End: 2019-01-17 | Stop reason: ALTCHOICE

## 2018-12-03 ENCOUNTER — PATIENT MESSAGE (OUTPATIENT)
Dept: PSYCHIATRY | Facility: CLINIC | Age: 38
End: 2018-12-03

## 2018-12-06 ENCOUNTER — PATIENT MESSAGE (OUTPATIENT)
Dept: NEUROLOGY | Facility: CLINIC | Age: 38
End: 2018-12-06

## 2018-12-06 ENCOUNTER — OFFICE VISIT (OUTPATIENT)
Dept: NEUROLOGY | Facility: CLINIC | Age: 38
End: 2018-12-06
Payer: MEDICARE

## 2018-12-06 VITALS
HEART RATE: 66 BPM | BODY MASS INDEX: 39.99 KG/M2 | HEIGHT: 65 IN | WEIGHT: 240 LBS | DIASTOLIC BLOOD PRESSURE: 80 MMHG | SYSTOLIC BLOOD PRESSURE: 138 MMHG

## 2018-12-06 DIAGNOSIS — R29.898 BILATERAL LEG WEAKNESS: ICD-10-CM

## 2018-12-06 DIAGNOSIS — Z74.09 IMPAIRED FUNCTIONAL MOBILITY, BALANCE, GAIT, AND ENDURANCE: ICD-10-CM

## 2018-12-06 DIAGNOSIS — G35 MULTIPLE SCLEROSIS: Primary | ICD-10-CM

## 2018-12-06 DIAGNOSIS — F32.0 CURRENT MILD EPISODE OF MAJOR DEPRESSIVE DISORDER, UNSPECIFIED WHETHER RECURRENT: ICD-10-CM

## 2018-12-06 DIAGNOSIS — N39.46 MIXED INCONTINENCE: ICD-10-CM

## 2018-12-06 DIAGNOSIS — H51.23 INO (INTERNUCLEAR OPHTHALMOPLEGIA), BILATERAL: ICD-10-CM

## 2018-12-06 PROCEDURE — 99999 PR PBB SHADOW E&M-EST. PATIENT-LVL III: CPT | Mod: PBBFAC,,, | Performed by: PSYCHIATRY & NEUROLOGY

## 2018-12-06 PROCEDURE — 99214 OFFICE O/P EST MOD 30 MIN: CPT | Mod: S$PBB,,, | Performed by: PSYCHIATRY & NEUROLOGY

## 2018-12-06 PROCEDURE — 99213 OFFICE O/P EST LOW 20 MIN: CPT | Mod: PBBFAC | Performed by: PSYCHIATRY & NEUROLOGY

## 2018-12-06 NOTE — PROGRESS NOTES
Subjective:       Patient ID: Jaylon Bowles is a 38 y.o. female who presents today for a routine clinic visit for MS.      MS HPI, updated last 9/13/18:  Symptom onset was in 2006, had episode of bilateral ON. Diagnosed in 2007 in [place]. Symptoms of dizziness, vertigo, numbness in legs, tingling in fingers, and gait imbalance with falls. Had MRI, LP - based on this diagnosed with MS. Initially on Avonex (ineffective), but switched to Copaxone. Had one relapse in 2008 with symptoms of worse gait imbalance. Received steroids which helped. Then switched to Betaseron (allergic, broke out in hives).  Started on Rebif in 2012. No further relapses. Reports more imbalance, uses walker outside the home but cane indoors. Suffers from pain in left leg and stiffness. Pain in left leg, responds to prednisone in past.       Subjective/Today:  · DMT: switched to Tecfidera 7/2018 due to emotional instability and needle fatigue.  · Side effects from DMT? Yes - some GI discomfort but not always taking with high protein meal. We discussed ways to increase protein. Also discussed can take aspirin 81mg 15min prior if experiencing flushing.  · Taking vitamin D3 as recommended? No Dose: felt like they were cramping her toes  · No new symptoms.   · Still occasional tearfulness despite using Celexa daily. Mostly when she's being creative. 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 She denies mood issues otherwise.  · Started Ampyra 6/2018 - feels more steady and less motor fatigue. There was one day where she ran out of medication and she felt more off-balance. With Ampyra, she is more steady, less falls. Also following with Donal.  · She has been working with Jackelyn (MING) on becoming more independent and with her mood. States that this is going well.      SOCIAL HISTORY  Social History     Tobacco Use    Smoking status: Never Smoker    Smokeless tobacco: Never Used   Substance Use Topics    Alcohol use: No     Comment: social    Drug  "use: No     Living arrangements - the patient lives with mother.  Employment quit [employer] around 6/2018 as she felt she was being bullied at work - cant move as fast as others, also had difficult time understanding new software. Currently not working. Does receive disability, but would like to resume part time job as previously.   Doing leg lifts. Plans on doing [employer] yoga  Started online courses 7/2018, planning to do more next semester.    MS ROS:  · Fatigue: Yes - take multiple breaks during the day  · Headaches: Migraines: previously with intermittent headaches in past, but worse 12/2017. Bifrontal pain than radiates back. Phonophobia, photophobia present. +Nausea, sometimes vomiting. +retroorbital pain behind right eye. Used to take ibuprofen (daily, 800mg daily or more), sleep, and rest in quiet area with meditation. After 6/2018, she stopped daily ibuprofen use. Doesn't like medication; tried magnesium but nonadherent initially.  · Seizures: none  · Sleep Disturbance: Yes - restless, no sleep study. "Mind is always running."  · Bladder Dysfunction: Yes - polyuria, urge incontinence. Following with urology, started on oxybutynin  · Bowel Dysfunction: constipation with oxybutynin  · Spasticity: Yes - mostly affects left side, leg > arm. Baclofen as needed  · Visual Symptoms: Yes - wears glasses. Feels as if vision is worsen, also describes oscillopsia in right eye.  · Cognitive: Yes - poor memory, word finding difficulty, and slowed cognitive processing.   · Mood Disorder: Yes - depression. 9/13/18 - improved off Rebif and with antidepressant therapy  · Gait Disturbance: Yes - uses walker. Started Ampyra 6/2018 - feels more steady and less motor fatigue.  · Falls: Yes - lots of near falls, infrequent falls when she uses walker. Last fall was last week, and another one week prior to that.   · Hand Dysfunction: Yes - numbness in hands, sometimes tremors  · Pain: Yes - left leg pain  · Sexual Dysfunction: Not " Assessed  · Skin Breakdown: Yes - scarring on abdomen and backs of arms  · Tremors: Yes - b/l hands with reaching for things  · Dysphagia:  Not daily, but worse prior to flare  · Dysarthria:    · Heat sensitivity:  Yes - symptoms, balance and cognition, worsens  · Sleep: snores, some apnea  · Copay Assist?  Yes - needed        Objective:        1. 25 foot timed walk: 14.11 (6/2018)-->14.9 sec w/U-step (9/2018)  No flowsheet data found.  2. SDMT:  3. 9 Hole Peg Test:  No flowsheet data found.    Neurologic Exam      GEN: intermittently tearful with little prompting.     MENTAL STATUS: language is fluent, normal verbal comprehension, attention is normal, patient is alert and oriented x 3.      CRANIAL NERVE EXAM: b/l ELMA with intermittent nystagmus. Vertical gaze intact. PERRL. No facial asymmetry. Facial sensation is intact bilaterally. There is no dysarthria. Uvula is midline, and palate moves symmetrically.      MOTOR EXAM: Normal bulk and tone throughout UE and LE bilaterally.  Strength is  5/5 except  5- left biceps, KE, KF, DF and 4+HF b/l     REFLEXES: 3+ biceps and patellar, rest 2+     SENSORY EXAM: LT decreased on left body     COORDINATION: mod ataxia bilaterally with FNF     GAIT: wide based, requires walker, more stable on today.     Imaging:     Results for orders placed during the hospital encounter of 06/12/14   MRI Brain W WO Contrast    Impression  Age advanced cerebral volume loss with numerous scattered foci of T2/flair signal abnormality throughout the supra-and infratentorial parenchyma majority centered within the periventricular white matter which in light of history is concerning for   sequela of prior demyelination.  Overall most compatible with a moderate degree demyelinating plaque burden.    No evidence for diffusion signal abnormality or enhancement to suggest active demyelination.  Clinical correlation and follow-up advised       Electronically signed by: ALBAN HAIR DO  Date:      06/12/14  Time:    11:43      Results for orders placed during the hospital encounter of 06/12/14   MRI Cervical Spine W WO Cont    Impression  Ill-defined short segment cord edema signal ventrally at the C4 vertebral body level which in light of history and intracranial pathology concerning for sequela of prior demyelination.    Well-circumscribed central area of cystic change within the cervical spinal cord at the C6 vertebral body level most compatible with syringohydromyelia.  No evidence for abnormal intrathecal enhancement    Otherwise study by motion artifact.  Within limits of the study no definite cord signal abnormality to suggest edema throughout the thoracic spine.    No definite abnormal intrathecal enhancement.            Electronically signed by: NAIN DOOLEY DO  Date:     06/12/14  Time:    11:29      Results for orders placed during the hospital encounter of 06/12/14   MRI Thoracic Spine W WO Cont    Addendum Addendum: There is a partially visualized pedunculated lesion extending  off the inferior aspect of the left lobe of the thyroid gland measuring  approximately 1.8 cm clinical correlation and further evaluation as  warranted.   Question enlargement of the ascending aorta though limited by technique  and motion measuring at least 3.8 cm in transverse dimension.  Clinical  correlation and further evaluation advised.    Electronically signed by: NAIN DOOLEY DO Date:     06/12/14 Time:    11:31       Nain Dooley DO 6/12/2014 11:32 AM          Impression  Ill-defined short segment cord edema signal ventrally at the C4 vertebral body level which in light of history and intracranial pathology concerning for sequela of prior demyelination.    Well-circumscribed central area of cystic change within the cervical spinal cord at the C6 vertebral body level most compatible with syringohydromyelia.  No evidence for abnormal intrathecal enhancement    Otherwise study by motion artifact.  Within  limits of the study no definite cord signal abnormality to suggest edema throughout the thoracic spine.    No definite abnormal intrathecal enhancement.            Electronically signed by: ALBAN HAIR DO  Date:     06/12/14  Time:    11:29      Most recent MRI's uploaded into system      Labs:     Lab Results   Component Value Date    DBCIWBJK19GU 39 06/11/2018    NERRPJDG27OW 26 (L) 02/20/2018     No results found for: JCVINDEX, JCVANTIBODY  Lab Results   Component Value Date    MU0AYCDJ 69.4 09/13/2018    ABSOLUTECD3 1045 09/13/2018    QS4JKQCA 20.4 09/13/2018    ABSOLUTECD8 308 09/13/2018    DI1FEOCL 48.8 09/13/2018    ABSOLUTECD4 735 09/13/2018    LABCD48 2.39 09/13/2018     Lab Results   Component Value Date    WBC 9.11 09/13/2018    RBC 4.36 09/13/2018    HGB 12.0 09/13/2018    HCT 35.7 (L) 09/13/2018    MCV 82 09/13/2018    MCH 27.5 09/13/2018    MCHC 33.6 09/13/2018    RDW 14.5 09/13/2018     09/13/2018    MPV 12.0 09/13/2018    GRAN 6.5 09/13/2018    GRAN 71.8 09/13/2018    LYMPH 1.6 09/13/2018    LYMPH 17.9 (L) 09/13/2018    MONO 0.7 09/13/2018    MONO 7.2 09/13/2018    EOS 0.2 09/13/2018    BASO 0.06 09/13/2018    EOSINOPHIL 2.1 09/13/2018    BASOPHIL 0.7 09/13/2018       Chemistry        Component Value Date/Time     09/13/2018 1229    K 4.4 09/13/2018 1229     09/13/2018 1229    CO2 28 09/13/2018 1229    BUN 13 09/13/2018 1229    CREATININE 0.9 09/13/2018 1229    GLU 88 09/13/2018 1229        Component Value Date/Time    CALCIUM 9.6 09/13/2018 1229    ALKPHOS 55 09/13/2018 1229    AST 23 09/13/2018 1229    ALT 19 09/13/2018 1229    BILITOT 0.6 09/13/2018 1229    ESTGFRAFRICA >60.0 09/13/2018 1229    EGFRNONAA >60.0 09/13/2018 1229          8/2018 - normal TSH   7/2018 - neg/normal RPR, HSV 2 IgG, HIV, thyroid stimulating hormone, T3, T4, acute hepatitis panel. Pos/elevated TSH, HSV 2 IgG, TPO ab, anti-thyroglobulin ab.   6/2018 - vit D 39  2/2018 - vit B12  375      Diagnosis/Assessment/Plan:    1.  Multiple Sclerosis  · Assessment: Diagnosed by MRI and LP in 2007 by outside specialist. Has failed Avonex and Copaxone, intolerant to Betaseron, and has been on Rebif since 2012. Transitioned to Tecfidera 6/2018 due to emotional instability, needle fatigue, and concern for relapse. No new signs of relapse since transitioning. Emotional instability improving  · Imaging: MRI brain and c/t spine w/wo contrast, 6 months post-start new DMT in 12/2018  · Labs: cbc, cmp, helper suppressor ratio  · The the patient was counseled about the importance of vitamin D supplementation in multiple sclerosis, and the fact that recent data suggests that high circulating blood levels of vitamin D has an ameliorating effect on the disease course in multiple sclerosis in general. Advised that she resume 12147 units/weekly vit D3.     2.   MS Symptom Assessment / Management  · Fatigue: sleep referral placed.  · Bladder Dysfunction: following with urology, continue  · Visual Symptoms: following with ophthalmology  · Cognitive: neuropsych test ordered last visit to eval cognitive changes, still pending  · Mood Disorder: continue lexapro.   · Gait Disturbance: continue Ampyra as it helps with gait stability and gait fatigue.     3. Early morning headaches, concern for sleep apnea      Migraines  Did not try magnesium. Does not like to try new medications. Treating with ibuprofen daily again causing more rebound headaches. Advised her to cut down ibuprofen use to 3 days per week, 600-800    RTC in 3 months    Over 50% of this 40 minute visit was spent in direct face to face counseling of the patient about MS, DMT considerations, and MS symptom management.         There are no diagnoses linked to this encounter.

## 2018-12-07 ENCOUNTER — PATIENT MESSAGE (OUTPATIENT)
Dept: INTERNAL MEDICINE | Facility: CLINIC | Age: 38
End: 2018-12-07

## 2018-12-07 DIAGNOSIS — Z11.3 SCREEN FOR STD (SEXUALLY TRANSMITTED DISEASE): ICD-10-CM

## 2018-12-07 DIAGNOSIS — Z11.4 SCREENING FOR HIV (HUMAN IMMUNODEFICIENCY VIRUS): Primary | ICD-10-CM

## 2018-12-09 ENCOUNTER — PATIENT MESSAGE (OUTPATIENT)
Dept: INTERNAL MEDICINE | Facility: CLINIC | Age: 38
End: 2018-12-09

## 2018-12-10 ENCOUNTER — OFFICE VISIT (OUTPATIENT)
Dept: PSYCHIATRY | Facility: CLINIC | Age: 38
End: 2018-12-10
Payer: MEDICARE

## 2018-12-10 ENCOUNTER — LAB VISIT (OUTPATIENT)
Dept: LAB | Facility: HOSPITAL | Age: 38
End: 2018-12-10
Attending: PSYCHIATRY & NEUROLOGY
Payer: MEDICARE

## 2018-12-10 ENCOUNTER — TELEPHONE (OUTPATIENT)
Dept: INTERNAL MEDICINE | Facility: CLINIC | Age: 38
End: 2018-12-10

## 2018-12-10 DIAGNOSIS — G35 MULTIPLE SCLEROSIS: ICD-10-CM

## 2018-12-10 DIAGNOSIS — F32.1 CURRENT MODERATE EPISODE OF MAJOR DEPRESSIVE DISORDER WITHOUT PRIOR EPISODE: Primary | ICD-10-CM

## 2018-12-10 DIAGNOSIS — Z11.4 SCREENING FOR HIV (HUMAN IMMUNODEFICIENCY VIRUS): ICD-10-CM

## 2018-12-10 DIAGNOSIS — Z51.81 ENCOUNTER FOR THERAPEUTIC DRUG MONITORING: ICD-10-CM

## 2018-12-10 DIAGNOSIS — Z11.3 SCREEN FOR STD (SEXUALLY TRANSMITTED DISEASE): ICD-10-CM

## 2018-12-10 LAB
ALBUMIN SERPL BCP-MCNC: 3.8 G/DL
ALP SERPL-CCNC: 63 U/L
ALT SERPL W/O P-5'-P-CCNC: 11 U/L
ANION GAP SERPL CALC-SCNC: 9 MMOL/L
AST SERPL-CCNC: 18 U/L
BASOPHILS # BLD AUTO: 0.05 K/UL
BASOPHILS NFR BLD: 0.4 %
BILIRUB SERPL-MCNC: 0.3 MG/DL
BUN SERPL-MCNC: 13 MG/DL
CALCIUM SERPL-MCNC: 9.6 MG/DL
CHLORIDE SERPL-SCNC: 102 MMOL/L
CO2 SERPL-SCNC: 29 MMOL/L
CREAT SERPL-MCNC: 0.9 MG/DL
DIFFERENTIAL METHOD: ABNORMAL
EOSINOPHIL # BLD AUTO: 0.2 K/UL
EOSINOPHIL NFR BLD: 1.6 %
ERYTHROCYTE [DISTWIDTH] IN BLOOD BY AUTOMATED COUNT: 17.5 %
EST. GFR  (AFRICAN AMERICAN): >60 ML/MIN/1.73 M^2
EST. GFR  (NON AFRICAN AMERICAN): >60 ML/MIN/1.73 M^2
GLUCOSE SERPL-MCNC: 68 MG/DL
HCT VFR BLD AUTO: 36.1 %
HGB BLD-MCNC: 11.9 G/DL
IMM GRANULOCYTES # BLD AUTO: 0.04 K/UL
IMM GRANULOCYTES NFR BLD AUTO: 0.3 %
LYMPHOCYTES # BLD AUTO: 1.4 K/UL
LYMPHOCYTES NFR BLD: 12.1 %
MCH RBC QN AUTO: 27.2 PG
MCHC RBC AUTO-ENTMCNC: 33 G/DL
MCV RBC AUTO: 82 FL
MONOCYTES # BLD AUTO: 0.8 K/UL
MONOCYTES NFR BLD: 7.1 %
NEUTROPHILS # BLD AUTO: 9.2 K/UL
NEUTROPHILS NFR BLD: 78.5 %
NRBC BLD-RTO: 0 /100 WBC
PLATELET # BLD AUTO: 399 K/UL
PMV BLD AUTO: 11.7 FL
POTASSIUM SERPL-SCNC: 4 MMOL/L
PROT SERPL-MCNC: 8.8 G/DL
RBC # BLD AUTO: 4.38 M/UL
SODIUM SERPL-SCNC: 140 MMOL/L
WBC # BLD AUTO: 11.76 K/UL

## 2018-12-10 PROCEDURE — 36415 COLL VENOUS BLD VENIPUNCTURE: CPT

## 2018-12-10 PROCEDURE — 80053 COMPREHEN METABOLIC PANEL: CPT

## 2018-12-10 PROCEDURE — 99499 UNLISTED E&M SERVICE: CPT | Mod: S$PBB,,, | Performed by: SOCIAL WORKER

## 2018-12-10 PROCEDURE — 85025 COMPLETE CBC W/AUTO DIFF WBC: CPT

## 2018-12-10 PROCEDURE — 80074 ACUTE HEPATITIS PANEL: CPT

## 2018-12-10 PROCEDURE — 86703 HIV-1/HIV-2 1 RESULT ANTBDY: CPT

## 2018-12-10 NOTE — PROGRESS NOTES
Individual Psychotherapy (PhD/LCSW) - [facility] [place] [facility] Intern  Not a billable service    12/10/2018    Site:  [facility]         Therapeutic Intervention: Met with patient.  Outpatient - Behavior modifying psychotherapy 60 min - and Outpatient - Supportive psychotherapy 60 min    Chief complaint/reason for encounter: depression     Interval history and content of current session: Patient reports no new symptoms of depression but does share some of the difficulties she is having regarding the termination of services. She is struggling to set appropriate boundaries with those around her, however, her ability to create goals and follow through with them has improved. 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 She had recently taken her last final and is registered for 2 more classes for the spring semester.  We examined the different home exercises assigned by SW intern and which ones she found most helpful and plans to continue (square breathing, journaling, scheduled venting followed by pleasant task).   Discussed plans for who to contact if she feels she needs counseling services in the future, and advised pt that she can always reach out to neurology provider, Portia Mitchell MD or  CONNIE [facility].     Treatment plan:  · Target symptoms: depression  · Why chosen therapy is appropriate versus another modality: relevant to diagnosis  · Outcome monitoring methods: self-report, observation  · Therapeutic intervention type: behavior modifying psychotherapy, supportive psychotherapy    Risk parameters:  Patient reports no suicidal ideation  Patient reports no homicidal ideation  Patient reports no self-injurious behavior  Patient reports no violent behavior    Verbal deficits: None    Patient's response to intervention:  The patient's response to intervention is accepting.    Progress toward goals and other mental status changes:  The patient's progress  toward goals is good.    Diagnosis:     ICD-10-CM ICD-9-CM   1. Current moderate episode of major depressive disorder without prior episode F32.1 296.22       Plan:  Terminated therapeutic services in this session, patient does not wish to continue therapy at this time.   Patient understands that she can reach out to neurology provider, Portia Mitchell MD, or KRISTAL Jensen, as needed.     Return to clinic: as needed    Length of Service (minutes): 60

## 2018-12-11 ENCOUNTER — PATIENT MESSAGE (OUTPATIENT)
Dept: INTERNAL MEDICINE | Facility: CLINIC | Age: 38
End: 2018-12-11

## 2018-12-11 ENCOUNTER — PATIENT MESSAGE (OUTPATIENT)
Dept: NEUROLOGY | Facility: CLINIC | Age: 38
End: 2018-12-11

## 2018-12-11 LAB
HAV IGM SERPL QL IA: NEGATIVE
HBV CORE IGM SERPL QL IA: NEGATIVE
HBV SURFACE AG SERPL QL IA: NEGATIVE
HCV AB SERPL QL IA: NEGATIVE
HIV 1+2 AB+HIV1 P24 AG SERPL QL IA: NEGATIVE

## 2018-12-17 ENCOUNTER — PATIENT MESSAGE (OUTPATIENT)
Dept: NEUROLOGY | Facility: CLINIC | Age: 38
End: 2018-12-17

## 2018-12-17 DIAGNOSIS — R26.9 GAIT DISTURBANCE: ICD-10-CM

## 2018-12-17 DIAGNOSIS — G35 MULTIPLE SCLEROSIS: ICD-10-CM

## 2018-12-18 ENCOUNTER — PATIENT MESSAGE (OUTPATIENT)
Dept: INTERNAL MEDICINE | Facility: CLINIC | Age: 38
End: 2018-12-18

## 2018-12-18 RX ORDER — DALFAMPRIDINE 10 MG/1
10 TABLET, FILM COATED, EXTENDED RELEASE ORAL 2 TIMES DAILY
Qty: 180 TABLET | Refills: 1 | Status: SHIPPED | OUTPATIENT
Start: 2018-12-18 | End: 2019-06-07 | Stop reason: SDUPTHER

## 2018-12-19 ENCOUNTER — TELEPHONE (OUTPATIENT)
Dept: PSYCHIATRY | Facility: CLINIC | Age: 38
End: 2018-12-19

## 2018-12-19 RX ORDER — DICLOFENAC SODIUM 10 MG/G
2 GEL TOPICAL 2 TIMES DAILY
Qty: 100 G | Refills: 0 | Status: SHIPPED | OUTPATIENT
Start: 2018-12-19 | End: 2019-01-17

## 2018-12-19 NOTE — TELEPHONE ENCOUNTER
Faxed visit note to Carolyn at [facility] [phone] for pt's scooter advising of need for evaluation by PT/OT.  Carolyn will connect pt with a PT/OT for this evaluation.

## 2018-12-27 ENCOUNTER — PATIENT MESSAGE (OUTPATIENT)
Dept: NEUROLOGY | Facility: CLINIC | Age: 38
End: 2018-12-27

## 2018-12-28 ENCOUNTER — HOSPITAL ENCOUNTER (OUTPATIENT)
Dept: RADIOLOGY | Facility: HOSPITAL | Age: 38
Discharge: HOME | End: 2018-12-28
Attending: PSYCHIATRY & NEUROLOGY
Payer: MEDICARE

## 2018-12-28 DIAGNOSIS — G35 MULTIPLE SCLEROSIS: ICD-10-CM

## 2018-12-28 PROCEDURE — 70553 MRI BRAIN STEM W/O & W/DYE: CPT | Mod: TC

## 2018-12-28 PROCEDURE — 25500020 PHARM REV CODE 255: Performed by: PSYCHIATRY & NEUROLOGY

## 2018-12-28 PROCEDURE — 72156 MRI NECK SPINE W/O & W/DYE: CPT | Mod: TC

## 2018-12-28 PROCEDURE — A9585 GADOBUTROL INJECTION: HCPCS | Performed by: PSYCHIATRY & NEUROLOGY

## 2018-12-28 PROCEDURE — 72156 MRI NECK SPINE W/O & W/DYE: CPT | Mod: 26,,, | Performed by: RADIOLOGY

## 2018-12-28 PROCEDURE — 70553 MRI BRAIN STEM W/O & W/DYE: CPT | Mod: 26,,, | Performed by: RADIOLOGY

## 2018-12-28 RX ORDER — GADOBUTROL 604.72 MG/ML
10 INJECTION INTRAVENOUS
Status: COMPLETED | OUTPATIENT
Start: 2018-12-28 | End: 2018-12-28

## 2018-12-28 RX ADMIN — GADOBUTROL 10 ML: 604.72 INJECTION INTRAVENOUS at 12:12

## 2019-01-03 ENCOUNTER — PATIENT MESSAGE (OUTPATIENT)
Dept: INTERNAL MEDICINE | Facility: CLINIC | Age: 39
End: 2019-01-03

## 2019-01-03 DIAGNOSIS — E03.9 HYPOTHYROIDISM, UNSPECIFIED TYPE: Primary | ICD-10-CM

## 2019-01-03 NOTE — TELEPHONE ENCOUNTER
Referral entered for Pharmacy Assistance program. See email.    Please inform patient of referral.

## 2019-01-04 ENCOUNTER — TELEPHONE (OUTPATIENT)
Dept: PHARMACY | Facility: CLINIC | Age: 39
End: 2019-01-04

## 2019-01-04 NOTE — TELEPHONE ENCOUNTER
Please see email; pharmacy assistance is not available; however pt was told her Rx's were as little as $4 copays at [facility].

## 2019-01-04 NOTE — TELEPHONE ENCOUNTER
There are no programs to assist with Pharmacy Patient Assistance.  I explain to patient to have the pharmacy re-run the prescription with January date so her co-pays will start again for the new year.  Also, informed patient she can get 3 of her medications at [facility] for $4 for a 30 day supply and $10 for a 90 day supply.

## 2019-01-17 ENCOUNTER — LAB VISIT (OUTPATIENT)
Dept: LAB | Facility: HOSPITAL | Age: 39
End: 2019-01-17
Attending: INTERNAL MEDICINE
Payer: MEDICARE

## 2019-01-17 ENCOUNTER — OFFICE VISIT (OUTPATIENT)
Dept: INTERNAL MEDICINE | Facility: CLINIC | Age: 39
End: 2019-01-17
Payer: MEDICARE

## 2019-01-17 VITALS
TEMPERATURE: 98 F | WEIGHT: 240.5 LBS | RESPIRATION RATE: 18 BRPM | BODY MASS INDEX: 40.07 KG/M2 | DIASTOLIC BLOOD PRESSURE: 90 MMHG | HEART RATE: 59 BPM | HEIGHT: 65 IN | SYSTOLIC BLOOD PRESSURE: 122 MMHG

## 2019-01-17 DIAGNOSIS — F41.9 ANXIETY AND DEPRESSION: ICD-10-CM

## 2019-01-17 DIAGNOSIS — I10 ESSENTIAL HYPERTENSION: Primary | ICD-10-CM

## 2019-01-17 DIAGNOSIS — I10 ESSENTIAL HYPERTENSION: ICD-10-CM

## 2019-01-17 DIAGNOSIS — E06.3 HASHIMOTO'S THYROIDITIS: ICD-10-CM

## 2019-01-17 DIAGNOSIS — D53.9 NUTRITIONAL ANEMIA: ICD-10-CM

## 2019-01-17 DIAGNOSIS — F32.A ANXIETY AND DEPRESSION: ICD-10-CM

## 2019-01-17 LAB
BASOPHILS # BLD AUTO: 0.05 K/UL
BASOPHILS NFR BLD: 0.5 %
DIFFERENTIAL METHOD: ABNORMAL
EOSINOPHIL # BLD AUTO: 0.1 K/UL
EOSINOPHIL NFR BLD: 1.3 %
ERYTHROCYTE [DISTWIDTH] IN BLOOD BY AUTOMATED COUNT: 15.5 %
FERRITIN SERPL-MCNC: 35 NG/ML
HCT VFR BLD AUTO: 37.1 %
HGB BLD-MCNC: 12.2 G/DL
IMM GRANULOCYTES # BLD AUTO: 0.05 K/UL
IMM GRANULOCYTES NFR BLD AUTO: 0.5 %
IRON SERPL-MCNC: 92 UG/DL
LYMPHOCYTES # BLD AUTO: 1.3 K/UL
LYMPHOCYTES NFR BLD: 12.3 %
MCH RBC QN AUTO: 27.4 PG
MCHC RBC AUTO-ENTMCNC: 32.9 G/DL
MCV RBC AUTO: 83 FL
MONOCYTES # BLD AUTO: 0.8 K/UL
MONOCYTES NFR BLD: 7.2 %
NEUTROPHILS # BLD AUTO: 8.2 K/UL
NEUTROPHILS NFR BLD: 78.2 %
NRBC BLD-RTO: 0 /100 WBC
PLATELET # BLD AUTO: 351 K/UL
PMV BLD AUTO: 13 FL
RBC # BLD AUTO: 4.46 M/UL
SATURATED IRON: 18 %
TOTAL IRON BINDING CAPACITY: 500 UG/DL
TRANSFERRIN SERPL-MCNC: 338 MG/DL
WBC # BLD AUTO: 10.47 K/UL

## 2019-01-17 PROCEDURE — 85025 COMPLETE CBC W/AUTO DIFF WBC: CPT

## 2019-01-17 PROCEDURE — 82728 ASSAY OF FERRITIN: CPT

## 2019-01-17 PROCEDURE — 99999 PR PBB SHADOW E&M-EST. PATIENT-LVL III: CPT | Mod: PBBFAC,,, | Performed by: INTERNAL MEDICINE

## 2019-01-17 PROCEDURE — 36415 COLL VENOUS BLD VENIPUNCTURE: CPT | Mod: PO

## 2019-01-17 PROCEDURE — 99999 PR PBB SHADOW E&M-EST. PATIENT-LVL III: ICD-10-PCS | Mod: PBBFAC,,, | Performed by: INTERNAL MEDICINE

## 2019-01-17 PROCEDURE — 99214 OFFICE O/P EST MOD 30 MIN: CPT | Mod: S$PBB,,, | Performed by: INTERNAL MEDICINE

## 2019-01-17 PROCEDURE — 99213 OFFICE O/P EST LOW 20 MIN: CPT | Mod: PBBFAC,PO | Performed by: INTERNAL MEDICINE

## 2019-01-17 PROCEDURE — 83540 ASSAY OF IRON: CPT

## 2019-01-17 PROCEDURE — 99214 PR OFFICE/OUTPT VISIT, EST, LEVL IV, 30-39 MIN: ICD-10-PCS | Mod: S$PBB,,, | Performed by: INTERNAL MEDICINE

## 2019-01-17 RX ORDER — SERTRALINE HYDROCHLORIDE 100 MG/1
100 TABLET, FILM COATED ORAL DAILY
Qty: 30 TABLET | Refills: 0 | Status: SHIPPED | OUTPATIENT
Start: 2019-01-17 | End: 2019-06-07

## 2019-01-17 RX ORDER — MOMETASONE FUROATE 1 MG/G
OINTMENT TOPICAL
Refills: 3 | COMMUNITY
Start: 2018-12-12 | End: 2022-02-04

## 2019-01-17 RX ORDER — DIMETHYL FUMARATE 240 MG/1
CAPSULE ORAL
COMMUNITY
Start: 2018-11-27 | End: 2019-03-14 | Stop reason: SDUPTHER

## 2019-01-24 ENCOUNTER — PATIENT MESSAGE (OUTPATIENT)
Dept: NEUROLOGY | Facility: CLINIC | Age: 39
End: 2019-01-24

## 2019-01-28 ENCOUNTER — PATIENT MESSAGE (OUTPATIENT)
Dept: OPHTHALMOLOGY | Facility: CLINIC | Age: 39
End: 2019-01-28

## 2019-01-28 ENCOUNTER — PATIENT MESSAGE (OUTPATIENT)
Dept: NEUROLOGY | Facility: CLINIC | Age: 39
End: 2019-01-28

## 2019-01-28 ENCOUNTER — TELEPHONE (OUTPATIENT)
Dept: INTERNAL MEDICINE | Facility: CLINIC | Age: 39
End: 2019-01-28

## 2019-02-05 ENCOUNTER — OFFICE VISIT (OUTPATIENT)
Dept: OPHTHALMOLOGY | Facility: CLINIC | Age: 39
End: 2019-02-05
Payer: MEDICARE

## 2019-02-05 DIAGNOSIS — G35 MULTIPLE SCLEROSIS: Primary | ICD-10-CM

## 2019-02-05 DIAGNOSIS — H51.23 INO (INTERNUCLEAR OPHTHALMOPLEGIA), BILATERAL: ICD-10-CM

## 2019-02-05 DIAGNOSIS — H47.293 PARTIAL OPTIC ATROPHY OF BOTH EYES: ICD-10-CM

## 2019-02-05 PROCEDURE — 99212 OFFICE O/P EST SF 10 MIN: CPT | Mod: PBBFAC | Performed by: OPHTHALMOLOGY

## 2019-02-05 PROCEDURE — 99999 PR PBB SHADOW E&M-EST. PATIENT-LVL II: ICD-10-PCS | Mod: PBBFAC,,, | Performed by: OPHTHALMOLOGY

## 2019-02-05 PROCEDURE — 92014 COMPRE OPH EXAM EST PT 1/>: CPT | Mod: S$PBB,,, | Performed by: OPHTHALMOLOGY

## 2019-02-05 PROCEDURE — 99999 PR PBB SHADOW E&M-EST. PATIENT-LVL II: CPT | Mod: PBBFAC,,, | Performed by: OPHTHALMOLOGY

## 2019-02-05 PROCEDURE — 92014 PR EYE EXAM, EST PATIENT,COMPREHESV: ICD-10-PCS | Mod: S$PBB,,, | Performed by: OPHTHALMOLOGY

## 2019-02-05 NOTE — PROGRESS NOTES
HPI     DLS:  07/11/2018    1 year follow up  MS. Lorenz reports she woke up 2 weeks ago  with blurry   spots in her peripheral view on the left eye. She stated the episode   lasted for one hour and then resolved. She reported white spots in her   vision.  She reports no symptoms after that unique episode. No symptoms today.     I have personally interviewed the patient, reviewed the history and   examined the patient and agree with the technician's &/or resident's exam,   assessment and plan.    Last edited by Tonio Barth MD on 2/5/2019 10:32 AM. (History)            Assessment /Plan     For exam results, see Encounter Report.    Multiple sclerosis    Partial optic atrophy of both eyes    ELMA (internuclear ophthalmoplegia), bilateral      I found no evidence of recurrent optic neuritis. Her vision remains 20/20 in her left eye. It is likely she had an episode of surface drying of her left eye altering vision. Repeat eye exam with visual field testing in one year.

## 2019-02-11 ENCOUNTER — HOSPITAL ENCOUNTER (OUTPATIENT)
Dept: RADIOLOGY | Facility: HOSPITAL | Age: 39
Discharge: HOME | End: 2019-02-11
Attending: PSYCHIATRY & NEUROLOGY
Payer: MEDICARE

## 2019-02-11 ENCOUNTER — OFFICE VISIT (OUTPATIENT)
Dept: NEUROLOGY | Facility: CLINIC | Age: 39
End: 2019-02-11
Payer: MEDICARE

## 2019-02-11 VITALS — HEIGHT: 65 IN | WEIGHT: 242 LBS | BODY MASS INDEX: 40.32 KG/M2

## 2019-02-11 DIAGNOSIS — R51.9 HEADACHE, UNSPECIFIED HEADACHE TYPE: ICD-10-CM

## 2019-02-11 DIAGNOSIS — M25.562 ACUTE PAIN OF LEFT KNEE: ICD-10-CM

## 2019-02-11 DIAGNOSIS — G35 MULTIPLE SCLEROSIS: Primary | ICD-10-CM

## 2019-02-11 DIAGNOSIS — Z79.899 HIGH RISK MEDICATION USE: ICD-10-CM

## 2019-02-11 DIAGNOSIS — G89.29 CHRONIC PAIN OF LEFT KNEE: ICD-10-CM

## 2019-02-11 DIAGNOSIS — M25.562 CHRONIC PAIN OF LEFT KNEE: ICD-10-CM

## 2019-02-11 DIAGNOSIS — R29.818 SUSPECTED SLEEP APNEA: ICD-10-CM

## 2019-02-11 PROCEDURE — 99999 PR PBB SHADOW E&M-EST. PATIENT-LVL III: ICD-10-PCS | Mod: PBBFAC,,, | Performed by: PSYCHIATRY & NEUROLOGY

## 2019-02-11 PROCEDURE — 99214 OFFICE O/P EST MOD 30 MIN: CPT | Mod: S$PBB,,, | Performed by: PSYCHIATRY & NEUROLOGY

## 2019-02-11 PROCEDURE — 73562 X-RAY EXAM OF KNEE 3: CPT | Mod: 26,LT,, | Performed by: RADIOLOGY

## 2019-02-11 PROCEDURE — 73562 XR KNEE 3 VIEW LEFT: ICD-10-PCS | Mod: 26,LT,, | Performed by: RADIOLOGY

## 2019-02-11 PROCEDURE — 99213 OFFICE O/P EST LOW 20 MIN: CPT | Mod: PBBFAC,25 | Performed by: PSYCHIATRY & NEUROLOGY

## 2019-02-11 PROCEDURE — 99214 PR OFFICE/OUTPT VISIT, EST, LEVL IV, 30-39 MIN: ICD-10-PCS | Mod: S$PBB,,, | Performed by: PSYCHIATRY & NEUROLOGY

## 2019-02-11 PROCEDURE — 73562 X-RAY EXAM OF KNEE 3: CPT | Mod: TC,FY,PO,LT

## 2019-02-11 PROCEDURE — 99999 PR PBB SHADOW E&M-EST. PATIENT-LVL III: CPT | Mod: PBBFAC,,, | Performed by: PSYCHIATRY & NEUROLOGY

## 2019-02-11 NOTE — PROGRESS NOTES
Subjective:       Patient ID: Jaylon Bowles is a 38 y.o. female who presents today for a routine clinic visit for MS.      MS History, updated as needed  Symptom onset was in 2006, had episode of bilateral ON. Diagnosed in 2007 based on MRI, LP in [place]. Symptoms of dizziness, vertigo, numbness in legs, tingling in fingers, and gait imbalance with falls. Initially on Avonex (ineffective), but switched to Copaxone. Had one relapse in 2008 with symptoms of worse gait imbalance. Received steroids which helped. Then switched to Betaseron (allergic, broke out in hives).  Started on Rebif in 2012. No further relapses. Reports more imbalance, uses walker outside the home but cane indoors. Suffers from pain in left leg and stiffness. Pain in left leg, responds to prednisone in past.       Subjective/Today:  · DMT: switched to Tecfidera 7/2018 due to emotional instability and needle fatigue.  · Side effects from DMT? Yes - some GI discomfort but not always taking with high protein meal. We discussed ways to increase protein. Also discussed can take aspirin 81mg 15min prior if experiencing flushing.  · Taking vitamin D3 as recommended? No Dose: felt like it were cramping her toes  · No new symptoms.   · Still taking Celexa but supposed to transition to Zoloft. Initially afraid but states that she will pick it up.  · Started Ampyra 6/2018 -  Improves balance and speed of walking. However, she states that her left knee is hurting more.       SOCIAL HISTORY  Social History     Tobacco Use    Smoking status: Never Smoker    Smokeless tobacco: Never Used   Substance Use Topics    Alcohol use: No     Comment: social    Drug use: No   Occupation: none presently (2/2019), receives disability. Quit [employer] around 6/2018 as she felt she was being bullied at work  Education: college. Started online courses 7/2018, planning to do more next semester.  Living arrangements - the patient lives with mother.  Exercise: Doing leg  "lifts. Plans on doing [employer] yoga      MS ROS:  · Fatigue: Yes - take multiple breaks during the day  · Headaches: Migraines: previously with intermittent headaches in past, but worse 12/2017. Bifrontal pain than radiates back. Phonophobia, photophobia present. +Nausea, sometimes vomiting. +retroorbital pain behind right eye. Used to take ibuprofen (daily, 800mg daily or more), sleep, and rest in quiet area with meditation. After 6/2018, she stopped daily ibuprofen use. Doesn't like medication; tried magnesium but nonadherent initially. 2/2019 - still with frequent migrainous headaches despite cutting down OTC med use.  · Seizures: none  · Sleep Disturbance: Yes - restless, no sleep study. "Mind is always running." Snores, some apnea  · Bladder Dysfunction: Yes - polyuria, urge incontinence. Following with urology, started on oxybutynin  · Bowel Dysfunction: constipation with oxybutynin  · Spasticity: Yes - mostly affects left side, leg > arm. Baclofen as needed  · Visual Symptoms: Yes - wears glasses. Feels as if vision is worsen, also describes oscillopsia in right eye.  · Cognitive: Yes - poor memory, word finding difficulty, and slowed cognitive processing.   · Mood Disorder: Yes - depression. 9/13/18 - improved off Rebif and with antidepressant therapy. 2/2019 - mood stable with with continued tearfulness. Has plans to transition to zoloft  · Gait Disturbance: Yes - uses walker. Started Ampyra 6/2018 - feels more steady and less motor fatigue.  · Falls: Yes - lots of near falls, infrequent falls when she uses walker. Last fall was last week, and another one week prior to that. 2/2019 - falls less frequent since starting Ampyra  · Hand Dysfunction: Yes - numbness in hands, sometimes tremors  · Pain: Yes - left knee pain  · Sexual Dysfunction: Not Assessed  · Skin Breakdown: no breakdown but scarring on abdomen and backs of arms  · Tremors: Yes - b/l hands with reaching for things  · Dysphagia:  Not daily, but " worse prior to flare  · Heat sensitivity:  Yes - symptoms, balance and cognition, worsens  · Copay Assist?  Yes - needed        Objective:        1. 25 foot timed walk: 14.11 (6/2018)-->14.9 sec w/U-step (9/2018)  No flowsheet data found.  2. SDMT:  3. 9 Hole Peg Test:  No flowsheet data found.    Neurologic Exam      GEN: NAD, no tearfulness on today     MENTAL STATUS: language is fluent, normal verbal comprehension, attention is normal, patient is alert and oriented x 3.      CRANIAL NERVE EXAM: b/l ELMA with intermittent nystagmus. Vertical gaze intact. PERRL. No facial asymmetry. Hearing intact to gross voice. Uvula is midline, and palate moves symmetrically.      MOTOR EXAM: Normal bulk and tone throughout UE and LE bilaterally.  Strength is  5/5 except  5- left biceps, KE, KF, DF and 4+HF b/l     REFLEXES: 3+ biceps and patellar, rest 2+     SENSORY EXAM: LT decreased on left body     COORDINATION: mod ataxia bilaterally with FNF     GAIT: wide based, requires walker.       Imaging:     MRI Brain w/wo: 5/2018 - Age advanced cerebral volume loss with numerous scattered foci of T2/flair signal abnormality throughout the supra-and infratentorial parenchyma majority centered within the periventricular white matter. No evidence for diffusion signal abnormality or enhancement 12/2018 - no change    MRI C-Spine w/wo: 5/2018 - Ill-defined short segment cord edema signal ventrally at the C4 vertebral body level. Well-circumscribed central area of cystic change within the cervical spinal cord at the C6 vertebral body level most compatible with syringohydromyelia.  No evidence for abnormal intrathecal enhancement.  12/2018 - no change    MRI T-Spine w/wo: 5/2018 - Within limits of the study no definite cord signal abnormality to suggest edema throughout the thoracic spine.      Labs:     Lab Results   Component Value Date    RXCINJXG49IW 39 06/11/2018    GLFXCWVU40RY 26 (L) 02/20/2018     Lab Results   Component Value  Date    QM0SMIYL 69.4 09/13/2018    ABSOLUTECD3 1045 09/13/2018    LK9LJJIM 20.4 09/13/2018    ABSOLUTECD8 308 09/13/2018    MY3WCBBJ 48.8 09/13/2018    ABSOLUTECD4 735 09/13/2018    LABCD48 2.39 09/13/2018     Lab Results   Component Value Date    WBC 10.47 01/17/2019    RBC 4.46 01/17/2019    HGB 12.2 01/17/2019    HCT 37.1 01/17/2019    MCV 83 01/17/2019    MCH 27.4 01/17/2019    MCHC 32.9 01/17/2019    RDW 15.5 (H) 01/17/2019     (H) 01/17/2019    MPV 13.0 (H) 01/17/2019    GRAN 8.2 (H) 01/17/2019    GRAN 78.2 (H) 01/17/2019    LYMPH 1.3 01/17/2019    LYMPH 12.3 (L) 01/17/2019    MONO 0.8 01/17/2019    MONO 7.2 01/17/2019    EOS 0.1 01/17/2019    BASO 0.05 01/17/2019    EOSINOPHIL 1.3 01/17/2019    BASOPHIL 0.5 01/17/2019       Chemistry        Component Value Date/Time     12/10/2018 1015    K 4.0 12/10/2018 1015     12/10/2018 1015    CO2 29 12/10/2018 1015    BUN 13 12/10/2018 1015    CREATININE 0.9 12/10/2018 1015    GLU 68 (L) 12/10/2018 1015        Component Value Date/Time    CALCIUM 9.6 12/10/2018 1015    ALKPHOS 63 12/10/2018 1015    AST 18 12/10/2018 1015    ALT 11 12/10/2018 1015    BILITOT 0.3 12/10/2018 1015    ESTGFRAFRICA >60.0 12/10/2018 1015    EGFRNONAA >60.0 12/10/2018 1015          8/2018 - normal TSH   7/2018 - neg/normal RPR, HSV 2 IgG, HIV, thyroid stimulating hormone, T3, T4, acute hepatitis panel. Pos/elevated TSH, HSV 2 IgG, TPO ab, anti-thyroglobulin ab.   6/2018 - vit D 39  2/2018 - vit B12 375      Diagnosis/Assessment/Plan:    1.  Multiple Sclerosis  · Assessment: Diagnosed by MRI and LP in 2007. Has failed Avonex and Copaxone, intolerant to Betaseron, and was on Rebif since 2012 prior to transitioning to Tecfidera 6/2018 due to emotional instability, needle fatigue. No new signs of relapse since transitioning.  · Imaging: MRI brain and c/t spine due 12/2019  · Labs: cbc, cmp, helper suppressor ratio  · The the patient was counseled about the importance of vitamin  D supplementation in multiple sclerosis, and the fact that recent data suggests that high circulating blood levels of vitamin D has an ameliorating effect on the disease course in multiple sclerosis in general. Advised that she resume 45988 units/weekly vit D3.      2.   MS Symptom Assessment / Management  · Fatigue: sleep medicine referral placed.  · Bladder Dysfunction: following with urology, continue  · Visual Symptoms: following with ophthalmology, seen by Dr. Barth today  · Cognitive: neuropsych test ordered previously to eval cognitive changes, still pending  · Mood Disorder: on lexapro, transitioning to zoloft. Encouraged compliance with transition started by PCP.  · Gait Disturbance: continue Ampyra as it helps with gait stability, balance, and motor fatigue.     3. Early morning headaches, concern for sleep apnea, Migraines  · Did not try magnesium. Does not like to try new medications. Using ibuprofen sparingly at this time. Planning for transition to zoloft as above.  · If zoloft doesn't work, then we will add another preventative agent.    4. Left knee pain:  · Unclear etiology. Concern for possible structural injury. Will order XR.      RTC in 3 months with Viviana f/u with me in 6 months    Over 50% of this 40 minute visit was spent in direct face to face counseling of the patient about MS, DMT considerations, and MS symptom management.         There are no diagnoses linked to this encounter.

## 2019-02-11 NOTE — Clinical Note
Hi there!I am seeing Ms. Bowles today and she having left knee pain. I will order an XR of her knee if you don't mind managing with me if it comes back abnormal.Thanks!

## 2019-02-13 ENCOUNTER — PATIENT MESSAGE (OUTPATIENT)
Dept: NEUROLOGY | Facility: CLINIC | Age: 39
End: 2019-02-13

## 2019-02-19 ENCOUNTER — PATIENT MESSAGE (OUTPATIENT)
Dept: NEUROLOGY | Facility: CLINIC | Age: 39
End: 2019-02-19

## 2019-02-19 ENCOUNTER — TELEPHONE (OUTPATIENT)
Dept: PSYCHIATRY | Facility: CLINIC | Age: 39
End: 2019-02-19

## 2019-02-19 NOTE — TELEPHONE ENCOUNTER
Received voicemail from Carolyn at [employer] advising that seating specialist and PT completed mobility evaluation for pt for her power device.  She will fax forms to this SW.

## 2019-02-26 NOTE — TELEPHONE ENCOUNTER
Signed mobility evaluation and physician order for pt's scooter placed in 'to be faxed' folder.  Copies scanned into chart.

## 2019-03-01 ENCOUNTER — PATIENT MESSAGE (OUTPATIENT)
Dept: UROGYNECOLOGY | Facility: CLINIC | Age: 39
End: 2019-03-01

## 2019-03-05 ENCOUNTER — PATIENT MESSAGE (OUTPATIENT)
Dept: NEUROLOGY | Facility: CLINIC | Age: 39
End: 2019-03-05

## 2019-03-05 NOTE — TELEPHONE ENCOUNTER
Spoke to patient concerning medication - Tecfidera and Ampyra.    Told by [facility] that she has be down to 5 days of medication prior to them intervening, which would be about 2 weeks from now. She asked them for financial assistance. She also called Medicare and was told that nothing about her insurance has changed.    She will keep us updated.

## 2019-03-09 ENCOUNTER — PATIENT MESSAGE (OUTPATIENT)
Dept: NEUROLOGY | Facility: CLINIC | Age: 39
End: 2019-03-09

## 2019-03-09 ENCOUNTER — PATIENT MESSAGE (OUTPATIENT)
Dept: INTERNAL MEDICINE | Facility: CLINIC | Age: 39
End: 2019-03-09

## 2019-03-14 ENCOUNTER — PATIENT MESSAGE (OUTPATIENT)
Dept: NEUROLOGY | Facility: CLINIC | Age: 39
End: 2019-03-14

## 2019-03-14 DIAGNOSIS — G35 MULTIPLE SCLEROSIS: Primary | ICD-10-CM

## 2019-03-19 ENCOUNTER — PATIENT MESSAGE (OUTPATIENT)
Dept: NEUROLOGY | Facility: CLINIC | Age: 39
End: 2019-03-19

## 2019-03-19 RX ORDER — DIMETHYL FUMARATE 240 MG/1
240 CAPSULE ORAL 2 TIMES DAILY
Qty: 180 CAPSULE | Refills: 1 | Status: SHIPPED | OUTPATIENT
Start: 2019-03-19 | End: 2019-09-19 | Stop reason: SDUPTHER

## 2019-03-19 NOTE — TELEPHONE ENCOUNTER
I spoke with [facility] who confirmed the Tecfidera prescription is currently in process and should be set up for delivery today.

## 2019-03-28 ENCOUNTER — PATIENT MESSAGE (OUTPATIENT)
Dept: NEUROLOGY | Facility: CLINIC | Age: 39
End: 2019-03-28

## 2019-04-03 DIAGNOSIS — I10 ESSENTIAL HYPERTENSION: ICD-10-CM

## 2019-04-03 RX ORDER — CARVEDILOL 25 MG/1
TABLET ORAL
Qty: 60 TABLET | Refills: 0 | Status: SHIPPED | OUTPATIENT
Start: 2019-04-03 | End: 2019-05-04 | Stop reason: SDUPTHER

## 2019-04-03 RX ORDER — LEVOTHYROXINE SODIUM 100 UG/1
TABLET ORAL
Qty: 90 TABLET | Refills: 0 | Status: SHIPPED | OUTPATIENT
Start: 2019-04-03 | End: 2019-07-03 | Stop reason: SDUPTHER

## 2019-04-03 RX ORDER — LISINOPRIL AND HYDROCHLOROTHIAZIDE 12.5; 2 MG/1; MG/1
TABLET ORAL
Qty: 60 TABLET | Refills: 0 | Status: SHIPPED | OUTPATIENT
Start: 2019-04-03 | End: 2019-05-04 | Stop reason: SDUPTHER

## 2019-04-10 ENCOUNTER — OFFICE VISIT (OUTPATIENT)
Dept: UROGYNECOLOGY | Facility: CLINIC | Age: 39
End: 2019-04-10
Payer: MEDICARE

## 2019-04-10 VITALS
BODY MASS INDEX: 40.55 KG/M2 | DIASTOLIC BLOOD PRESSURE: 100 MMHG | WEIGHT: 243.38 LBS | HEIGHT: 65 IN | SYSTOLIC BLOOD PRESSURE: 150 MMHG

## 2019-04-10 DIAGNOSIS — N89.8 VAGINAL ITCHING: ICD-10-CM

## 2019-04-10 DIAGNOSIS — B37.31 VULVAR CANDIDIASIS: Primary | ICD-10-CM

## 2019-04-10 PROCEDURE — 99999 PR PBB SHADOW E&M-EST. PATIENT-LVL III: ICD-10-PCS | Mod: PBBFAC,,, | Performed by: NURSE PRACTITIONER

## 2019-04-10 PROCEDURE — 99214 PR OFFICE/OUTPT VISIT, EST, LEVL IV, 30-39 MIN: ICD-10-PCS | Mod: S$PBB,,, | Performed by: NURSE PRACTITIONER

## 2019-04-10 PROCEDURE — 99213 OFFICE O/P EST LOW 20 MIN: CPT | Mod: PBBFAC | Performed by: NURSE PRACTITIONER

## 2019-04-10 PROCEDURE — 99214 OFFICE O/P EST MOD 30 MIN: CPT | Mod: S$PBB,,, | Performed by: NURSE PRACTITIONER

## 2019-04-10 PROCEDURE — 99999 PR PBB SHADOW E&M-EST. PATIENT-LVL III: CPT | Mod: PBBFAC,,, | Performed by: NURSE PRACTITIONER

## 2019-04-10 RX ORDER — FLUCONAZOLE 150 MG/1
150 TABLET ORAL
Qty: 3 TABLET | Refills: 0 | Status: SHIPPED | OUTPATIENT
Start: 2019-04-10 | End: 2019-04-15

## 2019-04-10 RX ORDER — CLOTRIMAZOLE AND BETAMETHASONE DIPROPIONATE 10; .64 MG/G; MG/G
CREAM TOPICAL 2 TIMES DAILY
Qty: 45 G | Refills: 3 | Status: SHIPPED | OUTPATIENT
Start: 2019-04-10 | End: 2020-05-20 | Stop reason: SDUPTHER

## 2019-04-10 NOTE — PATIENT INSTRUCTIONS
1. Well woman  --pap hpv high risk positive  --repeat in 08/2019    2. Vulvar candidiasis/ vaginal itching  --clotrimazole/bethamethasone ointment twice daily to entire inner and outer labia  --diflucan 150 mg every 48 hours x 3 doses  --do not douche    3. RTC 2 weeks

## 2019-04-10 NOTE — PROGRESS NOTES
04/10/2019    SUBJECTIVE:   38 y.o. female for complaints of vaginal and vulvar itching x 4 weeks.  Used vaginsil soap, medicated douche, and soap.    Past Medical History:   Diagnosis Date    Depression     Hypertension     Migraine headache     Multiple sclerosis     Thyroid disease        History reviewed. No pertinent surgical history.    Current Outpatient Medications   Medication Sig Dispense Refill    baclofen (LIORESAL) 10 MG tablet Take 10 mg by mouth 2 (two) times daily as needed.       betamethasone dipropionate (DIPROLENE) 0.05 % cream       carvedilol (COREG) 25 MG tablet TAKE 1 TABLET BY MOUTH TWICE DAILY WITH MEALS 60 tablet 0    dalfampridine (AMPYRA) 10 mg Tb12 Take 10 mg by mouth 2 (two) times daily. 180 tablet 1    levothyroxine (SYNTHROID) 100 MCG tablet TAKE 1 TABLET(100 MCG) BY MOUTH EVERY DAY 90 tablet 0    lisinopril-hydrochlorothiazide (PRINZIDE,ZESTORETIC) 20-12.5 mg per tablet TAKE 2 TABLETS BY MOUTH EVERY DAY 60 tablet 0    mometasone (ELOCON) 0.1 % ointment STEFF EXT AA QD FOR 10 DAYS  3    multivitamin with minerals (ONE DAILY COMPLETE ORAL) Take by mouth.      oxybutynin (DITROPAN-XL) 10 MG 24 hr tablet Take 1 tablet (10 mg total) by mouth once daily. 30 tablet 11    sertraline (ZOLOFT) 100 MG tablet Take 1 tablet (100 mg total) by mouth once daily. 30 tablet 0    TECFIDERA 240 mg CpDR Take 240 mg by mouth 2 (two) times daily. 180 capsule 1    clotrimazole-betamethasone 1-0.05% (LOTRISONE) cream Apply topically 2 (two) times daily. 45 g 3    fluconazole (DIFLUCAN) 150 MG Tab Take 1 tablet (150 mg total) by mouth every 48 hours. for 3 doses 3 tablet 0     No current facility-administered medications for this visit.      Allergies: Betaseron [interferon beta-1b]   Patient's last menstrual period was 03/20/2019.      Well Woman:  Pap:09/2018 normal HPV high risk positive      Family History  Family History   Problem Relation Age of Onset    Hypertension Mother      "Thyroid disease Mother     Diabetes Father     Stroke Father     Vaginal cancer Neg Hx     Endometrial cancer Neg Hx     Cervical cancer Neg Hx     Breast cancer Neg Hx     Ovarian cancer Neg Hx           ROS:  Feeling well.   No dyspnea or chest pain on exertion.    No abdominal pain, change in bowel habits, black or bloody stools.    UUI when she needs to void-- stopped PT.  GYN ROS: normal menses, no abnormal bleeding, pelvic pain or discharge, no breast pain or new or enlarging lumps on self exam.   No neurological complaints.    OBJECTIVE:   The patient appears well, alert, oriented x 3, in no distress.  BP (!) 150/100 (BP Location: Left arm, Patient Position: Sitting, BP Method: Large (Manual))   Ht 5' 5" (1.651 m)   Wt 110.4 kg (243 lb 6.2 oz)   LMP 03/20/2019   BMI 40.50 kg/m²      Abdomen soft without tenderness, guarding, mass or organomegaly.   Extremities show no edema, normal peripheral pulses.   Neurological is normal, no focal findings.        PELVIC EXAM:   VULVA: Area between labia majora and minor irritated with fissures.  Faint red raised rash noted.  VAGINA: normal appearing vagina with normal color, thin white discharge noted--no odor, no lesions,  CERVIX: normal appearing cervix without discharge or lesions,   UTERUS: uterus is normal size, shape, consistency and nontender,   ADNEXA: no masses, difficult to assess due to body habitus  RECTAL: deferred    ASSESSMENT:   1. Vulvar candidiasis  clotrimazole-betamethasone 1-0.05% (LOTRISONE) cream   2. Vaginal itching  fluconazole (DIFLUCAN) 150 MG Tab       PLAN:     1. Well woman  --pap hpv high risk positive  --repeat in 08/2019    2. Vulvar candidiasis/ vaginal itching  --clotrimazole/bethamethasone ointment twice daily to entire inner and outer labia  --diflucan 150 mg every 48 hours x 3 doses  --do not douche    3. RTC 2 weeks    30 minutes were spent in face to face time with this patient  90 % of this time was spent in counseling " and/or coordination of care  Jamee JASMINE Marchand [facility]  Division of Female Pelvic Medicine and Reconstructive Surgery  Department of Obstetrics & Gynecology

## 2019-05-04 DIAGNOSIS — I10 ESSENTIAL HYPERTENSION: ICD-10-CM

## 2019-05-06 RX ORDER — LISINOPRIL AND HYDROCHLOROTHIAZIDE 12.5; 2 MG/1; MG/1
TABLET ORAL
Qty: 60 TABLET | Refills: 0 | Status: SHIPPED | OUTPATIENT
Start: 2019-05-06 | End: 2019-06-01 | Stop reason: SDUPTHER

## 2019-05-06 RX ORDER — CARVEDILOL 25 MG/1
TABLET ORAL
Qty: 60 TABLET | Refills: 0 | Status: SHIPPED | OUTPATIENT
Start: 2019-05-06 | End: 2019-06-01 | Stop reason: SDUPTHER

## 2019-05-08 ENCOUNTER — PATIENT MESSAGE (OUTPATIENT)
Dept: NEUROLOGY | Facility: CLINIC | Age: 39
End: 2019-05-08

## 2019-06-01 DIAGNOSIS — I10 ESSENTIAL HYPERTENSION: ICD-10-CM

## 2019-06-02 RX ORDER — CARVEDILOL 25 MG/1
TABLET ORAL
Qty: 60 TABLET | Refills: 0 | Status: SHIPPED | OUTPATIENT
Start: 2019-06-02 | End: 2019-10-31 | Stop reason: SDUPTHER

## 2019-06-02 RX ORDER — LISINOPRIL AND HYDROCHLOROTHIAZIDE 12.5; 2 MG/1; MG/1
TABLET ORAL
Qty: 60 TABLET | Refills: 0 | Status: SHIPPED | OUTPATIENT
Start: 2019-06-02 | End: 2019-08-22 | Stop reason: ALTCHOICE

## 2019-06-05 ENCOUNTER — PATIENT MESSAGE (OUTPATIENT)
Dept: NEUROLOGY | Facility: CLINIC | Age: 39
End: 2019-06-05

## 2019-06-05 ENCOUNTER — LAB VISIT (OUTPATIENT)
Dept: LAB | Facility: HOSPITAL | Age: 39
End: 2019-06-05
Payer: MEDICARE

## 2019-06-05 ENCOUNTER — OFFICE VISIT (OUTPATIENT)
Dept: NEUROLOGY | Facility: CLINIC | Age: 39
End: 2019-06-05
Payer: MEDICARE

## 2019-06-05 VITALS
HEART RATE: 61 BPM | SYSTOLIC BLOOD PRESSURE: 136 MMHG | WEIGHT: 239.31 LBS | BODY MASS INDEX: 39.87 KG/M2 | HEIGHT: 65 IN | DIASTOLIC BLOOD PRESSURE: 81 MMHG

## 2019-06-05 DIAGNOSIS — R26.9 GAIT DISTURBANCE: ICD-10-CM

## 2019-06-05 DIAGNOSIS — Z79.899 OTHER LONG TERM (CURRENT) DRUG THERAPY: ICD-10-CM

## 2019-06-05 DIAGNOSIS — Z71.89 COUNSELING REGARDING GOALS OF CARE: ICD-10-CM

## 2019-06-05 DIAGNOSIS — Z79.899 HIGH RISK MEDICATION USE: ICD-10-CM

## 2019-06-05 DIAGNOSIS — Z11.4 ENCOUNTER FOR SCREENING FOR HIV: ICD-10-CM

## 2019-06-05 DIAGNOSIS — G35 MULTIPLE SCLEROSIS: Primary | ICD-10-CM

## 2019-06-05 DIAGNOSIS — R41.89 COGNITIVE CHANGE: ICD-10-CM

## 2019-06-05 DIAGNOSIS — F32.0 CURRENT MILD EPISODE OF MAJOR DEPRESSIVE DISORDER, UNSPECIFIED WHETHER RECURRENT: ICD-10-CM

## 2019-06-05 DIAGNOSIS — G35 MULTIPLE SCLEROSIS: ICD-10-CM

## 2019-06-05 DIAGNOSIS — N31.9 NEUROGENIC DYSFUNCTION OF THE URINARY BLADDER: ICD-10-CM

## 2019-06-05 DIAGNOSIS — Z29.89 PROPHYLACTIC IMMUNOTHERAPY: ICD-10-CM

## 2019-06-05 LAB
25(OH)D3+25(OH)D2 SERPL-MCNC: 20 NG/ML (ref 30–96)
ALBUMIN SERPL BCP-MCNC: 4.1 G/DL (ref 3.5–5.2)
ALP SERPL-CCNC: 55 U/L (ref 55–135)
ALT SERPL W/O P-5'-P-CCNC: 10 U/L (ref 10–44)
AST SERPL-CCNC: 15 U/L (ref 10–40)
BASOPHILS # BLD AUTO: 0.04 K/UL (ref 0–0.2)
BASOPHILS NFR BLD: 0.5 % (ref 0–1.9)
BILIRUB DIRECT SERPL-MCNC: 0.2 MG/DL (ref 0.1–0.3)
BILIRUB SERPL-MCNC: 0.5 MG/DL (ref 0.1–1)
DIFFERENTIAL METHOD: ABNORMAL
EOSINOPHIL # BLD AUTO: 0.3 K/UL (ref 0–0.5)
EOSINOPHIL NFR BLD: 3.7 % (ref 0–8)
ERYTHROCYTE [DISTWIDTH] IN BLOOD BY AUTOMATED COUNT: 16.7 % (ref 11.5–14.5)
HCT VFR BLD AUTO: 32.9 % (ref 37–48.5)
HGB BLD-MCNC: 10.9 G/DL (ref 12–16)
HIV 1+2 AB+HIV1 P24 AG SERPL QL IA: NEGATIVE
IMM GRANULOCYTES # BLD AUTO: 0.03 K/UL (ref 0–0.04)
IMM GRANULOCYTES NFR BLD AUTO: 0.4 % (ref 0–0.5)
LYMPHOCYTES # BLD AUTO: 1.1 K/UL (ref 1–4.8)
LYMPHOCYTES NFR BLD: 14.1 % (ref 18–48)
MCH RBC QN AUTO: 27.9 PG (ref 27–31)
MCHC RBC AUTO-ENTMCNC: 33.1 G/DL (ref 32–36)
MCV RBC AUTO: 84 FL (ref 82–98)
MONOCYTES # BLD AUTO: 0.6 K/UL (ref 0.3–1)
MONOCYTES NFR BLD: 7.4 % (ref 4–15)
NEUTROPHILS # BLD AUTO: 5.7 K/UL (ref 1.8–7.7)
NEUTROPHILS NFR BLD: 73.9 % (ref 38–73)
NRBC BLD-RTO: 0 /100 WBC
PLATELET # BLD AUTO: 266 K/UL (ref 150–350)
PMV BLD AUTO: 12 FL (ref 9.2–12.9)
PROT SERPL-MCNC: 8.3 G/DL (ref 6–8.4)
RBC # BLD AUTO: 3.91 M/UL (ref 4–5.4)
WBC # BLD AUTO: 7.75 K/UL (ref 3.9–12.7)

## 2019-06-05 PROCEDURE — 80076 HEPATIC FUNCTION PANEL: CPT

## 2019-06-05 PROCEDURE — 86359 T CELLS TOTAL COUNT: CPT

## 2019-06-05 PROCEDURE — 86360 T CELL ABSOLUTE COUNT/RATIO: CPT

## 2019-06-05 PROCEDURE — 99215 OFFICE O/P EST HI 40 MIN: CPT | Mod: S$PBB,,, | Performed by: CLINICAL NURSE SPECIALIST

## 2019-06-05 PROCEDURE — 99999 PR PBB SHADOW E&M-EST. PATIENT-LVL III: CPT | Mod: PBBFAC,,, | Performed by: CLINICAL NURSE SPECIALIST

## 2019-06-05 PROCEDURE — 99213 OFFICE O/P EST LOW 20 MIN: CPT | Mod: PBBFAC | Performed by: CLINICAL NURSE SPECIALIST

## 2019-06-05 PROCEDURE — 86703 HIV-1/HIV-2 1 RESULT ANTBDY: CPT

## 2019-06-05 PROCEDURE — 36415 COLL VENOUS BLD VENIPUNCTURE: CPT

## 2019-06-05 PROCEDURE — 99215 PR OFFICE/OUTPT VISIT, EST, LEVL V, 40-54 MIN: ICD-10-PCS | Mod: S$PBB,,, | Performed by: CLINICAL NURSE SPECIALIST

## 2019-06-05 PROCEDURE — 82306 VITAMIN D 25 HYDROXY: CPT

## 2019-06-05 PROCEDURE — 99999 PR PBB SHADOW E&M-EST. PATIENT-LVL III: ICD-10-PCS | Mod: PBBFAC,,, | Performed by: CLINICAL NURSE SPECIALIST

## 2019-06-05 PROCEDURE — 85025 COMPLETE CBC W/AUTO DIFF WBC: CPT

## 2019-06-05 RX ORDER — CHOLECALCIFEROL (VITAMIN D3) 25 MCG
1000 TABLET ORAL DAILY
COMMUNITY
End: 2019-09-19 | Stop reason: ALTCHOICE

## 2019-06-05 RX ORDER — ESCITALOPRAM OXALATE 20 MG/1
20 TABLET ORAL DAILY
Refills: 2 | COMMUNITY
Start: 2019-03-01 | End: 2019-06-07 | Stop reason: SDUPTHER

## 2019-06-05 NOTE — PROGRESS NOTES
"Subjective:       Patient ID: Jaylon Bowles is a 38 y.o. female who presents today for a routine clinic visit for MS.  The history has been provided by the patient. She was last seen by Dr. Mitchell in February 2019.     MS HPI:  · DMT: Tecfidera; she is out of medication--She will receive shipment on Friday from [facility]  · Side effects from DMT? Yes - Some flushing if she doesn't take Aspirin   · Taking vitamin D3 as recommended? Yes - Dose: 1000 units daily   She denies any new or different symptoms.   She has started exercising at home; doing home PT exercises.   She is very heat sensitive (becomes like "a rag doll"), and she does use a cooling vest.   She has not transitioned to Zoloft. She continues to take Lexapro. She does not feel as tearful as she once did.     SOCIAL HISTORY  Social History     Tobacco Use    Smoking status: Never Smoker    Smokeless tobacco: Never Used   Substance Use Topics    Alcohol use: No     Comment: social    Drug use: No     Types: Marijuana     Living arrangements - the patient lives with her mother  Employment: receives disability; she is interested in working though. She is in school at [employer] for [employer]. Her goal is to get a master's degree.     MS ROS:  · Fatigue: Yes -Energy level is low. She has a hard time getting things done during the day. She does take naps as needed, but this interferes with her sleep.   · Sleep: Sleeps generally well. She did not have sleep clinic appt.   · Headaches: More controlled lately; she has not been waking up because of headache  · Seizures: none  · Sleep Disturbance: Yes - restless, no sleep study. "Mind is always running." Snores, some apnea  · Bladder Dysfunction: Yes - She has some urge incontinence. She has an accident about 3 times a week. She has seen Dr. Stokes in the past. She denies any UTIs. She takes oxybutynin, but has not been taking it recently.   · Bowel Dysfunction: Constipation has resolved with diet " changes.   · Spasticity: Yes - She gets charley horses in legs and feet at times. She takes Baclofen, but it makes her sleepy. She has started stretching.   · Visual Symptoms: Yes - wears glasses. Stable lately.   · Cognitive: Yes - poor short-term memory, word finding difficulty, and slowed cognitive processing.   · Mood Disorder: Yes - Mood has been up and down. She continues to take Lexapro. She has not transitioned to Zoloft.   · Gait Disturbance: Yes - uses walker at all times; sometimes uses cane and scooter. Feels like walking has been better since she has started exercising and stretching. She is taking Ampyra.  · Falls: Yes - no recent falls  · Hand Dysfunction: Yes -improved; less frequent numbness in hands; sometimes drops things   · Pain: No; back pain has improved.   · Sexual Dysfunction: Not Assessed  · Skin Breakdown: has some eczema   · Tremors: No  · Dysphagia:  No  · Dysarthria: No  · Heat sensitivity:  Yes - As above   · Copay Assist: Yes, $0 copay for Tecfidera       Objective:        1. 25 foot timed walk: 14.2 seconds today with rollator; was 14.9 seconds with rollator in September 2018    Neurologic Exam         MENTAL STATUS: language is fluent, normal verbal comprehension, attention is normal, patient is alert and oriented x 3.      CRANIAL NERVE EXAM: b/l ELMA with intermittent nystagmus. Vertical gaze intact. PERRL. No facial asymmetry. Hearing intact to gross voice. Uvula is midline, and palate moves symmetrically. Pupils=6-7mm     MOTOR EXAM: Normal bulk and tone throughout UE and LE bilaterally.  Strength is 5/5 except 5-/5 left biceps, KE, KF, DF; and 5-/5 HF b/l     REFLEXES: 3+ biceps and patellar bilaterally, rest 2+     SENSORY EXAM: LT decreased on left side; vibration intact     Visual acuity==20/20 with glasses      COORDINATION: moderate dysmetria bilaterally with finger to nose; Rapid sequential movements are slower on left side. Heel to shin more difficult with left heel to  right shin.      GAIT: wide based, requires walker. Romberg with mild sway. Tandem walking requires assistance--very unsteady    Imaging:     No new imaging to review today.   Labs:     Lab Results   Component Value Date    [id number] 39 06/11/2018    EQWZLTUQ96GR 26 (L) 02/20/2018     Lab Results   Component Value Date    SJ0YOWUG 67.1 02/11/2019    ABSOLUTECD3 907 02/11/2019    NI4MNLRU 13.5 02/11/2019    ABSOLUTECD8 183 (L) 02/11/2019    RE6JUMQD 53.1 02/11/2019    ABSOLUTECD4 719 02/11/2019    LABCD48 3.93 (H) 02/11/2019    LABCD48 2.39 09/13/2018    LABCD48 3.34 02/20/2018     Lab Results   Component Value Date    WBC 11.63 02/11/2019    RBC 4.10 02/11/2019    HGB 11.5 (L) 02/11/2019    HCT 35.1 (L) 02/11/2019    MCV 86 02/11/2019    MCH 28.0 02/11/2019    MCHC 32.8 02/11/2019    RDW 14.7 (H) 02/11/2019     02/11/2019    MPV 11.6 02/11/2019    GRAN 9.0 (H) 02/11/2019    GRAN 77.2 (H) 02/11/2019    LYMPH 1.5 02/11/2019    LYMPH 12.5 (L) 02/11/2019    MONO 0.8 02/11/2019    MONO 7.1 02/11/2019    EOS 0.3 02/11/2019    BASO 0.05 02/11/2019    EOSINOPHIL 2.4 02/11/2019    BASOPHIL 0.4 02/11/2019     Sodium   Date Value Ref Range Status   02/11/2019 136 136 - 145 mmol/L Final     Potassium   Date Value Ref Range Status   02/11/2019 4.4 3.5 - 5.1 mmol/L Final     Chloride   Date Value Ref Range Status   02/11/2019 100 95 - 110 mmol/L Final     CO2   Date Value Ref Range Status   02/11/2019 30 (H) 23 - 29 mmol/L Final     Glucose   Date Value Ref Range Status   02/11/2019 76 70 - 110 mg/dL Final     BUN, Bld   Date Value Ref Range Status   02/11/2019 13 6 - 20 mg/dL Final     Creatinine   Date Value Ref Range Status   02/11/2019 0.9 0.5 - 1.4 mg/dL Final     Calcium   Date Value Ref Range Status   02/11/2019 9.7 8.7 - 10.5 mg/dL Final     Total Protein   Date Value Ref Range Status   02/11/2019 8.7 (H) 6.0 - 8.4 g/dL Final     Albumin   Date Value Ref Range Status   02/11/2019 4.0 3.5 - 5.2 g/dL Final      Total Bilirubin   Date Value Ref Range Status   02/11/2019 0.3 0.1 - 1.0 mg/dL Final     Comment:     For infants and newborns, interpretation of results should be based  on gestational age, weight and in agreement with clinical  observations.  Premature Infant recommended reference ranges:  Up to 24 hours.............<8.0 mg/dL  Up to 48 hours............<12.0 mg/dL  3-5 days..................<15.0 mg/dL  6-29 days.................<15.0 mg/dL       Alkaline Phosphatase   Date Value Ref Range Status   02/11/2019 59 55 - 135 U/L Final     AST   Date Value Ref Range Status   02/11/2019 19 10 - 40 U/L Final     ALT   Date Value Ref Range Status   02/11/2019 14 10 - 44 U/L Final     Anion Gap   Date Value Ref Range Status   02/11/2019 6 (L) 8 - 16 mmol/L Final     eGFR if    Date Value Ref Range Status   02/11/2019 >60.0 >60 mL/min/1.73 m^2 Final     eGFR if non    Date Value Ref Range Status   02/11/2019 >60.0 >60 mL/min/1.73 m^2 Final     Comment:     Calculation used to obtain the estimated glomerular filtration  rate (eGFR) is the CKD-EPI equation.      Creatinine agvotgzgb=409nv/min        Diagnosis/Assessment/Plan:    1. Multiple Sclerosis  · Assessment: Jaylon is clinically stable on Tecfidera.   · Imaging: MRI brain, C, and T spine due in December 2019; will order at next visit   · Disease Modifying Therapies: Continue Tecfidera. Will check CBC, CD8, LFT today. Continue Vitamin D. Will also check Vitamin D level today. Patient requests HIV test, so this has been ordered, as well.     2. MS Symptom Assessment / Management  · Fatigue: Will monitor.   · Sleep Disturbance: Will get her scheduled for sleep clinic appt.  · Bladder Dysfunction: She plans to restart oxybutynin.   · Spasticity: Continue Baclofen and stretching routine.   · Cognitive: Will follow up on referral for NP testing.   · Mood Disorder: Continue Lexapro for now.   · Gait Disturbance: Continue Ampyra and  exercise program. Will check kidney function at next visit.     Over 50% of this 40 minute visit was spent in direct face to face counseling of the patient about MS, DMT considerations, and MS symptom management. The patient agrees with the plan of care. She will follow up with Dr. Mitchell in September.     There are no diagnoses linked to this encounter.

## 2019-06-05 NOTE — Clinical Note
Rebecca, can you tell me if [facility] is scheduled for NP testing? Dr. Mitchell referred her, but I am going to put in a new referral just in case. Thanks!Viviana

## 2019-06-05 NOTE — Clinical Note
I saw Jaylon today, and she's doing pretty well. She never made the switch to Zoloft from Lexapro. Is it ok to go ahead and refill her Lexapro?

## 2019-06-06 LAB
ABSOLUTE CD3: 764 CELLS/UL (ref 700–2100)
ABSOLUTE CD8: 170 CELLS/UL (ref 200–900)
CD3%: 70.3 % (ref 55–83)
CD3+CD4+ CELLS # BLD: 598 CELLS/UL (ref 300–1400)
CD3+CD4+ CELLS NFR BLD: 55 % (ref 28–57)
CD4/CD8 RATIO: 3.53 (ref 0.9–3.6)
CD8 % SUPPRESSOR T CELL: 15.6 % (ref 10–39)

## 2019-06-07 ENCOUNTER — TELEPHONE (OUTPATIENT)
Dept: INTERNAL MEDICINE | Facility: CLINIC | Age: 39
End: 2019-06-07

## 2019-06-07 ENCOUNTER — TELEPHONE (OUTPATIENT)
Dept: NEUROLOGY | Facility: CLINIC | Age: 39
End: 2019-06-07

## 2019-06-07 ENCOUNTER — PATIENT MESSAGE (OUTPATIENT)
Dept: NEUROLOGY | Facility: CLINIC | Age: 39
End: 2019-06-07

## 2019-06-07 DIAGNOSIS — G35 MULTIPLE SCLEROSIS: Primary | ICD-10-CM

## 2019-06-07 DIAGNOSIS — G35 MULTIPLE SCLEROSIS: ICD-10-CM

## 2019-06-07 DIAGNOSIS — F32.A DEPRESSION, UNSPECIFIED DEPRESSION TYPE: ICD-10-CM

## 2019-06-07 DIAGNOSIS — R26.9 GAIT DISTURBANCE: ICD-10-CM

## 2019-06-07 RX ORDER — DALFAMPRIDINE 10 MG/1
10 TABLET, FILM COATED, EXTENDED RELEASE ORAL 2 TIMES DAILY
Qty: 180 TABLET | Refills: 0 | Status: CANCELLED | OUTPATIENT
Start: 2019-06-07 | End: 2020-06-06

## 2019-06-07 RX ORDER — DALFAMPRIDINE 10 MG/1
10 TABLET, FILM COATED, EXTENDED RELEASE ORAL EVERY 12 HOURS
Qty: 180 TABLET | Refills: 0 | Status: SHIPPED | OUTPATIENT
Start: 2019-06-07 | End: 2019-09-07 | Stop reason: SDUPTHER

## 2019-06-07 RX ORDER — ESCITALOPRAM OXALATE 20 MG/1
20 TABLET ORAL DAILY
Qty: 30 TABLET | Refills: 5 | Status: SHIPPED | OUTPATIENT
Start: 2019-06-07 | End: 2020-05-28

## 2019-06-07 NOTE — TELEPHONE ENCOUNTER
----- Message from Cat Ramachandran MD sent at 6/7/2019  3:03 PM CDT -----  Due for annual in 1 month.     See if she will schedule labs in July and appt as soon as it is available.

## 2019-06-07 NOTE — TELEPHONE ENCOUNTER
----- Message from Portia Mitchell MD sent at 6/6/2019 12:38 AM CDT -----  Yes, that's fine. Thanks!      ----- Message -----  From: VERÓNICA Geronimo, CNS  Sent: 6/5/2019   4:14 PM  To: Portia Mitchell MD    I saw Jaylon today, and she's doing pretty well. She never made the switch to Zoloft from Lexapro. Is it ok to go ahead and refill her Lexapro?

## 2019-06-14 ENCOUNTER — PATIENT MESSAGE (OUTPATIENT)
Dept: INTERNAL MEDICINE | Facility: CLINIC | Age: 39
End: 2019-06-14

## 2019-06-14 ENCOUNTER — OFFICE VISIT (OUTPATIENT)
Dept: UROGYNECOLOGY | Facility: CLINIC | Age: 39
End: 2019-06-14
Payer: MEDICARE

## 2019-06-14 VITALS
SYSTOLIC BLOOD PRESSURE: 132 MMHG | BODY MASS INDEX: 40.55 KG/M2 | HEIGHT: 65 IN | DIASTOLIC BLOOD PRESSURE: 80 MMHG | WEIGHT: 243.38 LBS

## 2019-06-14 DIAGNOSIS — N90.89 VULVAR IRRITATION: ICD-10-CM

## 2019-06-14 DIAGNOSIS — N39.46 MIXED INCONTINENCE: Primary | ICD-10-CM

## 2019-06-14 PROCEDURE — 99999 PR PBB SHADOW E&M-EST. PATIENT-LVL IV: ICD-10-PCS | Mod: PBBFAC,,, | Performed by: NURSE PRACTITIONER

## 2019-06-14 PROCEDURE — 99999 PR PBB SHADOW E&M-EST. PATIENT-LVL IV: CPT | Mod: PBBFAC,,, | Performed by: NURSE PRACTITIONER

## 2019-06-14 PROCEDURE — 99213 OFFICE O/P EST LOW 20 MIN: CPT | Mod: S$PBB,,, | Performed by: NURSE PRACTITIONER

## 2019-06-14 PROCEDURE — 99214 OFFICE O/P EST MOD 30 MIN: CPT | Mod: PBBFAC | Performed by: NURSE PRACTITIONER

## 2019-06-14 PROCEDURE — 99213 PR OFFICE/OUTPT VISIT, EST, LEVL III, 20-29 MIN: ICD-10-PCS | Mod: S$PBB,,, | Performed by: NURSE PRACTITIONER

## 2019-06-14 NOTE — PROGRESS NOTES
06/14/2019    SUBJECTIVE:   38 y.o. female for follow up of vulvo/vaginal candidiasis.    Past Medical History:   Diagnosis Date    Depression     Hypertension     Migraine headache     Multiple sclerosis     Thyroid disease        History reviewed. No pertinent surgical history.    Current Outpatient Medications   Medication Sig Dispense Refill    baclofen (LIORESAL) 10 MG tablet Take 10 mg by mouth 2 (two) times daily as needed.       betamethasone dipropionate (DIPROLENE) 0.05 % cream       carvedilol (COREG) 25 MG tablet TAKE 1 TABLET BY MOUTH TWICE DAILY WITH MEALS 60 tablet 0    clotrimazole-betamethasone 1-0.05% (LOTRISONE) cream Apply topically 2 (two) times daily. 45 g 3    dalfampridine (AMPYRA) 10 mg Tb12 Take 10 mg by mouth every 12 (twelve) hours. 180 tablet 0    escitalopram oxalate (LEXAPRO) 20 MG tablet Take 1 tablet (20 mg total) by mouth once daily. 30 tablet 5    levothyroxine (SYNTHROID) 100 MCG tablet TAKE 1 TABLET(100 MCG) BY MOUTH EVERY DAY 90 tablet 0    lisinopril-hydrochlorothiazide (PRINZIDE,ZESTORETIC) 20-12.5 mg per tablet TAKE 2 TABLETS BY MOUTH EVERY DAY 60 tablet 0    mometasone (ELOCON) 0.1 % ointment STEFF EXT AA QD FOR 10 DAYS  3    multivitamin with minerals (ONE DAILY COMPLETE ORAL) Take by mouth.      oxybutynin (DITROPAN-XL) 10 MG 24 hr tablet Take 1 tablet (10 mg total) by mouth once daily. 30 tablet 11    TECFIDERA 240 mg CpDR Take 240 mg by mouth 2 (two) times daily. 180 capsule 1    vitamin D (VITAMIN D3) 1000 units Tab Take 1,000 Units by mouth once daily.       No current facility-administered medications for this visit.      Allergies: Betaseron [interferon beta-1b] and Cephalexin   Patient's last menstrual period was 06/03/2019.      Well Woman:  Pap:09/2018 normal HPV high risk positive      Family History  Family History   Problem Relation Age of Onset    Hypertension Mother     Thyroid disease Mother     Diabetes Father     Stroke Father      "Vaginal cancer Neg Hx     Endometrial cancer Neg Hx     Cervical cancer Neg Hx     Breast cancer Neg Hx     Ovarian cancer Neg Hx           ROS:  Feeling well.   No dyspnea or chest pain on exertion.    No abdominal pain, change in bowel habits, black or bloody stools.    UUI when she needs to void-- stopped PT.  GYN ROS: normal menses, no abnormal bleeding, pelvic pain or discharge, no breast pain or new or enlarging lumps on self exam.   No neurological complaints.    OBJECTIVE:   The patient appears well, alert, oriented x 3, in no distress.  /80 (BP Location: Right arm, Patient Position: Sitting, BP Method: Large (Manual))   Ht 5' 5" (1.651 m)   Wt 110.4 kg (243 lb 6.2 oz)   LMP 06/03/2019   BMI 40.50 kg/m²      Abdomen soft without tenderness, guarding, mass or organomegaly.   Extremities show no edema, normal peripheral pulses.   Neurological is normal, no focal findings.        PELVIC EXAM:   VULVA:normal genitalia  VAGINA: normal appearing vagina with normal color, No discharge noted--no odor, no lesions,  CERVIX: normal appearing cervix without discharge or lesions,   UTERUS: uterus is normal size, shape, consistency and nontender,   ADNEXA: no masses, difficult to assess due to body habitus  RECTAL: deferred    ASSESSMENT:   1. Mixed incontinence     2. Vulvar irritation         PLAN:     1. Well woman  --pap hpv high risk positive  --repeat in 08/2019    2. Vulvar candidiasis/ vaginal itching  --resolved    3. Vulvar dryness  --continue using moisture barrier    4. Hx of MS:    We reviewed that bladder dysfunction can be seen in the majority of patients with MS. The symptoms range from not being able to emptying to urinary frequency and urinary incontinence.  This may be as a results of  block or delay transmission of nerve signals in areas of the central nervous system (CNS)  that control the bladder and urinary sphincters due to the MS brain lesions. A spastic (overactive) bladder that is " unable to hold the normal amount of urine, or a bladder that does not empty properly (retains some urine in it). Many symptoms are associated with MS vary and sometimes need additional testing to better evaluate these urinary problems.    · Frequency and/or urgency of urination   · Hesitancy in starting urination   · Frequent nighttime urination (nocturia)   · Incontinence (the inability to hold in urine)   · Inability to empty the bladder completely     5  Mixed urinary incontinence, urge << stress:   --Bladder diary for 3-7 days,  not done   --Empty bladder every 3 hours.  Empty well: wait a minute, lean forward on toilet.    --Avoid dietary irritants (see sheet).  Keep diary x 3-5 days to determine your irritants.  --KEGELS: do 10 in AM and 10 in PM, holding each x 10 seconds.  When you feel urge to go, STOP, KEGEL, and when urge has passed, then go to bathroom.   Has stopped pelvic floor   --URGE: Continue Ditropan 10 mg daily.  SE profile reviewed.    Takes 2-4 weeks to see if will have effect.  For dry mouth: get sour, sugar free lozenge or gum.    --STRESS:  Pessary vs. Sling.      5. Constipation:  --resolved at this time     6. Nocturia (nighttime urination): stop fluids 2 hours before bed.  If have leg swelling:  Elevate feet above chest x 1 hour before bed to get excess fluid off.  Can also use support hose (knee highs).       7. RTC 08/2019 for annual exam      30 minutes were spent in face to face time with this patient  90 % of this time was spent in counseling and/or coordination of care  Jamee JASMINE Morena  [facility]  Division of Female Pelvic Medicine and Reconstructive Surgery  Department of Obstetrics & Gynecology

## 2019-06-14 NOTE — PATIENT INSTRUCTIONS
1. Well woman  --pap hpv high risk positive  --repeat in 08/2019    2. Vulvar candidiasis/ vaginal itching  --resolved    3. Vulvar dryness  --continue using moisture barrier    4. Hx of MS:    We reviewed that bladder dysfunction can be seen in the majority of patients with MS. The symptoms range from not being able to emptying to urinary frequency and urinary incontinence.  This may be as a results of  block or delay transmission of nerve signals in areas of the central nervous system (CNS)  that control the bladder and urinary sphincters due to the MS brain lesions. A spastic (overactive) bladder that is unable to hold the normal amount of urine, or a bladder that does not empty properly (retains some urine in it). Many symptoms are associated with MS vary and sometimes need additional testing to better evaluate these urinary problems.    · Frequency and/or urgency of urination   · Hesitancy in starting urination   · Frequent nighttime urination (nocturia)   · Incontinence (the inability to hold in urine)   · Inability to empty the bladder completely     5  Mixed urinary incontinence, urge << stress:   --Bladder diary for 3-7 days,  not done   --Empty bladder every 3 hours.  Empty well: wait a minute, lean forward on toilet.    --Avoid dietary irritants (see sheet).  Keep diary x 3-5 days to determine your irritants.  --KEGELS: do 10 in AM and 10 in PM, holding each x 10 seconds.  When you feel urge to go, STOP, KEGEL, and when urge has passed, then go to bathroom.   Has stopped pelvic floor   --URGE: Continue Ditropan 10 mg daily.  SE profile reviewed.    Takes 2-4 weeks to see if will have effect.  For dry mouth: get sour, sugar free lozenge or gum.    --STRESS:  Pessary vs. Sling.      5. Constipation:  --resolved at this time     6. Nocturia (nighttime urination): stop fluids 2 hours before bed.  If have leg swelling:  Elevate feet above chest x 1 hour before bed to get excess fluid off.  Can also use support  hose (knee highs).       7. RTC 08/2019 for annual exam

## 2019-06-26 ENCOUNTER — PATIENT MESSAGE (OUTPATIENT)
Dept: UROGYNECOLOGY | Facility: CLINIC | Age: 39
End: 2019-06-26

## 2019-06-26 DIAGNOSIS — R82.90 ABNORMAL URINE ODOR: Primary | ICD-10-CM

## 2019-06-26 NOTE — TELEPHONE ENCOUNTER
ALEC,  Called pt to discuss further no answer, Left voice message for pt to give the office a call back at [phone] regarding message from portal.      Troy Mancuso.    I'm having an odor issue. Smells fishy....do you have anything you can prescribe me to help with this odor or anything I can do?  Please advise.

## 2019-06-26 NOTE — TELEPHONE ENCOUNTER
FYCHRIS,    Pt stated she's experiencing an fishy vaginal odor when she urinates. An UA/UC order has been placed. Pt was also scheduled for an follow up apt 7/5/19 at 1:30 pm.

## 2019-06-27 ENCOUNTER — LAB VISIT (OUTPATIENT)
Dept: LAB | Facility: HOSPITAL | Age: 39
End: 2019-06-27
Attending: NURSE PRACTITIONER
Payer: MEDICARE

## 2019-06-27 DIAGNOSIS — R82.90 ABNORMAL URINE ODOR: ICD-10-CM

## 2019-06-27 LAB
BACTERIA #/AREA URNS AUTO: NORMAL /HPF
BILIRUB UR QL STRIP: NEGATIVE
CLARITY UR REFRACT.AUTO: CLEAR
COLOR UR AUTO: YELLOW
GLUCOSE UR QL STRIP: NEGATIVE
HGB UR QL STRIP: NEGATIVE
KETONES UR QL STRIP: NEGATIVE
LEUKOCYTE ESTERASE UR QL STRIP: ABNORMAL
MICROSCOPIC COMMENT: NORMAL
NITRITE UR QL STRIP: NEGATIVE
PH UR STRIP: 6 [PH] (ref 5–8)
PROT UR QL STRIP: NEGATIVE
RBC #/AREA URNS AUTO: 0 /HPF (ref 0–4)
SP GR UR STRIP: 1.01 (ref 1–1.03)
SQUAMOUS #/AREA URNS AUTO: 8 /HPF
URN SPEC COLLECT METH UR: ABNORMAL
WBC #/AREA URNS AUTO: 1 /HPF (ref 0–5)

## 2019-06-27 PROCEDURE — 87086 URINE CULTURE/COLONY COUNT: CPT

## 2019-06-27 PROCEDURE — 81001 URINALYSIS AUTO W/SCOPE: CPT

## 2019-06-28 ENCOUNTER — PATIENT MESSAGE (OUTPATIENT)
Dept: UROGYNECOLOGY | Facility: CLINIC | Age: 39
End: 2019-06-28

## 2019-06-28 NOTE — TELEPHONE ENCOUNTER
FYI,    I have a question about URINALYSIS AUTO ONLY resulted on 6/27/19 at 9:26 PM.    What does having a trace of leukocytes mean?

## 2019-06-29 LAB
BACTERIA UR CULT: NORMAL
BACTERIA UR CULT: NORMAL

## 2019-07-03 RX ORDER — LEVOTHYROXINE SODIUM 100 UG/1
TABLET ORAL
Qty: 90 TABLET | Refills: 0 | Status: SHIPPED | OUTPATIENT
Start: 2019-07-03 | End: 2019-10-31 | Stop reason: SDUPTHER

## 2019-07-04 ENCOUNTER — PATIENT MESSAGE (OUTPATIENT)
Dept: INTERNAL MEDICINE | Facility: CLINIC | Age: 39
End: 2019-07-04

## 2019-07-04 DIAGNOSIS — H92.09 DISCOMFORT OF EAR, UNSPECIFIED LATERALITY: ICD-10-CM

## 2019-07-04 DIAGNOSIS — H93.233 HEARING ABNORMALLY ACUTE, BILATERAL: Primary | ICD-10-CM

## 2019-07-04 DIAGNOSIS — H83.3X3 SOUND SENSITIVITY IN BOTH EARS: ICD-10-CM

## 2019-07-19 ENCOUNTER — PATIENT MESSAGE (OUTPATIENT)
Dept: SLEEP MEDICINE | Facility: CLINIC | Age: 39
End: 2019-07-19

## 2019-07-25 ENCOUNTER — PATIENT MESSAGE (OUTPATIENT)
Dept: NEUROLOGY | Facility: CLINIC | Age: 39
End: 2019-07-25

## 2019-07-25 DIAGNOSIS — M62.838 MUSCLE SPASM: ICD-10-CM

## 2019-07-25 DIAGNOSIS — G35 MULTIPLE SCLEROSIS: Primary | ICD-10-CM

## 2019-07-26 ENCOUNTER — PATIENT MESSAGE (OUTPATIENT)
Dept: INTERNAL MEDICINE | Facility: CLINIC | Age: 39
End: 2019-07-26

## 2019-07-30 RX ORDER — BACLOFEN 10 MG/1
10 TABLET ORAL NIGHTLY
Qty: 30 TABLET | Refills: 6 | Status: SHIPPED | OUTPATIENT
Start: 2019-07-30 | End: 2021-01-25

## 2019-07-31 ENCOUNTER — OFFICE VISIT (OUTPATIENT)
Dept: OTOLARYNGOLOGY | Facility: CLINIC | Age: 39
End: 2019-07-31
Payer: MEDICARE

## 2019-07-31 ENCOUNTER — PATIENT MESSAGE (OUTPATIENT)
Dept: NEUROLOGY | Facility: CLINIC | Age: 39
End: 2019-07-31

## 2019-07-31 ENCOUNTER — PATIENT MESSAGE (OUTPATIENT)
Dept: OTOLARYNGOLOGY | Facility: CLINIC | Age: 39
End: 2019-07-31

## 2019-07-31 ENCOUNTER — CLINICAL SUPPORT (OUTPATIENT)
Dept: AUDIOLOGY | Facility: CLINIC | Age: 39
End: 2019-07-31
Payer: MEDICARE

## 2019-07-31 VITALS — SYSTOLIC BLOOD PRESSURE: 122 MMHG | HEART RATE: 66 BPM | DIASTOLIC BLOOD PRESSURE: 82 MMHG

## 2019-07-31 DIAGNOSIS — H93.299 MISOPHONIA: Primary | ICD-10-CM

## 2019-07-31 DIAGNOSIS — H91.90 PERCEIVED HEARING LOSS: ICD-10-CM

## 2019-07-31 DIAGNOSIS — H93.233 HYPERACUSIS OF BOTH EARS: Primary | ICD-10-CM

## 2019-07-31 PROCEDURE — 99211 OFF/OP EST MAY X REQ PHY/QHP: CPT | Mod: PBBFAC,25

## 2019-07-31 PROCEDURE — 99213 OFFICE O/P EST LOW 20 MIN: CPT | Mod: PBBFAC,27 | Performed by: NURSE PRACTITIONER

## 2019-07-31 PROCEDURE — 92552 PURE TONE AUDIOMETRY AIR: CPT | Mod: PBBFAC | Performed by: AUDIOLOGIST

## 2019-07-31 PROCEDURE — 99999 PR PBB SHADOW E&M-EST. PATIENT-LVL I: ICD-10-PCS | Mod: PBBFAC,,,

## 2019-07-31 PROCEDURE — 99999 PR PBB SHADOW E&M-EST. PATIENT-LVL III: ICD-10-PCS | Mod: PBBFAC,,, | Performed by: NURSE PRACTITIONER

## 2019-07-31 PROCEDURE — 99213 OFFICE O/P EST LOW 20 MIN: CPT | Mod: S$PBB,ICN,, | Performed by: NURSE PRACTITIONER

## 2019-07-31 PROCEDURE — 92556 SPEECH AUDIOMETRY COMPLETE: CPT | Mod: PBBFAC | Performed by: AUDIOLOGIST

## 2019-07-31 PROCEDURE — 99213 PR OFFICE/OUTPT VISIT, EST, LEVL III, 20-29 MIN: ICD-10-PCS | Mod: S$PBB,ICN,, | Performed by: NURSE PRACTITIONER

## 2019-07-31 PROCEDURE — 99999 PR PBB SHADOW E&M-EST. PATIENT-LVL I: CPT | Mod: PBBFAC,,,

## 2019-07-31 PROCEDURE — 99999 PR PBB SHADOW E&M-EST. PATIENT-LVL III: CPT | Mod: PBBFAC,,, | Performed by: NURSE PRACTITIONER

## 2019-07-31 NOTE — PROGRESS NOTES
Subjective:      Jaylon Bowles is a 38 y.o. female who was referred to me by Dr. Cat Ramachandran in consultation for hyperawareness of sounds.    Ms. Bowles reports sounds such as keys that cause discomfort in both ears. She reports a slight decrease in hearing in both ears. She denies tinnitus. She denies ear pain or drainage. She has a history of MS for several years. There is not a family history of hearing loss at a young age.  There is not a prior history of ear surgery.  There is not a prior history of ear infections .  She denies a history of significant noise exposure.  She does not wear hearing aids currently.  She has not had a hearing test recently.       Past Medical History  She has a past medical history of Depression, Hypertension, Migraine headache, Multiple sclerosis, and Thyroid disease.    Past Surgical History  She has no past surgical history on file.    Family History  Her family history includes Diabetes in her father; Hypertension in her mother; Stroke in her father; Thyroid disease in her mother.    Social History  She reports that she has never smoked. She has never used smokeless tobacco. She reports that she does not drink alcohol or use drugs.    Allergies  She is allergic to betaseron [interferon beta-1b] and cephalexin.    Medications  She has a current medication list which includes the following prescription(s): baclofen, betamethasone dipropionate, carvedilol, clotrimazole-betamethasone 1-0.05%, dalfampridine, escitalopram oxalate, levothyroxine, lisinopril-hydrochlorothiazide, mometasone, multivitamin with minerals, oxybutynin, tecfidera, and vitamin d.    Review of Systems   Constitutional: Negative for chills, fever and unexpected weight change.   HENT: Positive for hearing loss. Negative for congestion, ear discharge, ear pain, facial swelling, postnasal drip, sinus pressure, sore throat, tinnitus and trouble swallowing.    Eyes: Negative for pain and visual disturbance.    Respiratory: Negative for apnea and shortness of breath.    Cardiovascular: Negative for chest pain and palpitations.   Gastrointestinal: Negative for abdominal pain and nausea.   Endocrine: Negative for cold intolerance and heat intolerance.   Musculoskeletal: Negative for joint swelling and neck stiffness.   Skin: Negative for color change and rash.   Neurological: Negative for dizziness, facial asymmetry and headaches.   Hematological: Negative for adenopathy. Does not bruise/bleed easily.   Psychiatric/Behavioral: Negative for agitation. The patient is not nervous/anxious.           Objective:     /82   Pulse 66      Constitutional:   Vital signs are normal. She appears well-developed and well-nourished.     Head:  Normocephalic and atraumatic.     Ears:    Right Ear: No lacerations. No drainage, swelling or tenderness. No foreign bodies. No mastoid tenderness. Tympanic membrane is not injected, not scarred, not perforated, not erythematous, not retracted and not bulging. Tympanic membrane mobility is normal. No middle ear effusion. No hemotympanum. No decreased hearing is noted.   Left Ear: No lacerations. No drainage, swelling or tenderness. No foreign bodies. No mastoid tenderness. Tympanic membrane is not injected, not scarred, not perforated, not erythematous, not retracted and not bulging. Tympanic membrane mobility is normal.  No middle ear effusion. No hemotympanum. No decreased hearing is noted.     Nose:  Nose normal including turbinates, nasal mucosa, sinuses and nasal septum.     Neck:  Neck normal without thyromegaly masses, asymmetry, normal tracheal structure, crepitus, and tenderness and no adenopathy.     Cardiovascular:   Normal heart sounds and normal pulses.      Pulmonary/Chest:   Effort normal and breath sounds normal.     Psychiatric:   She has a normal mood and affect.       Procedure    None      Data Reviewed    WBC (K/uL)   Date Value   06/05/2019 7.75     Platelets (K/uL)    Date Value   06/05/2019 266      Creatinine (mg/dL)   Date Value   02/11/2019 0.9     TSH (uIU/mL)   Date Value   10/12/2018 0.997     Glucose (mg/dL)   Date Value   02/11/2019 76     No results found for: HGBA1C    I independently reviewed the tracings of the complete audiometric evaluation performed today.  I reviewed the audiogram with the patient as well.  Pertinent findings include a normal study.         Assessment:     1. Hyperacusis of both ears    2. Perceived hearing loss         Plan:     I had a long discussion with the patient regarding her condition and the further workup and management options.    Audiogram normal.  Ms. Bowles reports hyperacusis in both ears. Provided reassurance and recommended distraction techniques to try to improve symptoms. There is no evidence of ear infection or problems with hearing. I asked that she follow up in 2 months. If not improved, will consider referral for CBT.    Follow up in about 2 months (around 9/30/2019).

## 2019-07-31 NOTE — TELEPHONE ENCOUNTER
Tell her to resume the baclofen. Nightly dose sent to pharmacy. Tell her it may cause drowsiness so it's given at night. She can take it on days with worse spasms

## 2019-07-31 NOTE — LETTER
July 31, 2019      Cat Ramachandran MD  [address] [facility] [address] [place] [address]           [facility] - Otorhinolaryngology  [address] [address]  Phone: [phone]  Fax: [phone]          Patient: Jaylon Bowles   MR Number: [id number]   YOB: 1980   Date of Visit: 7/31/2019       Dear Dr. Cat Ramachandran:    Thank you for referring Jaylon Bowles to me for evaluation. Attached you will find relevant portions of my assessment and plan of care.    If you have questions, please do not hesitate to call me. I look forward to following Jaylon Bowles along with you.    Sincerely,    Agus Xavier, NP    Enclosure  CC:  No Recipients    If you would like to receive this communication electronically, please contact [email or web address] or [phone] to request more information on [employer] access.    For providers and/or their staff who would like to refer a patient to [facility], please contact us through our one-stop-shop provider referral line, [facility], at [phone].    If you feel you have received this communication in error or would no longer like to receive these types of communications, please e-mail [email or web address]

## 2019-07-31 NOTE — PROGRESS NOTES
Audiologic Evaluation    Jaylon Bowles was seen on the above date for a hearing evaluation. Jaylon Bowles reports hyper-sensitivity to metallic sounds like keys jingling or silverware clanging.     Audiometric testing via supra-aural headphones indicated normal hearing sensitivity for 250-8000 Hz in each ear. Pure tone average and speech recognition threshold were in good agreement. Excellent speech discrimination ability was demonstrated in quiet when novel words were presented at a conversational level in each ear.  Note tympanometry and acoustic reflex testing not performed due to patient hyper-sensitivity during standard audiometric testing.     Recommendations:  1) Otologic consultation.  2) Annual audiometric testing to monitor hearing sensitivity.  3) Hearing protection in the presence of excessive noise.

## 2019-08-01 ENCOUNTER — PATIENT MESSAGE (OUTPATIENT)
Dept: INTERNAL MEDICINE | Facility: CLINIC | Age: 39
End: 2019-08-01

## 2019-08-05 ENCOUNTER — PATIENT MESSAGE (OUTPATIENT)
Dept: INTERNAL MEDICINE | Facility: CLINIC | Age: 39
End: 2019-08-05

## 2019-08-05 DIAGNOSIS — E66.01 MORBID OBESITY: Primary | ICD-10-CM

## 2019-08-06 ENCOUNTER — PATIENT MESSAGE (OUTPATIENT)
Dept: INTERNAL MEDICINE | Facility: CLINIC | Age: 39
End: 2019-08-06

## 2019-08-06 ENCOUNTER — PATIENT MESSAGE (OUTPATIENT)
Dept: NEUROLOGY | Facility: CLINIC | Age: 39
End: 2019-08-06

## 2019-08-06 NOTE — TELEPHONE ENCOUNTER
Please inform that Medicare will not cover the session with the dietician. The cost would be around $30-40.    Notice of Non-coverage will need to be signed- may mail it to patient and she will need to return it signed if she chooses to proceed.    Form is in the outbox.

## 2019-08-09 ENCOUNTER — PATIENT MESSAGE (OUTPATIENT)
Dept: NEUROLOGY | Facility: CLINIC | Age: 39
End: 2019-08-09

## 2019-08-14 ENCOUNTER — PATIENT OUTREACH (OUTPATIENT)
Dept: ADMINISTRATIVE | Facility: OTHER | Age: 39
End: 2019-08-14

## 2019-08-14 DIAGNOSIS — Z12.31 ENCOUNTER FOR SCREENING MAMMOGRAM FOR MALIGNANT NEOPLASM OF BREAST: Primary | ICD-10-CM

## 2019-08-16 ENCOUNTER — HOSPITAL ENCOUNTER (OUTPATIENT)
Dept: RADIOLOGY | Facility: OTHER | Age: 39
Discharge: HOME | End: 2019-08-16
Attending: NURSE PRACTITIONER
Payer: MEDICARE

## 2019-08-16 ENCOUNTER — OFFICE VISIT (OUTPATIENT)
Dept: UROGYNECOLOGY | Facility: CLINIC | Age: 39
End: 2019-08-16
Payer: MEDICARE

## 2019-08-16 VITALS
SYSTOLIC BLOOD PRESSURE: 120 MMHG | DIASTOLIC BLOOD PRESSURE: 70 MMHG | WEIGHT: 239.88 LBS | BODY MASS INDEX: 39.97 KG/M2 | HEIGHT: 65 IN

## 2019-08-16 DIAGNOSIS — N89.8 VAGINAL ITCHING: ICD-10-CM

## 2019-08-16 DIAGNOSIS — N39.46 MIXED INCONTINENCE: ICD-10-CM

## 2019-08-16 DIAGNOSIS — Z01.419 WELL WOMAN EXAM: Primary | ICD-10-CM

## 2019-08-16 DIAGNOSIS — N90.89 VULVAR IRRITATION: ICD-10-CM

## 2019-08-16 DIAGNOSIS — Z12.31 ENCOUNTER FOR SCREENING MAMMOGRAM FOR BREAST CANCER: ICD-10-CM

## 2019-08-16 DIAGNOSIS — Z12.4 ENCOUNTER FOR SCREENING FOR CERVICAL CANCER: ICD-10-CM

## 2019-08-16 PROCEDURE — 87481 CANDIDA DNA AMP PROBE: CPT | Mod: 59

## 2019-08-16 PROCEDURE — 87801 DETECT AGNT MULT DNA AMPLI: CPT

## 2019-08-16 PROCEDURE — 77063 BREAST TOMOSYNTHESIS BI: CPT | Mod: 26,,, | Performed by: INTERNAL MEDICINE

## 2019-08-16 PROCEDURE — 99999 PR PBB SHADOW E&M-EST. PATIENT-LVL IV: ICD-10-PCS | Mod: PBBFAC,,, | Performed by: NURSE PRACTITIONER

## 2019-08-16 PROCEDURE — 77063 MAMMO DIGITAL SCREENING BILAT WITH TOMOSYNTHESIS_CAD: ICD-10-PCS | Mod: 26,,, | Performed by: INTERNAL MEDICINE

## 2019-08-16 PROCEDURE — 99999 PR PBB SHADOW E&M-EST. PATIENT-LVL IV: CPT | Mod: PBBFAC,,, | Performed by: NURSE PRACTITIONER

## 2019-08-16 PROCEDURE — G0101 CA SCREEN;PELVIC/BREAST EXAM: HCPCS | Mod: PBBFAC

## 2019-08-16 PROCEDURE — G0101 PR CA SCREEN;PELVIC/BREAST EXAM: ICD-10-PCS | Mod: S$PBB,,, | Performed by: NURSE PRACTITIONER

## 2019-08-16 PROCEDURE — 77067 SCR MAMMO BI INCL CAD: CPT | Mod: 26,,, | Performed by: INTERNAL MEDICINE

## 2019-08-16 PROCEDURE — 88142 CYTOPATH C/V THIN LAYER: CPT

## 2019-08-16 PROCEDURE — 77067 SCR MAMMO BI INCL CAD: CPT | Mod: TC

## 2019-08-16 PROCEDURE — 87624 HPV HI-RISK TYP POOLED RSLT: CPT

## 2019-08-16 PROCEDURE — 99214 OFFICE O/P EST MOD 30 MIN: CPT | Mod: PBBFAC | Performed by: NURSE PRACTITIONER

## 2019-08-16 PROCEDURE — 77067 MAMMO DIGITAL SCREENING BILAT WITH TOMOSYNTHESIS_CAD: ICD-10-PCS | Mod: 26,,, | Performed by: INTERNAL MEDICINE

## 2019-08-16 PROCEDURE — G0101 CA SCREEN;PELVIC/BREAST EXAM: HCPCS | Mod: S$PBB,,, | Performed by: NURSE PRACTITIONER

## 2019-08-16 NOTE — PATIENT INSTRUCTIONS
1. Well woman  --pap today  --takes 2-3 weeks for results     2. Vulvar candidiasis/ vaginal itching  --lotrisone twice daily x 2 weeks     3. Vulvar dryness  --Vaginal atrophy (dryness):   Use REPLENS or REFRESH OTC: 1/2 applicator full in vagina twice a week.         4. Hx of MS:    We reviewed that bladder dysfunction can be seen in the majority of patients with MS. The symptoms range from not being able to emptying to urinary frequency and urinary incontinence.  This may be as a results of  block or delay transmission of nerve signals in areas of the central nervous system (CNS)  that control the bladder and urinary sphincters due to the MS brain lesions. A spastic (overactive) bladder that is unable to hold the normal amount of urine, or a bladder that does not empty properly (retains some urine in it). Many symptoms are associated with MS vary and sometimes need additional testing to better evaluate these urinary problems.    · Frequency and/or urgency of urination   · Hesitancy in starting urination   · Frequent nighttime urination (nocturia)   · Incontinence (the inability to hold in urine)   · Inability to empty the bladder completely     5  Mixed urinary incontinence, urge << stress:   --Bladder diary for 3-7 days,  not done   --Empty bladder every 3 hours.  Empty well: wait a minute, lean forward on toilet.    --Avoid dietary irritants (see sheet).  Keep diary x 3-5 days to determine your irritants.  --KEGELS: do 10 in AM and 10 in PM, holding each x 10 seconds.  When you feel urge to go, STOP, KEGEL, and when urge has passed, then go to bathroom.   Has stopped pelvic floor   --URGE: Continue Ditropan 10 mg daily.  SE profile reviewed.    Takes 2-4 weeks to see if will have effect.  For dry mouth: get sour, sugar free lozenge or gum.    --STRESS:  Pessary vs. Sling.      5. Constipation:  --resolved at this time     6. Nocturia (nighttime urination): stop fluids 2 hours before bed.  If have leg swelling:   Elevate feet above chest x 1 hour before bed to get excess fluid off.  Can also use support hose (knee highs).       7. RTC 1 year for annual exam

## 2019-08-16 NOTE — PROGRESS NOTES
08/16/2019    SUBJECTIVE:   39 y.o. female for annual exam.    Past Medical History:   Diagnosis Date    Depression     Hypertension     Migraine headache     Multiple sclerosis     Thyroid disease        History reviewed. No pertinent surgical history.    Current Outpatient Medications   Medication Sig Dispense Refill    baclofen (LIORESAL) 10 MG tablet Take 1 tablet (10 mg total) by mouth every evening. 30 tablet 6    betamethasone dipropionate (DIPROLENE) 0.05 % cream       carvedilol (COREG) 25 MG tablet TAKE 1 TABLET BY MOUTH TWICE DAILY WITH MEALS 60 tablet 0    clotrimazole-betamethasone 1-0.05% (LOTRISONE) cream Apply topically 2 (two) times daily. 45 g 3    dalfampridine (AMPYRA) 10 mg Tb12 Take 10 mg by mouth every 12 (twelve) hours. 180 tablet 0    escitalopram oxalate (LEXAPRO) 20 MG tablet Take 1 tablet (20 mg total) by mouth once daily. 30 tablet 5    levothyroxine (SYNTHROID) 100 MCG tablet TAKE 1 TABLET(100 MCG) BY MOUTH EVERY DAY 90 tablet 0    lisinopril-hydrochlorothiazide (PRINZIDE,ZESTORETIC) 20-12.5 mg per tablet TAKE 2 TABLETS BY MOUTH EVERY DAY 60 tablet 0    mometasone (ELOCON) 0.1 % ointment STEFF EXT AA QD FOR 10 DAYS  3    multivitamin with minerals (ONE DAILY COMPLETE ORAL) Take by mouth.      oxybutynin (DITROPAN-XL) 10 MG 24 hr tablet Take 1 tablet (10 mg total) by mouth once daily. 30 tablet 11    TECFIDERA 240 mg CpDR Take 240 mg by mouth 2 (two) times daily. 180 capsule 1    vitamin D (VITAMIN D3) 1000 units Tab Take 1,000 Units by mouth once daily.       No current facility-administered medications for this visit.      Allergies: Betaseron [interferon beta-1b] and Cephalexin   Patient's last menstrual period was 08/10/2019.      Well Woman:  Pap:Pap:09/2018 normal HPV high risk positive  Mammo:ordered  Colonoscopy:n/a  Dexa:n/a      Family History  Family History   Problem Relation Age of Onset    Hypertension Mother     Thyroid disease Mother     Diabetes Father  "    Stroke Father     Vaginal cancer Neg Hx     Endometrial cancer Neg Hx     Cervical cancer Neg Hx     Breast cancer Neg Hx     Ovarian cancer Neg Hx             ROS:  Feeling well.   No dyspnea or chest pain on exertion.    No abdominal pain, change in bowel habits, black or bloody stools.    No urinary tract symptoms.   GYN ROS: normal menses, no abnormal bleeding, pelvic pain or discharge, no breast pain or new or enlarging lumps on self exam, she complains of vaginal odor and vaginal dryness. Still complains of vaginal itching  No neurological complaints.    OBJECTIVE:   The patient appears well, alert, oriented x 3, in no distress.  /70 (BP Location: Right arm, Patient Position: Sitting, BP Method: Large (Manual))   Ht 5' 5" (1.651 m)   Wt 108.8 kg (239 lb 13.8 oz)   LMP 08/10/2019   BMI 39.91 kg/m²   ENT normal.  Neck supple. No adenopathy or thyromegaly. MAXIMILIAN.   Lungs are clear, good air entry, no wheezes, rhonchi or rales.   S1 and S2 normal, no murmurs, regular rate and rhythm.   Abdomen soft without tenderness, guarding, mass or organomegaly.   Extremities show no edema, normal peripheral pulses.   Neurological is normal, no focal findings.    BREAST EXAM: breasts appear normal, no suspicious masses, no skin or nipple changes or axillary nodes    PELVIC EXAM:   VULVA: vulvar erythema , red, raised, macularpapular rash, consistent with yeast dermatitis  VAGINA: normal appearing vagina with normal color and discharge, no lesions, vaginal discharge - white and thin,  CERVIX: normal appearing cervix without discharge or lesions,   UTERUS: uterus is normal size, shape, consistency and nontender, difficult to assess due to body habitus,   ADNEXA: no masses, difficulty to assess due to body habitus,   RECTAL: deferred    ASSESSMENT:   1. Well woman exam     2. Mixed incontinence     3. Vulvar irritation     4. Vaginal itching  Vaginosis Screen by DNA Probe   5. Encounter for screening for " cervical cancer   HPV High Risk Genotypes, PCR    Liquid-based pap smear, screening   6. Encounter for screening mammogram for breast cancer  CANCELED: Mammo Digital Screening Bilat With CAD       PLAN:   1. Well woman  --pap today  --takes 2-3 weeks for results     2. Vulvar candidiasis/ vaginal itching  --lotrisone twice daily x 2 weeks     3. Vulvar dryness  --Vaginal atrophy (dryness):   Use REPLENS or REFRESH OTC: 1/2 applicator full in vagina twice a week.         4. Hx of MS:    We reviewed that bladder dysfunction can be seen in the majority of patients with MS. The symptoms range from not being able to emptying to urinary frequency and urinary incontinence.  This may be as a results of  block or delay transmission of nerve signals in areas of the central nervous system (CNS)  that control the bladder and urinary sphincters due to the MS brain lesions. A spastic (overactive) bladder that is unable to hold the normal amount of urine, or a bladder that does not empty properly (retains some urine in it). Many symptoms are associated with MS vary and sometimes need additional testing to better evaluate these urinary problems.    · Frequency and/or urgency of urination   · Hesitancy in starting urination   · Frequent nighttime urination (nocturia)   · Incontinence (the inability to hold in urine)   · Inability to empty the bladder completely     5  Mixed urinary incontinence, urge << stress:   --Bladder diary for 3-7 days,  not done   --Empty bladder every 3 hours.  Empty well: wait a minute, lean forward on toilet.    --Avoid dietary irritants (see sheet).  Keep diary x 3-5 days to determine your irritants.  --KEGELS: do 10 in AM and 10 in PM, holding each x 10 seconds.  When you feel urge to go, STOP, KEGEL, and when urge has passed, then go to bathroom.   Has stopped pelvic floor   --URGE: Continue Ditropan 10 mg daily.  SE profile reviewed.    Takes 2-4 weeks to see if will have effect.  For dry mouth: get  sour, sugar free lozenge or gum.    --STRESS:  Pessary vs. Sling.      5. Constipation:  --resolved at this time     6. Nocturia (nighttime urination): stop fluids 2 hours before bed.  If have leg swelling:  Elevate feet above chest x 1 hour before bed to get excess fluid off.  Can also use support hose (knee highs).       7. RTC 1 year for annual exam      30 minutes were spent in face to face time with this patient  90 % of this time was spent in counseling and/or coordination of care  Jamee JASMINE Marchand [facility]  Division of Female Pelvic Medicine and Reconstructive Surgery  Department of Obstetrics & Gynecology

## 2019-08-17 ENCOUNTER — PATIENT MESSAGE (OUTPATIENT)
Dept: UROGYNECOLOGY | Facility: CLINIC | Age: 39
End: 2019-08-17

## 2019-08-17 DIAGNOSIS — N76.0 BACTERIAL VAGINOSIS: Primary | ICD-10-CM

## 2019-08-17 DIAGNOSIS — B96.89 BACTERIAL VAGINOSIS: Primary | ICD-10-CM

## 2019-08-17 LAB
BACTERIAL VAGINOSIS DNA: POSITIVE
CANDIDA GLABRATA DNA: NEGATIVE
CANDIDA KRUSEI DNA: NEGATIVE
CANDIDA RRNA VAG QL PROBE: NEGATIVE
T VAGINALIS RRNA GENITAL QL PROBE: NEGATIVE

## 2019-08-17 RX ORDER — METRONIDAZOLE 500 MG/1
500 TABLET ORAL EVERY 12 HOURS
Qty: 14 TABLET | Refills: 3 | Status: SHIPPED | OUTPATIENT
Start: 2019-08-17 | End: 2019-08-24

## 2019-08-22 ENCOUNTER — OFFICE VISIT (OUTPATIENT)
Dept: INTERNAL MEDICINE | Facility: CLINIC | Age: 39
End: 2019-08-22
Payer: MEDICARE

## 2019-08-22 ENCOUNTER — LAB VISIT (OUTPATIENT)
Dept: LAB | Facility: HOSPITAL | Age: 39
End: 2019-08-22
Attending: INTERNAL MEDICINE
Payer: MEDICARE

## 2019-08-22 VITALS
HEIGHT: 65 IN | TEMPERATURE: 98 F | SYSTOLIC BLOOD PRESSURE: 152 MMHG | HEART RATE: 64 BPM | DIASTOLIC BLOOD PRESSURE: 88 MMHG | RESPIRATION RATE: 16 BRPM | WEIGHT: 238.13 LBS | BODY MASS INDEX: 39.67 KG/M2

## 2019-08-22 DIAGNOSIS — E06.3 HASHIMOTO'S THYROIDITIS: ICD-10-CM

## 2019-08-22 DIAGNOSIS — I10 ESSENTIAL HYPERTENSION: Primary | ICD-10-CM

## 2019-08-22 DIAGNOSIS — Z00.00 ANNUAL PHYSICAL EXAM: ICD-10-CM

## 2019-08-22 DIAGNOSIS — G35 MULTIPLE SCLEROSIS: ICD-10-CM

## 2019-08-22 DIAGNOSIS — F41.9 ANXIETY AND DEPRESSION: ICD-10-CM

## 2019-08-22 DIAGNOSIS — N39.46 MIXED INCONTINENCE: ICD-10-CM

## 2019-08-22 DIAGNOSIS — Z74.09 IMPAIRED FUNCTIONAL MOBILITY, BALANCE, GAIT, AND ENDURANCE: ICD-10-CM

## 2019-08-22 DIAGNOSIS — E66.01 SEVERE OBESITY (BMI 35.0-39.9) WITH COMORBIDITY: ICD-10-CM

## 2019-08-22 DIAGNOSIS — R79.89 ABNORMAL CBC: ICD-10-CM

## 2019-08-22 DIAGNOSIS — I10 ESSENTIAL HYPERTENSION: ICD-10-CM

## 2019-08-22 DIAGNOSIS — F32.A ANXIETY AND DEPRESSION: ICD-10-CM

## 2019-08-22 LAB
25(OH)D3+25(OH)D2 SERPL-MCNC: 22 NG/ML (ref 30–96)
ALBUMIN SERPL BCP-MCNC: 4.2 G/DL (ref 3.5–5.2)
ALP SERPL-CCNC: 57 U/L (ref 55–135)
ALT SERPL W/O P-5'-P-CCNC: 12 U/L (ref 10–44)
ANION GAP SERPL CALC-SCNC: 10 MMOL/L (ref 8–16)
AST SERPL-CCNC: 18 U/L (ref 10–40)
BASOPHILS # BLD AUTO: 0.04 K/UL (ref 0–0.2)
BASOPHILS NFR BLD: 0.4 % (ref 0–1.9)
BILIRUB SERPL-MCNC: 0.4 MG/DL (ref 0.1–1)
BUN SERPL-MCNC: 12 MG/DL (ref 6–20)
CALCIUM SERPL-MCNC: 10.2 MG/DL (ref 8.7–10.5)
CHLORIDE SERPL-SCNC: 101 MMOL/L (ref 95–110)
CHOLEST SERPL-MCNC: 202 MG/DL (ref 120–199)
CHOLEST/HDLC SERPL: 3.2 {RATIO} (ref 2–5)
CO2 SERPL-SCNC: 29 MMOL/L (ref 23–29)
CREAT SERPL-MCNC: 0.9 MG/DL (ref 0.5–1.4)
DIFFERENTIAL METHOD: ABNORMAL
EOSINOPHIL # BLD AUTO: 0.2 K/UL (ref 0–0.5)
EOSINOPHIL NFR BLD: 1.7 % (ref 0–8)
ERYTHROCYTE [DISTWIDTH] IN BLOOD BY AUTOMATED COUNT: 14.5 % (ref 11.5–14.5)
EST. GFR  (AFRICAN AMERICAN): >60 ML/MIN/1.73 M^2
EST. GFR  (NON AFRICAN AMERICAN): >60 ML/MIN/1.73 M^2
FERRITIN SERPL-MCNC: 9 NG/ML (ref 20–300)
GLUCOSE SERPL-MCNC: 84 MG/DL (ref 70–110)
HCT VFR BLD AUTO: 36.2 % (ref 37–48.5)
HDLC SERPL-MCNC: 63 MG/DL (ref 40–75)
HDLC SERPL: 31.2 % (ref 20–50)
HGB BLD-MCNC: 11.7 G/DL (ref 12–16)
HPV HR 12 DNA CVX QL NAA+PROBE: POSITIVE
HPV16 AG SPEC QL: NEGATIVE
HPV18 DNA SPEC QL NAA+PROBE: NEGATIVE
IMM GRANULOCYTES # BLD AUTO: 0.03 K/UL (ref 0–0.04)
IMM GRANULOCYTES NFR BLD AUTO: 0.3 % (ref 0–0.5)
IRON SERPL-MCNC: 36 UG/DL (ref 30–160)
LDLC SERPL CALC-MCNC: 124.6 MG/DL (ref 63–159)
LYMPHOCYTES # BLD AUTO: 1.1 K/UL (ref 1–4.8)
LYMPHOCYTES NFR BLD: 11.9 % (ref 18–48)
MCH RBC QN AUTO: 27.6 PG (ref 27–31)
MCHC RBC AUTO-ENTMCNC: 32.3 G/DL (ref 32–36)
MCV RBC AUTO: 85 FL (ref 82–98)
MONOCYTES # BLD AUTO: 0.7 K/UL (ref 0.3–1)
MONOCYTES NFR BLD: 7.2 % (ref 4–15)
NEUTROPHILS # BLD AUTO: 7.1 K/UL (ref 1.8–7.7)
NEUTROPHILS NFR BLD: 78.5 % (ref 38–73)
NONHDLC SERPL-MCNC: 139 MG/DL
NRBC BLD-RTO: 0 /100 WBC
PLATELET # BLD AUTO: 313 K/UL (ref 150–350)
PMV BLD AUTO: 12.4 FL (ref 9.2–12.9)
POTASSIUM SERPL-SCNC: 4.2 MMOL/L (ref 3.5–5.1)
PROT SERPL-MCNC: 8.7 G/DL (ref 6–8.4)
RBC # BLD AUTO: 4.24 M/UL (ref 4–5.4)
RETICS/RBC NFR AUTO: 1.3 % (ref 0.5–2.5)
SATURATED IRON: 6 % (ref 20–50)
SODIUM SERPL-SCNC: 140 MMOL/L (ref 136–145)
T4 FREE SERPL-MCNC: 1.01 NG/DL (ref 0.71–1.51)
TOTAL IRON BINDING CAPACITY: 567 UG/DL (ref 250–450)
TRANSFERRIN SERPL-MCNC: 383 MG/DL (ref 200–375)
TRIGL SERPL-MCNC: 72 MG/DL (ref 30–150)
TSH SERPL DL<=0.005 MIU/L-ACNC: 5.31 UIU/ML (ref 0.4–4)
WBC # BLD AUTO: 9.06 K/UL (ref 3.9–12.7)

## 2019-08-22 PROCEDURE — 99214 OFFICE O/P EST MOD 30 MIN: CPT | Mod: PBBFAC,PO | Performed by: INTERNAL MEDICINE

## 2019-08-22 PROCEDURE — 83540 ASSAY OF IRON: CPT

## 2019-08-22 PROCEDURE — 85025 COMPLETE CBC W/AUTO DIFF WBC: CPT

## 2019-08-22 PROCEDURE — 99214 PR OFFICE/OUTPT VISIT, EST, LEVL IV, 30-39 MIN: ICD-10-PCS | Mod: S$PBB,,, | Performed by: INTERNAL MEDICINE

## 2019-08-22 PROCEDURE — 82306 VITAMIN D 25 HYDROXY: CPT

## 2019-08-22 PROCEDURE — 36415 COLL VENOUS BLD VENIPUNCTURE: CPT | Mod: PO

## 2019-08-22 PROCEDURE — 85045 AUTOMATED RETICULOCYTE COUNT: CPT

## 2019-08-22 PROCEDURE — 80053 COMPREHEN METABOLIC PANEL: CPT

## 2019-08-22 PROCEDURE — 84443 ASSAY THYROID STIM HORMONE: CPT

## 2019-08-22 PROCEDURE — 99999 PR PBB SHADOW E&M-EST. PATIENT-LVL IV: ICD-10-PCS | Mod: PBBFAC,,, | Performed by: INTERNAL MEDICINE

## 2019-08-22 PROCEDURE — 80061 LIPID PANEL: CPT

## 2019-08-22 PROCEDURE — 84439 ASSAY OF FREE THYROXINE: CPT

## 2019-08-22 PROCEDURE — 99999 PR PBB SHADOW E&M-EST. PATIENT-LVL IV: CPT | Mod: PBBFAC,,, | Performed by: INTERNAL MEDICINE

## 2019-08-22 PROCEDURE — 82728 ASSAY OF FERRITIN: CPT

## 2019-08-22 PROCEDURE — 99214 OFFICE O/P EST MOD 30 MIN: CPT | Mod: S$PBB,,, | Performed by: INTERNAL MEDICINE

## 2019-08-22 RX ORDER — LOSARTAN POTASSIUM AND HYDROCHLOROTHIAZIDE 12.5; 1 MG/1; MG/1
1 TABLET ORAL DAILY
Qty: 90 TABLET | Refills: 1 | Status: SHIPPED | OUTPATIENT
Start: 2019-08-22 | End: 2020-05-22 | Stop reason: SDUPTHER

## 2019-08-22 NOTE — PROGRESS NOTES
Subjective:      Jaylon Bowles is a 39 y.o. female who presents for annual exam.    Family History:  family history includes Diabetes in her father; Hypertension in her mother; Stroke in her father; Thyroid disease in her mother.    Health Maintenance:  Health Maintenance       Date Due Completion Date    TETANUS VACCINE 08/10/1997 ---    Influenza Vaccine (1) 09/01/2019 ---    Mammogram 08/16/2021 8/16/2019    Pap Smear with HPV Cotest 09/14/2021 9/14/2018        Eye exam: 2/2019  Dental Exam: needs  OB/GYN: Dr. Berg    MMG: done  Last Pap: 8/16/2019    Body mass index is 39.62 kg/m².    Meds:   Current Outpatient Medications:     baclofen (LIORESAL) 10 MG tablet, Take 1 tablet (10 mg total) by mouth every evening., Disp: 30 tablet, Rfl: 6    betamethasone dipropionate (DIPROLENE) 0.05 % cream, , Disp: , Rfl:     carvedilol (COREG) 25 MG tablet, TAKE 1 TABLET BY MOUTH TWICE DAILY WITH MEALS, Disp: 60 tablet, Rfl: 0    clotrimazole-betamethasone 1-0.05% (LOTRISONE) cream, Apply topically 2 (two) times daily., Disp: 45 g, Rfl: 3    dalfampridine (AMPYRA) 10 mg Tb12, Take 10 mg by mouth every 12 (twelve) hours., Disp: 180 tablet, Rfl: 0    escitalopram oxalate (LEXAPRO) 20 MG tablet, Take 1 tablet (20 mg total) by mouth once daily., Disp: 30 tablet, Rfl: 5    levothyroxine (SYNTHROID) 100 MCG tablet, TAKE 1 TABLET(100 MCG) BY MOUTH EVERY DAY, Disp: 90 tablet, Rfl: 0    metroNIDAZOLE (FLAGYL) 500 MG tablet, Take 1 tablet (500 mg total) by mouth every 12 (twelve) hours. for 7 days, Disp: 14 tablet, Rfl: 3    mometasone (ELOCON) 0.1 % ointment, STEFF EXT AA QD FOR 10 DAYS, Disp: , Rfl: 3    multivitamin with minerals (ONE DAILY COMPLETE ORAL), Take by mouth., Disp: , Rfl:     oxybutynin (DITROPAN-XL) 10 MG 24 hr tablet, Take 1 tablet (10 mg total) by mouth once daily., Disp: 30 tablet, Rfl: 11    TECFIDERA 240 mg CpDR, Take 240 mg by mouth 2 (two) times daily., Disp: 180 capsule, Rfl: 1    vitamin D  (VITAMIN D3) 1000 units Tab, Take 1,000 Units by mouth once daily., Disp: , Rfl:     losartan-hydrochlorothiazide 100-12.5 mg (HYZAAR) 100-12.5 mg Tab, Take 1 tablet by mouth once daily., Disp: 90 tablet, Rfl: 1    PMHx:   Past Medical History:   Diagnosis Date    Depression     Hypertension     Migraine headache     Multiple sclerosis     Thyroid disease        PSHx:   History reviewed. No pertinent surgical history.    SocHx:   Social History     Socioeconomic History    Marital status: Single     Spouse name: Not on file    Number of children: Not on file    Years of education: Not on file    Highest education level: Not on file   Occupational History    Not on file   Social Needs    Financial resource strain: Hard    Food insecurity:     Worry: Often true     Inability: Often true    Transportation needs:     Medical: No     Non-medical: No   Tobacco Use    Smoking status: Never Smoker    Smokeless tobacco: Never Used   Substance and Sexual Activity    Alcohol use: No     Frequency: Never     Drinks per session: Patient refused     Binge frequency: Never     Comment: social    Drug use: No     Types: Marijuana    Sexual activity: Not Currently     Partners: Male     Birth control/protection: None   Lifestyle    Physical activity:     Days per week: 2 days     Minutes per session: 20 min    Stress: Very much   Relationships    Social connections:     Talks on phone: More than three times a week     Gets together: More than three times a week     Attends Baptism service: Not on file     Active member of club or organization: No     Attends meetings of clubs or organizations: Never     Relationship status: Never    Other Topics Concern    Are you pregnant or think you may be? Not Asked    Breast-feeding Not Asked   Social History Narrative    Not on file       Review of Systems   Constitutional: Negative for activity change, chills, diaphoresis, fever and unexpected weight change.    HENT: Negative for congestion, dental problem, ear pain, hearing loss, postnasal drip, rhinorrhea, sore throat and trouble swallowing.    Eyes: Negative for discharge, redness and visual disturbance.   Respiratory: Negative for cough, chest tightness, shortness of breath and wheezing.    Cardiovascular: Negative for chest pain and palpitations.   Gastrointestinal: Negative for abdominal pain, blood in stool, constipation, diarrhea, nausea and vomiting.   Endocrine: Negative for polydipsia and polyuria.   Genitourinary: Negative for decreased urine volume, difficulty urinating, dysuria, frequency, hematuria and menstrual problem.   Musculoskeletal: Positive for back pain and gait problem. Negative for arthralgias, joint swelling and neck pain.   Skin: Negative for color change and rash.   Neurological: Positive for weakness ( lower extremity). Negative for dizziness, numbness and headaches.   Hematological: Negative for adenopathy.   Psychiatric/Behavioral: Positive for dysphoric mood (Some of her issues are related to problems dealing with her mom). Negative for confusion. The patient is not nervous/anxious.            Answers for HPI/ROS submitted by the patient on 8/20/2019   activity change: No  unexpected weight change: No  neck pain: No  hearing loss: No  rhinorrhea: No  trouble swallowing: No  eye discharge: No  visual disturbance: No  chest tightness: No  wheezing: No  chest pain: No  palpitations: No  blood in stool: No  constipation: No  vomiting: No  diarrhea: No  polydipsia: No  polyuria: No  difficulty urinating: No  hematuria: No  menstrual problem: No  dysuria: No  joint swelling: No  arthralgias: No  headaches: No  weakness: No  confusion: No  dysphoric mood: No        Objective:      Physical Exam   Constitutional: She is oriented to person, place, and time. Vital signs are normal. She appears well-developed and well-nourished. No distress.   HENT:   Head: Normocephalic and atraumatic.   Right Ear:  Hearing, tympanic membrane, external ear and ear canal normal. Tympanic membrane is not erythematous and not bulging.   Left Ear: Hearing, tympanic membrane, external ear and ear canal normal. Tympanic membrane is not erythematous and not bulging.   Nose: Nose normal.   Mouth/Throat: Uvula is midline, oropharynx is clear and moist and mucous membranes are normal. No oropharyngeal exudate or posterior oropharyngeal erythema.   Eyes: Pupils are equal, round, and reactive to light. Conjunctivae and lids are normal. No scleral icterus. Right eye exhibits abnormal extraocular motion. Left eye exhibits normal extraocular motion.   Neck: Normal range of motion. Neck supple. No thyroid mass and no thyromegaly present.   Cardiovascular: Normal rate, regular rhythm, normal heart sounds and intact distal pulses.   No murmur heard.  Pulmonary/Chest: Effort normal and breath sounds normal. She has no wheezes.   Abdominal: Soft. Bowel sounds are normal. She exhibits no distension. There is no tenderness.   Musculoskeletal: Normal range of motion. She exhibits no edema.   Lymphadenopathy:     She has no cervical adenopathy.        Right: No supraclavicular adenopathy present.        Left: No supraclavicular adenopathy present.   Neurological: She is alert and oriented to person, place, and time. Coordination abnormal.   Skin: Skin is warm, dry and intact. No rash noted. She is not diaphoretic.   Psychiatric: She has a normal mood and affect.   Vitals reviewed.      Assessment:       1. Essential hypertension    2. Anxiety and depression    3. Hashimoto's thyroiditis    4. Multiple sclerosis    5. Impaired functional mobility, balance, gait, and endurance    6. Mixed incontinence    7. Abnormal CBC    8. Severe obesity (BMI 35.0-39.9) with comorbidity    9. Annual physical exam    10. BMI 39.0-39.9,adult        Plan:         1. Essential hypertension  - blood pressure is elevated, discussed the risks of ACE-I use, change lisinopril  to losartan  - losartan-hydrochlorothiazide 100-12.5 mg (HYZAAR) 100-12.5 mg Tab; Take 1 tablet by mouth once daily.  Dispense: 90 tablet; Refill: 1  - CBC auto differential; Future  - Comprehensive metabolic panel; Future  - monitor blood pressure closely and call with readings    2. Anxiety and depression  - stable, discussed changing to an alternative but patient declines for now, continue Lexapro    3. Hashimoto's thyroiditis  - stable, continue Synthroid  - TSH; Future  - Lipid panel; Future    4. Multiple sclerosis  - had a difficult time finding a new job  - trying to become more independent  - advised to check on line to see if there are additional MS-related resources to assist    5. Impaired functional mobility, balance, gait, and endurance  - requires assistance walking, uses Rollator    6. Mixed incontinence  - managed by Urogynecology    7. Abnormal CBC  - Iron and TIBC; Future  - Ferritin; Future  - Reticulocytes; Future    8.  Severe obesity with comorbidity  - plans to make a few dietary changes and reduce intake    9. Annual physical exam  - CBC auto differential; Future  - Iron and TIBC; Future  - Ferritin; Future  - Reticulocytes; Future  - Vitamin D; Future  - Comprehensive metabolic panel; Future  - TSH; Future  - Urinalysis; Future  - Lipid panel; Future    10. BMI 39.0-39.9,adult  - Vitamin D; Future    RTC in 2 months for follow-up or sooner if needed    Cat Ramachandran MD  Internal Medicine, [facility]

## 2019-08-23 ENCOUNTER — PATIENT MESSAGE (OUTPATIENT)
Dept: UROGYNECOLOGY | Facility: CLINIC | Age: 39
End: 2019-08-23

## 2019-08-23 ENCOUNTER — PATIENT MESSAGE (OUTPATIENT)
Dept: INTERNAL MEDICINE | Facility: CLINIC | Age: 39
End: 2019-08-23

## 2019-08-23 PROBLEM — E66.01 MORBID OBESITY WITH BMI OF 40.0-44.9, ADULT: Status: RESOLVED | Noted: 2018-07-04 | Resolved: 2019-08-23

## 2019-08-23 NOTE — TELEPHONE ENCOUNTER
DOMINIQUEI,  I have a question about HUMAN PAPILLOMAVIRUS (HPV) HIGH RISK GENOTYPES BY PCR resulted on 8/22/19 at 1:00 PM.    Guess it didn't go away. :-(    So now what?  How often should I check this?  How severe is this?         Close

## 2019-08-26 ENCOUNTER — PATIENT MESSAGE (OUTPATIENT)
Dept: NEUROLOGY | Facility: CLINIC | Age: 39
End: 2019-08-26

## 2019-08-26 DIAGNOSIS — G35 MULTIPLE SCLEROSIS: Primary | ICD-10-CM

## 2019-08-27 ENCOUNTER — PATIENT MESSAGE (OUTPATIENT)
Dept: UROGYNECOLOGY | Facility: CLINIC | Age: 39
End: 2019-08-27

## 2019-08-27 DIAGNOSIS — B97.7 HPV IN FEMALE: Primary | ICD-10-CM

## 2019-08-28 ENCOUNTER — TELEPHONE (OUTPATIENT)
Dept: UROGYNECOLOGY | Facility: CLINIC | Age: 39
End: 2019-08-28

## 2019-08-28 NOTE — TELEPHONE ENCOUNTER
Spoke wit pt and scheduled her colpo procedure 9/25/19 at 11:30 am per pt's request. Apt letter mailed out

## 2019-08-28 NOTE — TELEPHONE ENCOUNTER
----- Message from Rosenda Grigsby sent at 8/28/2019 12:19 PM CDT -----  Contact: KENDELL NI [[id number]]  Name of Who is Calling: KENDELL NI [[id number]]    What is the request in detail: Would like to speak with staff in regards scheduling colposcopy. Please contact to further discuss and advise      Can the clinic reply by [facility]: no    [facility]: [phone]

## 2019-08-29 ENCOUNTER — PATIENT MESSAGE (OUTPATIENT)
Dept: UROGYNECOLOGY | Facility: CLINIC | Age: 39
End: 2019-08-29

## 2019-08-30 ENCOUNTER — PATIENT MESSAGE (OUTPATIENT)
Dept: INTERNAL MEDICINE | Facility: CLINIC | Age: 39
End: 2019-08-30

## 2019-09-07 DIAGNOSIS — R26.9 GAIT DISTURBANCE: ICD-10-CM

## 2019-09-07 DIAGNOSIS — G35 MULTIPLE SCLEROSIS: ICD-10-CM

## 2019-09-09 RX ORDER — DALFAMPRIDINE 10 MG/1
TABLET, FILM COATED, EXTENDED RELEASE ORAL
Qty: 180 TABLET | Refills: 0 | Status: SHIPPED | OUTPATIENT
Start: 2019-09-09 | End: 2019-12-05 | Stop reason: SDUPTHER

## 2019-09-19 ENCOUNTER — TELEPHONE (OUTPATIENT)
Dept: PHARMACY | Facility: CLINIC | Age: 39
End: 2019-09-19

## 2019-09-19 ENCOUNTER — PATIENT MESSAGE (OUTPATIENT)
Dept: NEUROLOGY | Facility: CLINIC | Age: 39
End: 2019-09-19

## 2019-09-19 ENCOUNTER — OFFICE VISIT (OUTPATIENT)
Dept: NEUROLOGY | Facility: CLINIC | Age: 39
End: 2019-09-19
Payer: MEDICARE

## 2019-09-19 ENCOUNTER — LAB VISIT (OUTPATIENT)
Dept: LAB | Facility: HOSPITAL | Age: 39
End: 2019-09-19
Attending: PSYCHIATRY & NEUROLOGY
Payer: MEDICARE

## 2019-09-19 VITALS
WEIGHT: 239.5 LBS | BODY MASS INDEX: 39.9 KG/M2 | SYSTOLIC BLOOD PRESSURE: 139 MMHG | HEIGHT: 65 IN | HEART RATE: 66 BPM | DIASTOLIC BLOOD PRESSURE: 85 MMHG

## 2019-09-19 DIAGNOSIS — E55.9 VITAMIN D DEFICIENCY: ICD-10-CM

## 2019-09-19 DIAGNOSIS — G35 MULTIPLE SCLEROSIS: Primary | ICD-10-CM

## 2019-09-19 DIAGNOSIS — G35 MULTIPLE SCLEROSIS: ICD-10-CM

## 2019-09-19 PROCEDURE — 86359 T CELLS TOTAL COUNT: CPT

## 2019-09-19 PROCEDURE — 99999 PR PBB SHADOW E&M-EST. PATIENT-LVL III: CPT | Mod: PBBFAC,,, | Performed by: PSYCHIATRY & NEUROLOGY

## 2019-09-19 PROCEDURE — 86360 T CELL ABSOLUTE COUNT/RATIO: CPT

## 2019-09-19 PROCEDURE — 99999 PR PBB SHADOW E&M-EST. PATIENT-LVL III: ICD-10-PCS | Mod: PBBFAC,,, | Performed by: PSYCHIATRY & NEUROLOGY

## 2019-09-19 PROCEDURE — 99215 PR OFFICE/OUTPT VISIT, EST, LEVL V, 40-54 MIN: ICD-10-PCS | Mod: S$PBB,,, | Performed by: PSYCHIATRY & NEUROLOGY

## 2019-09-19 PROCEDURE — 99215 OFFICE O/P EST HI 40 MIN: CPT | Mod: S$PBB,,, | Performed by: PSYCHIATRY & NEUROLOGY

## 2019-09-19 PROCEDURE — 99213 OFFICE O/P EST LOW 20 MIN: CPT | Mod: PBBFAC | Performed by: PSYCHIATRY & NEUROLOGY

## 2019-09-19 RX ORDER — ASPIRIN 325 MG
50000 TABLET, DELAYED RELEASE (ENTERIC COATED) ORAL WEEKLY
Qty: 12 CAPSULE | Refills: 0 | Status: SHIPPED | OUTPATIENT
Start: 2019-09-19 | End: 2019-10-31

## 2019-09-19 RX ORDER — DIMETHYL FUMARATE 240 MG/1
240 CAPSULE ORAL 2 TIMES DAILY
Qty: 180 CAPSULE | Refills: 1 | Status: SHIPPED | OUTPATIENT
Start: 2019-09-19 | End: 2020-03-19 | Stop reason: SDUPTHER

## 2019-09-19 NOTE — TELEPHONE ENCOUNTER
LVM for callback to inform patient that [facility] received prescription for Tecfidera and benefits investigation is required.  [facility] will be back in touch once insurance determination is received.

## 2019-09-19 NOTE — PROGRESS NOTES
Subjective:       Patient ID: Jaylon Bowles is a 39 y.o. female who presents today for a routine clinic visit for MS.      MS History, updated as needed  Symptom onset was in 2006, had episode of bilateral ON. Diagnosed in 2007 based on MRI, LP in [place]. Symptoms of dizziness, vertigo, numbness in legs, tingling in fingers, and gait imbalance with falls. Initially on Avonex (ineffective), but switched to Copaxone. Had one relapse in 2008 with symptoms of worse gait imbalance. Received steroids which helped. Then switched to Betaseron (allergic, broke out in hives).  Started on Rebif in 2012. No further relapses. Reports more imbalance, uses walker outside the home but cane indoors. Suffers from pain in left leg and stiffness. Pain in left leg, responds to prednisone in past.       Subjective/Today:  · DMT: switched to Tecfidera 7/2018.  · Side effects from DMT? Yes - some cramping but tolerable, now always taking with high protein meal.  · Taking vitamin D3 as recommended? No, stopped taking it arbitrarily Dose: --  · No new symptoms.   · Now with increased falls as she started using the cane outdoors rather than walkers. Discussed need to use cane.  · Still taking Celexa and feels like it is helping.  · Still taking Ampyra since - Improves balance and speed of walking.   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 Quit [employer] around 6/2018 as she felt she was being bullied at work  Education: college. Started online courses 7/2018, planning to do more next semester.  Living  "arrangements - the patient lives with mother.  Exercise: Doing leg lifts. Plans on doing [employer] yoga      MS ROS:  · Fatigue: Yes - take multiple breaks during the day  · Headaches: Migraines: previously with intermittent headaches in past, but worse 12/2017. Bifrontal pain than radiates back. Phonophobia, photophobia present. +Nausea, sometimes vomiting. +retroorbital pain behind right eye. Used to take ibuprofen (daily, 800mg daily or more), sleep, and rest in quiet area with meditation. After 6/2018, she stopped daily ibuprofen use. Doesn't like medication; tried magnesium but nonadherent initially. 2/2019 - still with frequent migrainous headaches despite cutting down OTC med use.  · Seizures: none  · Sleep Disturbance: Yes - restless, no sleep study. "Mind is always running." Snores, some apnea  · Bladder Dysfunction: Yes - polyuria, urge incontinence. Following with urology, started on oxybutynin  · Bowel Dysfunction: constipation with oxybutynin  · Spasticity: Yes - mostly affects left side, leg > arm. Baclofen as needed  · Visual Symptoms: Yes - wears glasses. Feels as if vision is worsen, also describes oscillopsia in right eye.  · Cognitive: Yes - poor memory, word finding difficulty, and slowed cognitive processing.   · Mood Disorder: Yes - depression. 9/13/18 - improved off Rebif and with antidepressant therapy. 2/2019 - mood stable with with continued tearfulness. Has plans to transition to zoloft  · Gait Disturbance: Yes - uses walker. Started Ampyra 6/2018 - feels more steady and less motor fatigue.  · Falls: Yes - lots of near falls, infrequent falls when she uses walker. Last fall was last week, and another one week prior to that. 2/2019 - falls less frequent since starting Ampyra  · Hand Dysfunction: Yes - numbness in hands, sometimes tremors  · Pain: Yes - left knee pain  · Sexual Dysfunction: Not Assessed  · Skin Breakdown: no breakdown but scarring on abdomen and backs of arms  · Tremors: Yes - " b/l hands with reaching for things  · Dysphagia:  Not daily, but worse prior to flare  · Heat sensitivity:  Yes - symptoms, balance and cognition, worsens  · Copay Assist?  Yes - needed        Objective:        1. 25 foot timed walk: 14.11 (6/2018)-->14.9 sec w/U-step (9/2018) -> 14.2s w/rollator (6/2019)  No flowsheet data found.  2. SDMT:  3. 9 Hole Peg Test:  No flowsheet data found.    Neurologic Exam      GEN: NAD, no tearfulness on today     MENTAL STATUS: language is fluent, normal verbal comprehension, attention is normal, patient is alert and oriented x 3.      CRANIAL NERVE EXAM: b/l ELMA with intermittent nystagmus. Vertical gaze intact. PERRL. No facial asymmetry. Hearing intact to gross voice. Uvula is midline, and palate moves symmetrically.      MOTOR EXAM: Normal bulk and tone throughout UE and LE bilaterally.  Strength is  5/5 except 4+HF b/l     COORDINATION: mod ataxia bilaterally with FNF     GAIT: wide based, requires walker.       Imaging:     MRI Brain w/wo: 5/2018 - Age advanced cerebral volume loss with numerous scattered foci of T2/flair signal abnormality throughout the supra-and infratentorial parenchyma majority centered within the periventricular white matter. No evidence for diffusion signal abnormality or enhancement 12/2018 - no change    MRI C-Spine w/wo: 5/2018 - Ill-defined short segment cord edema signal ventrally at the C4 vertebral body level. Well-circumscribed central area of cystic change within the cervical spinal cord at the C6 vertebral body level most compatible with syringohydromyelia.  No evidence for abnormal intrathecal enhancement.  12/2018 - no change    MRI T-Spine w/wo: 5/2018 - Within limits of the study no definite cord signal abnormality to suggest edema throughout the thoracic spine.      Labs:     Lab Results   Component Value Date    LBADKEMT39ZA 22 (L) 08/22/2019    HMWKRZLE97YG 20 (L) 06/05/2019    HBZWQIIS80LJ 39 06/11/2018     Lab Results   Component  Value Date    AX2GRVST 70.3 06/05/2019    ABSOLUTECD3 764 06/05/2019    NA8XTJSC 15.6 06/05/2019    ABSOLUTECD8 170 (L) 06/05/2019    TE9QWGIE 55.0 06/05/2019    ABSOLUTECD4 598 06/05/2019    LABCD48 3.53 06/05/2019     Lab Results   Component Value Date    WBC 9.06 08/22/2019    RBC 4.24 08/22/2019    HGB 11.7 (L) 08/22/2019    HCT 36.2 (L) 08/22/2019    MCV 85 08/22/2019    MCH 27.6 08/22/2019    MCHC 32.3 08/22/2019    RDW 14.5 08/22/2019     08/22/2019    MPV 12.4 08/22/2019    GRAN 7.1 08/22/2019    GRAN 78.5 (H) 08/22/2019    LYMPH 1.1 08/22/2019    LYMPH 11.9 (L) 08/22/2019    MONO 0.7 08/22/2019    MONO 7.2 08/22/2019    EOS 0.2 08/22/2019    BASO 0.04 08/22/2019    EOSINOPHIL 1.7 08/22/2019    BASOPHIL 0.4 08/22/2019       Chemistry        Component Value Date/Time     08/22/2019 1219    K 4.2 08/22/2019 1219     08/22/2019 1219    CO2 29 08/22/2019 1219    BUN 12 08/22/2019 1219    CREATININE 0.9 08/22/2019 1219    GLU 84 08/22/2019 1219        Component Value Date/Time    CALCIUM 10.2 08/22/2019 1219    ALKPHOS 57 08/22/2019 1219    AST 18 08/22/2019 1219    ALT 12 08/22/2019 1219    BILITOT 0.4 08/22/2019 1219    ESTGFRAFRICA >60.0 08/22/2019 1219    EGFRNONAA >60.0 08/22/2019 1219          8/2018 - normal TSH   7/2018 - neg/normal RPR, HSV 2 IgG, HIV, thyroid stimulating hormone, T3, T4, acute hepatitis panel. Pos/elevated TSH, HSV 2 IgG, TPO ab, anti-thyroglobulin ab.   6/2018 - vit D 39  2/2018 - vit B12 375      Diagnosis/Assessment/Plan:    1.  Multiple Sclerosis  · Assessment: Diagnosed by MRI and LP in 2007. Has failed Avonex and Copaxone, intolerant to Betaseron, and was on Rebif since 2012 prior to transitioning to Tecfidera 6/2018 due to emotional instability, needle fatigue. No new signs of relapse since transitioning.  · Imaging: MRI brain and c/t spine due 12/2019  · Labs: cbc, cmp, helper suppressor ratio  · The the patient was counseled about the importance of vitamin D  supplementation in multiple sclerosis, and the fact that recent data suggests that high circulating blood levels of vitamin D has an ameliorating effect on the disease course in multiple sclerosis in general. Advised that she resume 38958 units/weekly vit D3.      2.   MS Symptom Assessment / Management  · Fatigue: sleep medicine referral prev placed.  · Bladder Dysfunction: following with urology, continue  · Visual Symptoms: following with ophthalmology, last seen by Dr. Barth 2/2019  · Cognitive: neuropsych test ordered previously to eval cognitive changes, still pending  · Mood Disorder: on lexapro, she never switched to zoloft.  · Gait Disturbance: continue Ampyra as it helps with gait stability, balance, and motor fatigue.       RTC in 3 months with Viviana, f/u with me in 6 months    Over 50% of this 40 minute visit was spent in direct face to face counseling of the patient about MS, DMT considerations, and MS symptom management.         There are no diagnoses linked to this encounter.

## 2019-09-20 ENCOUNTER — PATIENT MESSAGE (OUTPATIENT)
Dept: NEUROLOGY | Facility: CLINIC | Age: 39
End: 2019-09-20

## 2019-09-20 DIAGNOSIS — G35 MULTIPLE SCLEROSIS: ICD-10-CM

## 2019-09-20 LAB
ABSOLUTE CD3: 765 CELLS/UL (ref 700–2100)
ABSOLUTE CD8: 173 CELLS/UL (ref 200–900)
CD3%: 67.6 % (ref 55–83)
CD3+CD4+ CELLS # BLD: 593 CELLS/UL (ref 300–1400)
CD3+CD4+ CELLS NFR BLD: 52.3 % (ref 28–57)
CD4/CD8 RATIO: 3.42 (ref 0.9–3.6)
CD8 % SUPPRESSOR T CELL: 15.3 % (ref 10–39)

## 2019-09-21 ENCOUNTER — PATIENT MESSAGE (OUTPATIENT)
Dept: NEUROLOGY | Facility: CLINIC | Age: 39
End: 2019-09-21

## 2019-09-23 ENCOUNTER — TELEPHONE (OUTPATIENT)
Dept: UROGYNECOLOGY | Facility: CLINIC | Age: 39
End: 2019-09-23

## 2019-09-23 NOTE — TELEPHONE ENCOUNTER
----- Message from Isiah Mcqueen sent at 2019 11:49 AM CDT -----  Contact: KENDELL NI [[id number]]  Name of Who is Calling: KENDELL NI [[id number]]      What is the request in detail: Would like to speak with staff in regards to rescheduling upcoming procedure. Due to going out of [place] for a . Please advise      Can the clinic reply by [facility]: yes      What [facility]: [phone]

## 2019-10-03 ENCOUNTER — PATIENT MESSAGE (OUTPATIENT)
Dept: PSYCHIATRY | Facility: CLINIC | Age: 39
End: 2019-10-03

## 2019-10-08 ENCOUNTER — DOCUMENTATION ONLY (OUTPATIENT)
Dept: NEUROLOGY | Facility: CLINIC | Age: 39
End: 2019-10-08

## 2019-10-08 ENCOUNTER — PATIENT MESSAGE (OUTPATIENT)
Dept: NEUROLOGY | Facility: CLINIC | Age: 39
End: 2019-10-08

## 2019-10-08 ENCOUNTER — TELEPHONE (OUTPATIENT)
Dept: UROGYNECOLOGY | Facility: CLINIC | Age: 39
End: 2019-10-08

## 2019-10-08 NOTE — TELEPHONE ENCOUNTER
----- Message from Isiah Mcqueen sent at 10/8/2019 12:31 PM CDT -----  Contact: KENDELL NI [[id number]]  Name of Who is Calling: KENDELL NI [[id number]]      What is the request in detail: Would like to speak with staff in regards to rescheduling her colpo. Please advise      Can the clinic reply by [facility]: yes      [facility]: [phone]

## 2019-10-08 NOTE — PROGRESS NOTES
Received [employer] form, placed in Francheska's folder to be emailed by Chelo.

## 2019-10-08 NOTE — TELEPHONE ENCOUNTER
Pt stated she will call to reschedule her colpo procedure once she confirms her transportation. Informed pt I'll relay this message to SANG Berg. Pt voiced understanding and call ended.

## 2019-10-12 ENCOUNTER — PATIENT MESSAGE (OUTPATIENT)
Dept: NEUROLOGY | Facility: CLINIC | Age: 39
End: 2019-10-12

## 2019-10-14 NOTE — TELEPHONE ENCOUNTER
Mailed pt her [employer] handicap hang tag application.  Advised that we are working on her [employer] application.

## 2019-10-18 ENCOUNTER — PATIENT MESSAGE (OUTPATIENT)
Dept: NEUROLOGY | Facility: CLINIC | Age: 39
End: 2019-10-18

## 2019-10-18 NOTE — TELEPHONE ENCOUNTER
Faxed [employer] certification form to Gaye at [facility] [employer] [phone].  Copy scanned into chart.

## 2019-10-28 ENCOUNTER — PATIENT MESSAGE (OUTPATIENT)
Dept: NEUROLOGY | Facility: CLINIC | Age: 39
End: 2019-10-28

## 2019-10-28 DIAGNOSIS — G35 MULTIPLE SCLEROSIS: Primary | ICD-10-CM

## 2019-10-31 ENCOUNTER — OFFICE VISIT (OUTPATIENT)
Dept: INTERNAL MEDICINE | Facility: CLINIC | Age: 39
End: 2019-10-31
Payer: MEDICARE

## 2019-10-31 ENCOUNTER — LAB VISIT (OUTPATIENT)
Dept: LAB | Facility: HOSPITAL | Age: 39
End: 2019-10-31
Attending: INTERNAL MEDICINE
Payer: MEDICARE

## 2019-10-31 ENCOUNTER — PATIENT MESSAGE (OUTPATIENT)
Dept: INTERNAL MEDICINE | Facility: CLINIC | Age: 39
End: 2019-10-31

## 2019-10-31 VITALS
DIASTOLIC BLOOD PRESSURE: 83 MMHG | WEIGHT: 238.13 LBS | TEMPERATURE: 98 F | RESPIRATION RATE: 18 BRPM | HEART RATE: 80 BPM | SYSTOLIC BLOOD PRESSURE: 134 MMHG | HEIGHT: 65 IN | BODY MASS INDEX: 39.67 KG/M2

## 2019-10-31 DIAGNOSIS — I10 ESSENTIAL HYPERTENSION: Primary | ICD-10-CM

## 2019-10-31 DIAGNOSIS — E06.3 HASHIMOTO'S THYROIDITIS: ICD-10-CM

## 2019-10-31 DIAGNOSIS — E06.3 HASHIMOTO'S THYROIDITIS: Primary | ICD-10-CM

## 2019-10-31 DIAGNOSIS — E61.1 IRON DEFICIENCY: ICD-10-CM

## 2019-10-31 DIAGNOSIS — K21.9 GASTROESOPHAGEAL REFLUX DISEASE, ESOPHAGITIS PRESENCE NOT SPECIFIED: ICD-10-CM

## 2019-10-31 DIAGNOSIS — F32.A ANXIETY AND DEPRESSION: ICD-10-CM

## 2019-10-31 DIAGNOSIS — F41.9 ANXIETY AND DEPRESSION: ICD-10-CM

## 2019-10-31 LAB
FERRITIN SERPL-MCNC: 17 NG/ML (ref 20–300)
IRON SERPL-MCNC: 57 UG/DL (ref 30–160)
RETICS/RBC NFR AUTO: 1.3 % (ref 0.5–2.5)
SATURATED IRON: 11 % (ref 20–50)
T4 FREE SERPL-MCNC: 1.04 NG/DL (ref 0.71–1.51)
TOTAL IRON BINDING CAPACITY: 539 UG/DL (ref 250–450)
TRANSFERRIN SERPL-MCNC: 364 MG/DL (ref 200–375)
TSH SERPL DL<=0.005 MIU/L-ACNC: 4.46 UIU/ML (ref 0.4–4)

## 2019-10-31 PROCEDURE — 99999 PR PBB SHADOW E&M-EST. PATIENT-LVL IV: ICD-10-PCS | Mod: PBBFAC,,, | Performed by: INTERNAL MEDICINE

## 2019-10-31 PROCEDURE — 85045 AUTOMATED RETICULOCYTE COUNT: CPT

## 2019-10-31 PROCEDURE — 36415 COLL VENOUS BLD VENIPUNCTURE: CPT | Mod: PO

## 2019-10-31 PROCEDURE — 99214 PR OFFICE/OUTPT VISIT, EST, LEVL IV, 30-39 MIN: ICD-10-PCS | Mod: S$PBB,,, | Performed by: INTERNAL MEDICINE

## 2019-10-31 PROCEDURE — 99214 OFFICE O/P EST MOD 30 MIN: CPT | Mod: S$PBB,,, | Performed by: INTERNAL MEDICINE

## 2019-10-31 PROCEDURE — 84443 ASSAY THYROID STIM HORMONE: CPT

## 2019-10-31 PROCEDURE — 83540 ASSAY OF IRON: CPT

## 2019-10-31 PROCEDURE — 84439 ASSAY OF FREE THYROXINE: CPT

## 2019-10-31 PROCEDURE — 82728 ASSAY OF FERRITIN: CPT

## 2019-10-31 PROCEDURE — 99214 OFFICE O/P EST MOD 30 MIN: CPT | Mod: PBBFAC,PO | Performed by: INTERNAL MEDICINE

## 2019-10-31 PROCEDURE — 99999 PR PBB SHADOW E&M-EST. PATIENT-LVL IV: CPT | Mod: PBBFAC,,, | Performed by: INTERNAL MEDICINE

## 2019-10-31 RX ORDER — ERGOCALCIFEROL 1.25 MG/1
50000 CAPSULE ORAL
Qty: 12 CAPSULE | Refills: 1 | Status: SHIPPED | OUTPATIENT
Start: 2019-10-31 | End: 2020-01-13 | Stop reason: ALTCHOICE

## 2019-10-31 RX ORDER — CARVEDILOL 25 MG/1
25 TABLET ORAL 2 TIMES DAILY WITH MEALS
Qty: 180 TABLET | Refills: 1 | Status: SHIPPED | OUTPATIENT
Start: 2019-10-31 | End: 2020-05-20

## 2019-10-31 RX ORDER — PANTOPRAZOLE SODIUM 40 MG/1
40 TABLET, DELAYED RELEASE ORAL DAILY
Qty: 30 TABLET | Refills: 0 | Status: SHIPPED | OUTPATIENT
Start: 2019-10-31 | End: 2020-02-03 | Stop reason: SDUPTHER

## 2019-10-31 RX ORDER — LEVOTHYROXINE SODIUM 112 UG/1
112 TABLET ORAL
Qty: 30 TABLET | Refills: 1 | Status: SHIPPED | OUTPATIENT
Start: 2019-10-31 | End: 2020-05-22 | Stop reason: SDUPTHER

## 2019-10-31 NOTE — PROGRESS NOTES
Subjective:       Patient ID: Jaylon Bowles is a 39 y.o. female who presents for Hypertension (follow-up)      Hypertension   This is a recurrent problem. The current episode started more than 1 year ago. The problem is unchanged. The problem is controlled. Associated symptoms include malaise/fatigue. Pertinent negatives include no anxiety, blurred vision, chest pain, headaches, neck pain, orthopnea, palpitations, peripheral edema, PND, shortness of breath or sweats. Agents associated with hypertension include NSAIDs and thyroid hormones. Risk factors for coronary artery disease include obesity, sedentary lifestyle and stress. Past treatments include nothing. The current treatment provides significant improvement. Compliance problems include diet, exercise and psychosocial issues.  There is no history of heart failure. Identifiable causes of hypertension include a thyroid problem. There is no history of a hypertension causing med.   Thyroid Problem   Presents for follow-up visit. Symptoms include depressed mood, dry skin and fatigue. Patient reports no anxiety, cold intolerance, constipation, diaphoresis, diarrhea, heat intolerance, hoarse voice, leg swelling, menstrual problem (menstrual cycle is shorter), palpitations, weight gain or weight loss. The symptoms have been worsening. There is no history of heart failure.   Gastroesophageal Reflux   She complains of belching and heartburn. She reports no abdominal pain, no chest pain, no coughing, no hoarse voice, no nausea, no sore throat or no wheezing. This is a new problem. The current episode started more than 1 month ago. The problem occurs frequently. The problem has been unchanged. The heartburn is located in the substernum. The heartburn is of moderate intensity. The symptoms are aggravated by certain foods. Associated symptoms include anemia and fatigue. Pertinent negatives include no weight loss. Risk factors include lack of exercise. She has tried  nothing for the symptoms. Past invasive treatments do not include reflux surgery.        Answers for HPI/ROS submitted by the patient on 10/30/2019   Hypertension  Chronicity: recurrent  Onset: more than 1 year ago  Progression since onset: unchanged  Condition status: controlled  anxiety: No  blurred vision: No  chest pain: No  headaches: No  malaise/fatigue: Yes  neck pain: No  orthopnea: No  palpitations: No  peripheral edema: No  PND: No  shortness of breath: No  sweats: No  Agents associated with hypertension: NSAIDs, thyroid hormones  CAD risks: obesity, sedentary lifestyle, stress  Compliance problems: diet, exercise, psychosocial issues  Past treatments: nothing  Improvement on treatment: significant        Review of Systems   Constitutional: Positive for fatigue and malaise/fatigue. Negative for chills, diaphoresis, fever, weight gain and weight loss.   HENT: Negative for congestion, hoarse voice, sinus pressure and sore throat.    Eyes: Negative for blurred vision, discharge and redness.   Respiratory: Negative for cough, chest tightness, shortness of breath and wheezing.    Cardiovascular: Negative for chest pain, palpitations, orthopnea and PND.   Gastrointestinal: Positive for heartburn. Negative for abdominal pain, constipation, diarrhea, nausea and vomiting.        Eats a lot of spicy food recently, notices burping and heartburn. Patient suspected it was due to Hyzaar so she stopped taking it.   Endocrine: Negative for cold intolerance and heat intolerance.   Genitourinary: Negative for dysuria, frequency and menstrual problem (menstrual cycle is shorter).   Musculoskeletal: Positive for arthralgias, gait problem and myalgias. Negative for neck pain.   Skin: Negative for rash and wound.   Neurological: Negative for dizziness, speech difficulty and headaches.   Psychiatric/Behavioral: Positive for sleep disturbance (does not sleep much due to lots of studying). Negative for dysphoric mood. The patient  is not nervous/anxious.          Objective:      Physical Exam   Constitutional: She is oriented to person, place, and time. Vital signs are normal. She appears well-developed and well-nourished. No distress.   HENT:   Head: Normocephalic and atraumatic.   Right Ear: Hearing and external ear normal.   Left Ear: Hearing and external ear normal.   Nose: Nose normal.   Mouth/Throat: Uvula is midline.   Eyes: Lids are normal.   Cardiovascular: Normal rate, regular rhythm, normal heart sounds and intact distal pulses.   No murmur heard.  Pulmonary/Chest: Effort normal and breath sounds normal. She has no wheezes.   Abdominal: Soft. Bowel sounds are normal. She exhibits no distension. There is no tenderness.   Musculoskeletal: Normal range of motion. She exhibits no edema.   Neurological: She is alert and oriented to person, place, and time.   Skin: Skin is warm, dry and intact. No rash noted. She is not diaphoretic.   Psychiatric: She has a normal mood and affect.   Vitals reviewed.      Assessment/Plan:         1. Essential hypertension  - BP mildly elevated, resume Hyzaar, continue coreg  - carvedilol (COREG) 25 MG tablet; Take 1 tablet (25 mg total) by mouth 2 (two) times daily with meals.  Dispense: 180 tablet; Refill: 1  - monitor BP regular and call if readings are high    2. Anxiety and depression  - stable, well-controlled, continue Lexapro    3. Hashimoto's thyroiditis  - TSH; Future  - may need to adjust levothyroxine dose    4. Iron deficiency  - Ferritin; Future  - Iron and TIBC; Future  - Reticulocytes; Future    5. Gastroesophageal reflux disease, esophagitis presence not specified  - pantoprazole (PROTONIX) 40 MG tablet; Take 1 tablet (40 mg total) by mouth once daily.  Dispense: 30 tablet; Refill: 0  - make a few dietary changes, reduce intake of spicy foods and tomato-based foods    RTC in 3 months or sooner if needed    Cat Ramachandran MD

## 2019-11-01 ENCOUNTER — PATIENT MESSAGE (OUTPATIENT)
Dept: INTERNAL MEDICINE | Facility: CLINIC | Age: 39
End: 2019-11-01

## 2019-11-06 ENCOUNTER — PATIENT MESSAGE (OUTPATIENT)
Dept: NEUROLOGY | Facility: CLINIC | Age: 39
End: 2019-11-06

## 2019-11-07 NOTE — TELEPHONE ENCOUNTER
Forwarding Schedule A letter request to MSW Intern, Coleen Lema as it must be printed on official letterhead.

## 2019-11-13 ENCOUNTER — PATIENT MESSAGE (OUTPATIENT)
Dept: INTERNAL MEDICINE | Facility: CLINIC | Age: 39
End: 2019-11-13

## 2019-12-02 ENCOUNTER — LAB VISIT (OUTPATIENT)
Dept: LAB | Facility: HOSPITAL | Age: 39
End: 2019-12-02
Attending: INTERNAL MEDICINE
Payer: MEDICARE

## 2019-12-02 DIAGNOSIS — E06.3 HASHIMOTO'S THYROIDITIS: ICD-10-CM

## 2019-12-02 LAB — TSH SERPL DL<=0.005 MIU/L-ACNC: 0.78 UIU/ML (ref 0.4–4)

## 2019-12-02 PROCEDURE — 36415 COLL VENOUS BLD VENIPUNCTURE: CPT | Mod: PO

## 2019-12-02 PROCEDURE — 84443 ASSAY THYROID STIM HORMONE: CPT

## 2019-12-03 ENCOUNTER — PATIENT MESSAGE (OUTPATIENT)
Dept: INTERNAL MEDICINE | Facility: CLINIC | Age: 39
End: 2019-12-03

## 2019-12-03 NOTE — TELEPHONE ENCOUNTER
Spoke to pt.  She had read the message wrong.  Thought her numbers were not within range.    She fine, taking 112mcg once daily.    No symptoms

## 2019-12-03 NOTE — TELEPHONE ENCOUNTER
Should continue the current dose levothyroxine 112 micrograms once daily.     TSH has improved with the last dose increase and is normal.     Why does she think the dose needs to be higher? What symptoms does she notice?

## 2019-12-05 DIAGNOSIS — R26.9 GAIT DISTURBANCE: ICD-10-CM

## 2019-12-05 DIAGNOSIS — G35 MULTIPLE SCLEROSIS: ICD-10-CM

## 2019-12-05 RX ORDER — DALFAMPRIDINE 10 MG/1
TABLET, FILM COATED, EXTENDED RELEASE ORAL
Qty: 60 TABLET | Refills: 1 | Status: SHIPPED | OUTPATIENT
Start: 2019-12-05 | End: 2020-01-27

## 2019-12-05 NOTE — TELEPHONE ENCOUNTER
Creatinine clearance range is 74.8-101/5ml/min; ok to refill Ampyra. She will need new labs in February 2020.

## 2019-12-08 ENCOUNTER — PATIENT MESSAGE (OUTPATIENT)
Dept: INTERNAL MEDICINE | Facility: CLINIC | Age: 39
End: 2019-12-08

## 2019-12-09 ENCOUNTER — PATIENT MESSAGE (OUTPATIENT)
Dept: INTERNAL MEDICINE | Facility: CLINIC | Age: 39
End: 2019-12-09

## 2019-12-17 ENCOUNTER — PATIENT OUTREACH (OUTPATIENT)
Dept: ADMINISTRATIVE | Facility: OTHER | Age: 39
End: 2019-12-17

## 2020-01-12 ENCOUNTER — PATIENT OUTREACH (OUTPATIENT)
Dept: ADMINISTRATIVE | Facility: OTHER | Age: 40
End: 2020-01-12

## 2020-01-13 ENCOUNTER — OFFICE VISIT (OUTPATIENT)
Dept: NEUROLOGY | Facility: CLINIC | Age: 40
End: 2020-01-13
Payer: MEDICARE

## 2020-01-13 ENCOUNTER — LAB VISIT (OUTPATIENT)
Dept: LAB | Facility: HOSPITAL | Age: 40
End: 2020-01-13
Payer: MEDICARE

## 2020-01-13 VITALS
WEIGHT: 241.75 LBS | HEART RATE: 73 BPM | BODY MASS INDEX: 40.28 KG/M2 | SYSTOLIC BLOOD PRESSURE: 145 MMHG | DIASTOLIC BLOOD PRESSURE: 86 MMHG | HEIGHT: 65 IN

## 2020-01-13 DIAGNOSIS — G35 MULTIPLE SCLEROSIS: Primary | ICD-10-CM

## 2020-01-13 DIAGNOSIS — Z79.899 OTHER LONG TERM (CURRENT) DRUG THERAPY: ICD-10-CM

## 2020-01-13 DIAGNOSIS — M62.838 MUSCLE SPASM: ICD-10-CM

## 2020-01-13 DIAGNOSIS — R26.9 GAIT DISTURBANCE: ICD-10-CM

## 2020-01-13 DIAGNOSIS — N31.9 NEUROGENIC DYSFUNCTION OF THE URINARY BLADDER: ICD-10-CM

## 2020-01-13 DIAGNOSIS — Z11.4 ENCOUNTER FOR SCREENING FOR HIV: ICD-10-CM

## 2020-01-13 DIAGNOSIS — Z71.89 COUNSELING REGARDING GOALS OF CARE: ICD-10-CM

## 2020-01-13 DIAGNOSIS — R53.83 FATIGUE, UNSPECIFIED TYPE: ICD-10-CM

## 2020-01-13 DIAGNOSIS — F32.0 CURRENT MILD EPISODE OF MAJOR DEPRESSIVE DISORDER, UNSPECIFIED WHETHER RECURRENT: ICD-10-CM

## 2020-01-13 DIAGNOSIS — Z29.89 PROPHYLACTIC IMMUNOTHERAPY: ICD-10-CM

## 2020-01-13 DIAGNOSIS — Z79.899 HIGH RISK MEDICATION USE: ICD-10-CM

## 2020-01-13 DIAGNOSIS — G35 MULTIPLE SCLEROSIS: ICD-10-CM

## 2020-01-13 LAB
25(OH)D3+25(OH)D2 SERPL-MCNC: 15 NG/ML (ref 30–96)
ALBUMIN SERPL BCP-MCNC: 4.2 G/DL (ref 3.5–5.2)
ALP SERPL-CCNC: 61 U/L (ref 55–135)
ALT SERPL W/O P-5'-P-CCNC: 10 U/L (ref 10–44)
ANION GAP SERPL CALC-SCNC: 10 MMOL/L (ref 8–16)
AST SERPL-CCNC: 18 U/L (ref 10–40)
BASOPHILS # BLD AUTO: 0.06 K/UL (ref 0–0.2)
BASOPHILS NFR BLD: 0.6 % (ref 0–1.9)
BILIRUB DIRECT SERPL-MCNC: 0.3 MG/DL (ref 0.1–0.3)
BILIRUB SERPL-MCNC: 0.6 MG/DL (ref 0.1–1)
BUN SERPL-MCNC: 13 MG/DL (ref 6–20)
CALCIUM SERPL-MCNC: 9.5 MG/DL (ref 8.7–10.5)
CHLORIDE SERPL-SCNC: 101 MMOL/L (ref 95–110)
CO2 SERPL-SCNC: 28 MMOL/L (ref 23–29)
CREAT SERPL-MCNC: 1 MG/DL (ref 0.5–1.4)
DIFFERENTIAL METHOD: ABNORMAL
EOSINOPHIL # BLD AUTO: 0.1 K/UL (ref 0–0.5)
EOSINOPHIL NFR BLD: 1.1 % (ref 0–8)
ERYTHROCYTE [DISTWIDTH] IN BLOOD BY AUTOMATED COUNT: 15.4 % (ref 11.5–14.5)
EST. GFR  (AFRICAN AMERICAN): >60 ML/MIN/1.73 M^2
EST. GFR  (NON AFRICAN AMERICAN): >60 ML/MIN/1.73 M^2
GLUCOSE SERPL-MCNC: 81 MG/DL (ref 70–110)
HCT VFR BLD AUTO: 35.6 % (ref 37–48.5)
HGB BLD-MCNC: 11.4 G/DL (ref 12–16)
HIV 1+2 AB+HIV1 P24 AG SERPL QL IA: NEGATIVE
IMM GRANULOCYTES # BLD AUTO: 0.04 K/UL (ref 0–0.04)
IMM GRANULOCYTES NFR BLD AUTO: 0.4 % (ref 0–0.5)
LYMPHOCYTES # BLD AUTO: 1 K/UL (ref 1–4.8)
LYMPHOCYTES NFR BLD: 10.6 % (ref 18–48)
MCH RBC QN AUTO: 26.6 PG (ref 27–31)
MCHC RBC AUTO-ENTMCNC: 32 G/DL (ref 32–36)
MCV RBC AUTO: 83 FL (ref 82–98)
MONOCYTES # BLD AUTO: 0.5 K/UL (ref 0.3–1)
MONOCYTES NFR BLD: 5.2 % (ref 4–15)
NEUTROPHILS # BLD AUTO: 7.8 K/UL (ref 1.8–7.7)
NEUTROPHILS NFR BLD: 82.1 % (ref 38–73)
NRBC BLD-RTO: 0 /100 WBC
PLATELET # BLD AUTO: 369 K/UL (ref 150–350)
PMV BLD AUTO: 12 FL (ref 9.2–12.9)
POTASSIUM SERPL-SCNC: 3.6 MMOL/L (ref 3.5–5.1)
PROT SERPL-MCNC: 8.7 G/DL (ref 6–8.4)
RBC # BLD AUTO: 4.29 M/UL (ref 4–5.4)
SODIUM SERPL-SCNC: 139 MMOL/L (ref 136–145)
WBC # BLD AUTO: 9.46 K/UL (ref 3.9–12.7)

## 2020-01-13 PROCEDURE — 86359 T CELLS TOTAL COUNT: CPT

## 2020-01-13 PROCEDURE — 85025 COMPLETE CBC W/AUTO DIFF WBC: CPT

## 2020-01-13 PROCEDURE — 99999 PR PBB SHADOW E&M-EST. PATIENT-LVL III: CPT | Mod: PBBFAC,,, | Performed by: CLINICAL NURSE SPECIALIST

## 2020-01-13 PROCEDURE — 80048 BASIC METABOLIC PNL TOTAL CA: CPT

## 2020-01-13 PROCEDURE — 99213 OFFICE O/P EST LOW 20 MIN: CPT | Mod: PBBFAC | Performed by: CLINICAL NURSE SPECIALIST

## 2020-01-13 PROCEDURE — 99215 OFFICE O/P EST HI 40 MIN: CPT | Mod: S$PBB,,, | Performed by: CLINICAL NURSE SPECIALIST

## 2020-01-13 PROCEDURE — 99215 PR OFFICE/OUTPT VISIT, EST, LEVL V, 40-54 MIN: ICD-10-PCS | Mod: S$PBB,,, | Performed by: CLINICAL NURSE SPECIALIST

## 2020-01-13 PROCEDURE — 86703 HIV-1/HIV-2 1 RESULT ANTBDY: CPT

## 2020-01-13 PROCEDURE — 80076 HEPATIC FUNCTION PANEL: CPT

## 2020-01-13 PROCEDURE — 86360 T CELL ABSOLUTE COUNT/RATIO: CPT

## 2020-01-13 PROCEDURE — 82306 VITAMIN D 25 HYDROXY: CPT

## 2020-01-13 PROCEDURE — 99999 PR PBB SHADOW E&M-EST. PATIENT-LVL III: ICD-10-PCS | Mod: PBBFAC,,, | Performed by: CLINICAL NURSE SPECIALIST

## 2020-01-13 PROCEDURE — 36415 COLL VENOUS BLD VENIPUNCTURE: CPT

## 2020-01-13 RX ORDER — CHOLECALCIFEROL (VITAMIN D3) 1250 MCG
1 TABLET ORAL WEEKLY
Qty: 12 TABLET | Refills: 0 | Status: SHIPPED | OUTPATIENT
Start: 2020-01-13 | End: 2020-05-06 | Stop reason: SDUPTHER

## 2020-01-13 NOTE — PROGRESS NOTES
Subjective:       Patient ID: Jaylon Bowles is a 39 y.o. female who presents today for a routine clinic visit for MS.  She was last seen by Dr. Mitchell in September. The history has been provided by the patient.     MS HPI:  · DMT:  Tecfidera since 7/2018  · Side effects from DMT? Yes - minor side effects; GI side effects manageable if she takes the pill with food   · Taking vitamin D3 as recommended? Just taking multivitamin   · She is feeling mostly stable; she has good days and bad days   · Falls--has frequent falls, but none in the past two weeks; most falls occur when she is using her cane and not the walker. She has been trying to use her walker more.   · She goes to [facility]  · Mood--takes her Lexapro; does not feel actively depressed   · She did not see the sleep medicine doctor.   · Cognitive testing appt was canceled because of transportation challenge.     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 Yes--impacts her day to day function   Do you feel you generally sleep well? She stays up late studying, sleeps for an hour, then wakes up for the day; naps during the day; does not feel like she is getting enough sleep. She believes that she does snore.    Do you have difficulty controlling your bladder?  No--controlled on oxybutynin; follows with Dr. Stokes; thinks she had a UTI a few months ago    Do you have difficulty controlling your bowels?  No   Do you have frequent muscle  cramps, tightness or spasms in your limbs?  Yes--She has MS hug and spasms in her feet. She takes Baclofen, which helps to put her to sleep. Exercise helps.    Do you have new visual symptoms?  No   Do you have worsening difficulty with your memory or thinking? No--stable    Do you have worsening symptoms of anxiety or depression?  No--stable on Lexapro    For patients who walk, Do you have more difficulty walking?  No--Stable since last visit; takes Ampyra, and she thinks this is helpful    Have you fallen since your last visit?  Yes--as above    For patients who use wheelchairs: Do you have any skin wounds or breakdown? Not Applicable   Do you have difficulty using your hands?  No   Do you have shooting or burning pain? No   Do you have difficulty with sexual function?  Yes   If you are sexually active, are you using birth control? Y/N  N/A Yes   Do you often choke when swallowing liquids or solid food?  No   Do you experience worsening symptoms when overheated? Yes--overall worsening when overheated; she is also sensitive to extreme cold    Do you need any new equipment such as a wheelchair, walker or shower chair? No--has a rollator and a cane; has tub chair    Do you receive co-pay financial assistance for your principal MS medicine? Yes--pays $0 for Tecfidera   Would you be interested in participating in an MS research trial in the future? No   For patients on Gilenya, Tecfidera, Aubagio, Rituxan, Ocrevus, Tysabri, Lemtrada or Methotrexate, are you aware that you should NOT receive live virus vaccines?  No--discussed and verbalized understanding   Do you feel you have adequate family/friend support?  Yes   Do you have health insurance?   Yes   Are you currently employed? No   Do you receive SSDI/SSI?  Yes   Do you use marijuana or cannabis products? No   How often? -   Have you been diagnosed with a urinary tract infection since your last visit here? No   Have you been diagnosed with a respiratory tract  infection since your last visit here? No   Have you been to the emergency room since your last visit here? No   Have you been hospitalized since your last visit here?  No           Objective:        1. 25 foot timed walk: 14.97 seconds today with rollator (ataxic); was 14.2 seconds with rollator in June 2019    Neurologic Exam      MENTAL STATUS: language is fluent, normal verbal comprehension, attention is normal, patient is alert and oriented x 3.      CRANIAL NERVE EXAM: b/l ELMA with intermittent nystagmus (right eye worse than left); Vertical gaze intact. PERRL. No facial asymmetry. Hearing intact to gross voice. Uvula is midline, and palate moves symmetrically.    MOTOR EXAM: Normal bulk and tone throughout UE and LE bilaterally.  Strength is 5/5 except 4/5 left biceps, KE, KF, DF; and 4+/5 left hip flexor     REFLEXES: 3+ biceps and patellar bilaterally, rest 2+     SENSORY EXAM: vibration intact throughout upper and lower extremities     Visual acuity==20/20 OS; Hard to see with OD because of nystagmus      COORDINATION: moderate dysmetria bilaterally with finger to nose; Rapid sequential movements are slower on left side. Heel to shin more difficult with left heel to right shin (lifts leg with hand/arm)     GAIT: wide based, requires walker. Romberg with mild sway.     Imaging:       Results for orders placed during the hospital encounter of 12/28/18   MRI Cervical Spine Demyelinating W W/O Contrast    Impression No significant change compared to remote prior with continued short segment T2 stir signal hyperintensities within the cervical spinal cord which may be sequela of prior demyelination in light of history.  No evidence for new cord signal abnormality or enhancement to suggest significant interval or active demyelination allowing for motion limitation.    There is superimposed mild degenerative changes most pronounced at C3/C4 with bulging disc with mild central canal stenosis without significant  neural foraminal stenosis.    Please see MRI brain report for further details      Electronically signed by: Nain Dooley DO  Date:    12/28/2018  Time:    13:02       Labs:     Lab Results   Component Value Date    ADSUWCRH64LZ 22 (L) 08/22/2019    KZKIAXPR78WH 20 (L) 06/05/2019    FZQDCNWB86BP 39 06/11/2018     Lab Results   Component Value Date    HW3SCOEB 67.6 09/19/2019    ABSOLUTECD3 765 09/19/2019    SB0DVAJX 15.3 09/19/2019    ABSOLUTECD8 173 (L) 09/19/2019    VW0UXVUF 52.3 09/19/2019    ABSOLUTECD4 593 09/19/2019    LABCD48 3.42 09/19/2019     Lab Results   Component Value Date    WBC 9.06 08/22/2019    RBC 4.24 08/22/2019    HGB 11.7 (L) 08/22/2019    HCT 36.2 (L) 08/22/2019    MCV 85 08/22/2019    MCH 27.6 08/22/2019    MCHC 32.3 08/22/2019    RDW 14.5 08/22/2019     08/22/2019    MPV 12.4 08/22/2019    GRAN 7.1 08/22/2019    GRAN 78.5 (H) 08/22/2019    LYMPH 1.1 08/22/2019    LYMPH 11.9 (L) 08/22/2019    MONO 0.7 08/22/2019    MONO 7.2 08/22/2019    EOS 0.2 08/22/2019    BASO 0.04 08/22/2019    EOSINOPHIL 1.7 08/22/2019    BASOPHIL 0.4 08/22/2019     Sodium   Date Value Ref Range Status   08/22/2019 140 136 - 145 mmol/L Final     Potassium   Date Value Ref Range Status   08/22/2019 4.2 3.5 - 5.1 mmol/L Final     Chloride   Date Value Ref Range Status   08/22/2019 101 95 - 110 mmol/L Final     CO2   Date Value Ref Range Status   08/22/2019 29 23 - 29 mmol/L Final     Glucose   Date Value Ref Range Status   08/22/2019 84 70 - 110 mg/dL Final     BUN, Bld   Date Value Ref Range Status   08/22/2019 12 6 - 20 mg/dL Final     Creatinine   Date Value Ref Range Status   08/22/2019 0.9 0.5 - 1.4 mg/dL Final     Calcium   Date Value Ref Range Status   08/22/2019 10.2 8.7 - 10.5 mg/dL Final     Total Protein   Date Value Ref Range Status   08/22/2019 8.7 (H) 6.0 - 8.4 g/dL Final     Albumin   Date Value Ref Range Status   08/22/2019 4.2 3.5 - 5.2 g/dL Final     Total Bilirubin   Date Value Ref Range Status    08/22/2019 0.4 0.1 - 1.0 mg/dL Final     Comment:     For infants and newborns, interpretation of results should be based  on gestational age, weight and in agreement with clinical  observations.  Premature Infant recommended reference ranges:  Up to 24 hours.............<8.0 mg/dL  Up to 48 hours............<12.0 mg/dL  3-5 days..................<15.0 mg/dL  6-29 days.................<15.0 mg/dL       Alkaline Phosphatase   Date Value Ref Range Status   08/22/2019 57 55 - 135 U/L Final     AST   Date Value Ref Range Status   08/22/2019 18 10 - 40 U/L Final     ALT   Date Value Ref Range Status   08/22/2019 12 10 - 44 U/L Final     Anion Gap   Date Value Ref Range Status   08/22/2019 10 8 - 16 mmol/L Final     eGFR if    Date Value Ref Range Status   08/22/2019 >60.0 >60 mL/min/1.73 m^2 Final     eGFR if non    Date Value Ref Range Status   08/22/2019 >60.0 >60 mL/min/1.73 m^2 Final     Comment:     Calculation used to obtain the estimated glomerular filtration  rate (eGFR) is the CKD-EPI equation.        Diagnosis/Assessment/Plan:    1. Multiple Sclerosis  · Assessment: Jaylon's walk time is a bit slower today, and her left side seems slightly weaker.   · Imaging: MRI brain and c/t spine due now   · Disease Modifying Therapies: Continue Tecfidera and Vitamin D. Will check labs for CBC, CD8, LFT, Vitamin D today. She is aware of the risks associated with immunosuppressant therapy, including increased risk of infection. She requests and HIV test today.     2. MS Symptom Assessment / Management  · Fatigue: I suspect that her fatigue is somewhat related to her sleep pattern.   · Sleep: Will reschedule sleep clinic appt.   · Bladder Dysfunction: Continue oxybutynin.   · Spasticity: Continue Baclofen and exercise.   · Visual Symptoms: She feels like her nystagmus is worsening; will get her set up for appt with Dr. Barth   · Cognitive: Stable. Will reschedule NP testing appt   · Mood  Disorder: Continue Lexapro.   · Gait Disturbance: Continue Ampyra. Will check BMP for creatinine clearance. Continue exercise.   · Falls: She defers additional PT for now.     Our visit today lasted 40 minutes, and 100% of this time was spent face to face with the patient. Over 50% of this visit included discussion of the treatment plan/symptom management/exam findings/imaging results/coordination of care. The patient agrees with the plan of care. She will follow up with Dr. Mitchell in 3 months.     Viviana Gentile, [facility]-BC, MSCN    Problem List Items Addressed This Visit        Neurologic Problems    Multiple sclerosis - Primary    Relevant Medications    cholecalciferol, vitamin D3, 50,000 unit Tab    Other Relevant Orders    CBC auto differential (Completed)    [place]-Suppressor Ratio    Basic metabolic panel    Vitamin D    MRI Brain Demyelinating Without Contrast    MRI Cervical Spine Demyelinating Without Contrast    MRI Thoracic Spine Demyelinating Without Contrast    HIV 1/2 Ag/Ab (4th Gen)    Hepatic function panel      Other Visit Diagnoses     Other long term (current) drug therapy         Relevant Orders    Vitamin D    Encounter for screening for HIV        Relevant Orders    HIV 1/2 Ag/Ab (4th Gen)

## 2020-01-13 NOTE — Clinical Note
Patient canceled NP testing appt late last year. Can we get her rescheduled for first available? Thanks. Viviana

## 2020-01-14 LAB
ABSOLUTE CD3: 716 CELLS/UL (ref 700–2100)
ABSOLUTE CD8: 159 CELLS/UL (ref 200–900)
CD3%: 64.9 % (ref 55–83)
CD3+CD4+ CELLS # BLD: 563 CELLS/UL (ref 300–1400)
CD3+CD4+ CELLS NFR BLD: 51 % (ref 28–57)
CD4/CD8 RATIO: 3.54 (ref 0.9–3.6)
CD8 % SUPPRESSOR T CELL: 14.4 % (ref 10–39)

## 2020-01-15 ENCOUNTER — PATIENT MESSAGE (OUTPATIENT)
Dept: NEUROLOGY | Facility: CLINIC | Age: 40
End: 2020-01-15

## 2020-01-23 ENCOUNTER — TELEPHONE (OUTPATIENT)
Dept: SLEEP MEDICINE | Facility: CLINIC | Age: 40
End: 2020-01-23

## 2020-01-24 ENCOUNTER — PATIENT MESSAGE (OUTPATIENT)
Dept: NEUROLOGY | Facility: CLINIC | Age: 40
End: 2020-01-24

## 2020-01-27 DIAGNOSIS — R26.9 GAIT DISTURBANCE: ICD-10-CM

## 2020-01-27 DIAGNOSIS — G35 MULTIPLE SCLEROSIS: ICD-10-CM

## 2020-01-27 RX ORDER — DALFAMPRIDINE 10 MG/1
TABLET, FILM COATED, EXTENDED RELEASE ORAL
Qty: 60 TABLET | Refills: 1 | Status: SHIPPED | OUTPATIENT
Start: 2020-01-27 | End: 2020-01-30 | Stop reason: SDUPTHER

## 2020-01-30 ENCOUNTER — PATIENT MESSAGE (OUTPATIENT)
Dept: NEUROLOGY | Facility: CLINIC | Age: 40
End: 2020-01-30

## 2020-01-30 DIAGNOSIS — R26.9 GAIT DISTURBANCE: ICD-10-CM

## 2020-01-30 DIAGNOSIS — G35 MULTIPLE SCLEROSIS: ICD-10-CM

## 2020-01-30 RX ORDER — DALFAMPRIDINE 10 MG/1
1 TABLET, FILM COATED, EXTENDED RELEASE ORAL EVERY 12 HOURS
Qty: 180 TABLET | Refills: 1 | Status: SHIPPED | OUTPATIENT
Start: 2020-01-30 | End: 2020-06-24

## 2020-02-03 ENCOUNTER — PATIENT MESSAGE (OUTPATIENT)
Dept: INTERNAL MEDICINE | Facility: CLINIC | Age: 40
End: 2020-02-03

## 2020-02-03 DIAGNOSIS — K21.9 GASTROESOPHAGEAL REFLUX DISEASE, ESOPHAGITIS PRESENCE NOT SPECIFIED: ICD-10-CM

## 2020-02-03 NOTE — TELEPHONE ENCOUNTER
See portal message from pt.    Requesting refill of PANTOPRAZOLE    Last office visit:  10/31/19  Pt due for HTN f/u -  Portal message sent  Recall set for Aug annual    [facility] set for escript

## 2020-02-04 RX ORDER — PANTOPRAZOLE SODIUM 40 MG/1
40 TABLET, DELAYED RELEASE ORAL DAILY
Qty: 30 TABLET | Refills: 2 | Status: SHIPPED | OUTPATIENT
Start: 2020-02-04 | End: 2021-01-25 | Stop reason: SDUPTHER

## 2020-02-05 ENCOUNTER — HOSPITAL ENCOUNTER (OUTPATIENT)
Dept: RADIOLOGY | Facility: HOSPITAL | Age: 40
Discharge: HOME | End: 2020-02-05
Attending: CLINICAL NURSE SPECIALIST
Payer: MEDICARE

## 2020-02-05 ENCOUNTER — PATIENT MESSAGE (OUTPATIENT)
Dept: NEUROLOGY | Facility: CLINIC | Age: 40
End: 2020-02-05

## 2020-02-05 DIAGNOSIS — G35 MULTIPLE SCLEROSIS: ICD-10-CM

## 2020-02-05 PROCEDURE — 25500020 PHARM REV CODE 255

## 2020-02-05 PROCEDURE — A9585 GADOBUTROL INJECTION: HCPCS

## 2020-02-05 PROCEDURE — 72141 MRI CERVICAL SPINE DEMYELINATING WITHOUT CONTRAST: ICD-10-PCS | Mod: 26,,, | Performed by: RADIOLOGY

## 2020-02-05 PROCEDURE — 72141 MRI NECK SPINE W/O DYE: CPT | Mod: 26,,, | Performed by: RADIOLOGY

## 2020-02-05 PROCEDURE — 70551 MRI BRAIN DEMYELINATING WITHOUT CONTRAST: ICD-10-PCS | Mod: 26,,, | Performed by: RADIOLOGY

## 2020-02-05 PROCEDURE — 70551 MRI BRAIN STEM W/O DYE: CPT | Mod: 26,,, | Performed by: RADIOLOGY

## 2020-02-05 PROCEDURE — 72141 MRI NECK SPINE W/O DYE: CPT | Mod: TC

## 2020-02-05 PROCEDURE — 72147 MRI CHEST SPINE W/DYE: CPT | Mod: TC

## 2020-02-05 PROCEDURE — 72147 MRI THORACIC SPINE DEMYELINATING WITHOUT CONTRAST: ICD-10-PCS | Mod: 26,,, | Performed by: RADIOLOGY

## 2020-02-05 PROCEDURE — 72147 MRI CHEST SPINE W/DYE: CPT | Mod: 26,,, | Performed by: RADIOLOGY

## 2020-02-05 PROCEDURE — 70551 MRI BRAIN STEM W/O DYE: CPT | Mod: TC

## 2020-02-20 ENCOUNTER — PATIENT MESSAGE (OUTPATIENT)
Dept: UROGYNECOLOGY | Facility: CLINIC | Age: 40
End: 2020-02-20

## 2020-02-28 ENCOUNTER — PATIENT OUTREACH (OUTPATIENT)
Dept: ADMINISTRATIVE | Facility: OTHER | Age: 40
End: 2020-02-28

## 2020-03-16 ENCOUNTER — PATIENT MESSAGE (OUTPATIENT)
Dept: NEUROLOGY | Facility: CLINIC | Age: 40
End: 2020-03-16

## 2020-03-16 DIAGNOSIS — G35 MULTIPLE SCLEROSIS: ICD-10-CM

## 2020-03-19 ENCOUNTER — PATIENT OUTREACH (OUTPATIENT)
Dept: ADMINISTRATIVE | Facility: OTHER | Age: 40
End: 2020-03-19

## 2020-03-19 RX ORDER — DIMETHYL FUMARATE 240 MG/1
240 CAPSULE ORAL 2 TIMES DAILY
Qty: 180 CAPSULE | Refills: 0 | Status: SHIPPED | OUTPATIENT
Start: 2020-03-19 | End: 2020-05-28 | Stop reason: SDUPTHER

## 2020-03-20 ENCOUNTER — PATIENT MESSAGE (OUTPATIENT)
Dept: ADMINISTRATIVE | Facility: OTHER | Age: 40
End: 2020-03-20

## 2020-04-17 ENCOUNTER — PATIENT MESSAGE (OUTPATIENT)
Dept: UROGYNECOLOGY | Facility: CLINIC | Age: 40
End: 2020-04-17

## 2020-04-17 NOTE — TELEPHONE ENCOUNTER
Reviewed with not to use summer's kaushik suppositories. Instructed to use replens/ rephresh otc. Verbalized understanding.  Jamee Berg, NUP-BC

## 2020-04-26 ENCOUNTER — PATIENT MESSAGE (OUTPATIENT)
Dept: INTERNAL MEDICINE | Facility: CLINIC | Age: 40
End: 2020-04-26

## 2020-04-27 NOTE — TELEPHONE ENCOUNTER
Sent patient an email.    Is there a contact phone number for [facility] that you can provide her? She needs to contact them to discuss problems with the cuff.

## 2020-05-04 ENCOUNTER — PATIENT OUTREACH (OUTPATIENT)
Dept: OTHER | Facility: OTHER | Age: 40
End: 2020-05-04

## 2020-05-04 NOTE — PROGRESS NOTES
Digital Medicine: Health  Introduction    Introduced Jaylon Bowles to Digital Medicine. Discussed health  role and recommended lifestyle modifications.    HPI    Last 5 Patient Entered Readings                                      Current 30 Day Average: 122/64     Recent Readings 4/30/2020 4/30/2020 4/29/2020 4/28/2020 4/28/2020    SBP (mmHg) 100 100 143 - 133    DBP (mmHg) 65 65 76 - 60    Pulse 71 71 74 65 -            Intervention/Plan    There are no preventive care reminders to display for this patient.    Reviewed the importance of self-monitoring, medication adherence, and that the health  can be used as a resource for lifestyle modifications to help reduce or maintain a healthy lifestyle.    Sent link to [facility] webpages and my contact information via [employer] for future questions. Follow up scheduled.         Screenings    SDOH

## 2020-05-04 NOTE — PROGRESS NOTES
Digital Medicine: Health  Introduction    Introduced Jaylon Bowles to Digital Medicine. Discussed health  role and recommended lifestyle modifications.    Newly enrolled pt to [facility] HTN program.    Pt feels well, without complaints. She takes her medication daily and she is pleased with her BP readings. Pt suffers from lower extremity pain and stiffness due to MS that was diagnosed in 2007. Pt reports since the pandemic covid 19 she has been ordering carryout to avoid grocery shopping due to social distancing. She stays active by doing home exercises 2-3x per week. Encouraged healthier eating choices, pt requested low sodium dietary resources to be emailed.     Ms. Jaylon Bowles, does not have questions or concerns at this time.    Enrollment complete.    The history is provided by the patient.     HYPERTENSION  Our goal is to get BP to consistently below 130/80mmHg and make the process convenient so patient can avoid extra trips to the office. Getting your blood pressure below 130/80mmHg (definition of control) will reduce your risk for heart attack, kidney failure, stroke and death (as well as kidney failure, eye disease, & dementia)      Reviewed that the Digital Medicine care team - consisting of a clinician and a health  - will follow the most current evidence-based national guidelines for treating your condition.  The health  will focus on lifestyle modifications and motivation while the clinician will focus on medication therapy.  The care team will review all data on a regular basis and reach out as needed.      Explained that one of the key parts of the program is communication with the care team.  Asked patient to respond to outreach attempts and complete questionnaires.  Stressed importance of medication adherence.    Explained that we expect patient to obtain several blood pressures per week at random times of day.  Instructed patient not to allow anyone else to use  phone and monitoring device.  Confirmed appropriate BP monitoring technique.      Explained to patient that the digital medicine team is not available for emergencies.  Patient will call [facility] on-call ([phone] or [phone]) or [phone] if needed.      Patient's BP goal is 130/80.Patient's BP average is 122/64 mmHg, which is above goal, per 2017 ACC/AHA Hypertension Guidelines.          Last 5 Patient Entered Readings                                      Current 30 Day Average: 122/64     Recent Readings 4/30/2020 4/30/2020 4/29/2020 4/28/2020 4/28/2020    SBP (mmHg) 100 100 143 - 133    DBP (mmHg) 65 65 76 - 60    Pulse 71 71 74 65 -            INTERVENTION(S)  recommended diet modifications, recommend physical activity, reviewed monitoring technique and encouragement/support      There are no preventive care reminders to display for this patient.    Reviewed the importance of self-monitoring, medication adherence, and that the health  can be used as a resource for lifestyle modifications to help reduce or maintain a healthy lifestyle.    Sent link to [facility] webpages and my contact information via [employer] for future questions. Follow up scheduled.             Diet Screening   She has the following dietary restrictions: low sodium diet    Barriers to a Healthy Diet: no barriers to healthy eating    Physical Activity Screening   When asked if exercising, patient responded: yes  Patient has the following chronic pain: msHer level of intensity when exercising is low.    Patient participates in the following activities: home videos and body weight exercises    She identified the following barriers to physical activity: pain/injury/recent surgery    Medication Adherence Screening   She did not miss a dose this month.    Patient identified the following reasons for non-compliance: None      SDOH

## 2020-05-04 NOTE — PROGRESS NOTES
Digital Medicine: Health  Introduction    Introduced Jaylon Bowles to Digital Medicine. Discussed health  role and recommended lifestyle modifications.    HPI    Last 5 Patient Entered Readings                                      Current 30 Day Average: 122/64     Recent Readings 4/30/2020 4/30/2020 4/29/2020 4/28/2020 4/28/2020    SBP (mmHg) 100 100 143 - 133    DBP (mmHg) 65 65 76 - 60    Pulse 71 71 74 65 -            Intervention/Plan    There are no preventive care reminders to display for this patient.    Reviewed the importance of self-monitoring, medication adherence, and that the health  can be used as a resource for lifestyle modifications to help reduce or maintain a healthy lifestyle.    Sent link to [facility] webpages and my contact information via [employer] for future questions. Follow up scheduled.         Screenings    SDOH

## 2020-05-05 ENCOUNTER — PATIENT MESSAGE (OUTPATIENT)
Dept: NEUROLOGY | Facility: CLINIC | Age: 40
End: 2020-05-05

## 2020-05-05 DIAGNOSIS — G35 MULTIPLE SCLEROSIS: ICD-10-CM

## 2020-05-07 RX ORDER — CHOLECALCIFEROL (VITAMIN D3) 1250 MCG
1 TABLET ORAL WEEKLY
Qty: 12 TABLET | Refills: 0 | Status: SHIPPED | OUTPATIENT
Start: 2020-05-07 | End: 2020-08-23 | Stop reason: SDUPTHER

## 2020-05-18 ENCOUNTER — PATIENT OUTREACH (OUTPATIENT)
Dept: ADMINISTRATIVE | Facility: OTHER | Age: 40
End: 2020-05-18

## 2020-05-18 ENCOUNTER — PATIENT MESSAGE (OUTPATIENT)
Dept: INTERNAL MEDICINE | Facility: CLINIC | Age: 40
End: 2020-05-18

## 2020-05-18 NOTE — PROGRESS NOTES
Patient's chart was reviewed.   Requested updates within Care Everywhere.  Immunizations reconciled.    Health Maintenance was updated.

## 2020-05-19 ENCOUNTER — OFFICE VISIT (OUTPATIENT)
Dept: NEUROLOGY | Facility: CLINIC | Age: 40
End: 2020-05-19
Payer: MEDICARE

## 2020-05-19 DIAGNOSIS — M62.838 MUSCLE SPASMS OF BOTH LOWER EXTREMITIES: ICD-10-CM

## 2020-05-19 DIAGNOSIS — G35 MULTIPLE SCLEROSIS: Primary | ICD-10-CM

## 2020-05-19 PROCEDURE — 99215 OFFICE O/P EST HI 40 MIN: CPT | Mod: 95,,, | Performed by: PSYCHIATRY & NEUROLOGY

## 2020-05-19 PROCEDURE — 99215 PR OFFICE/OUTPT VISIT, EST, LEVL V, 40-54 MIN: ICD-10-PCS | Mod: 95,,, | Performed by: PSYCHIATRY & NEUROLOGY

## 2020-05-19 PROCEDURE — G0463 HOSPITAL OUTPT CLINIC VISIT: HCPCS

## 2020-05-19 PROCEDURE — 99211 OFF/OP EST MAY X REQ PHY/QHP: CPT

## 2020-05-19 RX ORDER — TIZANIDINE HYDROCHLORIDE 2 MG/1
2 CAPSULE, GELATIN COATED ORAL 2 TIMES DAILY PRN
Qty: 60 CAPSULE | Refills: 3 | Status: SHIPPED | OUTPATIENT
Start: 2020-05-19 | End: 2020-11-27 | Stop reason: SDUPTHER

## 2020-05-19 NOTE — Clinical Note
Troy Harris -- I know you guys must be swamped. This isn't urgent, but I just wanted to put this on your radar as I ordered her neuropsych test back in June 2019 but it doesn't look like it's been scheduled yet.

## 2020-05-19 NOTE — PROGRESS NOTES
The patient location is: [place]  The chief complaint leading to consultation is: MS follow up    Visit type: audiovisual    Face to Face time with patient: 60 minutes of total time spent on the encounter, which includes face to face time and non-face to face time preparing to see the patient (eg, review of tests), Obtaining and/or reviewing separately obtained history, Documenting clinical information in the electronic or other health record, Independently interpreting results (not separately reported) and communicating results to the patient/family/caregiver, or Care coordination (not separately reported).         Each patient to whom he or she provides medical services by telemedicine is:  (1) informed of the relationship between the physician and patient and the respective role of any other health care provider with respect to management of the patient; and (2) notified that he or she may decline to receive medical services by telemedicine and may withdraw from such care at any time.    Notes:         Subjective:          Patient ID: Jaylon Bowles is a 39 y.o. female who presents today for a routine clinic visit for MS.      MS HPI:  Symptom onset was in 2006, had episode of bilateral ON. Diagnosed in 2007 based on MRI, LP in [place]. Symptoms of dizziness, vertigo, numbness in legs, tingling in fingers, and gait imbalance with falls. Initially on Avonex (ineffective), but switched to Copaxone. Had one relapse in 2008 with symptoms of worse gait imbalance. Received steroids which helped. Then switched to Betaseron (allergic, broke out in hives).  Started on Rebif in 2012. No further relapses. Suffers from pain in left leg and stiffness. Pain in left leg, responds to prednisone in past.   7/2018 - started Tecfidera    Today:  · DMT: dimethyl fumarate  · Side effects from DMT? No  · Taking vitamin D3 as recommended? Yes - 22500 units per week. Also taking ginseng, ginko biloba, and magnesium, black seed  "oil pill  · No new s/s of relapse or worsening.  · School has ended. She made a 4.0 in the two classes that she took.  · Mood - improved, no issues, taking lexapro "more often". Also meditating and praying  · Gait, falls - Taking Ampyra, improves walking and balance. Still with occasional falls  · Memory - taking ginko biloba, feels that it helps  · Spasms - taking 5-10mg baclofen. Left leg with muscle tightness and right foot with increased cramps. Does not feel that the baclofen is working.  · Feels like her spine is aching. Not exercising as much, discussed need to exercise and stretch more.    Medications:  Current Outpatient Medications   Medication Sig    baclofen (LIORESAL) 10 MG tablet Take 1 tablet (10 mg total) by mouth every evening.    betamethasone dipropionate (DIPROLENE) 0.05 % cream     carvedilol (COREG) 25 MG tablet Take 1 tablet (25 mg total) by mouth 2 (two) times daily with meals.    cholecalciferol, vitamin D3, 1,250 mcg (50,000 unit) Tab Take 1 tablet by mouth once a week.    clotrimazole-betamethasone 1-0.05% (LOTRISONE) cream Apply topically 2 (two) times daily.    dalfampridine 10 mg Tb12 Take 1 tablet by mouth every 12 (twelve) hours.    escitalopram oxalate (LEXAPRO) 20 MG tablet Take 1 tablet (20 mg total) by mouth once daily.    levothyroxine (SYNTHROID) 112 MCG tablet Take 1 tablet (112 mcg total) by mouth before breakfast.    losartan-hydrochlorothiazide 100-12.5 mg (HYZAAR) 100-12.5 mg Tab Take 1 tablet by mouth once daily.    mometasone (ELOCON) 0.1 % ointment STEFF EXT AA QD FOR 10 DAYS    multivitamin with minerals (ONE DAILY COMPLETE ORAL) Take by mouth.    oxybutynin (DITROPAN-XL) 10 MG 24 hr tablet Take 1 tablet (10 mg total) by mouth once daily.    pantoprazole (PROTONIX) 40 MG tablet Take 1 tablet (40 mg total) by mouth once daily.    TECFIDERA 240 mg CpDR Take 240 mg by mouth 2 (two) times daily.     No current facility-administered medications for this visit.  "       SOCIAL HISTORY  Social History     Tobacco Use    Smoking status: Never Smoker    Smokeless tobacco: Never Used   Substance Use Topics    Alcohol use: No     Frequency: Never     Drinks per session: Patient refused     Binge frequency: Never     Comment: social    Drug use: No     Types: Marijuana       Living arrangements - the patient lives with their family.    ROS:    REVIEW OF SYMPTOMS 5/17/2020   Do you feel abnormally tired on most days? No   Do you feel you generally sleep well? Yes   Do you have difficulty controlling your bladder?  No   Do you have difficulty controlling your bowels?  No   Do you have frequent muscle cramps, tightness or spasms in your limbs?  No   Do you have new visual symptoms?  No   Do you have worsening difficulty with your memory or thinking? No   Do you have worsening symptoms of anxiety or depression?  No   For patients who walk, Do you have more difficulty walking?  No   Have you fallen since your last visit?  Yes   For patients who use wheelchairs: Do you have any skin wounds or breakdown? Not Applicable   Do you have difficulty using your hands?  No   Do you have shooting or burning pain? No   Do you have difficulty with sexual function?  Yes   If you are sexually active, are you using birth control? Y/N  N/A Not Applicable   Do you often choke when swallowing liquids or solid food?  No   Do you experience worsening symptoms when overheated? Yes   Do you need any new equipment such as a wheelchair, walker or shower chair? Yes   Do you receive co-pay financial assistance for your principal MS medicine? No   Would you be interested in participating in an MS research trial in the future? No   For patients on Gilenya, Tecfidera, Aubagio, Rituxan, Ocrevus, Tysabri, Lemtrada or Methotrexate, are you aware that you should NOT receive live virus vaccines?  Yes   Do you feel you have adequate family/friend support?  Yes   Do you have health insurance?   Yes   Are you currently  employed? No   Do you receive SSDI/SSI?  Yes   Do you use marijuana or cannabis products? Yes   How often? Weekly   Have you been diagnosed with a urinary tract infection since your last visit here? No   Have you been diagnosed with a respiratory tract infection since your last visit here? No   Have you been to the emergency room since your last visit here? No   Have you been hospitalized since your last visit here?  No                Objective:        1. 25 foot timed walk:  No flowsheet data found.    2. 9 Hole Peg Test:  No flowsheet data found.    Neurologic Exam  MENTAL STATUS: grossly intact. Normal language, attention.   CRANIAL NERVE EXAM:  No facial asymmetry. tongue midline. LT decreased on left face. Shoulder shrug normal b/l There is no dysarthria.   MOTOR EXAM:  Strength is at least 3/5 in all groups in the lower extremities and upper extremities.   COORDINATION: mod ataxia on modified finger-to-nose exam.   GAIT: wide based and ataxic, cautiously walking independently on today for a few steps.      Imaging:     Results for orders placed during the hospital encounter of 02/05/20   MRI Brain Demyelinating Without Contrast    Impression Stable appearance of the brain and spinal cord again demonstrating findings compatible with the reported history of multiple sclerosis as further discussed above.  No new discrete lesions identified to indicate ongoing demyelination.    Mild degenerative change in the cervical spine with posterior central disc extrusion at C3-C4 with mild indentation of the ventral cord. Additional mild multilevel cervical and thoracic spondylosis, as above.  No significant neural foraminal narrowing or spinal canal stenosis elsewhere.    Electronically signed by resident: Kishan Patino MD  Date:    02/05/2020  Time:    13:34    Electronically signed by: Tonio Cohen MD  Date:    02/05/2020  Time:    16:11     Results for orders placed during the hospital encounter of 02/05/20   MRI  Cervical Spine Demyelinating Without Contrast    Impression Stable appearance of the brain and spinal cord again demonstrating findings compatible with the reported history of multiple sclerosis as further discussed above.  No new discrete lesions identified to indicate ongoing demyelination.    Mild degenerative change in the cervical spine with posterior central disc extrusion at C3-C4 with mild indentation of the ventral cord. Additional mild multilevel cervical and thoracic spondylosis, as above.  No significant neural foraminal narrowing or spinal canal stenosis elsewhere.    Electronically signed by resident: Kishan Patino MD  Date:    02/05/2020  Time:    13:34    Electronically signed by: Tonio Cohen MD  Date:    02/05/2020  Time:    16:11     Results for orders placed during the hospital encounter of 02/05/20   MRI Thoracic Spine Demyelinating Without Contrast    Impression Stable appearance of the brain and spinal cord again demonstrating findings compatible with the reported history of multiple sclerosis as further discussed above.  No new discrete lesions identified to indicate ongoing demyelination.    Mild degenerative change in the cervical spine with posterior central disc extrusion at C3-C4 with mild indentation of the ventral cord. Additional mild multilevel cervical and thoracic spondylosis, as above.  No significant neural foraminal narrowing or spinal canal stenosis elsewhere.    Electronically signed by resident: Kishan Patino MD  Date:    02/05/2020  Time:    13:34    Electronically signed by: Tonio Cohen MD  Date:    02/05/2020  Time:    16:11     No results found for this or any previous visit.  Results for orders placed during the hospital encounter of 12/28/18   MRI Cervical Spine Demyelinating W W/O Contrast    Impression No significant change compared to remote prior with continued short segment T2 stir signal hyperintensities within the cervical spinal cord which may be  sequela of prior demyelination in light of history.  No evidence for new cord signal abnormality or enhancement to suggest significant interval or active demyelination allowing for motion limitation.    There is superimposed mild degenerative changes most pronounced at C3/C4 with bulging disc with mild central canal stenosis without significant neural foraminal stenosis.    Please see MRI brain report for further details      Electronically signed by: Nain Dooley DO  Date:    12/28/2018  Time:    13:02     MRI Brain w/wo: 5/2018 - Age advanced cerebral volume loss with numerous scattered foci of T2/flair signal abnormality throughout the supra-and infratentorial parenchyma majority centered within the periventricular white matter. No evidence for diffusion signal abnormality or enhancement 12/2018 - no change     MRI C-Spine w/wo: 5/2018 - Ill-defined short segment cord edema signal ventrally at the C4 vertebral body level. Well-circumscribed central area of cystic change within the cervical spinal cord at the C6 vertebral body level most compatible with syringohydromyelia.  No evidence for abnormal intrathecal enhancement.  12/2018 - no change     MRI T-Spine w/wo: 5/2018 - Within limits of the study no definite cord signal abnormality to suggest edema throughout the thoracic spine.      Labs:     Lab Results   Component Value Date    EKEVOUUR56ES 15 (L) 01/13/2020    YBYBDSZA51WV 22 (L) 08/22/2019    UHZUMZLC44OE 20 (L) 06/05/2019     No results found for: JCVINDEX, JCVANTIBODY  Lab Results   Component Value Date    HY5DASBT 64.9 01/13/2020    ABSOLUTECD3 716.0 01/13/2020    MF6ZBTOH 14.4 01/13/2020    ABSOLUTECD8 159.0 (L) 01/13/2020    NR2HRBNW 51.0 01/13/2020    ABSOLUTECD4 563.0 01/13/2020    LABCD48 3.54 01/13/2020     Lab Results   Component Value Date    WBC 9.46 01/13/2020    RBC 4.29 01/13/2020    HGB 11.4 (L) 01/13/2020    HCT 35.6 (L) 01/13/2020    MCV 83 01/13/2020    MCH 26.6 (L) 01/13/2020    MCHC  32.0 01/13/2020    RDW 15.4 (H) 01/13/2020     (H) 01/13/2020    MPV 12.0 01/13/2020    GRAN 7.8 (H) 01/13/2020    GRAN 82.1 (H) 01/13/2020    LYMPH 1.0 01/13/2020    LYMPH 10.6 (L) 01/13/2020    MONO 0.5 01/13/2020    MONO 5.2 01/13/2020    EOS 0.1 01/13/2020    BASO 0.06 01/13/2020    EOSINOPHIL 1.1 01/13/2020    BASOPHIL 0.6 01/13/2020     Sodium   Date Value Ref Range Status   01/13/2020 139 136 - 145 mmol/L Final     Potassium   Date Value Ref Range Status   01/13/2020 3.6 3.5 - 5.1 mmol/L Final     Chloride   Date Value Ref Range Status   01/13/2020 101 95 - 110 mmol/L Final     CO2   Date Value Ref Range Status   01/13/2020 28 23 - 29 mmol/L Final     Glucose   Date Value Ref Range Status   01/13/2020 81 70 - 110 mg/dL Final     BUN, Bld   Date Value Ref Range Status   01/13/2020 13 6 - 20 mg/dL Final     Creatinine   Date Value Ref Range Status   01/13/2020 1.0 0.5 - 1.4 mg/dL Final     Calcium   Date Value Ref Range Status   01/13/2020 9.5 8.7 - 10.5 mg/dL Final     Total Protein   Date Value Ref Range Status   01/13/2020 8.7 (H) 6.0 - 8.4 g/dL Final     Albumin   Date Value Ref Range Status   01/13/2020 4.2 3.5 - 5.2 g/dL Final     Total Bilirubin   Date Value Ref Range Status   01/13/2020 0.6 0.1 - 1.0 mg/dL Final     Comment:     For infants and newborns, interpretation of results should be based  on gestational age, weight and in agreement with clinical  observations.  Premature Infant recommended reference ranges:  Up to 24 hours.............<8.0 mg/dL  Up to 48 hours............<12.0 mg/dL  3-5 days..................<15.0 mg/dL  6-29 days.................<15.0 mg/dL       Alkaline Phosphatase   Date Value Ref Range Status   01/13/2020 61 55 - 135 U/L Final     AST   Date Value Ref Range Status   01/13/2020 18 10 - 40 U/L Final     ALT   Date Value Ref Range Status   01/13/2020 10 10 - 44 U/L Final     Anion Gap   Date Value Ref Range Status   01/13/2020 10 8 - 16 mmol/L Final     eGFR if     Date Value Ref Range Status   01/13/2020 >60.0 >60 mL/min/1.73 m^2 Final     eGFR if non    Date Value Ref Range Status   01/13/2020 >60.0 >60 mL/min/1.73 m^2 Final     Comment:     Calculation used to obtain the estimated glomerular filtration  rate (eGFR) is the CKD-EPI equation.        Lab Results   Component Value Date    HEPBSAG Negative 12/10/2018           MS Impression and Plan:     NEURO MULTIPLE SCLEROSIS IMPRESSION:   MS Status:     Number of relapses in the past year?:  0    Clinical Progression:  Worsened    Clinical Progression comment:  Likely deconditioning, do not suspect relapse    MRI Progression:  Stable  Plan:     DMT:  No change in management    DMT comment:  Continue Tecfidera    Symptom Management:  Implement change in symptom management     Next Imaging Due: 2/1/2021     Mood - improved, encouraged to continue to comply with lexapro rx and lifestyle changes    Gait Disturbance: continue Ampyra as it helps with gait stability, balance, and motor fatigue. I am ordering a rollator with seat as pt has falls and is unsafe to ambulate with a cane or crutches and needs rollator to safely complete ADLs    Muscle spasms/spasticty: patient not consistently taking baclofen bc she states that it is ineffective. Will switch to tizanidine 2mg bid prn. PARQ discussion held for medication. Encouraged home exercises and stretching for now to help with deconditioning.    MS counseling given    F/u with me in 3 months    Our visit today lasted 40 minutes, and 100% of this time was spent face to face with the patient. Over 50% of this visit included discussion of the treatment plan/medication changes/symptom management/exam findings/imaging results/coordination of care. The patient agrees with the plan of care.    Problem List Items Addressed This Visit     None          Portia Mitchell MD

## 2020-05-20 ENCOUNTER — PATIENT MESSAGE (OUTPATIENT)
Dept: UROGYNECOLOGY | Facility: CLINIC | Age: 40
End: 2020-05-20

## 2020-05-20 DIAGNOSIS — B37.31 VULVAR CANDIDIASIS: ICD-10-CM

## 2020-05-20 DIAGNOSIS — I10 ESSENTIAL HYPERTENSION: ICD-10-CM

## 2020-05-20 RX ORDER — CLOTRIMAZOLE AND BETAMETHASONE DIPROPIONATE 10; .64 MG/G; MG/G
CREAM TOPICAL 2 TIMES DAILY
Qty: 45 G | Refills: 3 | Status: SHIPPED | OUTPATIENT
Start: 2020-05-20 | End: 2022-06-14

## 2020-05-20 RX ORDER — CARVEDILOL 25 MG/1
TABLET ORAL
Qty: 180 TABLET | Refills: 1 | Status: SHIPPED | OUTPATIENT
Start: 2020-05-20 | End: 2020-11-13

## 2020-05-22 ENCOUNTER — PATIENT MESSAGE (OUTPATIENT)
Dept: INTERNAL MEDICINE | Facility: CLINIC | Age: 40
End: 2020-05-22

## 2020-05-22 DIAGNOSIS — I10 ESSENTIAL HYPERTENSION: ICD-10-CM

## 2020-05-22 RX ORDER — LOSARTAN POTASSIUM AND HYDROCHLOROTHIAZIDE 12.5; 1 MG/1; MG/1
1 TABLET ORAL DAILY
Qty: 90 TABLET | Refills: 1 | Status: SHIPPED | OUTPATIENT
Start: 2020-05-22 | End: 2020-11-13

## 2020-05-22 RX ORDER — LEVOTHYROXINE SODIUM 100 UG/1
TABLET ORAL
Qty: 90 TABLET | Refills: 0 | OUTPATIENT
Start: 2020-05-22

## 2020-05-22 RX ORDER — LEVOTHYROXINE SODIUM 112 UG/1
112 TABLET ORAL
Qty: 90 TABLET | Refills: 1 | Status: SHIPPED | OUTPATIENT
Start: 2020-05-22 | End: 2020-11-27

## 2020-05-22 RX ORDER — LOSARTAN POTASSIUM AND HYDROCHLOROTHIAZIDE 12.5; 1 MG/1; MG/1
TABLET ORAL
Qty: 90 TABLET | Refills: 1 | OUTPATIENT
Start: 2020-05-22

## 2020-05-25 ENCOUNTER — PATIENT OUTREACH (OUTPATIENT)
Dept: OTHER | Facility: OTHER | Age: 40
End: 2020-05-25

## 2020-05-25 DIAGNOSIS — I10 ESSENTIAL HYPERTENSION: Primary | ICD-10-CM

## 2020-05-25 DIAGNOSIS — N39.46 MIXED INCONTINENCE: ICD-10-CM

## 2020-05-27 ENCOUNTER — TELEPHONE (OUTPATIENT)
Dept: NEUROLOGY | Facility: CLINIC | Age: 40
End: 2020-05-27

## 2020-05-27 NOTE — PROGRESS NOTES
"Digital Medicine: Clinician Introduction    Jaylon Bowles is a 39 y.o. female who is newly enrolled in the Digital Medicine Clinic.    The following information was reviewed and updated:  Preferred pharmacy   [id number] - [place] - [address] AT [facility] [address]  [address] [address]  Phone: [phone] Fax: [phone]    [facility] - [place] - [address]  [address]  [facility] [address]  Phone: [phone] Fax: [phone]    [facility] - [place], [place] - [address]   [address]   [address]  [facility] [address]  Phone: [phone] Fax: [phone]    [facility] [address] [place] [address] [address]  [address] [place] [address]  Phone: [phone] Fax: [phone]      Patient prefers a 90 days supply.     Review of patient's allergies indicates:   Allergen Reactions    Betaseron [interferon beta-1b] Hives    Gabapentin Hallucinations    Cephalexin Hives, Itching and Rash       I spoke with the patient today for the initial enrollment call.  She admits to taking her losartan/hydrochlorothiazide tablet twice a day instead of once a day.  She is taking her carvedilol twice a day.  She says she has have been having some issues with her cuff as well sometimes she gets the message that the device is unable to take a reading.  She does admit that she did not watch the video about proper placement of the cuff in I advised that she do so.  She is taking her blood pressure reading sitting at the kitchen table.  I am also following on an alert elevated blood pressure reading an alert low blood pressure reading she does admit to feeling "weird" when her blood pressure was elevated but feels pretty normal when her blood pressure readings are low.      The history is provided by the patient. No  was used.     HYPERTENSION  Our goal is to get BP to consistently below " 130/80mmHg and make the process convenient so patient can avoid extra trips to the office. Getting your blood pressure below 130/80mmHg (definition of control) will reduce your risk for heart attack, kidney failure, stroke and death (as well as kidney failure, eye disease, & dementia)      Reviewed non-pharmacologic therapies and impact on BP      Explained that we expect patient to obtain several blood pressures per week at random times of day.  Instructed patient not to allow anyone else to use phone and monitoring device.  Confirmed appropriate BP monitoring technique.      Explained to patient that the digital medicine team is not available for emergencies.  Patient will call [facility] on-call ([phone] or [phone]) or 911 if needed.    Patient's BP goal is 130/80. Patients BP average is 117/71 mmHg, which is at or below goal, per 2017 ACC/AHA Hypertension Guidelines.    Clinician received low BP alert.    Patient is not experiencing symptoms.    Allergies reviewed.      Last 5 Patient Entered Readings                                      Current 30 Day Average: 117/71     Recent Readings 5/27/2020 5/25/2020 5/25/2020 5/25/2020 5/24/2020    SBP (mmHg) 127 112 73 73 110    DBP (mmHg) 44 72 53 53 75    Pulse 71 67 72 72 60            INTERVENTION(S)  reviewed appropriate dose schedule, recommended diet modifications, recommended physical activity, reviewed monitoring technique and encouragement/support    PLAN  patient verbalizes understanding and additional monitoring needed    Average blood pressure 117/71    Advised she only take 1 losartan/hydrochlorothiazide tablet daily.  Continue carvedilol twice daily.  Follow-up in 2 weeks      There are no preventive care reminders to display for this patient.    Current Medication Regimen:  Hypertension Medications             carvediloL (COREG) 25 MG tablet TAKE 1 TABLET(25 MG) BY MOUTH TWICE DAILY WITH MEALS    losartan-hydrochlorothiazide 100-12.5 mg  (HYZAAR) 100-12.5 mg Tab Take 1 tablet by mouth once daily.            Reviewed the importance of self-monitoring, medication adherence, and that the health  can be used as a resource for lifestyle modifications to help reduce or maintain a healthy lifestyle.    Sent link to [facility]'s Digital [facility] webpages and my contact information via [employer] for future questions. Follow up scheduled.             Sleep Apnea Screening  Patient not previously diagnosed with SATYA and     Medication Affordability Screening  Patient is currently having problems affording medications for a non-program diagnosis.  Duration: this month    Medication Adherence Screening   She did not miss a dose this month.

## 2020-05-28 ENCOUNTER — PATIENT MESSAGE (OUTPATIENT)
Dept: NEUROLOGY | Facility: CLINIC | Age: 40
End: 2020-05-28

## 2020-05-28 DIAGNOSIS — G35 MULTIPLE SCLEROSIS: ICD-10-CM

## 2020-05-28 DIAGNOSIS — F32.A DEPRESSION, UNSPECIFIED DEPRESSION TYPE: ICD-10-CM

## 2020-05-28 RX ORDER — DIMETHYL FUMARATE 240 MG/1
240 CAPSULE ORAL 2 TIMES DAILY
Qty: 60 CAPSULE | Refills: 0 | Status: SHIPPED | OUTPATIENT
Start: 2020-05-28 | End: 2020-06-23 | Stop reason: SDUPTHER

## 2020-05-28 RX ORDER — ESCITALOPRAM OXALATE 20 MG/1
TABLET ORAL
Qty: 30 TABLET | Refills: 5 | Status: SHIPPED | OUTPATIENT
Start: 2020-05-28 | End: 2021-01-25 | Stop reason: SDUPTHER

## 2020-05-31 PROCEDURE — 99454 REM MNTR PHYSIOL PARAM 16-30: CPT | Mod: PBBFAC,PO | Performed by: INTERNAL MEDICINE

## 2020-06-01 ENCOUNTER — PATIENT OUTREACH (OUTPATIENT)
Dept: OTHER | Facility: OTHER | Age: 40
End: 2020-06-01

## 2020-06-01 ENCOUNTER — PATIENT MESSAGE (OUTPATIENT)
Dept: NEUROLOGY | Facility: CLINIC | Age: 40
End: 2020-06-01

## 2020-06-01 ENCOUNTER — LAB VISIT (OUTPATIENT)
Dept: LAB | Facility: HOSPITAL | Age: 40
End: 2020-06-01
Attending: PSYCHIATRY & NEUROLOGY
Payer: MEDICARE

## 2020-06-01 DIAGNOSIS — R26.9 ABNORMALITY OF GAIT: Primary | ICD-10-CM

## 2020-06-01 DIAGNOSIS — G35 MULTIPLE SCLEROSIS: ICD-10-CM

## 2020-06-01 LAB
ALBUMIN SERPL BCP-MCNC: 4 G/DL (ref 3.5–5.2)
ALP SERPL-CCNC: 63 U/L (ref 55–135)
ALT SERPL W/O P-5'-P-CCNC: 9 U/L (ref 10–44)
ANION GAP SERPL CALC-SCNC: 5 MMOL/L (ref 8–16)
AST SERPL-CCNC: 15 U/L (ref 10–40)
BASOPHILS # BLD AUTO: 0.06 K/UL (ref 0–0.2)
BASOPHILS NFR BLD: 0.7 % (ref 0–1.9)
BILIRUB SERPL-MCNC: 0.4 MG/DL (ref 0.1–1)
BUN SERPL-MCNC: 21 MG/DL (ref 6–20)
CALCIUM SERPL-MCNC: 9 MG/DL (ref 8.7–10.5)
CHLORIDE SERPL-SCNC: 102 MMOL/L (ref 95–110)
CO2 SERPL-SCNC: 30 MMOL/L (ref 23–29)
CREAT SERPL-MCNC: 1 MG/DL (ref 0.5–1.4)
DIFFERENTIAL METHOD: ABNORMAL
EOSINOPHIL # BLD AUTO: 0.3 K/UL (ref 0–0.5)
EOSINOPHIL NFR BLD: 2.8 % (ref 0–8)
ERYTHROCYTE [DISTWIDTH] IN BLOOD BY AUTOMATED COUNT: 17.5 % (ref 11.5–14.5)
EST. GFR  (AFRICAN AMERICAN): >60 ML/MIN/1.73 M^2
EST. GFR  (NON AFRICAN AMERICAN): >60 ML/MIN/1.73 M^2
GLUCOSE SERPL-MCNC: 81 MG/DL (ref 70–110)
HCT VFR BLD AUTO: 33.6 % (ref 37–48.5)
HGB BLD-MCNC: 10.6 G/DL (ref 12–16)
IMM GRANULOCYTES # BLD AUTO: 0.03 K/UL (ref 0–0.04)
IMM GRANULOCYTES NFR BLD AUTO: 0.3 % (ref 0–0.5)
LYMPHOCYTES # BLD AUTO: 1.4 K/UL (ref 1–4.8)
LYMPHOCYTES NFR BLD: 15.7 % (ref 18–48)
MCH RBC QN AUTO: 26 PG (ref 27–31)
MCHC RBC AUTO-ENTMCNC: 31.5 G/DL (ref 32–36)
MCV RBC AUTO: 82 FL (ref 82–98)
MONOCYTES # BLD AUTO: 0.7 K/UL (ref 0.3–1)
MONOCYTES NFR BLD: 8 % (ref 4–15)
NEUTROPHILS # BLD AUTO: 6.6 K/UL (ref 1.8–7.7)
NEUTROPHILS NFR BLD: 72.5 % (ref 38–73)
NRBC BLD-RTO: 0 /100 WBC
PLATELET # BLD AUTO: 290 K/UL (ref 150–350)
PMV BLD AUTO: 12.5 FL (ref 9.2–12.9)
POTASSIUM SERPL-SCNC: 4 MMOL/L (ref 3.5–5.1)
PROT SERPL-MCNC: 8.5 G/DL (ref 6–8.4)
RBC # BLD AUTO: 4.08 M/UL (ref 4–5.4)
SODIUM SERPL-SCNC: 137 MMOL/L (ref 136–145)
WBC # BLD AUTO: 9.13 K/UL (ref 3.9–12.7)

## 2020-06-01 PROCEDURE — 86359 T CELLS TOTAL COUNT: CPT | Mod: HCNC

## 2020-06-01 PROCEDURE — 99457 RPM TX MGMT 1ST 20 MIN: CPT | Mod: S$GLB,,, | Performed by: INTERNAL MEDICINE

## 2020-06-01 PROCEDURE — 36415 COLL VENOUS BLD VENIPUNCTURE: CPT | Mod: HCNC,PO

## 2020-06-01 PROCEDURE — 86360 T CELL ABSOLUTE COUNT/RATIO: CPT | Mod: HCNC

## 2020-06-01 PROCEDURE — 85025 COMPLETE CBC W/AUTO DIFF WBC: CPT | Mod: HCNC

## 2020-06-01 PROCEDURE — 80053 COMPREHEN METABOLIC PANEL: CPT | Mod: HCNC

## 2020-06-01 PROCEDURE — 99457 PR MONITORING, PHYSIOL PARAM, REMOTE, 1ST 20 MINS, PER MONTH: ICD-10-PCS | Mod: S$GLB,,, | Performed by: INTERNAL MEDICINE

## 2020-06-01 NOTE — PROGRESS NOTES
Digital Medicine: Health  Follow-Up    Called patient for HC follow up.    Patient feels well. Regarding eating habits, she went to the grocery store and bought salmon and salad ingredients for the week ahead. She admits that she bought a snickers bar and a butter finger that she will divide it in portion sizes to satisfy her sweet tooth. Praised pt for her motivation and making healthier food choices.     Ms. Bowles, does not have any question or concerns at this time.    Follow up complete.          INTERVENTION(S)  encouragement/support    PLAN  continue monitoring      There are no preventive care reminders to display for this patient.    Last 5 Patient Entered Readings                                      Current 30 Day Average: 116/74     Recent Readings 5/29/2020 5/28/2020 5/27/2020 5/25/2020 5/25/2020    SBP (mmHg) 115 132 127 112 73    DBP (mmHg) 72 87 44 72 53    Pulse 71 67 71 67 72                  Screenings    SDOH

## 2020-06-02 ENCOUNTER — OFFICE VISIT (OUTPATIENT)
Dept: NEUROLOGY | Facility: CLINIC | Age: 40
End: 2020-06-02
Payer: MEDICARE

## 2020-06-02 ENCOUNTER — PATIENT MESSAGE (OUTPATIENT)
Dept: NEUROLOGY | Facility: CLINIC | Age: 40
End: 2020-06-02

## 2020-06-02 DIAGNOSIS — R41.9 COGNITIVE COMPLAINTS: ICD-10-CM

## 2020-06-02 DIAGNOSIS — G35 MULTIPLE SCLEROSIS: ICD-10-CM

## 2020-06-02 DIAGNOSIS — I10 ESSENTIAL HYPERTENSION: ICD-10-CM

## 2020-06-02 DIAGNOSIS — F41.9 ANXIETY AND DEPRESSION: ICD-10-CM

## 2020-06-02 DIAGNOSIS — F32.A ANXIETY AND DEPRESSION: ICD-10-CM

## 2020-06-02 DIAGNOSIS — F34.1 DYSTHYMIA: Primary | ICD-10-CM

## 2020-06-02 DIAGNOSIS — F06.8 ORGANIC EMOTIONALLY LABILE DISORDER: ICD-10-CM

## 2020-06-02 LAB
ABSOLUTE CD3: 1029 CELLS/UL (ref 700–2100)
ABSOLUTE CD8: 215 CELLS/UL (ref 200–900)
CD3%: 65.3 % (ref 55–83)
CD3+CD4+ CELLS # BLD: 816 CELLS/UL (ref 300–1400)
CD3+CD4+ CELLS NFR BLD: 51.8 % (ref 28–57)
CD4/CD8 RATIO: 3.79 (ref 0.9–3.6)
CD8 % SUPPRESSOR T CELL: 13.7 % (ref 10–39)

## 2020-06-02 PROCEDURE — 99499 NO LOS: ICD-10-PCS | Mod: HCNC,95,, | Performed by: CLINICAL NEUROPSYCHOLOGIST

## 2020-06-02 PROCEDURE — 90791 PSYCH DIAGNOSTIC EVALUATION: CPT | Mod: HCNC,95,, | Performed by: CLINICAL NEUROPSYCHOLOGIST

## 2020-06-02 PROCEDURE — 90791 PR PSYCHIATRIC DIAGNOSTIC EVALUATION: ICD-10-PCS | Mod: HCNC,95,, | Performed by: CLINICAL NEUROPSYCHOLOGIST

## 2020-06-02 PROCEDURE — 99499 UNLISTED E&M SERVICE: CPT | Mod: HCNC,95,, | Performed by: CLINICAL NEUROPSYCHOLOGIST
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
## 2020-06-08 ENCOUNTER — DOCUMENTATION ONLY (OUTPATIENT)
Dept: NEUROLOGY | Facility: CLINIC | Age: 40
End: 2020-06-08

## 2020-06-08 PROBLEM — R41.9 COGNITIVE COMPLAINTS: Status: ACTIVE | Noted: 2020-06-08

## 2020-06-08 NOTE — ASSESSMENT & PLAN NOTE
We will be in touch to schedule testing as soon as possible.     The patient was encouraged to continue participating in behaviors that promote brain health, including maintaining a physical exercise regimen, eating a healthy diet such as the Mediterranean diet, maintaining cognitive and social activity, and getting plenty of good restorative sleep.      Safety precautions for COVID-19 were also discussed.     She was encouraged to reach out via [facility] or by phone if she had questions/concerns.

## 2020-06-18 ENCOUNTER — PATIENT OUTREACH (OUTPATIENT)
Dept: OTHER | Facility: OTHER | Age: 40
End: 2020-06-18

## 2020-06-18 NOTE — PROGRESS NOTES
Digital Medicine: Clinician Follow-Up    I spoke with the patient today after she message me about being concerned about her elevated blood pressure readings from June 16.  Her highest blood pressure reading was 166/147, also 156/92, 136/101.  At this time she was also having significant back pain.  She says that she rested and the following day on June 17th her blood back pain had resolved.  Her blood pressure reading on June 17th was 120/83.  She says that she has been trying to monitor her sodium intake.  She wants to start walking a couple of laps around the pool.  We discussed exercise goals to help lower her blood pressure    The history is provided by the patient. No  was used.   Follow Up  Follow-up reason(s): reading review and responding to task          INTERVENTION(S)  reviewed appropriate dose schedule and encouragement/support    PLAN  patient verbalizes understanding and additional monitoring needed    average blood pressure 120/81    no med changes.  Her recent high blood pressure readings were likely due to back pain which has resolved. F/u in 12 weeks.       There are no preventive care reminders to display for this patient.    Last 5 Patient Entered Readings                                      Current 30 Day Average: 120/81     Recent Readings 6/17/2020 6/16/2020 6/16/2020 6/16/2020 6/8/2020    SBP (mmHg) 120 136 166 156 127    DBP (mmHg) 83 101 147 92 80    Pulse 66 71 68 69 74             Hypertension Medications             carvediloL (COREG) 25 MG tablet TAKE 1 TABLET(25 MG) BY MOUTH TWICE DAILY WITH MEALS    losartan-hydrochlorothiazide 100-12.5 mg (HYZAAR) 100-12.5 mg Tab Take 1 tablet by mouth once daily.                 Screenings

## 2020-06-22 ENCOUNTER — PATIENT OUTREACH (OUTPATIENT)
Dept: OTHER | Facility: OTHER | Age: 40
End: 2020-06-22

## 2020-06-23 DIAGNOSIS — G35 MULTIPLE SCLEROSIS: ICD-10-CM

## 2020-06-23 RX ORDER — DIMETHYL FUMARATE 240 MG/1
240 CAPSULE ORAL 2 TIMES DAILY
Qty: 180 CAPSULE | Refills: 1 | Status: SHIPPED | OUTPATIENT
Start: 2020-06-23 | End: 2020-06-24 | Stop reason: SDUPTHER

## 2020-06-24 DIAGNOSIS — G35 MULTIPLE SCLEROSIS: ICD-10-CM

## 2020-06-24 DIAGNOSIS — R26.9 GAIT DISTURBANCE: ICD-10-CM

## 2020-06-24 RX ORDER — DIMETHYL FUMARATE 240 MG/1
240 CAPSULE ORAL 2 TIMES DAILY
Qty: 180 CAPSULE | Refills: 1 | Status: SHIPPED | OUTPATIENT
Start: 2020-06-24 | End: 2020-07-07 | Stop reason: SDUPTHER

## 2020-06-24 RX ORDER — DALFAMPRIDINE 10 MG/1
TABLET, FILM COATED, EXTENDED RELEASE ORAL
Qty: 180 TABLET | Refills: 1 | Status: SHIPPED | OUTPATIENT
Start: 2020-06-24 | End: 2020-10-05 | Stop reason: SDUPTHER

## 2020-07-01 ENCOUNTER — PATIENT OUTREACH (OUTPATIENT)
Dept: OTHER | Facility: OTHER | Age: 40
End: 2020-07-01

## 2020-07-01 NOTE — PROGRESS NOTES
Digital Medicine: Clinician Follow-Up    I spoke with the patient today to f/u on an alert elevated BP  Reading of 136/116. She thinks her BP might have elevated due to eating salty Pistachios yesterday evening. She says her back pain also likely contributed to her her elevated reading.     The history is provided by the patient. No  was used.   Follow Up  Follow-up reason(s): reading review      Alert received.   Care Team received high BP alert.  Patient is experiencing symptomsof back pain.        INTERVENTION(S)  reviewed appropriate dose schedule, recommended diet modifications and encouragement/support    PLAN  patient verbalizes understanding and additional monitoring needed    Avr /85    I advised that she take a BP reading daily. F/u next week. Consider increasing HCTZ to 25mg          There are no preventive care reminders to display for this patient.    Last 5 Patient Entered Readings                                      Current 30 Day Average: 122/85     Recent Readings 6/30/2020 6/30/2020 6/30/2020 6/21/2020 6/21/2020    SBP (mmHg) 152 136 136 120 73    DBP (mmHg) 87 116 116 55 58    Pulse 59 59 59 65 59             Hypertension Medications             carvediloL (COREG) 25 MG tablet TAKE 1 TABLET(25 MG) BY MOUTH TWICE DAILY WITH MEALS    losartan-hydrochlorothiazide 100-12.5 mg (HYZAAR) 100-12.5 mg Tab Take 1 tablet by mouth once daily.                 Screenings

## 2020-07-10 ENCOUNTER — PATIENT OUTREACH (OUTPATIENT)
Dept: OTHER | Facility: OTHER | Age: 40
End: 2020-07-10

## 2020-07-20 NOTE — PROGRESS NOTES
09/14/2018    SUBJECTIVE:   38 y.o. female for annual exam.    Past Medical History:   Diagnosis Date    Depression     Hypertension     Migraine headache     Multiple sclerosis     Thyroid disease        History reviewed. No pertinent surgical history.    Current Outpatient Medications   Medication Sig Dispense Refill    baclofen (LIORESAL) 10 MG tablet Take 10 mg by mouth 2 (two) times daily as needed.       betamethasone dipropionate (DIPROLENE) 0.05 % cream       carvedilol (COREG) 25 MG tablet Take 1 tablet (25 mg total) by mouth 2 (two) times daily with meals. Taking daily 60 tablet 2    escitalopram oxalate (LEXAPRO) 20 MG tablet Take 1 tablet (20 mg total) by mouth once daily. 30 tablet 0    levothyroxine (SYNTHROID) 100 MCG tablet Take 1 tablet (100 mcg total) by mouth once daily. 90 tablet 1    oxybutynin (DITROPAN-XL) 10 MG 24 hr tablet Take 1 tablet (10 mg total) by mouth once daily. 30 tablet 11    polyethylene glycol (GLYCOLAX) 17 gram/dose powder Take 17 g by mouth once daily. 1530 g 2    TECFIDERA 120 mg (14)- 240 mg (46) CpDR       dalfampridine (AMPYRA) 10 mg Tb12 Take 10 mg by mouth 2 (two) times daily. 180 tablet 0    lisinopril (PRINIVIL,ZESTRIL) 30 MG tablet Take 1 tablet (30 mg total) by mouth once daily. 30 tablet 0     No current facility-administered medications for this visit.      Allergies: Betaseron [interferon beta-1b]   Patient's last menstrual period was 08/20/2018.      Well Woman:  Pap:04/2014 normal      Family History  Family History   Problem Relation Age of Onset    Hypertension Mother     Thyroid disease Mother     Diabetes Father     Stroke Father     Vaginal cancer Neg Hx     Endometrial cancer Neg Hx     Cervical cancer Neg Hx     Breast cancer Neg Hx           ROS:  Feeling well.   No dyspnea or chest pain on exertion.    No abdominal pain, change in bowel habits, black or bloody stools.    UUI when she needs to void-- stopped PT.  GYN ROS: normal  "menses, no abnormal bleeding, pelvic pain or discharge, no breast pain or new or enlarging lumps on self exam.   No neurological complaints.    OBJECTIVE:   The patient appears well, alert, oriented x 3, in no distress.  BP (!) 160/100 (BP Location: Left arm, Patient Position: Sitting, BP Method: Large (Manual))   Ht 5' 5" (1.651 m)   Wt 109.7 kg (241 lb 13.5 oz)   LMP 08/20/2018   BMI 40.25 kg/m²   ENT normal.  Neck supple. No adenopathy or thyromegaly. MAXIMILIAN.   Lungs are clear, good air entry, no wheezes, rhonchi or rales.   S1 and S2 normal, no murmurs, regular rate and rhythm.   Abdomen soft without tenderness, guarding, mass or organomegaly.   Extremities show no edema, normal peripheral pulses.   Neurological is normal, no focal findings.    BREAST EXAM: breasts appear normal, no suspicious masses, no skin or nipple changes or axillary nodes    PELVIC EXAM:   VULVA: normal appearing vulva with no masses, tenderness or lesions,   VAGINA: normal appearing vagina with normal color, thin white discharge noted, no lesions,  CERVIX: normal appearing cervix without discharge or lesions,   UTERUS: uterus is normal size, shape, consistency and nontender,   ADNEXA: no masses, difficult to assess due to body habitus  RECTAL: deferred    ASSESSMENT:   1. Well woman exam     2. Vaginal discharge     3. Encounter for screening for cervical cancer   Liquid-based pap smear, screening    HPV High Risk Genotypes, PCR   4. Obesity, unspecified classification, unspecified obesity type, unspecified whether serious comorbidity present  Ambulatory Referral to [facility] ([facility])       PLAN:     1. Well woman  --takes 2-3 weeks for results    2. Vaginal discharge  --flagyl 500 mg twice daily x 7 days  --a. avoiding feminine products such as deoderant soaps, body wash, bubble bath, douches, scented toilet paper, deoderant tampons or pads, feminine wipes, chronic pad use, etc.  b. avoiding other vulvovaginal irritants such as " long hot baths, humidity, tight, synthetic clothing, chlorine and sitting around in wet bathing suits  c. wearing cotton underwear, avoiding thong underwear and no underwear to bed  d. taking showers instead of baths and use a hair dryer on cool setting afterwards to dry  e. wearing cotton to exercise and shower immediately after exercise and change clothes  f. using polyurethane condoms without spermicide if sexually active and symptoms are triggered by intercourse    3. Contraception  --using condoms for now  --discussed options--limited to htn    4. Obesity  --medical fitness referral    5. RTC 1 year    30 minutes were spent in face to face time with this patient  90 % of this time was spent in counseling and/or coordination of care  Jamee JASMINE Marchand [facility]  Division of Female Pelvic Medicine and Reconstructive Surgery  Department of Obstetrics & Gynecology           Quality 110: Preventive Care And Screening: Influenza Immunization: Influenza Immunization Administered during Influenza season Quality 265: Biopsy Follow-Up: Biopsy results reviewed, communicated, tracked, and documented Quality 431: Preventive Care And Screening: Unhealthy Alcohol Use - Screening: Patient screened for unhealthy alcohol use using a single question and scores less than 2 times per year Detail Level: Detailed Quality 358: Patient-Centered Surgical Risk Assessment And Communication: Documentation of patient-specific risk assessment with a risk calculator based on multi-institutional clinical data, the specific risk calculator used, and communication of risk assessment from risk calculator with the patient or family. Quality 400a: One-Time Screening For Hepatitis C Virus (Hcv) For All Patients: Patient received one-time screening for HCV infection Quality 130: Documentation Of Current Medications In The Medical Record: Current Medications Documented Quality 226: Preventive Care And Screening: Tobacco Use: Screening And Cessation Intervention: Patient screened for tobacco use and is an ex/non-smoker

## 2020-07-20 NOTE — PROGRESS NOTES
"Digital Medicine: Health  Follow-Up    Patient attributes high bp readings because of " horrific back pain " due to MS. Patient is working on exercise and meal planning for this week. She is trying to reduce meat, dairy, and candy intake. I encouraged her to continue with that.    Patient reports feeling better today.                      Diet-no change to diet    No change to diet.  Patient reports eating or drinking the following: Pt stated that she will create a meal plan this week to have more salads and vegetables.    We talked about ideas of keeping a food log to avoid mindless eating.    Additional diet details:    Physical Activity-no change to routine  No change to exercise routine.       Additional physical activity details: Patient is working on upper strength exercises.          Medication Adherence-Medication adherence was assessed.        PLAN  Additional monitoring needed: per Digital Medicine PA-C pt advised to check BP daily.    Patient did not express questions or concerns and patient has contact information if needed.    Explained the importance of self-monitoring and medication adherence. Encouraged the patient to communicate with their health  for lifestyle modifications to help improve or maintain a healthy lifestyle.        There are no preventive care reminders to display for this patient.    Last 5 Patient Entered Readings                                      Current 30 Day Average: 134/81     Recent Readings 7/16/2020 7/14/2020 7/14/2020 7/12/2020 7/10/2020    SBP (mmHg) 141 129 86 126 134    DBP (mmHg) 99 82 50 67 82    Pulse 70 67 64 66 66               "

## 2020-07-20 NOTE — PROGRESS NOTES
Digital Medicine: Clinician Follow-Up    I spoke with the patient today to follow-up on her blood pressure reading.  Typically her systolic blood pressure is ranging from the 1 40s-120s and her diastolic blood pressure is ranging from 80-90s.     She is still dealing with back pain.  Her main issue at this time is that she is falling more frequently.  Her legs seem to give out on her.  She denies feeling lightheaded or dizziness prior to the falls.  She does have a history of MS.  She says that she has been doing okay limiting her sodium intake    The history is provided by the patient.   Follow-up reason(s): routine follow up.     Readings are trending up   due to back pain.    Patient is not experiencing signs/symptoms of hypotension.  Patient is not experiencing signs/symptoms of hypertension.        Last 5 Patient Entered Readings                                      Current 30 Day Average: 134/81     Recent Readings 7/16/2020 7/14/2020 7/14/2020 7/12/2020 7/10/2020    SBP (mmHg) 141 129 86 126 134    DBP (mmHg) 99 82 50 67 82    Pulse 70 67 64 66 66             Screenings    ASSESSMENT(S)  Patients BP average is 134/81 mmHg, which is above goal. Patient's BP goal is less than or equal to 130/80 per 2017 ACC/AHA Hypertension Guidelines.       PLAN  Additional monitoring needed: f/u in 3 week  Continue current therapy:  Await MD intervention: Will holf off on med changes until her frequent falls are addressed by neuro    Patient verbalizes understanding. Patient did not express questions or concerns and patient has contact information if needed.          There are no preventive care reminders to display for this patient.      Hypertension Medications             carvediloL (COREG) 25 MG tablet TAKE 1 TABLET(25 MG) BY MOUTH TWICE DAILY WITH MEALS    losartan-hydrochlorothiazide 100-12.5 mg (HYZAAR) 100-12.5 mg Tab Take 1 tablet by mouth once daily.

## 2020-07-23 ENCOUNTER — OFFICE VISIT (OUTPATIENT)
Dept: NEUROLOGY | Facility: CLINIC | Age: 40
End: 2020-07-23
Payer: MEDICARE

## 2020-07-23 DIAGNOSIS — Z74.09 IMPAIRED FUNCTIONAL MOBILITY, BALANCE, GAIT, AND ENDURANCE: ICD-10-CM

## 2020-07-23 DIAGNOSIS — G35 MULTIPLE SCLEROSIS: Primary | ICD-10-CM

## 2020-07-23 DIAGNOSIS — R25.2 SPASTICITY: ICD-10-CM

## 2020-07-23 PROCEDURE — 99499 UNLISTED E&M SERVICE: CPT | Mod: HCNC,95,, | Performed by: PSYCHIATRY & NEUROLOGY

## 2020-07-23 PROCEDURE — 99499 RISK ADDL DX/OHS AUDIT: ICD-10-PCS | Mod: HCNC,95,, | Performed by: PSYCHIATRY & NEUROLOGY

## 2020-07-23 PROCEDURE — 99215 PR OFFICE/OUTPT VISIT, EST, LEVL V, 40-54 MIN: ICD-10-PCS | Mod: HCNC,95,, | Performed by: PSYCHIATRY & NEUROLOGY

## 2020-07-23 PROCEDURE — 99215 OFFICE O/P EST HI 40 MIN: CPT | Mod: HCNC,95,, | Performed by: PSYCHIATRY & NEUROLOGY

## 2020-07-23 NOTE — PROGRESS NOTES
The patient location is: [place]  The chief complaint leading to consultation is: MS follow up    Visit type: audiovisual    Face to Face time with patient: 50 minutes of total time spent on the encounter, which includes face to face time and non-face to face time preparing to see the patient (eg, review of tests), Obtaining and/or reviewing separately obtained history, Documenting clinical information in the electronic or other health record, Independently interpreting results (not separately reported) and communicating results to the patient/family/caregiver, or Care coordination (not separately reported).         Each patient to whom he or she provides medical services by telemedicine is:  (1) informed of the relationship between the physician and patient and the respective role of any other health care provider with respect to management of the patient; and (2) notified that he or she may decline to receive medical services by telemedicine and may withdraw from such care at any time.    Notes:         Subjective:          Patient ID: Jaylon Bowles is a 39 y.o. female who presents today for a routine clinic visit for MS.      MS HPI:  Symptom onset was in 2006, had episode of bilateral ON. Diagnosed in 2007 based on MRI, LP in [place]. Symptoms of dizziness, vertigo, numbness in legs, tingling in fingers, and gait imbalance with falls. Initially on Avonex (ineffective), but switched to Copaxone. Had one relapse in 2008 with symptoms of worse gait imbalance. Received steroids which helped. Then switched to Betaseron (allergic, broke out in hives).  Started on Rebif in 2012. No further relapses. Suffers from pain in left leg and stiffness. Pain in left leg, responds to prednisone in past.   7/2018 - started Tecfidera    Today:  · DMT: dimethyl fumarate  · Side effects from DMT? No  · Taking vitamin D3 as recommended? Yes - 38758 units per week. Also taking ginseng, ginko biloba, and magnesium, black seed  "oil pill  · No new s/s of relapse or worsening.  · Falls - has had increased falls starting last week, two falls last week and one this week. No known preceding trigger aside from heat exposure the day prior to the first fall. No known triggers before the second fall. She was not using walker with either fall, tried to walk independently. Left leg is stiff and difficult to walk. Unclear how she is falling, states "I just fall." Feels like she is walking with her toes "balled up". New walker is not an issue either. Does feel like walking has gradually worsened otherwise outside of these two weeks. Only taking tizanidine once a day. Still taking Ampyra  · Exercising regularly - recumbent bike, dumbbells, and PT/OT exercises. Currently exercising every other day.  · School: accepted into [employer], may start in August.      Medications:  Current Outpatient Medications   Medication Sig    baclofen (LIORESAL) 10 MG tablet Take 1 tablet (10 mg total) by mouth every evening.    betamethasone dipropionate (DIPROLENE) 0.05 % cream     carvedilol (COREG) 25 MG tablet Take 1 tablet (25 mg total) by mouth 2 (two) times daily with meals.    cholecalciferol, vitamin D3, 1,250 mcg (50,000 unit) Tab Take 1 tablet by mouth once a week.    clotrimazole-betamethasone 1-0.05% (LOTRISONE) cream Apply topically 2 (two) times daily.    dalfampridine 10 mg Tb12 Take 1 tablet by mouth every 12 (twelve) hours.    escitalopram oxalate (LEXAPRO) 20 MG tablet Take 1 tablet (20 mg total) by mouth once daily.    levothyroxine (SYNTHROID) 112 MCG tablet Take 1 tablet (112 mcg total) by mouth before breakfast.    losartan-hydrochlorothiazide 100-12.5 mg (HYZAAR) 100-12.5 mg Tab Take 1 tablet by mouth once daily.    mometasone (ELOCON) 0.1 % ointment STEFF EXT AA QD FOR 10 DAYS    multivitamin with minerals (ONE DAILY COMPLETE ORAL) Take by mouth.    oxybutynin (DITROPAN-XL) 10 MG 24 hr tablet Take 1 tablet (10 mg total) by mouth once daily.    " pantoprazole (PROTONIX) 40 MG tablet Take 1 tablet (40 mg total) by mouth once daily.    TECFIDERA 240 mg CpDR Take 240 mg by mouth 2 (two) times daily.     No current facility-administered medications for this visit.        SOCIAL HISTORY  Social History     Tobacco Use    Smoking status: Never Smoker    Smokeless tobacco: Never Used   Substance Use Topics    Alcohol use: No     Frequency: Never     Drinks per session: Patient refused     Binge frequency: Never     Comment: social    Drug use: No     Types: Marijuana       Living arrangements - the patient lives with their family.    ROS:    REVIEW OF SYMPTOMS 5/17/2020   Do you feel abnormally tired on most days? No   Do you feel you generally sleep well? Yes   Do you have difficulty controlling your bladder?  No   Do you have difficulty controlling your bowels?  No   Do you have frequent muscle cramps, tightness or spasms in your limbs?  No   Do you have new visual symptoms?  No   Do you have worsening difficulty with your memory or thinking? No   Do you have worsening symptoms of anxiety or depression?  No   For patients who walk, Do you have more difficulty walking?  No   Have you fallen since your last visit?  Yes   For patients who use wheelchairs: Do you have any skin wounds or breakdown? Not Applicable   Do you have difficulty using your hands?  No   Do you have shooting or burning pain? No   Do you have difficulty with sexual function?  Yes   If you are sexually active, are you using birth control? Y/N  N/A Not Applicable   Do you often choke when swallowing liquids or solid food?  No   Do you experience worsening symptoms when overheated? Yes   Do you need any new equipment such as a wheelchair, walker or shower chair? Yes   Do you receive co-pay financial assistance for your principal MS medicine? No   Would you be interested in participating in an MS research trial in the future? No   For patients on Gilenya, Tecfidera, Aubagio, Rituxan, Ocrevus,  Tysabri, Lemtrada or Methotrexate, are you aware that you should NOT receive live virus vaccines?  Yes   Do you feel you have adequate family/friend support?  Yes   Do you have health insurance?   Yes   Are you currently employed? No   Do you receive SSDI/SSI?  Yes   Do you use marijuana or cannabis products? Yes   How often? Weekly   Have you been diagnosed with a urinary tract infection since your last visit here? No   Have you been diagnosed with a respiratory tract infection since your last visit here? No   Have you been to the emergency room since your last visit here? No   Have you been hospitalized since your last visit here?  No                Objective:        1. 25 foot timed walk:  No flowsheet data found.    2. 9 Hole Peg Test:  No flowsheet data found.    Neurologic Exam  MENTAL STATUS: grossly intact. Normal language, attention.   CRANIAL NERVE EXAM:  No facial asymmetry. tongue midline. LT decreased on left face. Shoulder shrug normal b/l There is no dysarthria.   MOTOR EXAM:  Strength is at least 4/5 in all groups in the lower extremities and upper extremities.   COORDINATION: mod ataxia on modified finger-to-nose exam, zenon on left.   GAIT: wide based and ataxic, cautiously walking with holding on to wall. More confident with walker      Imaging:     Results for orders placed during the hospital encounter of 02/05/20   MRI Brain Demyelinating Without Contrast    Impression Stable appearance of the brain and spinal cord again demonstrating findings compatible with the reported history of multiple sclerosis as further discussed above.  No new discrete lesions identified to indicate ongoing demyelination.    Mild degenerative change in the cervical spine with posterior central disc extrusion at C3-C4 with mild indentation of the ventral cord. Additional mild multilevel cervical and thoracic spondylosis, as above.  No significant neural foraminal narrowing or spinal canal stenosis  elsewhere.    Electronically signed by resident: Kishan Patino MD  Date:    02/05/2020  Time:    13:34    Electronically signed by: Tonio Cohen MD  Date:    02/05/2020  Time:    16:11     Results for orders placed during the hospital encounter of 02/05/20   MRI Cervical Spine Demyelinating Without Contrast    Impression Stable appearance of the brain and spinal cord again demonstrating findings compatible with the reported history of multiple sclerosis as further discussed above.  No new discrete lesions identified to indicate ongoing demyelination.    Mild degenerative change in the cervical spine with posterior central disc extrusion at C3-C4 with mild indentation of the ventral cord. Additional mild multilevel cervical and thoracic spondylosis, as above.  No significant neural foraminal narrowing or spinal canal stenosis elsewhere.    Electronically signed by resident: Kishan Patino MD  Date:    02/05/2020  Time:    13:34    Electronically signed by: Tonio Cohen MD  Date:    02/05/2020  Time:    16:11     Results for orders placed during the hospital encounter of 02/05/20   MRI Thoracic Spine Demyelinating Without Contrast    Impression Stable appearance of the brain and spinal cord again demonstrating findings compatible with the reported history of multiple sclerosis as further discussed above.  No new discrete lesions identified to indicate ongoing demyelination.    Mild degenerative change in the cervical spine with posterior central disc extrusion at C3-C4 with mild indentation of the ventral cord. Additional mild multilevel cervical and thoracic spondylosis, as above.  No significant neural foraminal narrowing or spinal canal stenosis elsewhere.    Electronically signed by resident: Kishan Patino MD  Date:    02/05/2020  Time:    13:34    Electronically signed by: Tonio Cohen MD  Date:    02/05/2020  Time:    16:11     No results found for this or any previous visit.  Results for orders  placed during the hospital encounter of 12/28/18   MRI Cervical Spine Demyelinating W W/O Contrast    Impression No significant change compared to remote prior with continued short segment T2 stir signal hyperintensities within the cervical spinal cord which may be sequela of prior demyelination in light of history.  No evidence for new cord signal abnormality or enhancement to suggest significant interval or active demyelination allowing for motion limitation.    There is superimposed mild degenerative changes most pronounced at C3/C4 with bulging disc with mild central canal stenosis without significant neural foraminal stenosis.    Please see MRI brain report for further details      Electronically signed by: Nain Dooley DO  Date:    12/28/2018  Time:    13:02     MRI Brain w/wo: 5/2018 - Age advanced cerebral volume loss with numerous scattered foci of T2/flair signal abnormality throughout the supra-and infratentorial parenchyma majority centered within the periventricular white matter. No evidence for diffusion signal abnormality or enhancement 12/2018 - no change     MRI C-Spine w/wo: 5/2018 - Ill-defined short segment cord edema signal ventrally at the C4 vertebral body level. Well-circumscribed central area of cystic change within the cervical spinal cord at the C6 vertebral body level most compatible with syringohydromyelia.  No evidence for abnormal intrathecal enhancement.  12/2018 - no change     MRI T-Spine w/wo: 5/2018 - Within limits of the study no definite cord signal abnormality to suggest edema throughout the thoracic spine.      Labs:     Lab Results   Component Value Date    KLMMHLUK05AZ 15 (L) 01/13/2020    [id number] 22 (L) 08/22/2019    [id number] 20 (L) 06/05/2019     No results found for: JCVINDEX, JCVANTIBODY  Lab Results   Component Value Date    YI2YHAZY 65.3 06/01/2020    ABSOLUTECD3 1029.0 06/01/2020    TY6ZYROS 13.7 06/01/2020    ABSOLUTECD8 215.0 06/01/2020    RA7TJTSI 51.8  06/01/2020    ABSOLUTECD4 816.0 06/01/2020    LABCD48 3.79 (H) 06/01/2020     Lab Results   Component Value Date    WBC 9.13 06/01/2020    RBC 4.08 06/01/2020    HGB 10.6 (L) 06/01/2020    HCT 33.6 (L) 06/01/2020    MCV 82 06/01/2020    MCH 26.0 (L) 06/01/2020    MCHC 31.5 (L) 06/01/2020    RDW 17.5 (H) 06/01/2020     06/01/2020    MPV 12.5 06/01/2020    GRAN 6.6 06/01/2020    GRAN 72.5 06/01/2020    LYMPH 1.4 06/01/2020    LYMPH 15.7 (L) 06/01/2020    MONO 0.7 06/01/2020    MONO 8.0 06/01/2020    EOS 0.3 06/01/2020    BASO 0.06 06/01/2020    EOSINOPHIL 2.8 06/01/2020    BASOPHIL 0.7 06/01/2020     Sodium   Date Value Ref Range Status   06/01/2020 137 136 - 145 mmol/L Final     Potassium   Date Value Ref Range Status   06/01/2020 4.0 3.5 - 5.1 mmol/L Final     Chloride   Date Value Ref Range Status   06/01/2020 102 95 - 110 mmol/L Final     CO2   Date Value Ref Range Status   06/01/2020 30 (H) 23 - 29 mmol/L Final     Glucose   Date Value Ref Range Status   06/01/2020 81 70 - 110 mg/dL Final     BUN, Bld   Date Value Ref Range Status   06/01/2020 21 (H) 6 - 20 mg/dL Final     Creatinine   Date Value Ref Range Status   06/01/2020 1.0 0.5 - 1.4 mg/dL Final     Calcium   Date Value Ref Range Status   06/01/2020 9.0 8.7 - 10.5 mg/dL Final     Total Protein   Date Value Ref Range Status   06/01/2020 8.5 (H) 6.0 - 8.4 g/dL Final     Albumin   Date Value Ref Range Status   06/01/2020 4.0 3.5 - 5.2 g/dL Final     Total Bilirubin   Date Value Ref Range Status   06/01/2020 0.4 0.1 - 1.0 mg/dL Final     Comment:     For infants and newborns, interpretation of results should be based  on gestational age, weight and in agreement with clinical  observations.  Premature Infant recommended reference ranges:  Up to 24 hours.............<8.0 mg/dL  Up to 48 hours............<12.0 mg/dL  3-5 days..................<15.0 mg/dL  6-29 days.................<15.0 mg/dL       Alkaline Phosphatase   Date Value Ref Range Status    06/01/2020 63 55 - 135 U/L Final     AST   Date Value Ref Range Status   06/01/2020 15 10 - 40 U/L Final     ALT   Date Value Ref Range Status   06/01/2020 9 (L) 10 - 44 U/L Final     Anion Gap   Date Value Ref Range Status   06/01/2020 5 (L) 8 - 16 mmol/L Final     eGFR if    Date Value Ref Range Status   06/01/2020 >60.0 >60 mL/min/1.73 m^2 Final     eGFR if non    Date Value Ref Range Status   06/01/2020 >60.0 >60 mL/min/1.73 m^2 Final     Comment:     Calculation used to obtain the estimated glomerular filtration  rate (eGFR) is the CKD-EPI equation.        Lab Results   Component Value Date    HEPBSAG Negative 12/10/2018           MS Impression and Plan:     NEURO MULTIPLE SCLEROSIS IMPRESSION:   MS Status:     Number of relapses in the past year?:  0    Clinical Progression:  Worsened    Clinical Progression comment:  Likely deconditioning, do not suspect relapse    MRI Progression:  Stable  Plan:     DMT:  No change in management    DMT comment:  Continue Tecfidera, started 7/2018    Symptom Management:  Implement change in symptom management     Next Imaging Due: 2/1/2021     Mood - improved, encouraged to continue to comply with lexapro rx and lifestyle changes    Gait Disturbance: continue Ampyra as it helps with gait stability, balance, and motor fatigue. Advised her to always use her rollator, especially outside.    Muscle spasms/spasticty: only taking tizanidine once a day, but advised her to take it 2mg bid prn. PARQ discussion held for medication. Encouraged home exercises and stretching for now to help with deconditioning.    MS counseling given    F/u with me in 3-4 months with Viviana Gentile NP. Will alternate visits  .    Problem List Items Addressed This Visit     None          Portia Mitchell MD

## 2020-07-31 PROCEDURE — 99457 PR MONITORING, PHYSIOL PARAM, REMOTE, 1ST 20 MINS, PER MONTH: ICD-10-PCS | Mod: S$GLB,,, | Performed by: INTERNAL MEDICINE

## 2020-07-31 PROCEDURE — 99457 RPM TX MGMT 1ST 20 MIN: CPT | Mod: S$GLB,,, | Performed by: INTERNAL MEDICINE

## 2020-08-06 ENCOUNTER — PATIENT MESSAGE (OUTPATIENT)
Dept: NEUROLOGY | Facility: CLINIC | Age: 40
End: 2020-08-06

## 2020-08-10 ENCOUNTER — PATIENT OUTREACH (OUTPATIENT)
Dept: OTHER | Facility: OTHER | Age: 40
End: 2020-08-10

## 2020-08-10 DIAGNOSIS — I10 ESSENTIAL HYPERTENSION: Primary | ICD-10-CM

## 2020-08-11 ENCOUNTER — PATIENT MESSAGE (OUTPATIENT)
Dept: INTERNAL MEDICINE | Facility: CLINIC | Age: 40
End: 2020-08-11

## 2020-08-20 ENCOUNTER — TELEPHONE (OUTPATIENT)
Dept: NEUROLOGY | Facility: CLINIC | Age: 40
End: 2020-08-20

## 2020-08-25 ENCOUNTER — PATIENT OUTREACH (OUTPATIENT)
Dept: OTHER | Facility: OTHER | Age: 40
End: 2020-08-25

## 2020-09-07 ENCOUNTER — PATIENT MESSAGE (OUTPATIENT)
Dept: INTERNAL MEDICINE | Facility: CLINIC | Age: 40
End: 2020-09-07

## 2020-09-08 ENCOUNTER — PATIENT MESSAGE (OUTPATIENT)
Dept: OTOLARYNGOLOGY | Facility: CLINIC | Age: 40
End: 2020-09-08

## 2020-09-08 DIAGNOSIS — H93.233 HYPERACUSIS OF BOTH EARS: Primary | ICD-10-CM

## 2020-09-08 NOTE — TELEPHONE ENCOUNTER
She has seen ENT for hyperacusis in 2019 and should follow-up.    Sent patient an e-mail. Please let her know the phone # for ENT in case she needs it. She is an established patient.

## 2020-09-09 ENCOUNTER — INITIAL CONSULT (OUTPATIENT)
Dept: NEUROLOGY | Facility: CLINIC | Age: 40
End: 2020-09-09
Payer: MEDICARE

## 2020-09-09 DIAGNOSIS — F34.1 DYSTHYMIA: ICD-10-CM

## 2020-09-09 DIAGNOSIS — G35 MULTIPLE SCLEROSIS: ICD-10-CM

## 2020-09-09 DIAGNOSIS — F41.9 ANXIETY AND DEPRESSION: ICD-10-CM

## 2020-09-09 DIAGNOSIS — F06.70 MILD NEUROCOGNITIVE DISORDER DUE TO ANOTHER MEDICAL CONDITION: Primary | ICD-10-CM

## 2020-09-09 DIAGNOSIS — F32.A ANXIETY AND DEPRESSION: ICD-10-CM

## 2020-09-09 PROCEDURE — 99499 NO LOS: ICD-10-PCS | Mod: HCNC,S$GLB,, | Performed by: CLINICAL NEUROPSYCHOLOGIST

## 2020-09-09 PROCEDURE — 96133 NRPSYC TST EVAL PHYS/QHP EA: CPT | Mod: HCNC,S$GLB,, | Performed by: CLINICAL NEUROPSYCHOLOGIST

## 2020-09-09 PROCEDURE — 96132 PR NEUROPSYCHOLOGIC TEST EVAL SVCS, 1ST HR: ICD-10-PCS | Mod: HCNC,S$GLB,, | Performed by: CLINICAL NEUROPSYCHOLOGIST

## 2020-09-09 PROCEDURE — 96133 PR NEUROPSYCHOLOGIC TEST EVAL SVCS, EA ADDTL HR: ICD-10-PCS | Mod: HCNC,S$GLB,, | Performed by: CLINICAL NEUROPSYCHOLOGIST

## 2020-09-09 PROCEDURE — 96132 NRPSYC TST EVAL PHYS/QHP 1ST: CPT | Mod: HCNC,S$GLB,, | Performed by: CLINICAL NEUROPSYCHOLOGIST

## 2020-09-09 PROCEDURE — 96139 PSYCL/NRPSYC TST TECH EA: CPT | Mod: HCNC,S$GLB,, | Performed by: CLINICAL NEUROPSYCHOLOGIST

## 2020-09-09 PROCEDURE — 96138 PSYCL/NRPSYC TECH 1ST: CPT | Mod: HCNC,S$GLB,, | Performed by: CLINICAL NEUROPSYCHOLOGIST

## 2020-09-09 PROCEDURE — 99499 UNLISTED E&M SERVICE: CPT | Mod: HCNC,S$GLB,, | Performed by: CLINICAL NEUROPSYCHOLOGIST

## 2020-09-09 PROCEDURE — 96138 PR PSYCH/NEUROPSYCH TEST ADMIN/SCORING, BY TECH, 2+ TESTS, 1ST 30 MIN: ICD-10-PCS | Mod: HCNC,S$GLB,, | Performed by: CLINICAL NEUROPSYCHOLOGIST

## 2020-09-09 PROCEDURE — 96139 PR PSYCH/NEUROPSYCH TEST ADMIN/SCORING, BY TECH, 2+ TESTS, EA ADDTL 30 MIN: ICD-10-PCS | Mod: HCNC,S$GLB,, | Performed by: CLINICAL NEUROPSYCHOLOGIST

## 2020-09-13 PROBLEM — F06.70 MILD NEUROCOGNITIVE DISORDER DUE TO ANOTHER MEDICAL CONDITION: Status: ACTIVE | Noted: 2020-06-08

## 2020-09-13 NOTE — PROGRESS NOTES
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 [employer]. Would like to work as a writer.   Learning/Attention/Behavior Difficulties: None early on. Recently thinking changes are impacting her ability to retain what she reads.   Repeated Grade(s): None  Typical Grades: Got a 4.0 GPA this past semester. She has "no idea" how she accomplished this and feels very thankful.    Occupation   Service: None  Occupational Status: Receiving disability   Primary Occupation: Current student.     Social  Family Status: Single. No children.    Support System: Good - mother   Hobbies/Activities: Loves to write, read, make jewelry, play games on her phone. Trying to learn how to play the [employer].  Current Living Situation: Lives at home with her mother     OBJECTIVE:     MENTAL STATUS AND OBSERVATIONS:   Appearance: Casually dressed and adequate grooming/hygiene.   Alertness: Attentive and alert.   Orientation: O x 4   Gait: Was in a wheelchair on the day of testing.   Motor movements/mannerisms: None observed on day of testing. Occasionally flexed/strecthed her fingers.   Vision & Hearing: Adequate for session  Speech/language: Normal in rate, rhythm, tone, and volume. No significant word finding difficulty observed. Comprehension was normal.  Mood/Affect: The patients stated mood was "content." Affect was mostly congruent with stated mood, although there were times when the patient became upset " and was tearful. Affect was observed to be anxious and frustrated on day of testing.   Interpersonal Behavior: Rapport was quickly and easily established   Suicidality/Homicidality: Denied  Hallucinations/Delusions: None evidenced or endorsed  Thought Content & Processes:Thoughts seemed logical and goal-directed.   Insight & Judgment: Appropriate  Participation in Clinical Interview: Full    PROCEDURES/TESTS ADMINISTERED: In addition to performing a review of pertinent medical records, reviewing limits to confidentiality, conducting a clinical interview, and explaining procedures, the following measures were administered: MSVT; Test of Premorbid Functioning (TOPF); Wechsler Adult Intelligence Scale, Fourth Edition (WAIS-IV) [Digit Span and Block Design subtests]; Symbol Digit Modalities Test (SDMT); Wechsler Memory Scale, Fourth Edition (WMS-IV) [Logical Memory subtest]; Wood Verbal Learning Test-Revised (HVLT-R; Form 1); Brief Visuospatial Memory Test-Revised (BVMT-R, form 1); Neuropsychological Assessment Battery (NAB) [Naming subtest, form 1]; Verbal fluency tests (FAS & animal naming; Calin et al., 2004 norms); Dell Complex Figure Test (RCFT) [copy only]; Trail Making Test, parts A and B (Calin et al., 2004 norms); Stroop Color Word Test (Yousif version); Aguilar Depression Inventory-Second Edition (BDI-2); and Generalized Anxiety Disorder - 7 Item Scale (MOY-7). Manual norms were used unless otherwise indicated.    TEST TAKING BEHAVIOR AND VALIDITY: During testing, the patient worked at a slow pace. At times, she presented in a somewhat childlike manner. She was curious about the tests and made several self-deprecating comments about her thinking and the impact that MS has had on her thinking. She was observed to be anxious and frustrated. She required additional encouragement at times to continue on with tasks. She was talkative and needed to be redirected at several time points as well. Overall, this patient  persevered throughout the evaluation. Scores on stand-alone and embedded performance validity measures were largely within normal limits, although one performance fell below cutoffs, indicating that effort and engagement may have varied throughout the evaluation. The current results, therefore, may slightly underestimate the patient's current functioning.    TEST RESULTS    Raw Score Type of Standardized Score Standardized Score Percentile/CP Descriptor   MSVT IR 85 - - - -   MSVT DR 95 - - - -   MSVT Cons 90 - - - -   ACS LM II Rec 20 - - - -   ACS RDS 7 - - - -   HVLT-R Recognition Discrimination 11 - - - -   PREMORBID FUNCTIONING Raw Score Type of Standardized Score Standardized Score Percentile/CP Descriptor   TOPF simple dem. eFSIQ - SS 90 25 Average   TOPF pred. eFSIQ -  53 Average   TOPF simple + pred. eFSIQ - SS 93 32 Average   LANGUAGE FUNCTIONING Raw Score Type of Standardized Score Standardized Score Percentile/CP Descriptor   TOPF Word Reading 43  50 Average   NAB Naming 30 Tscore 52 58 Average   FAS 20 Tscore 29 2 Below Average   Animal Naming 19 Tscore 50 50 Average   VISUOSPATIAL FUNCTIONING Raw Score Type of Standardized Score Standardized Score Percentile/CP Descriptor   WAIS-IV Block Design 34 ss 8 25 Average   RCFT Copy 24.5 - - <1 Exceptionally Low    RCFT Time to Copy 251 - - >16 WNL   BVMT-R Copy 11 - - - -   LEARNING & MEMORY Raw Score Type of Standardized Score Standardized Score Percentile/CP Descriptor   HVLT-R         Total Immediate (3, 7, 7) 17 Tscore 22 0.3 Exceptionally Low    Delayed Recall 4 Tscore <20 <1 Exceptionally Low    Retention % 57 Tscore 25 0.5 Exceptionally Low    Hits 11 - - - -   False Positives 0 - - - -   Discrimination  11 Tscore 49 46 Average   WMS-IV Subtests         LM I 15 ss 5 5 Below Average   LM II 12 ss 6 9 Low Average   LM Recognition 20 - - 3-9 Below Average   BVMT-R         IR (2, 2, 4) 8 Tscore <20 <1 Exceptionally Low    DR 4 Tscore <20 <1  Exceptionally Low    Discrimination Index 4 - - 3-5 Below Average   ATTENTION/WORKING MEMORY Raw Score Type of Standardized Score Standardized Score Percentile/CP Descriptor   WAIS-IV Digit Span 25 ss 8 25 Average         DS Forward 9 ss 8 25 Average         DS Backward 7 ss 8 25 Average         DS Sequence 9 ss 10 50 Average         Longest Digit Forward 7 - - - -         Longest Digit Backward 4 - - - -         Longest Digit Sequence 6 - - - -   MENTAL PROCESSING SPEED Raw Score Type of Standardized Score Standardized Score Percentile/CP Descriptor   SMDT Written 25 zscore -2.61 - Exceptionally Low    SMDT Oral 32 zscore -2.56 - Exceptionally Low    TMT A  76 Tscore 20 0.1 Exceptionally Low    TMT A errors 0 - - - -   Stroop: Word Reading 48 Tscore 14 <1 Exceptionally Low    Stroop: Color Naming 47 Tscore 23 0.3 Exceptionally Low    EXECUTIVE FUNCTIONING Raw Score Type of Standardized Score Standardized Score Percentile/CP Descriptor   TMT B 122 Tscore 36 8 Below Average   TMT B errors 1 - - - -   Stroop: C/W 34 Tscore 39 14 Low Average   Stroop: Interference 11 Tscore 61 86 High Average   MOOD & PERSONALITY Raw Score Type of Standardized Score Standardized Score Percentile/CP Descriptor   BDI-2 10 - - - Minimal   MOY-7 5 - - - Mild   ss = scaled score (mean = 10, SD = 3); SS = standard score (mean = 100, SD = 15); Tscore mean = 50, SD = 10; zscore (mean = 0.00, SD = 1)         BILLING  Service Description CPT Code Minutes Units   Psychiatric diagnostic evaluation by physician [id number] (previously billed)   Neurobehavioral status exam by physician [id number] (previously billed)   Each additional hour by physician [id number] (previously billed)   Test Evaluation Services --  --   Neuropsychological testing evaluation services by physician [phone]   Each additional hour by physician [id number]  2   Test Administration and Scoring --  --   Psychological or neuropsychological test administration and scoring by physician [id number]  0    Each additional 30 minutes by physician [id number]  0   Psychological or neuropsychological test administration and scoring by technician [phone]   Each additional 30 minutes by technician [id number]  5

## 2020-09-13 NOTE — ASSESSMENT & PLAN NOTE
As stated below, while most effort measures were at expectation, one fell below cutoff, suggesting that her effort/engagement may have varied. The current results, therefore, may slightly underestimate the patient's current functioning.     When results of the current evaluation are compared to average range premorbid estimates (based on both demographic information and a word reading performance), moderate deficits are seen in the patient's processing speed, executive functioning (divided attention, single trial encoding, organization, planning, letter verbal fluency, and inhibition), and learning. She had mild difficulty retrieving previously learned information, with recognition cueing somewhat helpful in pulling this information up from storage.     Given her independence in daily functioning, a diagnosis of Mild Neurocognitive Disorder is warranted, with MS considered to be the likely etiology. While psychological questionnaires were not indicative of clinically significant depressive or anxious symptoms, she did report a history of longstanding depression which never fully resolves and anxiety during the clinical interview. These factors are considered to be exacerbating factors and with adequate treatment (as well as continued stabilization of MS), this patient is likely to see some mild improvement and/or stabilization of her cognitive functioning. The following recommendations are offered:     Mental Health - In addition to taking her Lexapro as prescribed, this patient may benefit from attending therapy/counseling services to help manage mood symptoms. Referral can be placed if this patient is interested. Otherwise, continue taking medication as prescribed and focus on the brain health behaviors below, as they too can help to improve mood.     Mental Fatigue Managing Fatigue and Energy   Compensatory strategies for managing fatigue in his daily life include consciously attempting to conserve energy by  planning ahead, scheduling rest, and frequent breaks from physical/mental activity.    One central strategy in managing mental fatigue is the idea of pacing activities and taking planned breaks for rest, even if he does not feel he needs to do so. Taking just 5-20 minutes to lie down or relax without mental stimulation a few times a day (even without sleeping) can be extremely helpful in maintaining energy levels.   Further, pacing (How long can I keep going?) is heavily dependent on: the individual, the particular task (e.g., typing for a long time versus reading a book), symptom variation (e.g., Am I in a flare up?), and time of day. Being aware of this will help to set realistic deadlines, adequately communicate about your needs, and help you to see that not all tasks are created equal as you will be able to do some quicker/longer than others.     He should plan more difficult tasks for the late-morning when his cognitive energy is better. More mundane tasks should be planned for the afternoon. If this cant be a regular strategy, then engaging in compensatory strategies will be important (e.g., having planned breaks, use cognitive strategies) to maintain mental efficiency.    Processing Speed   Slowed processing speed is most apparent at new and demanding tasks. You will find that with practice you are able to engage in some tasks quicker than when you initially engaged in the task. Starting to see this will be useful, especially when you have a new or difficult task. Practice and frequent engagement in something should only improve your ability to process information in a more efficient manner.   Try to notice when you do something quicker than before. This will reinforce positive expectations as we tend to focus only our mistakes rather than our successes.    Continue using multiple modalities (e.g., listening, writing notes, asking questions, recording lectures, follow-up meetings) to learn new  "information will be helpful and is likely to allow additional time for processing, thus improving memory for the material.   Another helpful strategy is to consciously break complex or multi-step tasks down into smaller "bits," so that he can accomplish one portion of the task at a time. Such a strategy may reduce some of his frustration about feeling that he is not accomplishing what he should. As he completes smaller tasks, he will be able to see his progress at each stage rather than viewing the task demands (and his progress) as a whole.   The patient reports significant problems staying with tasks. Aside from the above strategies, he may wish to set timers to accomplish portions of tasks. For instance, he can set a timer at a certain number of minutes to finish a given task.       Attention - Staying on track   Continue using active mental strategies to avoid attentional lapses. For instance, frequently ask yourself: (1) What I am currently doing? (2) What was I doing before this? (3) What do I need to do next? Youll likely need an external cue for some time (e.g., alarm on phone, visual reminder) to integrate this into your day.     Use self talk to ask yourself why you are stopping a certain task and ask yourself whether you can stay with the task a little longer to finish what you need to do.   Pair enjoyable activities (e.g., listening to music) or provide rewards (e.g., dinner out or whatever you like) to incentivize you to stay with a task.    Sustained Attention:    Have realistic expectations of yourself and plan out what you can realistically do in a given hour, day, and weeklong period. This planning (noted above) involves writing down what you need to do and chunking it accordingly. For instance, you would make a list of all the steps involved in a given assignment, think about the time involved in each step, and plan what you can do in a given time period before your attention wanes. " This takes a lot of effort, but is worth it.    Check off tasks as you do them. This feels good and helps keep you on track.     Divided Attention: This is the kind of attention needed to do several things at once.    Most people have limited divided attention and research has shown that we are less effective when we are doing several things at once (e.g., checking email, reading online, watching television, and talking on the phone). Try to methodically engage in one task at a time to limit problems with divided attention.    Set up some organizers so you can move from one separate task to another.    Have visual cues to keep you on track when you have a lot to do. This will re-orient you and remind you. For instance you may be doing something, get distracted, but then see your notepad that has your next task outlined.      External Supports for Attention: Careful assessment of the environment can assist you with attention. This includes:    Modifying the physical space to improve attention. You may want to think about how your house and office impact your attention. If you cant modify something, then think about how other means. For instance, you may not be able to make your house quiet if you have children, but getting some ear plugs or ambient noise may improve this. Or, post a do not disturb sign when you need some space for studying or work.    Get organized to reduce distractibility! Develop files at home, on your computer, and even with your email.    If you can afford to, then think about electronic devices. They have alarms to remind you along with voice recorders. Keep a planner with you or use your phone planner    Memory    Attention-based strategies to improve memory: Remember that inattention and lack of focus are major culprits to forgetting information so be sure and practice paying attention for adequate learning of information. Patients will rely on passive attention to remembering  something (e.g., yeah, uh-huh approach) and find they cannot recall it earlier. We recommend the following to improve attention, which may aid in later recall:    Look at the person as they are speaking to you.    Paraphrase as they are speaking   Write down important pieces of information    Ask them to repeat if you zone out.    Have them simplify and reduce information that you need to attend to during conversation.    Have visual cues to remind you if you need to do something later.    Processing Speed-based strategies to improve memory: Using multiple modalities (e.g., listening, writing notes, asking questions, recording, follow-up meetings with bosses) to learn new information also can be helpful and is likely to allow additional time for processing, thus improving memory for the material.     Memory Strategies: Absorbing Information   Simplify - Use easier words and shorter sentences. Break down things into steps.   Restate - Put information into your own words.  Does it make sense? This allows you and others to test for understanding.   Link - If possible, associate new information with something you already know.   Organize - Group items into meaningful categories.  You can organize by time, location, color, shape, size, function, and even age.  Be creative.    Break it up - Dont try to take in too much at one time. Concentrate for a few minutes, then move on to something else. You may learn more in short sessions than one long one.    Memory Strategies: Storing Information   Rehearse - Immediately after seeing/hearing something, try to recall it.  Wait a few minutes, then check again.  Gradually lengthen the intervals between rehearsals.   Repetition of learned material is critical to ensure storage of information to be learned.     Practice good memory hygiene:   Engage in regular exercise, which increases alertness and arousal, which can improve attention and focus.    Get a good  nights sleep, as sleep is necessary for memory consolidation. Caffeine intake in the afternoon and evening, as well as stuffing oneself at supper, can decrease the quality of restful sleep throughout the night. Additionally, bedtime and wake-up times should be consistent every night and morning so the body becomes used to a single sleep/awake routine.   Eat healthy foods and balanced meals. It is notable that research indicates certain nutrients may aid in brain function, such as B vitamins (especially B6, B12, and folic acid), antioxidants (such as vitamins C and E, and beta carotene), and Omega-3 fatty acids. Talk with your physician or nutritionist.    Prospective Memory: This involves the ability to remember to do something. Several strategies can be helpful:   Use posted visual reminders in high-traffic areas (e.g., bathroom or kitchen)   Have a daily to-do list that you check to stay on track   If you have a smart-phone, research some to-do list apps that may be helpful   Pair a frequently performed task (e.g., drinking your morning coffee) with a new task you may forget (e.g., a new bill you need to pay). This will remind you to do it.     Behaviors That Promote Brain Health - This patient is encouraged to participate in behaviors known to promote brain health, including maintaining a regular physical exercise regimen, eating a healthy diet such as the Mediterranean/ MIND diet, maintaining both cognitive and social activities, and getting good, quality sleep.    Re-evaluation PRN. This evaluation can serve as a baseline for comparison.

## 2020-09-22 NOTE — PROGRESS NOTES
"Digital Medicine: Health  Follow-Up    The history is provided by the patient.                 Patient needs assistance troubleshooting: patient reminder needed.      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 She stays up late and notes drinking " a lot" of coffee.     She checks her blood pressure after taking her medication in the mornings.           Diet-no change to diet  24 hour dietary recall  No change to diet.  Breakfast is typically between. Home made english egg muffin.  Dinner is typically between. Salad + protein.   Snacks are typically. Dry fruits/ popcorn/ gummies .    Patient reports eating or drinking the following: Her moms cooks, she doesn't know if she uses salt, but she states that " it doesn't taste salty". She does not add salt to table food.   She reads food labels for sodium content.       Physical Activity-Change      Additional physical activity details: She does not.    She complained of MS flare ups; fatigue.                Addressed patient questions and patient has my contact information if needed prior to next outreach. Patient verbalizes understanding.      Explained the importance of self-monitoring and medication adherence. Encouraged the patient to communicate with their health  for lifestyle modifications to help improve or maintain a healthy lifestyle.            There are no preventive care reminders to display for this patient.    Last 5 Patient Entered Readings                                      Current 30 Day Average: 114/70     Recent Readings 9/14/2020 9/11/2020 9/7/2020 8/26/2020 8/10/2020    SBP (mmHg) 115 137 115 90 118    DBP (mmHg) 74 82 63 60 68    Pulse 66 69 65 61 63               "

## 2020-09-23 ENCOUNTER — PATIENT MESSAGE (OUTPATIENT)
Dept: NEUROLOGY | Facility: CLINIC | Age: 40
End: 2020-09-23

## 2020-09-23 ENCOUNTER — OFFICE VISIT (OUTPATIENT)
Dept: NEUROLOGY | Facility: CLINIC | Age: 40
End: 2020-09-23
Payer: MEDICARE

## 2020-09-23 DIAGNOSIS — G35 MULTIPLE SCLEROSIS: ICD-10-CM

## 2020-09-23 DIAGNOSIS — F34.1 DYSTHYMIA: ICD-10-CM

## 2020-09-23 DIAGNOSIS — F06.70 MILD NEUROCOGNITIVE DISORDER DUE TO ANOTHER MEDICAL CONDITION: ICD-10-CM

## 2020-09-23 PROCEDURE — 99499 UNLISTED E&M SERVICE: CPT | Mod: GT,HCNC,95, | Performed by: CLINICAL NEUROPSYCHOLOGIST

## 2020-09-23 PROCEDURE — 99499 NO LOS: ICD-10-PCS | Mod: HCNC,95,, | Performed by: CLINICAL NEUROPSYCHOLOGIST

## 2020-09-23 NOTE — PROGRESS NOTES
NEUROPSYCHOLOGICAL EVALUATION FEEDBACK    TELEMEDICINE DETAILS:   The patient location is: home  The chief complaint leading to consultation is: feedback regarding neuropsychological test results  Visit type: Virtual visit with synchronous audio and video  Total time spent with patient: 44 minutes  Each patient to whom he or she provides medical services by telemedicine is: (1) informed of the relationship between the physician and patient and the respective role of any other health care provider with respect to management of the patient; and (2) notified that he or she may decline to receive medical services by telemedicine and may withdraw from such care at any time.  Notes: See below.    Jaylon Bowles attended a feedback session today.  We discussed the results of the neuropsychological evaluation and I gave time to discuss questions and concerns. For full evaluation details, please see the note from this provider dated 9/9/2020 . A copy of the report was provided via My Chart.     Marija Arboleda, PhD  Licensed Clinical Neuropsychologist  [facility] - Department of Neurology

## 2020-09-27 ENCOUNTER — PATIENT MESSAGE (OUTPATIENT)
Dept: NEUROLOGY | Facility: CLINIC | Age: 40
End: 2020-09-27

## 2020-09-27 DIAGNOSIS — Z51.81 ENCOUNTER FOR MONITORING IMMUNOMODULATING THERAPY: ICD-10-CM

## 2020-09-27 DIAGNOSIS — G35 MULTIPLE SCLEROSIS: Primary | ICD-10-CM

## 2020-09-27 DIAGNOSIS — Z79.899 ENCOUNTER FOR MONITORING IMMUNOMODULATING THERAPY: ICD-10-CM

## 2020-09-28 ENCOUNTER — PATIENT MESSAGE (OUTPATIENT)
Dept: NEUROLOGY | Facility: CLINIC | Age: 40
End: 2020-09-28

## 2020-09-29 ENCOUNTER — PATIENT MESSAGE (OUTPATIENT)
Dept: NEUROLOGY | Facility: CLINIC | Age: 40
End: 2020-09-29

## 2020-09-29 NOTE — Clinical Note
How does the vocational rehab work? Jaylon is disabled but she would like to eventually work again. At your next session, could you pick her brain about vocational rehab? If she's interested, maybe we can set this up for her. Thanks! [FreeTextEntry1] : **********************ALL PRINTED SCRIPTS TO BE FILLED THROUGH [facility]*************************\par \par Mr. Corrales is a 54 year old male who has a history of asthma, allergy, GERD, and abnormal CT. He is s/p WTC exposure. He now presents to the office today for a follow up pulmonary visit, now s/p complicated cervical spine surgery (12/2019) now stable- poor sleep. \par \par problem 1: abnormal chest CT - c/w inflammation\par -PPD Quantiferon gold (-), ESR / Hypersensitivity panel (wnl)\par -Follow up Chest CT - 3/2021\par \par problem 2: asthma (stable)\par -continue QVar 80 puffs BID\par -continue to use Symbicort 160 at 2 inhalations BID\par -continue Spiriva at 1 inhalation QD \par -continue to use Singulair 10 mg QHS \par -continue to use Ventolin PRN / Xopenex 0.63 via nebulizer q6H prn\par \par -Asthma is  believed to be caused by inherited (genetic) and environmental factor, but its exact cause is unknown. Asthma may be triggered by allergens, lung infections, or irritants in the air. Asthma triggers are different for each person\par -Inhaler technique reviewed as well as oral hygiene techniques reviewed with patient. Avoidance of cold air, extremes of temperature, rescue inhaler should be used before exercise. Order of medication reviewed with patient. Recommended use of a cool mist humidifier in the bedroom.\par \par problem 3: cough; ? Sensory neuropathic cough \par -continue Amitriptyline 10 mg up to TID, prn (DC if able)\par -continue Promethazine DM prn\par Sensory neuropathic cough is an etiology of cough that is often realized once someone has been ruled out for common disease such as: asthma, COPD, eosinophilic bronchitis, bronchiectasis, post nasal drip, and GERD. It sometimes develops following a URI, herpes zoster outbreak in pharynx or thyroid or cervical spine injury. However, many patients have no identifiable antecedent explanation. \par \par problem 4: allergic rhinitis \par -recommended to use "Navage" nasal rinse device \par -continue Claritin 10 mg QAM \par -continue to use Xyzal 5 mg QHS\par -continue to use Omnaris 1 sniff/nostril BID\par -continue Olopatadine 1 sniff each nostril BID (.6)\par -follow up with Dr. Ok Islas  - s/p turbinectomy \par -Environmental measures for allergies were encouraged including mattress and pillow cover, air purifier, and environmental controls.\par \par problem 5: GERD (FeeSST- c/w LPR)\par -continue to use Dexilant 60 mg before first meal\par -continue to use Pepcid 40 mg QHS \par \par -Rule of 2s: avoid eating too much, eating too late, eating too spicy, eating two hours before bed\par -Things to avoid including overeating, spicy foods, tight clothing, eating within three hours of bed, this list is not all inclusive. \par -For treatment of reflux, possible options discussed including diet control, H2 blockers, PPIs, as well as coating motility agents discussed as treatment options. Timing of meals and proximity of last meal to sleep were discussed. If symptoms persist, a formal gastrointestinal evaluation is needed. \par \par problem 6: insomnia\par - medical marijuana\par -continue to use Ambien PRN \par -recommended good sleep hygiene\par -Good sleep hygiene was encouraged including avoiding watching television an hour before bed, keeping caffeine at a low,  avoiding reading, television, or anything, in bed, no drinking any liquids three hours before bedtime, and only getting into bed when tired and ready for sleep.\par \par problem 7: foot cramps (resolved)\par -continue to use MyoCalm PM\par \par Problem 8a: COVID-19 precautionary Immune Support Recommendations:\par -OTC Vitamin C 500mg BID \par -OTC Quercetin 250-500mg BID \par -OTC Zinc 75-100mg per day \par -OTC Melatonin 1 or 2mg a night \par -OTC Vitamin D 1-4000mg per day \par -OTC Tonic Water 8oz per day\par -Water 0.5-1 gallon per day\par \par problem 8: health maintenance\par -received flu shot in - 9/2020\par -recommended strep pneumonia vaccines: Prevnar-13 vaccine, followed by Pneumo vaccine 23 on year following\par -recommended early intervention for URIs\par -recommended osteoporosis evaluations\par -recommended early dermatological evaluations\par -recommended after the age of 50 to the age of 70, colonoscopy every 5 years\par -encouraged early intervention\par \par \par F/U in 4 months\par He is encouraged to call with any changes, concerns, or questions. \par \par ***********************ALL PRINTED SCRIPTS TO BE FILLED THROUGH [facility]************************************

## 2020-10-01 ENCOUNTER — LAB VISIT (OUTPATIENT)
Dept: LAB | Facility: HOSPITAL | Age: 40
End: 2020-10-01
Attending: PSYCHIATRY & NEUROLOGY
Payer: MEDICARE

## 2020-10-01 ENCOUNTER — PATIENT MESSAGE (OUTPATIENT)
Dept: NEUROLOGY | Facility: CLINIC | Age: 40
End: 2020-10-01

## 2020-10-01 DIAGNOSIS — G35 MULTIPLE SCLEROSIS: ICD-10-CM

## 2020-10-01 DIAGNOSIS — Z79.899 ENCOUNTER FOR MONITORING IMMUNOMODULATING THERAPY: ICD-10-CM

## 2020-10-01 DIAGNOSIS — Z51.81 ENCOUNTER FOR MONITORING IMMUNOMODULATING THERAPY: ICD-10-CM

## 2020-10-01 LAB
ALBUMIN SERPL BCP-MCNC: 3.9 G/DL (ref 3.5–5.2)
ALP SERPL-CCNC: 63 U/L (ref 55–135)
ALT SERPL W/O P-5'-P-CCNC: 17 U/L (ref 10–44)
ANION GAP SERPL CALC-SCNC: 11 MMOL/L (ref 8–16)
AST SERPL-CCNC: 21 U/L (ref 10–40)
BASOPHILS # BLD AUTO: 0.05 K/UL (ref 0–0.2)
BASOPHILS NFR BLD: 0.5 % (ref 0–1.9)
BILIRUB SERPL-MCNC: 0.8 MG/DL (ref 0.1–1)
BUN SERPL-MCNC: 12 MG/DL (ref 6–20)
CALCIUM SERPL-MCNC: 9.5 MG/DL (ref 8.7–10.5)
CHLORIDE SERPL-SCNC: 104 MMOL/L (ref 95–110)
CO2 SERPL-SCNC: 23 MMOL/L (ref 23–29)
CREAT SERPL-MCNC: 0.8 MG/DL (ref 0.5–1.4)
DIFFERENTIAL METHOD: ABNORMAL
EOSINOPHIL # BLD AUTO: 0.1 K/UL (ref 0–0.5)
EOSINOPHIL NFR BLD: 1 % (ref 0–8)
ERYTHROCYTE [DISTWIDTH] IN BLOOD BY AUTOMATED COUNT: 16.3 % (ref 11.5–14.5)
EST. GFR  (AFRICAN AMERICAN): >60 ML/MIN/1.73 M^2
EST. GFR  (NON AFRICAN AMERICAN): >60 ML/MIN/1.73 M^2
GLUCOSE SERPL-MCNC: 111 MG/DL (ref 70–110)
HCT VFR BLD AUTO: 35.4 % (ref 37–48.5)
HGB BLD-MCNC: 11.9 G/DL (ref 12–16)
IMM GRANULOCYTES # BLD AUTO: 0.04 K/UL (ref 0–0.04)
IMM GRANULOCYTES NFR BLD AUTO: 0.4 % (ref 0–0.5)
LYMPHOCYTES # BLD AUTO: 1.3 K/UL (ref 1–4.8)
LYMPHOCYTES NFR BLD: 12.5 % (ref 18–48)
MCH RBC QN AUTO: 27.4 PG (ref 27–31)
MCHC RBC AUTO-ENTMCNC: 33.6 G/DL (ref 32–36)
MCV RBC AUTO: 81 FL (ref 82–98)
MONOCYTES # BLD AUTO: 0.5 K/UL (ref 0.3–1)
MONOCYTES NFR BLD: 4.6 % (ref 4–15)
NEUTROPHILS # BLD AUTO: 8.1 K/UL (ref 1.8–7.7)
NEUTROPHILS NFR BLD: 81 % (ref 38–73)
NRBC BLD-RTO: 0 /100 WBC
PLATELET # BLD AUTO: 222 K/UL (ref 150–350)
PMV BLD AUTO: 11.9 FL (ref 9.2–12.9)
POTASSIUM SERPL-SCNC: 4 MMOL/L (ref 3.5–5.1)
PROT SERPL-MCNC: 8.7 G/DL (ref 6–8.4)
RBC # BLD AUTO: 4.35 M/UL (ref 4–5.4)
SODIUM SERPL-SCNC: 138 MMOL/L (ref 136–145)
WBC # BLD AUTO: 10.01 K/UL (ref 3.9–12.7)

## 2020-10-01 PROCEDURE — 85025 COMPLETE CBC W/AUTO DIFF WBC: CPT | Mod: HCNC

## 2020-10-01 PROCEDURE — 80053 COMPREHEN METABOLIC PANEL: CPT | Mod: HCNC

## 2020-10-01 PROCEDURE — 36415 COLL VENOUS BLD VENIPUNCTURE: CPT | Mod: HCNC

## 2020-10-01 PROCEDURE — 86703 HIV-1/HIV-2 1 RESULT ANTBDY: CPT | Mod: HCNC

## 2020-10-02 ENCOUNTER — PATIENT MESSAGE (OUTPATIENT)
Dept: NEUROLOGY | Facility: CLINIC | Age: 40
End: 2020-10-02

## 2020-10-02 ENCOUNTER — TELEPHONE (OUTPATIENT)
Dept: NEUROLOGY | Facility: CLINIC | Age: 40
End: 2020-10-02

## 2020-10-02 DIAGNOSIS — R26.9 GAIT DISTURBANCE: Primary | ICD-10-CM

## 2020-10-02 DIAGNOSIS — G35 MULTIPLE SCLEROSIS: ICD-10-CM

## 2020-10-02 LAB — HIV 1+2 AB+HIV1 P24 AG SERPL QL IA: NEGATIVE

## 2020-10-02 NOTE — TELEPHONE ENCOUNTER
----- Message from Tremaine Degroot sent at 10/2/2020  4:44 PM CDT -----  [facility] asking for prior authorization please contact           Contact info

## 2020-10-05 RX ORDER — DALFAMPRIDINE 10 MG/1
10 TABLET, FILM COATED, EXTENDED RELEASE ORAL EVERY 12 HOURS
Qty: 60 TABLET | Refills: 5 | OUTPATIENT
Start: 2020-10-05 | End: 2021-10-05

## 2020-10-05 NOTE — TELEPHONE ENCOUNTER
Notified via fax from [facility] that a 30 day supply for Ampyra ER 10 mg tablets has been approved through 12/31/21. Made pt aware. Called in a 30 day supply of Ampyra with 5 refills to [place] to pharmacist[facility].

## 2020-10-10 ENCOUNTER — PATIENT MESSAGE (OUTPATIENT)
Dept: NEUROLOGY | Facility: CLINIC | Age: 40
End: 2020-10-10

## 2020-10-23 ENCOUNTER — PATIENT OUTREACH (OUTPATIENT)
Dept: OTHER | Facility: OTHER | Age: 40
End: 2020-10-23

## 2020-10-26 ENCOUNTER — OFFICE VISIT (OUTPATIENT)
Dept: NEUROLOGY | Facility: CLINIC | Age: 40
End: 2020-10-26
Payer: MEDICARE

## 2020-10-26 DIAGNOSIS — F34.1 DYSTHYMIA: ICD-10-CM

## 2020-10-26 DIAGNOSIS — F32.A ANXIETY AND DEPRESSION: ICD-10-CM

## 2020-10-26 DIAGNOSIS — F41.9 ANXIETY AND DEPRESSION: ICD-10-CM

## 2020-10-26 DIAGNOSIS — G35 MULTIPLE SCLEROSIS: ICD-10-CM

## 2020-10-26 DIAGNOSIS — F06.70 MILD NEUROCOGNITIVE DISORDER DUE TO ANOTHER MEDICAL CONDITION: Primary | ICD-10-CM

## 2020-10-26 PROCEDURE — 99499 NO LOS: ICD-10-PCS | Mod: HCNC,95,, | Performed by: CLINICAL NEUROPSYCHOLOGIST

## 2020-10-26 PROCEDURE — 96116 PR NEUROBEHAVIORAL STATUS EXAM BY PSYCH/PHYS: ICD-10-PCS | Mod: HCNC,95,, | Performed by: CLINICAL NEUROPSYCHOLOGIST

## 2020-10-26 PROCEDURE — 99499 UNLISTED E&M SERVICE: CPT | Mod: HCNC,95,, | Performed by: CLINICAL NEUROPSYCHOLOGIST

## 2020-10-26 PROCEDURE — 96116 NUBHVL XM PHYS/QHP 1ST HR: CPT | Mod: HCNC,95,, | Performed by: CLINICAL NEUROPSYCHOLOGIST

## 2020-10-26 NOTE — PROGRESS NOTES
"NEUROPSYCHOLOGICAL EVALUATION - FOLLOW UP - CONFIDENTIAL     Referring Provider: Viviana Gentile, APRN,* & Marija Arboleda, PhD  Medical Necessity: Evaluate cognitive functioning, treatment planning/management, and supportive therapy in the setting of MS and cognitive changes  Date Conducted:  10/26/2020  Present At Visit: The patient  [id number]/[id number]= 60 minutes   Referral Diagnoses: G35 (ICD-10-CM) - Multiple sclerosis     R41.89 (ICD-10-CM) - Cognitive change  Consent: The patient expressed an understanding of the purpose of the evaluation and consented to all procedures. We discussed the limits of confidentiality and discussed an emergency plan.    ASSESSMENT & PLAN:     Ms. Jaylon Bowles is an 40 y.o., female with her bachelors degree  who was referred for a neuropsychological evaluation in the setting of MS and cognitive changes.      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          Current Assessment & Plan     Discussed study habits and reviewed other options to help her study.   Discussed importance of taking breaks, setting up the environment for success.   Checked-in on mood and how that may be impacting her performance.   Supportive therapy provided.   Patient in agreement with this plan.   RTC PRN.         Multiple sclerosis       Psychiatric    Anxiety and depression    Dysthymia    Overview     Longstanding depression, sometimes better than others but does not fully resolve.   Prescribed Lexapro 20 mg  Not currently in counseling/therapy           Thank you for allowing me to assist in Ms. Bowles's care. If you have any questions, please contact me at [phone].      Marija Arboleda, PhD  Licensed Clinical " Neuropsychologist  [facility] - Department of Neurology    SUBJECTIVE      Ms. Jaylon Bowles is an 39 y.o., female with her bachelors degree who was referred for a neuropsychological evaluation in the setting of MS and cognitive changes. She was seen by this provider on  6/2/2020 & 9/9/2020 for neuropsychological evaluation and diagnosed with Mild Neurocognitive Disorder due to another medical condition (MS), anxiety, and dysthymia. She returns today for a check-in appointment and supportive therapy in the context of poor study skills and concern over her class performances.     Feels like her mind is congested and she is not studying as well as she previously was. Currently has Cs and Ds. Classes - taking accounting information systems and quickbook. Quickbook getting an A in. Other class giving way too much infromation for her to do well. Has an exam this Thursday. Gets double time, so gets an hour thirty minutes to take it in. Needs to get a B or A on the next test in order to pass. Feels comfortable with 50% of material on the test so far.     Study habits - taking notes. She writes down study questions and then she reads the question and tries to answer it before she looks at the answer. Highlighting text book. And then rehighlight the definitions. The words seem all the same.     Study environment - a lot of distraction she has this semester. Aunt just passed away, mom's doctors appointments. What keeps her pushing through. I am not a quitter. [place] and meditate a lot. Hasn't started making jewelry yet.     During the day she studies. Then takes a nap. Waking up a lot later. 7 AM and gets started. Does 2 hours of studying. Takes a break for price is right. Showers, cleans, then comes back to study again. 2 hours of studying. Then spends time with mom. Then 8/9 PM starts studying again, doing homework, then studying until 2 or 3 AM. Best studying at night. Her mom is settled and it's quiet, so  "that's the best time for her. Gets on the computer, reads the notes she has, makes sure she doesn't have anything to do for the other class and if so, will work on that.     OBJECTIVE:     MENTAL STATUS AND OBSERVATIONS:   Appearance: Casually dressed and adequate grooming/hygiene.   Alertness: Attentive and alert.   Orientation: O x 4   Gait: Unable to assess   Motor movements/mannerisms: Unable to assess   Vision & Hearing: Adequate for session  Speech/language: Normal in rate, rhythm, tone, and volume. No significant word finding difficulty observed. Comprehension was normal.  Mood/Affect: The patients stated mood was "nervous." Affect was mostly congruent with stated mood, although there were times when the patient became upset and was tearful.    Interpersonal Behavior: Rapport was quickly and easily established   Suicidality/Homicidality: Denied  Hallucinations/Delusions: None evidenced or endorsed  Thought Content & Processes:Thoughts seemed logical and goal-directed.   Insight & Judgment: Appropriate  Participation in Clinical Interview: Full    PROCEDURES/TESTS ADMINISTERED: Conducting a mental status examination and check-in appointment, and providing supportive therapy.   "

## 2020-10-27 ENCOUNTER — PATIENT MESSAGE (OUTPATIENT)
Dept: NEUROLOGY | Facility: CLINIC | Age: 40
End: 2020-10-27

## 2020-10-28 NOTE — ASSESSMENT & PLAN NOTE
Discussed study habits and reviewed other options to help her study.   Discussed importance of taking breaks, setting up the environment for success.   Checked-in on mood and how that may be impacting her performance.   Supportive therapy provided.   Patient in agreement with this plan.   RTC PRN.

## 2020-11-02 ENCOUNTER — PATIENT MESSAGE (OUTPATIENT)
Dept: NEUROLOGY | Facility: CLINIC | Age: 40
End: 2020-11-02

## 2020-11-02 ENCOUNTER — PATIENT MESSAGE (OUTPATIENT)
Dept: PSYCHIATRY | Facility: CLINIC | Age: 40
End: 2020-11-02

## 2020-11-03 ENCOUNTER — PATIENT OUTREACH (OUTPATIENT)
Dept: ADMINISTRATIVE | Facility: OTHER | Age: 40
End: 2020-11-03

## 2020-11-06 ENCOUNTER — PATIENT MESSAGE (OUTPATIENT)
Dept: NEUROLOGY | Facility: CLINIC | Age: 40
End: 2020-11-06

## 2020-11-13 ENCOUNTER — PATIENT MESSAGE (OUTPATIENT)
Dept: INTERNAL MEDICINE | Facility: CLINIC | Age: 40
End: 2020-11-13

## 2020-11-13 DIAGNOSIS — R79.89 ABNORMAL CBC: ICD-10-CM

## 2020-11-13 DIAGNOSIS — R73.09 OTHER ABNORMAL GLUCOSE: ICD-10-CM

## 2020-11-13 DIAGNOSIS — E55.9 VITAMIN D INSUFFICIENCY: ICD-10-CM

## 2020-11-13 DIAGNOSIS — Z00.00 ANNUAL PHYSICAL EXAM: ICD-10-CM

## 2020-11-13 DIAGNOSIS — E03.9 HYPOTHYROIDISM, UNSPECIFIED TYPE: ICD-10-CM

## 2020-11-13 DIAGNOSIS — I10 ESSENTIAL HYPERTENSION: Primary | ICD-10-CM

## 2020-11-16 ENCOUNTER — PATIENT MESSAGE (OUTPATIENT)
Dept: INTERNAL MEDICINE | Facility: CLINIC | Age: 40
End: 2020-11-16

## 2020-11-27 RX ORDER — LEVOTHYROXINE SODIUM 112 UG/1
TABLET ORAL
Qty: 90 TABLET | Refills: 0 | Status: SHIPPED | OUTPATIENT
Start: 2020-11-27 | End: 2021-01-25 | Stop reason: SDUPTHER

## 2020-11-29 ENCOUNTER — PATIENT MESSAGE (OUTPATIENT)
Dept: NEUROLOGY | Facility: CLINIC | Age: 40
End: 2020-11-29

## 2020-12-02 DIAGNOSIS — G35 MULTIPLE SCLEROSIS: ICD-10-CM

## 2020-12-02 DIAGNOSIS — R25.2 SPASTICITY: Primary | ICD-10-CM

## 2020-12-02 NOTE — TELEPHONE ENCOUNTER
"[facility] "plan does not cover tizandine 2mg capsules. Per Rx benefits alternative medications include tizandine tablets hcl.  "

## 2020-12-03 ENCOUNTER — PATIENT MESSAGE (OUTPATIENT)
Dept: NEUROLOGY | Facility: CLINIC | Age: 40
End: 2020-12-03

## 2020-12-07 ENCOUNTER — PATIENT MESSAGE (OUTPATIENT)
Dept: OTHER | Facility: OTHER | Age: 40
End: 2020-12-07

## 2020-12-07 ENCOUNTER — PATIENT MESSAGE (OUTPATIENT)
Dept: ADMINISTRATIVE | Facility: HOSPITAL | Age: 40
End: 2020-12-07

## 2020-12-09 RX ORDER — TIZANIDINE 2 MG/1
2 TABLET ORAL 2 TIMES DAILY PRN
Qty: 60 TABLET | Refills: 2 | Status: SHIPPED | OUTPATIENT
Start: 2020-12-09 | End: 2020-12-19

## 2020-12-15 ENCOUNTER — PATIENT OUTREACH (OUTPATIENT)
Dept: OTHER | Facility: OTHER | Age: 40
End: 2020-12-15

## 2020-12-15 DIAGNOSIS — G35 MULTIPLE SCLEROSIS: ICD-10-CM

## 2020-12-15 NOTE — PROGRESS NOTES
Digital Medicine: Health  Follow-Up    The history is provided by the patient.                 Patient needs assistance troubleshooting: patient reminder needed.      Topics Covered on Call: Diet    Additional Follow-up details: Patient reports doing well without concern. Regarding lack of blood pressure readings she reports that she took a class last semester that was very stressful. Notes that she was drinking more coffee and staying up late, so she didn't want to check her blood pressure because she knew it would be elevated. She reports that she will start submitting readings today.             Diet-no change to diet    No change to diet.  Patient reports eating or drinking the following: She reports no changes. She reports cooking with natural spices, herbs and a small amount of salt.       Physical Activity-no change to routine  No change to exercise routine.           Additional monitoring needed. Submit weekly readings.       Addressed patient questions and patient has my contact information if needed prior to next outreach. Patient verbalizes understanding.      Explained the importance of self-monitoring and medication adherence. Encouraged the patient to communicate with their health  for lifestyle modifications to help improve or maintain a healthy lifestyle.               There are no preventive care reminders to display for this patient.      Last 5 Patient Entered Readings                                      Current 30 Day Average:      Recent Readings 10/5/2020 10/5/2020 10/2/2020 9/25/2020 9/14/2020    SBP (mmHg) 96 83 121 109 115    DBP (mmHg) 67 47 67 40 74    Pulse 69 74 70 61 66

## 2020-12-16 RX ORDER — DIMETHYL FUMARATE 240 MG/1
240 CAPSULE ORAL 2 TIMES DAILY
Qty: 60 CAPSULE | Refills: 0 | Status: SHIPPED | OUTPATIENT
Start: 2020-12-16 | End: 2021-02-23 | Stop reason: SDUPTHER

## 2020-12-18 ENCOUNTER — PATIENT MESSAGE (OUTPATIENT)
Dept: NEUROLOGY | Facility: CLINIC | Age: 40
End: 2020-12-18

## 2020-12-22 ENCOUNTER — PATIENT MESSAGE (OUTPATIENT)
Dept: INTERNAL MEDICINE | Facility: CLINIC | Age: 40
End: 2020-12-22

## 2020-12-22 ENCOUNTER — PATIENT MESSAGE (OUTPATIENT)
Dept: UROGYNECOLOGY | Facility: CLINIC | Age: 40
End: 2020-12-22

## 2020-12-23 ENCOUNTER — PATIENT MESSAGE (OUTPATIENT)
Dept: INTERNAL MEDICINE | Facility: CLINIC | Age: 40
End: 2020-12-23

## 2020-12-23 RX ORDER — DICLOFENAC SODIUM 50 MG/1
50 TABLET, DELAYED RELEASE ORAL 2 TIMES DAILY PRN
Qty: 14 TABLET | Refills: 0 | Status: SHIPPED | OUTPATIENT
Start: 2020-12-23 | End: 2020-12-30

## 2020-12-31 ENCOUNTER — PATIENT OUTREACH (OUTPATIENT)
Dept: ADMINISTRATIVE | Facility: OTHER | Age: 40
End: 2020-12-31

## 2020-12-31 NOTE — PROGRESS NOTES
Care Everywhere: updated  Immunization: updated  Health Maintenance: updated  Media Review:   Legacy Review:   Order placed:   Upcoming appts:  Scheduling ticket to schedule annual pcp visit sent to patient's portal on 11.3.2020

## 2021-01-04 ENCOUNTER — PATIENT MESSAGE (OUTPATIENT)
Dept: ADMINISTRATIVE | Facility: HOSPITAL | Age: 41
End: 2021-01-04

## 2021-01-05 ENCOUNTER — LAB VISIT (OUTPATIENT)
Dept: LAB | Facility: HOSPITAL | Age: 41
End: 2021-01-05
Attending: PSYCHIATRY & NEUROLOGY
Payer: MEDICARE

## 2021-01-05 ENCOUNTER — OFFICE VISIT (OUTPATIENT)
Dept: NEUROLOGY | Facility: CLINIC | Age: 41
End: 2021-01-05
Payer: MEDICARE

## 2021-01-05 VITALS — HEIGHT: 65 IN | BODY MASS INDEX: 42.55 KG/M2 | WEIGHT: 255.38 LBS

## 2021-01-05 DIAGNOSIS — G35 MULTIPLE SCLEROSIS: ICD-10-CM

## 2021-01-05 DIAGNOSIS — R26.9 GAIT DISTURBANCE: ICD-10-CM

## 2021-01-05 DIAGNOSIS — Z51.81 ENCOUNTER FOR MONITORING IMMUNOMODULATING THERAPY: ICD-10-CM

## 2021-01-05 DIAGNOSIS — M62.838 MUSCLE SPASMS OF BOTH LOWER EXTREMITIES: ICD-10-CM

## 2021-01-05 DIAGNOSIS — Z79.899 ENCOUNTER FOR MONITORING IMMUNOMODULATING THERAPY: ICD-10-CM

## 2021-01-05 DIAGNOSIS — G35 MULTIPLE SCLEROSIS: Primary | ICD-10-CM

## 2021-01-05 LAB
ALBUMIN SERPL BCP-MCNC: 3.8 G/DL (ref 3.5–5.2)
ALP SERPL-CCNC: 57 U/L (ref 55–135)
ALT SERPL W/O P-5'-P-CCNC: 10 U/L (ref 10–44)
ANION GAP SERPL CALC-SCNC: 8 MMOL/L (ref 8–16)
AST SERPL-CCNC: 15 U/L (ref 10–40)
BASOPHILS # BLD AUTO: 0.02 K/UL (ref 0–0.2)
BASOPHILS NFR BLD: 0.4 % (ref 0–1.9)
BILIRUB SERPL-MCNC: 0.4 MG/DL (ref 0.1–1)
BUN SERPL-MCNC: 13 MG/DL (ref 6–20)
CALCIUM SERPL-MCNC: 9.4 MG/DL (ref 8.7–10.5)
CHLORIDE SERPL-SCNC: 101 MMOL/L (ref 95–110)
CO2 SERPL-SCNC: 30 MMOL/L (ref 23–29)
CREAT SERPL-MCNC: 0.9 MG/DL (ref 0.5–1.4)
DACRYOCYTES BLD QL SMEAR: ABNORMAL
DIFFERENTIAL METHOD: ABNORMAL
EOSINOPHIL # BLD AUTO: 0.2 K/UL (ref 0–0.5)
EOSINOPHIL NFR BLD: 3.8 % (ref 0–8)
ERYTHROCYTE [DISTWIDTH] IN BLOOD BY AUTOMATED COUNT: 15.6 % (ref 11.5–14.5)
EST. GFR  (AFRICAN AMERICAN): >60 ML/MIN/1.73 M^2
EST. GFR  (NON AFRICAN AMERICAN): >60 ML/MIN/1.73 M^2
GLUCOSE SERPL-MCNC: 107 MG/DL (ref 70–110)
HCT VFR BLD AUTO: 34.5 % (ref 37–48.5)
HGB BLD-MCNC: 11.1 G/DL (ref 12–16)
HYPOCHROMIA BLD QL SMEAR: ABNORMAL
IMM GRANULOCYTES # BLD AUTO: 0.02 K/UL (ref 0–0.04)
IMM GRANULOCYTES NFR BLD AUTO: 0.4 % (ref 0–0.5)
LYMPHOCYTES # BLD AUTO: 1.4 K/UL (ref 1–4.8)
LYMPHOCYTES NFR BLD: 29.4 % (ref 18–48)
MCH RBC QN AUTO: 27.1 PG (ref 27–31)
MCHC RBC AUTO-ENTMCNC: 32.2 G/DL (ref 32–36)
MCV RBC AUTO: 84 FL (ref 82–98)
MONOCYTES # BLD AUTO: 0.3 K/UL (ref 0.3–1)
MONOCYTES NFR BLD: 6.3 % (ref 4–15)
NEUTROPHILS # BLD AUTO: 2.9 K/UL (ref 1.8–7.7)
NEUTROPHILS NFR BLD: 59.7 % (ref 38–73)
NRBC BLD-RTO: 0 /100 WBC
PLATELET # BLD AUTO: 199 K/UL (ref 150–350)
PLATELET BLD QL SMEAR: ABNORMAL
PMV BLD AUTO: 11.8 FL (ref 9.2–12.9)
POIKILOCYTOSIS BLD QL SMEAR: SLIGHT
POTASSIUM SERPL-SCNC: 3.8 MMOL/L (ref 3.5–5.1)
PROT SERPL-MCNC: 8.3 G/DL (ref 6–8.4)
RBC # BLD AUTO: 4.1 M/UL (ref 4–5.4)
SODIUM SERPL-SCNC: 139 MMOL/L (ref 136–145)
TARGETS BLD QL SMEAR: ABNORMAL
WBC # BLD AUTO: 4.79 K/UL (ref 3.9–12.7)

## 2021-01-05 PROCEDURE — 86360 T CELL ABSOLUTE COUNT/RATIO: CPT | Mod: HCNC

## 2021-01-05 PROCEDURE — 99215 OFFICE O/P EST HI 40 MIN: CPT | Mod: HCNC,S$GLB,, | Performed by: PSYCHIATRY & NEUROLOGY

## 2021-01-05 PROCEDURE — 80053 COMPREHEN METABOLIC PANEL: CPT | Mod: HCNC

## 2021-01-05 PROCEDURE — 99999 PR PBB SHADOW E&M-EST. PATIENT-LVL III: CPT | Mod: PBBFAC,HCNC,, | Performed by: PSYCHIATRY & NEUROLOGY

## 2021-01-05 PROCEDURE — 1125F AMNT PAIN NOTED PAIN PRSNT: CPT | Mod: HCNC,S$GLB,, | Performed by: PSYCHIATRY & NEUROLOGY

## 2021-01-05 PROCEDURE — 3008F BODY MASS INDEX DOCD: CPT | Mod: HCNC,CPTII,S$GLB, | Performed by: PSYCHIATRY & NEUROLOGY

## 2021-01-05 PROCEDURE — 36415 COLL VENOUS BLD VENIPUNCTURE: CPT | Mod: HCNC

## 2021-01-05 PROCEDURE — 99215 PR OFFICE/OUTPT VISIT, EST, LEVL V, 40-54 MIN: ICD-10-PCS | Mod: HCNC,S$GLB,, | Performed by: PSYCHIATRY & NEUROLOGY

## 2021-01-05 PROCEDURE — 86359 T CELLS TOTAL COUNT: CPT | Mod: HCNC

## 2021-01-05 PROCEDURE — 99999 PR PBB SHADOW E&M-EST. PATIENT-LVL III: ICD-10-PCS | Mod: PBBFAC,HCNC,, | Performed by: PSYCHIATRY & NEUROLOGY

## 2021-01-05 PROCEDURE — 3008F PR BODY MASS INDEX (BMI) DOCUMENTED: ICD-10-PCS | Mod: HCNC,CPTII,S$GLB, | Performed by: PSYCHIATRY & NEUROLOGY

## 2021-01-05 PROCEDURE — 1125F PR PAIN SEVERITY QUANTIFIED, PAIN PRESENT: ICD-10-PCS | Mod: HCNC,S$GLB,, | Performed by: PSYCHIATRY & NEUROLOGY

## 2021-01-05 PROCEDURE — 85025 COMPLETE CBC W/AUTO DIFF WBC: CPT | Mod: HCNC

## 2021-01-06 ENCOUNTER — PATIENT MESSAGE (OUTPATIENT)
Dept: NEUROLOGY | Facility: CLINIC | Age: 41
End: 2021-01-06

## 2021-01-06 LAB
ABSOLUTE CD3: 1001 CELLS/UL (ref 700–2100)
ABSOLUTE CD8: 214 CELLS/UL (ref 200–900)
CD3%: 63.4 % (ref 55–83)
CD3+CD4+ CELLS # BLD: 790 CELLS/UL (ref 300–1400)
CD3+CD4+ CELLS NFR BLD: 50 % (ref 28–57)
CD4/CD8 RATIO: 3.69 (ref 0.9–3.6)
CD8 % SUPPRESSOR T CELL: 13.5 % (ref 10–39)

## 2021-01-07 ENCOUNTER — DOCUMENTATION ONLY (OUTPATIENT)
Dept: NEUROLOGY | Facility: CLINIC | Age: 41
End: 2021-01-07

## 2021-01-13 ENCOUNTER — PATIENT OUTREACH (OUTPATIENT)
Dept: OTHER | Facility: OTHER | Age: 41
End: 2021-01-13

## 2021-01-15 ENCOUNTER — PATIENT MESSAGE (OUTPATIENT)
Dept: OTHER | Facility: OTHER | Age: 41
End: 2021-01-15

## 2021-01-15 ENCOUNTER — PATIENT MESSAGE (OUTPATIENT)
Dept: NEUROLOGY | Facility: CLINIC | Age: 41
End: 2021-01-15

## 2021-01-17 ENCOUNTER — PATIENT MESSAGE (OUTPATIENT)
Dept: NEUROLOGY | Facility: CLINIC | Age: 41
End: 2021-01-17

## 2021-01-25 ENCOUNTER — LAB VISIT (OUTPATIENT)
Dept: LAB | Facility: HOSPITAL | Age: 41
End: 2021-01-25
Attending: INTERNAL MEDICINE
Payer: MEDICARE

## 2021-01-25 ENCOUNTER — PATIENT MESSAGE (OUTPATIENT)
Dept: INTERNAL MEDICINE | Facility: CLINIC | Age: 41
End: 2021-01-25

## 2021-01-25 ENCOUNTER — OFFICE VISIT (OUTPATIENT)
Dept: INTERNAL MEDICINE | Facility: CLINIC | Age: 41
End: 2021-01-25
Payer: MEDICARE

## 2021-01-25 VITALS
SYSTOLIC BLOOD PRESSURE: 118 MMHG | TEMPERATURE: 98 F | HEIGHT: 69 IN | WEIGHT: 255.06 LBS | OXYGEN SATURATION: 99 % | HEART RATE: 64 BPM | RESPIRATION RATE: 18 BRPM | DIASTOLIC BLOOD PRESSURE: 84 MMHG | BODY MASS INDEX: 37.78 KG/M2

## 2021-01-25 DIAGNOSIS — Z12.31 ENCOUNTER FOR SCREENING MAMMOGRAM FOR HIGH-RISK PATIENT: ICD-10-CM

## 2021-01-25 DIAGNOSIS — R79.89 ABNORMAL CBC: ICD-10-CM

## 2021-01-25 DIAGNOSIS — F32.A MILD DEPRESSIVE DISORDER: ICD-10-CM

## 2021-01-25 DIAGNOSIS — I10 ESSENTIAL HYPERTENSION: Primary | ICD-10-CM

## 2021-01-25 DIAGNOSIS — Z00.00 ANNUAL PHYSICAL EXAM: ICD-10-CM

## 2021-01-25 DIAGNOSIS — R59.9 PALPABLE LYMPH NODE: ICD-10-CM

## 2021-01-25 DIAGNOSIS — K21.9 CHRONIC GERD: ICD-10-CM

## 2021-01-25 DIAGNOSIS — G35 MULTIPLE SCLEROSIS: ICD-10-CM

## 2021-01-25 DIAGNOSIS — N39.46 MIXED INCONTINENCE: ICD-10-CM

## 2021-01-25 DIAGNOSIS — D50.9 IRON DEFICIENCY ANEMIA, UNSPECIFIED IRON DEFICIENCY ANEMIA TYPE: ICD-10-CM

## 2021-01-25 DIAGNOSIS — E55.9 VITAMIN D INSUFFICIENCY: ICD-10-CM

## 2021-01-25 DIAGNOSIS — E03.9 HYPOTHYROIDISM, UNSPECIFIED TYPE: ICD-10-CM

## 2021-01-25 DIAGNOSIS — I10 ESSENTIAL HYPERTENSION: ICD-10-CM

## 2021-01-25 DIAGNOSIS — R73.09 OTHER ABNORMAL GLUCOSE: ICD-10-CM

## 2021-01-25 DIAGNOSIS — E04.9 PALPABLE THYROID: ICD-10-CM

## 2021-01-25 DIAGNOSIS — R29.898 BILATERAL LEG WEAKNESS: ICD-10-CM

## 2021-01-25 DIAGNOSIS — E06.3 HASHIMOTO'S THYROIDITIS: ICD-10-CM

## 2021-01-25 DIAGNOSIS — R26.9 GAIT DISORDER: ICD-10-CM

## 2021-01-25 LAB
ALBUMIN SERPL BCP-MCNC: 3.9 G/DL (ref 3.5–5.2)
ALP SERPL-CCNC: 61 U/L (ref 55–135)
ALT SERPL W/O P-5'-P-CCNC: 9 U/L (ref 10–44)
ANION GAP SERPL CALC-SCNC: 6 MMOL/L (ref 8–16)
AST SERPL-CCNC: 15 U/L (ref 10–40)
BASOPHILS # BLD AUTO: 0.05 K/UL (ref 0–0.2)
BASOPHILS NFR BLD: 0.5 % (ref 0–1.9)
BILIRUB SERPL-MCNC: 0.5 MG/DL (ref 0.1–1)
BUN SERPL-MCNC: 15 MG/DL (ref 6–20)
CALCIUM SERPL-MCNC: 9.4 MG/DL (ref 8.7–10.5)
CHLORIDE SERPL-SCNC: 102 MMOL/L (ref 95–110)
CO2 SERPL-SCNC: 30 MMOL/L (ref 23–29)
CREAT SERPL-MCNC: 0.9 MG/DL (ref 0.5–1.4)
DIFFERENTIAL METHOD: ABNORMAL
EOSINOPHIL # BLD AUTO: 0.2 K/UL (ref 0–0.5)
EOSINOPHIL NFR BLD: 1.6 % (ref 0–8)
ERYTHROCYTE [DISTWIDTH] IN BLOOD BY AUTOMATED COUNT: 15.9 % (ref 11.5–14.5)
EST. GFR  (AFRICAN AMERICAN): >60 ML/MIN/1.73 M^2
EST. GFR  (NON AFRICAN AMERICAN): >60 ML/MIN/1.73 M^2
ESTIMATED AVG GLUCOSE: 103 MG/DL (ref 68–131)
FERRITIN SERPL-MCNC: 8 NG/ML (ref 20–300)
GLUCOSE SERPL-MCNC: 85 MG/DL (ref 70–110)
HBA1C MFR BLD HPLC: 5.2 % (ref 4–5.6)
HCT VFR BLD AUTO: 32.6 % (ref 37–48.5)
HGB BLD-MCNC: 10.7 G/DL (ref 12–16)
IMM GRANULOCYTES # BLD AUTO: 0.03 K/UL (ref 0–0.04)
IMM GRANULOCYTES NFR BLD AUTO: 0.3 % (ref 0–0.5)
IRON SERPL-MCNC: 71 UG/DL (ref 30–160)
LYMPHOCYTES # BLD AUTO: 1.5 K/UL (ref 1–4.8)
LYMPHOCYTES NFR BLD: 15.2 % (ref 18–48)
MCH RBC QN AUTO: 26.6 PG (ref 27–31)
MCHC RBC AUTO-ENTMCNC: 32.8 G/DL (ref 32–36)
MCV RBC AUTO: 81 FL (ref 82–98)
MONOCYTES # BLD AUTO: 0.7 K/UL (ref 0.3–1)
MONOCYTES NFR BLD: 7.2 % (ref 4–15)
NEUTROPHILS # BLD AUTO: 7.3 K/UL (ref 1.8–7.7)
NEUTROPHILS NFR BLD: 75.2 % (ref 38–73)
NRBC BLD-RTO: 0 /100 WBC
PLATELET # BLD AUTO: 326 K/UL (ref 150–350)
PMV BLD AUTO: 12.2 FL (ref 9.2–12.9)
POTASSIUM SERPL-SCNC: 4.3 MMOL/L (ref 3.5–5.1)
PROT SERPL-MCNC: 8.4 G/DL (ref 6–8.4)
RBC # BLD AUTO: 4.03 M/UL (ref 4–5.4)
SATURATED IRON: 14 % (ref 20–50)
SODIUM SERPL-SCNC: 138 MMOL/L (ref 136–145)
TOTAL IRON BINDING CAPACITY: 518 UG/DL (ref 250–450)
TRANSFERRIN SERPL-MCNC: 350 MG/DL (ref 200–375)
TSH SERPL DL<=0.005 MIU/L-ACNC: 0.62 UIU/ML (ref 0.4–4)
WBC # BLD AUTO: 9.63 K/UL (ref 3.9–12.7)

## 2021-01-25 PROCEDURE — 1125F AMNT PAIN NOTED PAIN PRSNT: CPT | Mod: HCNC,S$GLB,, | Performed by: INTERNAL MEDICINE

## 2021-01-25 PROCEDURE — 99499 RISK ADDL DX/OHS AUDIT: ICD-10-PCS | Mod: HCNC,S$GLB,, | Performed by: INTERNAL MEDICINE

## 2021-01-25 PROCEDURE — 99214 OFFICE O/P EST MOD 30 MIN: CPT | Mod: HCNC,S$GLB,, | Performed by: INTERNAL MEDICINE

## 2021-01-25 PROCEDURE — 3074F SYST BP LT 130 MM HG: CPT | Mod: HCNC,CPTII,S$GLB, | Performed by: INTERNAL MEDICINE

## 2021-01-25 PROCEDURE — 80053 COMPREHEN METABOLIC PANEL: CPT | Mod: HCNC

## 2021-01-25 PROCEDURE — 84443 ASSAY THYROID STIM HORMONE: CPT | Mod: HCNC

## 2021-01-25 PROCEDURE — 99214 PR OFFICE/OUTPT VISIT, EST, LEVL IV, 30-39 MIN: ICD-10-PCS | Mod: HCNC,S$GLB,, | Performed by: INTERNAL MEDICINE

## 2021-01-25 PROCEDURE — 83036 HEMOGLOBIN GLYCOSYLATED A1C: CPT | Mod: HCNC

## 2021-01-25 PROCEDURE — 1125F PR PAIN SEVERITY QUANTIFIED, PAIN PRESENT: ICD-10-PCS | Mod: HCNC,S$GLB,, | Performed by: INTERNAL MEDICINE

## 2021-01-25 PROCEDURE — 99499 UNLISTED E&M SERVICE: CPT | Mod: HCNC,S$GLB,, | Performed by: INTERNAL MEDICINE

## 2021-01-25 PROCEDURE — 85025 COMPLETE CBC W/AUTO DIFF WBC: CPT | Mod: HCNC

## 2021-01-25 PROCEDURE — 82728 ASSAY OF FERRITIN: CPT | Mod: HCNC

## 2021-01-25 PROCEDURE — 36415 COLL VENOUS BLD VENIPUNCTURE: CPT | Mod: HCNC,PO

## 2021-01-25 PROCEDURE — 3079F PR MOST RECENT DIASTOLIC BLOOD PRESSURE 80-89 MM HG: ICD-10-PCS | Mod: HCNC,CPTII,S$GLB, | Performed by: INTERNAL MEDICINE

## 2021-01-25 PROCEDURE — 3008F BODY MASS INDEX DOCD: CPT | Mod: HCNC,CPTII,S$GLB, | Performed by: INTERNAL MEDICINE

## 2021-01-25 PROCEDURE — 83540 ASSAY OF IRON: CPT | Mod: HCNC

## 2021-01-25 PROCEDURE — 3079F DIAST BP 80-89 MM HG: CPT | Mod: HCNC,CPTII,S$GLB, | Performed by: INTERNAL MEDICINE

## 2021-01-25 PROCEDURE — 99999 PR PBB SHADOW E&M-EST. PATIENT-LVL V: ICD-10-PCS | Mod: PBBFAC,HCNC,, | Performed by: INTERNAL MEDICINE

## 2021-01-25 PROCEDURE — 3008F PR BODY MASS INDEX (BMI) DOCUMENTED: ICD-10-PCS | Mod: HCNC,CPTII,S$GLB, | Performed by: INTERNAL MEDICINE

## 2021-01-25 PROCEDURE — 82306 VITAMIN D 25 HYDROXY: CPT | Mod: HCNC

## 2021-01-25 PROCEDURE — 99999 PR PBB SHADOW E&M-EST. PATIENT-LVL V: CPT | Mod: PBBFAC,HCNC,, | Performed by: INTERNAL MEDICINE

## 2021-01-25 PROCEDURE — 3074F PR MOST RECENT SYSTOLIC BLOOD PRESSURE < 130 MM HG: ICD-10-PCS | Mod: HCNC,CPTII,S$GLB, | Performed by: INTERNAL MEDICINE

## 2021-01-25 RX ORDER — DALFAMPRIDINE 10 MG/1
1 TABLET, FILM COATED, EXTENDED RELEASE ORAL 2 TIMES DAILY
COMMUNITY
Start: 2021-01-15 | End: 2021-02-23 | Stop reason: SDUPTHER

## 2021-01-25 RX ORDER — LOSARTAN POTASSIUM AND HYDROCHLOROTHIAZIDE 12.5; 1 MG/1; MG/1
1 TABLET ORAL DAILY
Qty: 90 TABLET | Refills: 3 | Status: SHIPPED | OUTPATIENT
Start: 2021-01-25 | End: 2022-06-14 | Stop reason: SDUPTHER

## 2021-01-25 RX ORDER — PANTOPRAZOLE SODIUM 40 MG/1
40 TABLET, DELAYED RELEASE ORAL DAILY
Qty: 90 TABLET | Refills: 3 | Status: SHIPPED | OUTPATIENT
Start: 2021-01-25 | End: 2022-06-14 | Stop reason: SDUPTHER

## 2021-01-25 RX ORDER — TIZANIDINE 2 MG/1
4 TABLET ORAL 2 TIMES DAILY PRN
Qty: 60 TABLET | Refills: 1 | Status: SHIPPED | OUTPATIENT
Start: 2021-01-25 | End: 2021-11-11 | Stop reason: SDUPTHER

## 2021-01-25 RX ORDER — ESCITALOPRAM OXALATE 20 MG/1
20 TABLET ORAL DAILY
Qty: 90 TABLET | Refills: 3 | Status: SHIPPED | OUTPATIENT
Start: 2021-01-25 | End: 2022-06-14 | Stop reason: SDUPTHER

## 2021-01-25 RX ORDER — TIZANIDINE 2 MG/1
1 TABLET ORAL 2 TIMES DAILY PRN
COMMUNITY
Start: 2021-01-09 | End: 2021-01-25 | Stop reason: SDUPTHER

## 2021-01-25 RX ORDER — CARVEDILOL 25 MG/1
25 TABLET ORAL 2 TIMES DAILY WITH MEALS
Qty: 180 TABLET | Refills: 3 | Status: SHIPPED | OUTPATIENT
Start: 2021-01-25 | End: 2022-01-10 | Stop reason: SDUPTHER

## 2021-01-25 RX ORDER — LEVOTHYROXINE SODIUM 112 UG/1
112 TABLET ORAL
Qty: 90 TABLET | Refills: 3 | Status: SHIPPED | OUTPATIENT
Start: 2021-01-25 | End: 2022-01-10 | Stop reason: SDUPTHER

## 2021-01-26 ENCOUNTER — PATIENT MESSAGE (OUTPATIENT)
Dept: INTERNAL MEDICINE | Facility: CLINIC | Age: 41
End: 2021-01-26

## 2021-01-26 DIAGNOSIS — D50.9 IRON DEFICIENCY ANEMIA, UNSPECIFIED IRON DEFICIENCY ANEMIA TYPE: Primary | ICD-10-CM

## 2021-01-26 LAB — 25(OH)D3+25(OH)D2 SERPL-MCNC: 47 NG/ML (ref 30–96)

## 2021-01-26 RX ORDER — FERROUS SULFATE 325(65) MG
325 TABLET ORAL DAILY
Refills: 0 | COMMUNITY
Start: 2021-01-26

## 2021-01-31 ENCOUNTER — PATIENT MESSAGE (OUTPATIENT)
Dept: OTHER | Facility: OTHER | Age: 41
End: 2021-01-31

## 2021-02-05 ENCOUNTER — PATIENT MESSAGE (OUTPATIENT)
Dept: NEUROLOGY | Facility: CLINIC | Age: 41
End: 2021-02-05

## 2021-02-08 ENCOUNTER — PATIENT MESSAGE (OUTPATIENT)
Dept: NEUROLOGY | Facility: CLINIC | Age: 41
End: 2021-02-08

## 2021-02-10 ENCOUNTER — PATIENT OUTREACH (OUTPATIENT)
Dept: OTHER | Facility: OTHER | Age: 41
End: 2021-02-10

## 2021-02-17 ENCOUNTER — TELEPHONE (OUTPATIENT)
Dept: NEUROLOGY | Facility: CLINIC | Age: 41
End: 2021-02-17

## 2021-02-18 ENCOUNTER — PATIENT MESSAGE (OUTPATIENT)
Dept: NEUROLOGY | Facility: CLINIC | Age: 41
End: 2021-02-18

## 2021-02-21 ENCOUNTER — PATIENT MESSAGE (OUTPATIENT)
Dept: NEUROLOGY | Facility: CLINIC | Age: 41
End: 2021-02-21

## 2021-02-21 DIAGNOSIS — G35 MULTIPLE SCLEROSIS: ICD-10-CM

## 2021-02-21 DIAGNOSIS — R26.9 GAIT DISTURBANCE: Primary | ICD-10-CM

## 2021-02-22 ENCOUNTER — PATIENT MESSAGE (OUTPATIENT)
Dept: NEUROLOGY | Facility: CLINIC | Age: 41
End: 2021-02-22

## 2021-02-23 RX ORDER — DALFAMPRIDINE 10 MG/1
TABLET, FILM COATED, EXTENDED RELEASE ORAL
Qty: 180 TABLET | Refills: 1 | Status: SHIPPED | OUTPATIENT
Start: 2021-02-23 | End: 2021-04-07

## 2021-02-23 RX ORDER — DIMETHYL FUMARATE 240 MG/1
240 CAPSULE ORAL 2 TIMES DAILY
Qty: 60 CAPSULE | Refills: 0 | Status: SHIPPED | OUTPATIENT
Start: 2021-02-23 | End: 2021-06-08 | Stop reason: ALTCHOICE

## 2021-02-24 ENCOUNTER — PATIENT MESSAGE (OUTPATIENT)
Dept: NEUROLOGY | Facility: CLINIC | Age: 41
End: 2021-02-24

## 2021-02-28 ENCOUNTER — PATIENT MESSAGE (OUTPATIENT)
Dept: NEUROLOGY | Facility: CLINIC | Age: 41
End: 2021-02-28

## 2021-03-05 ENCOUNTER — PATIENT MESSAGE (OUTPATIENT)
Dept: NEUROLOGY | Facility: CLINIC | Age: 41
End: 2021-03-05

## 2021-03-06 ENCOUNTER — PATIENT MESSAGE (OUTPATIENT)
Dept: INTERNAL MEDICINE | Facility: CLINIC | Age: 41
End: 2021-03-06

## 2021-03-08 ENCOUNTER — PATIENT MESSAGE (OUTPATIENT)
Dept: INTERNAL MEDICINE | Facility: CLINIC | Age: 41
End: 2021-03-08

## 2021-03-08 RX ORDER — DICLOFENAC SODIUM 50 MG/1
50 TABLET, DELAYED RELEASE ORAL 2 TIMES DAILY
Qty: 30 TABLET | Refills: 0 | Status: SHIPPED | OUTPATIENT
Start: 2021-03-08 | End: 2021-06-21

## 2021-03-09 ENCOUNTER — PATIENT MESSAGE (OUTPATIENT)
Dept: INTERNAL MEDICINE | Facility: CLINIC | Age: 41
End: 2021-03-09

## 2021-03-10 ENCOUNTER — PATIENT MESSAGE (OUTPATIENT)
Dept: INTERNAL MEDICINE | Facility: CLINIC | Age: 41
End: 2021-03-10

## 2021-03-10 ENCOUNTER — PATIENT OUTREACH (OUTPATIENT)
Dept: OTHER | Facility: OTHER | Age: 41
End: 2021-03-10

## 2021-03-11 ENCOUNTER — PATIENT MESSAGE (OUTPATIENT)
Dept: NEUROLOGY | Facility: CLINIC | Age: 41
End: 2021-03-11

## 2021-03-15 ENCOUNTER — TELEPHONE (OUTPATIENT)
Dept: INTERNAL MEDICINE | Facility: CLINIC | Age: 41
End: 2021-03-15

## 2021-03-15 ENCOUNTER — PATIENT MESSAGE (OUTPATIENT)
Dept: INTERNAL MEDICINE | Facility: CLINIC | Age: 41
End: 2021-03-15

## 2021-03-16 ENCOUNTER — PATIENT MESSAGE (OUTPATIENT)
Dept: INTERNAL MEDICINE | Facility: CLINIC | Age: 41
End: 2021-03-16

## 2021-03-17 ENCOUNTER — PATIENT OUTREACH (OUTPATIENT)
Dept: ADMINISTRATIVE | Facility: OTHER | Age: 41
End: 2021-03-17

## 2021-03-18 ENCOUNTER — PATIENT MESSAGE (OUTPATIENT)
Dept: NEUROLOGY | Facility: CLINIC | Age: 41
End: 2021-03-18

## 2021-03-18 ENCOUNTER — OFFICE VISIT (OUTPATIENT)
Dept: NEUROLOGY | Facility: CLINIC | Age: 41
End: 2021-03-18
Payer: MEDICARE

## 2021-03-18 DIAGNOSIS — G35 MULTIPLE SCLEROSIS: Primary | ICD-10-CM

## 2021-03-18 PROCEDURE — 99214 OFFICE O/P EST MOD 30 MIN: CPT | Mod: 95,,, | Performed by: PSYCHIATRY & NEUROLOGY

## 2021-03-18 PROCEDURE — 99214 PR OFFICE/OUTPT VISIT, EST, LEVL IV, 30-39 MIN: ICD-10-PCS | Mod: 95,,, | Performed by: PSYCHIATRY & NEUROLOGY

## 2021-03-27 ENCOUNTER — PATIENT MESSAGE (OUTPATIENT)
Dept: NEUROLOGY | Facility: CLINIC | Age: 41
End: 2021-03-27

## 2021-04-02 ENCOUNTER — IMMUNIZATION (OUTPATIENT)
Dept: PRIMARY CARE CLINIC | Facility: CLINIC | Age: 41
End: 2021-04-02
Payer: MEDICARE

## 2021-04-02 DIAGNOSIS — Z23 NEED FOR VACCINATION: Primary | ICD-10-CM

## 2021-04-02 PROCEDURE — 91300 PR SARS-COV- 2 COVID-19 VACCINE, NO PRSV, 30MCG/0.3ML, IM: ICD-10-PCS | Mod: S$GLB,,, | Performed by: INTERNAL MEDICINE

## 2021-04-02 PROCEDURE — 0001A PR IMMUNIZ ADMIN, SARS-COV-2 COVID-19 VACC, 30MCG/0.3ML, 1ST DOSE: ICD-10-PCS | Mod: CV19,S$GLB,, | Performed by: INTERNAL MEDICINE

## 2021-04-02 PROCEDURE — 91300 PR SARS-COV- 2 COVID-19 VACCINE, NO PRSV, 30MCG/0.3ML, IM: CPT | Mod: S$GLB,,, | Performed by: INTERNAL MEDICINE

## 2021-04-02 PROCEDURE — 0001A PR IMMUNIZ ADMIN, SARS-COV-2 COVID-19 VACC, 30MCG/0.3ML, 1ST DOSE: CPT | Mod: CV19,S$GLB,, | Performed by: INTERNAL MEDICINE

## 2021-04-02 RX ADMIN — Medication 0.3 ML: at 12:04

## 2021-04-06 ENCOUNTER — OFFICE VISIT (OUTPATIENT)
Dept: NEUROLOGY | Facility: CLINIC | Age: 41
End: 2021-04-06
Payer: MEDICARE

## 2021-04-06 ENCOUNTER — PATIENT MESSAGE (OUTPATIENT)
Dept: NEUROLOGY | Facility: CLINIC | Age: 41
End: 2021-04-06

## 2021-04-06 VITALS
SYSTOLIC BLOOD PRESSURE: 133 MMHG | TEMPERATURE: 98 F | DIASTOLIC BLOOD PRESSURE: 80 MMHG | WEIGHT: 257.06 LBS | BODY MASS INDEX: 38.07 KG/M2 | HEART RATE: 73 BPM | HEIGHT: 69 IN

## 2021-04-06 DIAGNOSIS — G35 MULTIPLE SCLEROSIS: Primary | ICD-10-CM

## 2021-04-06 DIAGNOSIS — M62.838 MUSCLE SPASM: ICD-10-CM

## 2021-04-06 DIAGNOSIS — Z71.89 COUNSELING REGARDING GOALS OF CARE: ICD-10-CM

## 2021-04-06 DIAGNOSIS — Z79.899 HIGH RISK MEDICATION USE: ICD-10-CM

## 2021-04-06 DIAGNOSIS — R26.9 GAIT DISTURBANCE: ICD-10-CM

## 2021-04-06 DIAGNOSIS — Z29.89 PROPHYLACTIC IMMUNOTHERAPY: ICD-10-CM

## 2021-04-06 PROCEDURE — 99215 PR OFFICE/OUTPT VISIT, EST, LEVL V, 40-54 MIN: ICD-10-PCS | Mod: S$GLB,,, | Performed by: CLINICAL NURSE SPECIALIST

## 2021-04-06 PROCEDURE — 99999 PR PBB SHADOW E&M-EST. PATIENT-LVL IV: ICD-10-PCS | Mod: PBBFAC,,, | Performed by: CLINICAL NURSE SPECIALIST

## 2021-04-06 PROCEDURE — 3008F PR BODY MASS INDEX (BMI) DOCUMENTED: ICD-10-PCS | Mod: CPTII,S$GLB,, | Performed by: CLINICAL NURSE SPECIALIST

## 2021-04-06 PROCEDURE — 99215 OFFICE O/P EST HI 40 MIN: CPT | Mod: S$GLB,,, | Performed by: CLINICAL NURSE SPECIALIST

## 2021-04-06 PROCEDURE — 1125F AMNT PAIN NOTED PAIN PRSNT: CPT | Mod: S$GLB,,, | Performed by: CLINICAL NURSE SPECIALIST

## 2021-04-06 PROCEDURE — 99999 PR PBB SHADOW E&M-EST. PATIENT-LVL IV: CPT | Mod: PBBFAC,,, | Performed by: CLINICAL NURSE SPECIALIST

## 2021-04-06 PROCEDURE — 3008F BODY MASS INDEX DOCD: CPT | Mod: CPTII,S$GLB,, | Performed by: CLINICAL NURSE SPECIALIST

## 2021-04-06 PROCEDURE — 1125F PR PAIN SEVERITY QUANTIFIED, PAIN PRESENT: ICD-10-PCS | Mod: S$GLB,,, | Performed by: CLINICAL NURSE SPECIALIST

## 2021-04-09 ENCOUNTER — PATIENT OUTREACH (OUTPATIENT)
Dept: OTHER | Facility: OTHER | Age: 41
End: 2021-04-09

## 2021-04-23 ENCOUNTER — IMMUNIZATION (OUTPATIENT)
Dept: PRIMARY CARE CLINIC | Facility: CLINIC | Age: 41
End: 2021-04-23

## 2021-04-23 DIAGNOSIS — Z23 NEED FOR VACCINATION: Primary | ICD-10-CM

## 2021-04-23 PROCEDURE — 0002A PR IMMUNIZ ADMIN, SARS-COV-2 COVID-19 VACC, 30MCG/0.3ML, 2ND DOSE: CPT | Mod: CV19,S$GLB,, | Performed by: INTERNAL MEDICINE

## 2021-04-23 PROCEDURE — 0002A PR IMMUNIZ ADMIN, SARS-COV-2 COVID-19 VACC, 30MCG/0.3ML, 2ND DOSE: ICD-10-PCS | Mod: CV19,S$GLB,, | Performed by: INTERNAL MEDICINE

## 2021-04-23 PROCEDURE — 91300 PR SARS-COV- 2 COVID-19 VACCINE, NO PRSV, 30MCG/0.3ML, IM: ICD-10-PCS | Mod: S$GLB,,, | Performed by: INTERNAL MEDICINE

## 2021-04-23 PROCEDURE — 91300 PR SARS-COV- 2 COVID-19 VACCINE, NO PRSV, 30MCG/0.3ML, IM: CPT | Mod: S$GLB,,, | Performed by: INTERNAL MEDICINE

## 2021-04-23 RX ADMIN — Medication 0.3 ML: at 10:04

## 2021-04-26 NOTE — TELEPHONE ENCOUNTER
[facility] [facility]   Tremfya Counseling: I discussed with the patient the risks of guselkumab including but not limited to immunosuppression, serious infections, worsening of inflammatory bowel disease and drug reactions.  The patient understands that monitoring is required including a PPD at baseline and must alert us or the primary physician if symptoms of infection or other concerning signs are noted.

## 2021-04-29 ENCOUNTER — PATIENT MESSAGE (OUTPATIENT)
Dept: INTERNAL MEDICINE | Facility: CLINIC | Age: 41
End: 2021-04-29

## 2021-04-29 ENCOUNTER — TELEPHONE (OUTPATIENT)
Dept: INTERNAL MEDICINE | Facility: CLINIC | Age: 41
End: 2021-04-29

## 2021-05-02 ENCOUNTER — PATIENT MESSAGE (OUTPATIENT)
Dept: NEUROLOGY | Facility: CLINIC | Age: 41
End: 2021-05-02

## 2021-05-02 DIAGNOSIS — G35 MULTIPLE SCLEROSIS: Primary | ICD-10-CM

## 2021-05-04 ENCOUNTER — PATIENT MESSAGE (OUTPATIENT)
Dept: NEUROLOGY | Facility: CLINIC | Age: 41
End: 2021-05-04

## 2021-05-04 ENCOUNTER — PATIENT MESSAGE (OUTPATIENT)
Dept: INTERNAL MEDICINE | Facility: CLINIC | Age: 41
End: 2021-05-04

## 2021-05-04 DIAGNOSIS — D50.9 IRON DEFICIENCY ANEMIA, UNSPECIFIED IRON DEFICIENCY ANEMIA TYPE: ICD-10-CM

## 2021-05-04 DIAGNOSIS — I10 ESSENTIAL HYPERTENSION: Primary | ICD-10-CM

## 2021-05-04 DIAGNOSIS — G35 MULTIPLE SCLEROSIS: Primary | ICD-10-CM

## 2021-05-05 ENCOUNTER — PATIENT MESSAGE (OUTPATIENT)
Dept: NEUROLOGY | Facility: CLINIC | Age: 41
End: 2021-05-05

## 2021-05-05 ENCOUNTER — TELEPHONE (OUTPATIENT)
Dept: PSYCHIATRY | Facility: CLINIC | Age: 41
End: 2021-05-05

## 2021-05-06 ENCOUNTER — HOSPITAL ENCOUNTER (OUTPATIENT)
Dept: RADIOLOGY | Facility: HOSPITAL | Age: 41
Discharge: HOME | End: 2021-05-06
Attending: CLINICAL NURSE SPECIALIST
Payer: MEDICARE

## 2021-05-06 ENCOUNTER — PATIENT MESSAGE (OUTPATIENT)
Dept: NEUROLOGY | Facility: CLINIC | Age: 41
End: 2021-05-06

## 2021-05-06 DIAGNOSIS — G35 MULTIPLE SCLEROSIS: ICD-10-CM

## 2021-05-06 PROCEDURE — 70553 MRI BRAIN STEM W/O & W/DYE: CPT | Mod: TC

## 2021-05-06 PROCEDURE — 70553 MRI BRAIN STEM W/O & W/DYE: CPT | Mod: 26,,, | Performed by: RADIOLOGY

## 2021-05-06 PROCEDURE — 70553 MRI BRAIN DEMYELINATING W/ WO CONTRAST: ICD-10-PCS | Mod: 26,,, | Performed by: RADIOLOGY

## 2021-05-06 PROCEDURE — 25500020 PHARM REV CODE 255: Performed by: CLINICAL NURSE SPECIALIST

## 2021-05-06 PROCEDURE — A9585 GADOBUTROL INJECTION: HCPCS | Performed by: CLINICAL NURSE SPECIALIST

## 2021-05-06 PROCEDURE — 72156 MRI NECK SPINE W/O & W/DYE: CPT | Mod: 26,,, | Performed by: RADIOLOGY

## 2021-05-06 PROCEDURE — 72156 MRI NECK SPINE W/O & W/DYE: CPT | Mod: TC

## 2021-05-06 PROCEDURE — 72156 MRI CERVICAL SPINE DEMYELINATING W W/O CONTRAST: ICD-10-PCS | Mod: 26,,, | Performed by: RADIOLOGY

## 2021-05-06 RX ORDER — GADOBUTROL 604.72 MG/ML
10 INJECTION INTRAVENOUS
Status: COMPLETED | OUTPATIENT
Start: 2021-05-06 | End: 2021-05-06

## 2021-05-06 RX ADMIN — GADOBUTROL 10 ML: 604.72 INJECTION INTRAVENOUS at 01:05

## 2021-05-11 ENCOUNTER — SPECIALTY PHARMACY (OUTPATIENT)
Dept: PHARMACY | Facility: CLINIC | Age: 41
End: 2021-05-11

## 2021-05-11 RX ORDER — DIROXIMEL FUMARATE 231 MG/1
462 CAPSULE ORAL 2 TIMES DAILY
Qty: 120 CAPSULE | Refills: 1 | Status: SHIPPED | OUTPATIENT
Start: 2021-05-11 | End: 2021-05-26

## 2021-05-11 RX ORDER — DIROXIMEL FUMARATE 231 MG/1
CAPSULE ORAL
Qty: 106 CAPSULE | Refills: 0 | Status: SHIPPED | OUTPATIENT
Start: 2021-05-11 | End: 2021-05-26

## 2021-05-14 ENCOUNTER — PATIENT MESSAGE (OUTPATIENT)
Dept: NEUROLOGY | Facility: CLINIC | Age: 41
End: 2021-05-14

## 2021-05-16 ENCOUNTER — PATIENT MESSAGE (OUTPATIENT)
Dept: NEUROLOGY | Facility: CLINIC | Age: 41
End: 2021-05-16

## 2021-05-18 ENCOUNTER — PATIENT MESSAGE (OUTPATIENT)
Dept: INTERNAL MEDICINE | Facility: CLINIC | Age: 41
End: 2021-05-18

## 2021-05-21 ENCOUNTER — PATIENT MESSAGE (OUTPATIENT)
Dept: OBSTETRICS AND GYNECOLOGY | Facility: CLINIC | Age: 41
End: 2021-05-21

## 2021-05-21 ENCOUNTER — PATIENT MESSAGE (OUTPATIENT)
Dept: ADMINISTRATIVE | Facility: OTHER | Age: 41
End: 2021-05-21

## 2021-05-21 ENCOUNTER — LAB VISIT (OUTPATIENT)
Dept: LAB | Facility: HOSPITAL | Age: 41
End: 2021-05-21
Attending: CLINICAL NURSE SPECIALIST
Payer: MEDICARE

## 2021-05-21 ENCOUNTER — TELEPHONE (OUTPATIENT)
Dept: OBSTETRICS AND GYNECOLOGY | Facility: CLINIC | Age: 41
End: 2021-05-21

## 2021-05-21 ENCOUNTER — PATIENT OUTREACH (OUTPATIENT)
Dept: ADMINISTRATIVE | Facility: OTHER | Age: 41
End: 2021-05-21

## 2021-05-21 DIAGNOSIS — G35 MULTIPLE SCLEROSIS: ICD-10-CM

## 2021-05-21 DIAGNOSIS — E03.9 HYPOTHYROIDISM, UNSPECIFIED TYPE: ICD-10-CM

## 2021-05-21 DIAGNOSIS — I10 ESSENTIAL HYPERTENSION: ICD-10-CM

## 2021-05-21 DIAGNOSIS — Z00.00 ANNUAL PHYSICAL EXAM: ICD-10-CM

## 2021-05-21 DIAGNOSIS — D50.9 IRON DEFICIENCY ANEMIA, UNSPECIFIED IRON DEFICIENCY ANEMIA TYPE: ICD-10-CM

## 2021-05-21 LAB
ALBUMIN SERPL BCP-MCNC: 4 G/DL (ref 3.5–5.2)
ALP SERPL-CCNC: 60 U/L (ref 55–135)
ALT SERPL W/O P-5'-P-CCNC: 12 U/L (ref 10–44)
ANION GAP SERPL CALC-SCNC: 10 MMOL/L (ref 8–16)
AST SERPL-CCNC: 15 U/L (ref 10–40)
BASOPHILS # BLD AUTO: 0.04 K/UL (ref 0–0.2)
BASOPHILS # BLD AUTO: 0.04 K/UL (ref 0–0.2)
BASOPHILS NFR BLD: 0.5 % (ref 0–1.9)
BASOPHILS NFR BLD: 0.5 % (ref 0–1.9)
BILIRUB SERPL-MCNC: 0.4 MG/DL (ref 0.1–1)
BUN SERPL-MCNC: 12 MG/DL (ref 6–20)
CALCIUM SERPL-MCNC: 9.6 MG/DL (ref 8.7–10.5)
CHLORIDE SERPL-SCNC: 104 MMOL/L (ref 95–110)
CHOLEST SERPL-MCNC: 179 MG/DL (ref 120–199)
CHOLEST/HDLC SERPL: 3.4 {RATIO} (ref 2–5)
CO2 SERPL-SCNC: 24 MMOL/L (ref 23–29)
CREAT SERPL-MCNC: 1 MG/DL (ref 0.5–1.4)
DIFFERENTIAL METHOD: ABNORMAL
DIFFERENTIAL METHOD: ABNORMAL
EOSINOPHIL # BLD AUTO: 0.1 K/UL (ref 0–0.5)
EOSINOPHIL # BLD AUTO: 0.1 K/UL (ref 0–0.5)
EOSINOPHIL NFR BLD: 1.4 % (ref 0–8)
EOSINOPHIL NFR BLD: 1.4 % (ref 0–8)
ERYTHROCYTE [DISTWIDTH] IN BLOOD BY AUTOMATED COUNT: 16.9 % (ref 11.5–14.5)
ERYTHROCYTE [DISTWIDTH] IN BLOOD BY AUTOMATED COUNT: 16.9 % (ref 11.5–14.5)
EST. GFR  (AFRICAN AMERICAN): >60 ML/MIN/1.73 M^2
EST. GFR  (NON AFRICAN AMERICAN): >60 ML/MIN/1.73 M^2
FERRITIN SERPL-MCNC: 23 NG/ML (ref 20–300)
GLUCOSE SERPL-MCNC: 83 MG/DL (ref 70–110)
HCT VFR BLD AUTO: 33.7 % (ref 37–48.5)
HCT VFR BLD AUTO: 33.7 % (ref 37–48.5)
HDLC SERPL-MCNC: 53 MG/DL (ref 40–75)
HDLC SERPL: 29.6 % (ref 20–50)
HGB BLD-MCNC: 11.3 G/DL (ref 12–16)
HGB BLD-MCNC: 11.3 G/DL (ref 12–16)
IMM GRANULOCYTES # BLD AUTO: 0.01 K/UL (ref 0–0.04)
IMM GRANULOCYTES # BLD AUTO: 0.01 K/UL (ref 0–0.04)
IMM GRANULOCYTES NFR BLD AUTO: 0.1 % (ref 0–0.5)
IMM GRANULOCYTES NFR BLD AUTO: 0.1 % (ref 0–0.5)
IRON SERPL-MCNC: 42 UG/DL (ref 30–160)
LDLC SERPL CALC-MCNC: 108.6 MG/DL (ref 63–159)
LYMPHOCYTES # BLD AUTO: 1.1 K/UL (ref 1–4.8)
LYMPHOCYTES # BLD AUTO: 1.1 K/UL (ref 1–4.8)
LYMPHOCYTES NFR BLD: 14.5 % (ref 18–48)
LYMPHOCYTES NFR BLD: 14.5 % (ref 18–48)
MCH RBC QN AUTO: 27.8 PG (ref 27–31)
MCH RBC QN AUTO: 27.8 PG (ref 27–31)
MCHC RBC AUTO-ENTMCNC: 33.5 G/DL (ref 32–36)
MCHC RBC AUTO-ENTMCNC: 33.5 G/DL (ref 32–36)
MCV RBC AUTO: 83 FL (ref 82–98)
MCV RBC AUTO: 83 FL (ref 82–98)
MONOCYTES # BLD AUTO: 0.6 K/UL (ref 0.3–1)
MONOCYTES # BLD AUTO: 0.6 K/UL (ref 0.3–1)
MONOCYTES NFR BLD: 8.1 % (ref 4–15)
MONOCYTES NFR BLD: 8.1 % (ref 4–15)
NEUTROPHILS # BLD AUTO: 5.6 K/UL (ref 1.8–7.7)
NEUTROPHILS # BLD AUTO: 5.6 K/UL (ref 1.8–7.7)
NEUTROPHILS NFR BLD: 75.4 % (ref 38–73)
NEUTROPHILS NFR BLD: 75.4 % (ref 38–73)
NONHDLC SERPL-MCNC: 126 MG/DL
NRBC BLD-RTO: 0 /100 WBC
NRBC BLD-RTO: 0 /100 WBC
PLATELET # BLD AUTO: 256 K/UL (ref 150–450)
PLATELET # BLD AUTO: 256 K/UL (ref 150–450)
PMV BLD AUTO: 12.6 FL (ref 9.2–12.9)
PMV BLD AUTO: 12.6 FL (ref 9.2–12.9)
POTASSIUM SERPL-SCNC: 3.7 MMOL/L (ref 3.5–5.1)
PROT SERPL-MCNC: 8.6 G/DL (ref 6–8.4)
RBC # BLD AUTO: 4.07 M/UL (ref 4–5.4)
RBC # BLD AUTO: 4.07 M/UL (ref 4–5.4)
SATURATED IRON: 9 % (ref 20–50)
SODIUM SERPL-SCNC: 138 MMOL/L (ref 136–145)
TOTAL IRON BINDING CAPACITY: 491 UG/DL (ref 250–450)
TRANSFERRIN SERPL-MCNC: 332 MG/DL (ref 200–375)
TRIGL SERPL-MCNC: 87 MG/DL (ref 30–150)
WBC # BLD AUTO: 7.39 K/UL (ref 3.9–12.7)
WBC # BLD AUTO: 7.39 K/UL (ref 3.9–12.7)

## 2021-05-21 PROCEDURE — 80061 LIPID PANEL: CPT | Performed by: INTERNAL MEDICINE

## 2021-05-21 PROCEDURE — 85025 COMPLETE CBC W/AUTO DIFF WBC: CPT | Performed by: INTERNAL MEDICINE

## 2021-05-21 PROCEDURE — 86359 T CELLS TOTAL COUNT: CPT | Performed by: CLINICAL NURSE SPECIALIST

## 2021-05-21 PROCEDURE — 83540 ASSAY OF IRON: CPT | Performed by: INTERNAL MEDICINE

## 2021-05-21 PROCEDURE — 80053 COMPREHEN METABOLIC PANEL: CPT | Performed by: CLINICAL NURSE SPECIALIST

## 2021-05-21 PROCEDURE — 86360 T CELL ABSOLUTE COUNT/RATIO: CPT | Performed by: CLINICAL NURSE SPECIALIST

## 2021-05-21 PROCEDURE — 82728 ASSAY OF FERRITIN: CPT | Performed by: INTERNAL MEDICINE

## 2021-05-21 PROCEDURE — 36415 COLL VENOUS BLD VENIPUNCTURE: CPT | Mod: PO | Performed by: INTERNAL MEDICINE

## 2021-05-22 ENCOUNTER — PATIENT MESSAGE (OUTPATIENT)
Dept: NEUROLOGY | Facility: CLINIC | Age: 41
End: 2021-05-22

## 2021-05-23 ENCOUNTER — PATIENT MESSAGE (OUTPATIENT)
Dept: INTERNAL MEDICINE | Facility: CLINIC | Age: 41
End: 2021-05-23

## 2021-05-24 LAB
ABSOLUTE CD3: 814 CELLS/UL (ref 700–2100)
ABSOLUTE CD8: 202 CELLS/UL (ref 200–900)
CD3%: 72.5 % (ref 55–83)
CD3+CD4+ CELLS # BLD: 617 CELLS/UL (ref 300–1400)
CD3+CD4+ CELLS NFR BLD: 55 % (ref 28–57)
CD4/CD8 RATIO: 3.05 (ref 0.9–3.6)
CD8 % SUPPRESSOR T CELL: 18 % (ref 10–39)

## 2021-05-25 ENCOUNTER — PATIENT MESSAGE (OUTPATIENT)
Dept: INFECTIOUS DISEASES | Facility: CLINIC | Age: 41
End: 2021-05-25

## 2021-05-25 ENCOUNTER — OFFICE VISIT (OUTPATIENT)
Dept: NEUROLOGY | Facility: CLINIC | Age: 41
End: 2021-05-25
Payer: MEDICARE

## 2021-05-25 ENCOUNTER — TELEPHONE (OUTPATIENT)
Dept: NEUROLOGY | Facility: CLINIC | Age: 41
End: 2021-05-25

## 2021-05-25 ENCOUNTER — OFFICE VISIT (OUTPATIENT)
Dept: INFECTIOUS DISEASES | Facility: CLINIC | Age: 41
End: 2021-05-25
Payer: MEDICARE

## 2021-05-25 ENCOUNTER — PATIENT MESSAGE (OUTPATIENT)
Dept: NEUROLOGY | Facility: CLINIC | Age: 41
End: 2021-05-25

## 2021-05-25 ENCOUNTER — TELEPHONE (OUTPATIENT)
Dept: PHARMACY | Facility: CLINIC | Age: 41
End: 2021-05-25

## 2021-05-25 VITALS
HEIGHT: 69 IN | HEART RATE: 56 BPM | BODY MASS INDEX: 37.88 KG/M2 | WEIGHT: 255.75 LBS | DIASTOLIC BLOOD PRESSURE: 62 MMHG | SYSTOLIC BLOOD PRESSURE: 100 MMHG | TEMPERATURE: 98 F

## 2021-05-25 VITALS
DIASTOLIC BLOOD PRESSURE: 59 MMHG | HEIGHT: 69 IN | WEIGHT: 257 LBS | BODY MASS INDEX: 38.06 KG/M2 | HEART RATE: 62 BPM | SYSTOLIC BLOOD PRESSURE: 101 MMHG

## 2021-05-25 DIAGNOSIS — G35 MULTIPLE SCLEROSIS: ICD-10-CM

## 2021-05-25 DIAGNOSIS — Z71.89 COUNSELING REGARDING GOALS OF CARE: ICD-10-CM

## 2021-05-25 DIAGNOSIS — Z29.89 PROPHYLACTIC IMMUNOTHERAPY: ICD-10-CM

## 2021-05-25 DIAGNOSIS — Z79.899 HIGH RISK MEDICATION USE: ICD-10-CM

## 2021-05-25 DIAGNOSIS — G35 MULTIPLE SCLEROSIS: Primary | ICD-10-CM

## 2021-05-25 DIAGNOSIS — R26.9 GAIT DISTURBANCE: ICD-10-CM

## 2021-05-25 PROCEDURE — 3008F BODY MASS INDEX DOCD: CPT | Mod: CPTII,S$GLB,, | Performed by: CLINICAL NURSE SPECIALIST

## 2021-05-25 PROCEDURE — 99204 PR OFFICE/OUTPT VISIT, NEW, LEVL IV, 45-59 MIN: ICD-10-PCS | Mod: 25,S$GLB,, | Performed by: PHYSICIAN ASSISTANT

## 2021-05-25 PROCEDURE — G0010 PNEUMOCOCCAL CONJUGATE VACCINE 13-VALENT LESS THAN 5YO & GREATER THAN: ICD-10-PCS | Mod: 59,S$GLB,, | Performed by: PHYSICIAN ASSISTANT

## 2021-05-25 PROCEDURE — 90471 IMMUNIZATION ADMIN: CPT | Mod: S$GLB,,, | Performed by: PHYSICIAN ASSISTANT

## 2021-05-25 PROCEDURE — 3008F BODY MASS INDEX DOCD: CPT | Mod: CPTII,S$GLB,, | Performed by: PHYSICIAN ASSISTANT

## 2021-05-25 PROCEDURE — 1125F PR PAIN SEVERITY QUANTIFIED, PAIN PRESENT: ICD-10-PCS | Mod: S$GLB,,, | Performed by: PHYSICIAN ASSISTANT

## 2021-05-25 PROCEDURE — 99999 PR PBB SHADOW E&M-EST. PATIENT-LVL IV: CPT | Mod: PBBFAC,,, | Performed by: PHYSICIAN ASSISTANT

## 2021-05-25 PROCEDURE — 90471 HEPATITIS A VACCINE ADULT IM: ICD-10-PCS | Mod: S$GLB,,, | Performed by: PHYSICIAN ASSISTANT

## 2021-05-25 PROCEDURE — 1125F PR PAIN SEVERITY QUANTIFIED, PAIN PRESENT: ICD-10-PCS | Mod: S$GLB,,, | Performed by: CLINICAL NURSE SPECIALIST

## 2021-05-25 PROCEDURE — 99215 OFFICE O/P EST HI 40 MIN: CPT | Mod: S$GLB,,, | Performed by: CLINICAL NURSE SPECIALIST

## 2021-05-25 PROCEDURE — 3008F PR BODY MASS INDEX (BMI) DOCUMENTED: ICD-10-PCS | Mod: CPTII,S$GLB,, | Performed by: PHYSICIAN ASSISTANT

## 2021-05-25 PROCEDURE — 1125F AMNT PAIN NOTED PAIN PRSNT: CPT | Mod: S$GLB,,, | Performed by: CLINICAL NURSE SPECIALIST

## 2021-05-25 PROCEDURE — 1125F AMNT PAIN NOTED PAIN PRSNT: CPT | Mod: S$GLB,,, | Performed by: PHYSICIAN ASSISTANT

## 2021-05-25 PROCEDURE — 90739 HEPATITIS B (RECOMBINANT) ADJUVANTED, 2 DOSE: ICD-10-PCS | Mod: S$GLB,,, | Performed by: PHYSICIAN ASSISTANT

## 2021-05-25 PROCEDURE — 99215 PR OFFICE/OUTPT VISIT, EST, LEVL V, 40-54 MIN: ICD-10-PCS | Mod: S$GLB,,, | Performed by: CLINICAL NURSE SPECIALIST

## 2021-05-25 PROCEDURE — 99204 OFFICE O/P NEW MOD 45 MIN: CPT | Mod: 25,S$GLB,, | Performed by: PHYSICIAN ASSISTANT

## 2021-05-25 PROCEDURE — 99499 UNLISTED E&M SERVICE: CPT | Mod: S$GLB,,, | Performed by: PHYSICIAN ASSISTANT

## 2021-05-25 PROCEDURE — 90670 PNEUMOCOCCAL CONJUGATE VACCINE 13-VALENT LESS THAN 5YO & GREATER THAN: ICD-10-PCS | Mod: S$GLB,,, | Performed by: PHYSICIAN ASSISTANT

## 2021-05-25 PROCEDURE — 90632 HEPATITIS A VACCINE ADULT IM: ICD-10-PCS | Mod: S$GLB,,, | Performed by: PHYSICIAN ASSISTANT

## 2021-05-25 PROCEDURE — 99999 PR PBB SHADOW E&M-EST. PATIENT-LVL IV: ICD-10-PCS | Mod: PBBFAC,,, | Performed by: PHYSICIAN ASSISTANT

## 2021-05-25 PROCEDURE — G0010 ADMIN HEPATITIS B VACCINE: HCPCS | Mod: 59,S$GLB,, | Performed by: PHYSICIAN ASSISTANT

## 2021-05-25 PROCEDURE — 3008F PR BODY MASS INDEX (BMI) DOCUMENTED: ICD-10-PCS | Mod: CPTII,S$GLB,, | Performed by: CLINICAL NURSE SPECIALIST

## 2021-05-25 PROCEDURE — 90670 PCV13 VACCINE IM: CPT | Mod: S$GLB,,, | Performed by: PHYSICIAN ASSISTANT

## 2021-05-25 PROCEDURE — 99999 PR PBB SHADOW E&M-EST. PATIENT-LVL IV: CPT | Mod: PBBFAC,,, | Performed by: CLINICAL NURSE SPECIALIST

## 2021-05-25 PROCEDURE — 90632 HEPA VACCINE ADULT IM: CPT | Mod: S$GLB,,, | Performed by: PHYSICIAN ASSISTANT

## 2021-05-25 PROCEDURE — 99999 PR PBB SHADOW E&M-EST. PATIENT-LVL IV: ICD-10-PCS | Mod: PBBFAC,,, | Performed by: CLINICAL NURSE SPECIALIST

## 2021-05-25 PROCEDURE — 90739 HEPB VACC 2/4 DOSE ADULT IM: CPT | Mod: S$GLB,,, | Performed by: PHYSICIAN ASSISTANT

## 2021-05-25 PROCEDURE — G0009 ADMIN PNEUMOCOCCAL VACCINE: HCPCS | Mod: 59,S$GLB,, | Performed by: PHYSICIAN ASSISTANT

## 2021-05-25 PROCEDURE — G0009 HEPATITIS B (RECOMBINANT) ADJUVANTED, 2 DOSE: ICD-10-PCS | Mod: 59,S$GLB,, | Performed by: PHYSICIAN ASSISTANT

## 2021-05-25 PROCEDURE — 99499 RISK ADDL DX/OHS AUDIT: ICD-10-PCS | Mod: S$GLB,,, | Performed by: PHYSICIAN ASSISTANT

## 2021-05-28 ENCOUNTER — PATIENT MESSAGE (OUTPATIENT)
Dept: NEUROLOGY | Facility: CLINIC | Age: 41
End: 2021-05-28

## 2021-05-31 ENCOUNTER — PATIENT MESSAGE (OUTPATIENT)
Dept: PSYCHIATRY | Facility: CLINIC | Age: 41
End: 2021-05-31

## 2021-05-31 ENCOUNTER — LAB VISIT (OUTPATIENT)
Dept: LAB | Facility: HOSPITAL | Age: 41
End: 2021-05-31
Attending: CLINICAL NURSE SPECIALIST
Payer: MEDICARE

## 2021-05-31 ENCOUNTER — PATIENT MESSAGE (OUTPATIENT)
Dept: NEUROLOGY | Facility: CLINIC | Age: 41
End: 2021-05-31

## 2021-05-31 DIAGNOSIS — G35 MULTIPLE SCLEROSIS: ICD-10-CM

## 2021-05-31 LAB
ALBUMIN SERPL BCP-MCNC: 4.1 G/DL (ref 3.5–5.2)
ALP SERPL-CCNC: 60 U/L (ref 55–135)
ALT SERPL W/O P-5'-P-CCNC: 10 U/L (ref 10–44)
ANION GAP SERPL CALC-SCNC: 12 MMOL/L (ref 8–16)
AST SERPL-CCNC: 16 U/L (ref 10–40)
BASOPHILS # BLD AUTO: 0.04 K/UL (ref 0–0.2)
BASOPHILS NFR BLD: 0.5 % (ref 0–1.9)
BILIRUB SERPL-MCNC: 0.6 MG/DL (ref 0.1–1)
BUN SERPL-MCNC: 10 MG/DL (ref 6–20)
CALCIUM SERPL-MCNC: 9.9 MG/DL (ref 8.7–10.5)
CHLORIDE SERPL-SCNC: 102 MMOL/L (ref 95–110)
CO2 SERPL-SCNC: 24 MMOL/L (ref 23–29)
CREAT SERPL-MCNC: 1 MG/DL (ref 0.5–1.4)
DIFFERENTIAL METHOD: ABNORMAL
EOSINOPHIL # BLD AUTO: 0.1 K/UL (ref 0–0.5)
EOSINOPHIL NFR BLD: 1.4 % (ref 0–8)
ERYTHROCYTE [DISTWIDTH] IN BLOOD BY AUTOMATED COUNT: 16.5 % (ref 11.5–14.5)
EST. GFR  (AFRICAN AMERICAN): >60 ML/MIN/1.73 M^2
EST. GFR  (NON AFRICAN AMERICAN): >60 ML/MIN/1.73 M^2
GLUCOSE SERPL-MCNC: 96 MG/DL (ref 70–110)
HCT VFR BLD AUTO: 34.9 % (ref 37–48.5)
HGB BLD-MCNC: 11.8 G/DL (ref 12–16)
IGA SERPL-MCNC: 393 MG/DL (ref 40–350)
IGG SERPL-MCNC: 2037 MG/DL (ref 650–1600)
IGM SERPL-MCNC: 169 MG/DL (ref 50–300)
IMM GRANULOCYTES # BLD AUTO: 0.03 K/UL (ref 0–0.04)
IMM GRANULOCYTES NFR BLD AUTO: 0.3 % (ref 0–0.5)
LYMPHOCYTES # BLD AUTO: 1.1 K/UL (ref 1–4.8)
LYMPHOCYTES NFR BLD: 12 % (ref 18–48)
MCH RBC QN AUTO: 27.9 PG (ref 27–31)
MCHC RBC AUTO-ENTMCNC: 33.8 G/DL (ref 32–36)
MCV RBC AUTO: 83 FL (ref 82–98)
MONOCYTES # BLD AUTO: 0.5 K/UL (ref 0.3–1)
MONOCYTES NFR BLD: 5.3 % (ref 4–15)
NEUTROPHILS # BLD AUTO: 7.1 K/UL (ref 1.8–7.7)
NEUTROPHILS NFR BLD: 80.5 % (ref 38–73)
NRBC BLD-RTO: 0 /100 WBC
PLATELET # BLD AUTO: 253 K/UL (ref 150–450)
PMV BLD AUTO: 12.7 FL (ref 9.2–12.9)
POTASSIUM SERPL-SCNC: 3.2 MMOL/L (ref 3.5–5.1)
PROT SERPL-MCNC: 8.6 G/DL (ref 6–8.4)
RBC # BLD AUTO: 4.23 M/UL (ref 4–5.4)
SODIUM SERPL-SCNC: 138 MMOL/L (ref 136–145)
WBC # BLD AUTO: 8.84 K/UL (ref 3.9–12.7)

## 2021-05-31 PROCEDURE — 80053 COMPREHEN METABOLIC PANEL: CPT | Performed by: CLINICAL NURSE SPECIALIST

## 2021-05-31 PROCEDURE — 86592 SYPHILIS TEST NON-TREP QUAL: CPT | Performed by: CLINICAL NURSE SPECIALIST

## 2021-05-31 PROCEDURE — 87340 HEPATITIS B SURFACE AG IA: CPT | Performed by: CLINICAL NURSE SPECIALIST

## 2021-05-31 PROCEDURE — 86787 VARICELLA-ZOSTER ANTIBODY: CPT | Performed by: CLINICAL NURSE SPECIALIST

## 2021-05-31 PROCEDURE — 86704 HEP B CORE ANTIBODY TOTAL: CPT | Performed by: CLINICAL NURSE SPECIALIST

## 2021-05-31 PROCEDURE — 85025 COMPLETE CBC W/AUTO DIFF WBC: CPT | Performed by: CLINICAL NURSE SPECIALIST

## 2021-05-31 PROCEDURE — 86790 VIRUS ANTIBODY NOS: CPT | Performed by: CLINICAL NURSE SPECIALIST

## 2021-05-31 PROCEDURE — 86360 T CELL ABSOLUTE COUNT/RATIO: CPT | Performed by: CLINICAL NURSE SPECIALIST

## 2021-05-31 PROCEDURE — 86803 HEPATITIS C AB TEST: CPT | Performed by: CLINICAL NURSE SPECIALIST

## 2021-05-31 PROCEDURE — 82784 ASSAY IGA/IGD/IGG/IGM EACH: CPT | Mod: 59 | Performed by: CLINICAL NURSE SPECIALIST

## 2021-05-31 PROCEDURE — 86682 HELMINTH ANTIBODY: CPT | Performed by: CLINICAL NURSE SPECIALIST

## 2021-05-31 PROCEDURE — 86359 T CELLS TOTAL COUNT: CPT | Performed by: CLINICAL NURSE SPECIALIST

## 2021-05-31 PROCEDURE — 86703 HIV-1/HIV-2 1 RESULT ANTBDY: CPT | Performed by: CLINICAL NURSE SPECIALIST

## 2021-05-31 PROCEDURE — 86706 HEP B SURFACE ANTIBODY: CPT | Performed by: CLINICAL NURSE SPECIALIST

## 2021-06-01 ENCOUNTER — TELEPHONE (OUTPATIENT)
Dept: INTERNAL MEDICINE | Facility: CLINIC | Age: 41
End: 2021-06-01

## 2021-06-01 DIAGNOSIS — E87.8 ELECTROLYTE ABNORMALITY: ICD-10-CM

## 2021-06-01 DIAGNOSIS — E87.6 HYPOKALEMIA: Primary | ICD-10-CM

## 2021-06-01 LAB
ABSOLUTE CD3: 747 CELLS/UL (ref 700–2100)
ABSOLUTE CD8: 177 CELLS/UL (ref 200–900)
CD3%: 70.8 % (ref 55–83)
CD3+CD4+ CELLS # BLD: 581 CELLS/UL (ref 300–1400)
CD3+CD4+ CELLS NFR BLD: 55.1 % (ref 28–57)
CD4/CD8 RATIO: 3.29 (ref 0.9–3.6)
CD8 % SUPPRESSOR T CELL: 16.7 % (ref 10–39)
HBV CORE AB SERPL QL IA: NEGATIVE
HBV SURFACE AB SER-ACNC: NEGATIVE M[IU]/ML
HBV SURFACE AG SERPL QL IA: NEGATIVE
HCV AB SERPL QL IA: NEGATIVE
HEPATITIS A ANTIBODY, IGG: NEGATIVE
HIV 1+2 AB+HIV1 P24 AG SERPL QL IA: NEGATIVE
RPR SER QL: NORMAL

## 2021-06-01 RX ORDER — POTASSIUM CHLORIDE 20 MEQ/1
20 TABLET, EXTENDED RELEASE ORAL DAILY
Qty: 3 TABLET | Refills: 0 | Status: SHIPPED | OUTPATIENT
Start: 2021-06-01 | End: 2021-06-04

## 2021-06-02 LAB
STRONGYLOIDES ANTIBODY IGG: NEGATIVE
VARICELLA INTERPRETATION: POSITIVE
VARICELLA ZOSTER IGG: 3.72 ISR (ref 0–0.9)

## 2021-06-06 ENCOUNTER — PATIENT MESSAGE (OUTPATIENT)
Dept: NEUROLOGY | Facility: CLINIC | Age: 41
End: 2021-06-06

## 2021-06-06 LAB
JCPYV AB SERPL QL IA: ABNORMAL
JCV INDEX: 0.32

## 2021-06-07 ENCOUNTER — PATIENT MESSAGE (OUTPATIENT)
Dept: NEUROLOGY | Facility: CLINIC | Age: 41
End: 2021-06-07

## 2021-06-08 ENCOUNTER — PATIENT MESSAGE (OUTPATIENT)
Dept: NEUROLOGY | Facility: CLINIC | Age: 41
End: 2021-06-08

## 2021-06-08 LAB — JCPYV AB SERPL QL IA: NORMAL

## 2021-06-09 ENCOUNTER — TELEPHONE (OUTPATIENT)
Dept: INTERNAL MEDICINE | Facility: CLINIC | Age: 41
End: 2021-06-09

## 2021-06-10 ENCOUNTER — PATIENT MESSAGE (OUTPATIENT)
Dept: NEUROLOGY | Facility: CLINIC | Age: 41
End: 2021-06-10

## 2021-06-10 DIAGNOSIS — M54.50 LOW BACK PAIN, UNSPECIFIED BACK PAIN LATERALITY, UNSPECIFIED CHRONICITY, UNSPECIFIED WHETHER SCIATICA PRESENT: ICD-10-CM

## 2021-06-10 DIAGNOSIS — G35 MULTIPLE SCLEROSIS: Primary | ICD-10-CM

## 2021-06-10 DIAGNOSIS — M54.2 NECK PAIN: ICD-10-CM

## 2021-06-11 ENCOUNTER — PATIENT MESSAGE (OUTPATIENT)
Dept: NEUROLOGY | Facility: CLINIC | Age: 41
End: 2021-06-11

## 2021-06-16 ENCOUNTER — DOCUMENTATION ONLY (OUTPATIENT)
Dept: NEUROLOGY | Facility: CLINIC | Age: 41
End: 2021-06-16

## 2021-06-17 ENCOUNTER — PATIENT MESSAGE (OUTPATIENT)
Dept: ORTHOPEDICS | Facility: CLINIC | Age: 41
End: 2021-06-17

## 2021-06-23 NOTE — PROGRESS NOTES
Subjective:       Patient ID: Jaylon Bowles is a 38 y.o. female who presents for Hypertension; Thyroid Problem; and Depression      Hypertension   This is a chronic problem. The current episode started more than 1 month ago. The problem is unchanged. The problem is controlled. Pertinent negatives include no chest pain, headaches, palpitations, peripheral edema or shortness of breath. There are no associated agents to hypertension. Risk factors for coronary artery disease include obesity and sedentary lifestyle. Past treatments include beta blockers and ACE inhibitors. The current treatment provides moderate improvement. Compliance problems include exercise.  There is no history of kidney disease. Identifiable causes of hypertension include a thyroid problem. There is no history of chronic renal disease or a hypertension causing med.   Thyroid Problem   Presents for follow-up visit. Symptoms include anxiety and depressed mood. Patient reports no cold intolerance, constipation, diaphoresis, diarrhea, heat intolerance, leg swelling, palpitations, weight gain or weight loss. The symptoms have been stable.   Depression   Visit Type: follow-up  Patient presents with the following symptoms: anhedonia, depressed mood, excessive worry, feelings of worthlessness and nervousness/anxiety.  Patient is not experiencing: chest pain, hyperventilation, malaise, palpitations, panic, shortness of breath, weight gain and weight loss.  Frequency of symptoms: most days   Severity: moderate   Compliance with medications:  %        Patient started going to [facility] for exercise. Her mother usually comes with her. She has also decreased her intake of [employer] chicken and sweets.       Review of Systems   Constitutional: Negative for chills, diaphoresis, fever, unexpected weight change, weight gain and weight loss.   HENT: Negative for congestion, rhinorrhea, sinus pressure and sore throat.    Eyes: Negative for redness and  Wound care discussed. Stressed importance of elevating legs, and watching carefully for signs of infection. Call immediately if noted.  Apply bacitracin and light dressing daily.   Do not rub skin; apply lotion gently and patting more than rubbing.  Follow up if symptoms increase, change or do not improve.      itching.   Respiratory: Negative for cough and shortness of breath.    Cardiovascular: Negative for chest pain, palpitations and leg swelling.   Gastrointestinal: Negative for abdominal pain, constipation, diarrhea, nausea and vomiting.   Endocrine: Negative for cold intolerance and heat intolerance.   Genitourinary: Negative for dysuria and hematuria.   Musculoskeletal: Negative for arthralgias and myalgias.   Skin: Negative for rash.   Neurological: Negative for dizziness, numbness and headaches.   Psychiatric/Behavioral: Positive for depression and dysphoric mood. The patient is nervous/anxious.        Objective:      Physical Exam   Constitutional: She is oriented to person, place, and time. Vital signs are normal. She appears well-developed and well-nourished. No distress.   HENT:   Head: Normocephalic and atraumatic.   Right Ear: Hearing and external ear normal.   Left Ear: Hearing and external ear normal.   Nose: Nose normal.   Mouth/Throat: Uvula is midline.   Eyes: Lids are normal.   Cardiovascular: Normal rate, regular rhythm, normal heart sounds and intact distal pulses.   No murmur heard.  Pulmonary/Chest: Effort normal and breath sounds normal. She has no wheezes.   Abdominal: Soft. Bowel sounds are normal. She exhibits no distension. There is no tenderness.   Musculoskeletal: Normal range of motion. She exhibits no edema.   Neurological: She is alert and oriented to person, place, and time.   Skin: Skin is warm, dry and intact. No rash noted. She is not diaphoretic.   Psychiatric: She has a normal mood and affect.   Vitals reviewed.      Assessment/Plan:       1. Essential hypertension  - BP is controlled  - continue coreg, lisinopril-HCTZ    2. Anxiety and depression  - change Lexapro to Zoloft  - sertraline (ZOLOFT) 100 MG tablet; Take 1 tablet (100 mg total) by mouth once daily.  Dispense: 30 tablet; Refill: 0    3. Hashimoto's thyroiditis  - stable, continue synthroid    4. Nutritional anemia   -  Ferritin; Future  - Iron and TIBC; Future  - CBC auto differential; Future    RTC in 3 months or sooner if needed    Cat Ramachandran MD

## 2021-06-24 ENCOUNTER — PATIENT OUTREACH (OUTPATIENT)
Dept: ADMINISTRATIVE | Facility: OTHER | Age: 41
End: 2021-06-24

## 2021-07-04 ENCOUNTER — PATIENT MESSAGE (OUTPATIENT)
Dept: OTHER | Facility: OTHER | Age: 41
End: 2021-07-04

## 2021-07-06 ENCOUNTER — PATIENT MESSAGE (OUTPATIENT)
Dept: OTHER | Facility: OTHER | Age: 41
End: 2021-07-06

## 2021-07-06 ENCOUNTER — PATIENT MESSAGE (OUTPATIENT)
Dept: NEUROLOGY | Facility: CLINIC | Age: 41
End: 2021-07-06

## 2021-07-07 ENCOUNTER — INFUSION (OUTPATIENT)
Dept: INFUSION THERAPY | Facility: HOSPITAL | Age: 41
End: 2021-07-07
Payer: MEDICARE

## 2021-07-07 VITALS
HEART RATE: 75 BPM | DIASTOLIC BLOOD PRESSURE: 57 MMHG | RESPIRATION RATE: 18 BRPM | TEMPERATURE: 98 F | SYSTOLIC BLOOD PRESSURE: 124 MMHG

## 2021-07-07 DIAGNOSIS — G35 MULTIPLE SCLEROSIS: Primary | ICD-10-CM

## 2021-07-07 PROCEDURE — 25000003 PHARM REV CODE 250: Performed by: PSYCHIATRY & NEUROLOGY

## 2021-07-07 PROCEDURE — 96367 TX/PROPH/DG ADDL SEQ IV INF: CPT

## 2021-07-07 PROCEDURE — 96415 CHEMO IV INFUSION ADDL HR: CPT

## 2021-07-07 PROCEDURE — 96413 CHEMO IV INFUSION 1 HR: CPT

## 2021-07-07 PROCEDURE — 63600175 PHARM REV CODE 636 W HCPCS: Mod: JG | Performed by: PSYCHIATRY & NEUROLOGY

## 2021-07-07 PROCEDURE — 96375 TX/PRO/DX INJ NEW DRUG ADDON: CPT

## 2021-07-07 RX ORDER — ACETAMINOPHEN 500 MG
1000 TABLET ORAL
Status: CANCELLED | OUTPATIENT
Start: 2021-07-07

## 2021-07-07 RX ORDER — EPINEPHRINE 0.3 MG/.3ML
0.3 INJECTION SUBCUTANEOUS
Status: DISCONTINUED | OUTPATIENT
Start: 2021-07-07 | End: 2021-07-07 | Stop reason: HOSPADM

## 2021-07-07 RX ORDER — EPINEPHRINE 0.3 MG/.3ML
0.3 INJECTION SUBCUTANEOUS
Status: CANCELLED | OUTPATIENT
Start: 2021-07-07

## 2021-07-07 RX ORDER — DIPHENHYDRAMINE HYDROCHLORIDE 50 MG/ML
50 INJECTION INTRAMUSCULAR; INTRAVENOUS
Status: DISCONTINUED | OUTPATIENT
Start: 2021-07-07 | End: 2021-07-07 | Stop reason: HOSPADM

## 2021-07-07 RX ORDER — SODIUM CHLORIDE 0.9 % (FLUSH) 0.9 %
10 SYRINGE (ML) INJECTION
Status: DISCONTINUED | OUTPATIENT
Start: 2021-07-07 | End: 2021-07-07 | Stop reason: HOSPADM

## 2021-07-07 RX ORDER — SODIUM CHLORIDE 0.9 % (FLUSH) 0.9 %
10 SYRINGE (ML) INJECTION
Status: CANCELLED | OUTPATIENT
Start: 2021-07-21

## 2021-07-07 RX ORDER — HEPARIN 100 UNIT/ML
500 SYRINGE INTRAVENOUS
Status: CANCELLED | OUTPATIENT
Start: 2021-07-21

## 2021-07-07 RX ORDER — SODIUM CHLORIDE 0.9 % (FLUSH) 0.9 %
10 SYRINGE (ML) INJECTION
Status: CANCELLED | OUTPATIENT
Start: 2021-07-07

## 2021-07-07 RX ORDER — FAMOTIDINE 10 MG/ML
20 INJECTION INTRAVENOUS
Status: CANCELLED | OUTPATIENT
Start: 2021-07-21

## 2021-07-07 RX ORDER — EPINEPHRINE 0.3 MG/.3ML
0.3 INJECTION SUBCUTANEOUS
Status: CANCELLED | OUTPATIENT
Start: 2021-07-21

## 2021-07-07 RX ORDER — HEPARIN 100 UNIT/ML
500 SYRINGE INTRAVENOUS
Status: CANCELLED | OUTPATIENT
Start: 2021-07-07

## 2021-07-07 RX ORDER — DIPHENHYDRAMINE HYDROCHLORIDE 50 MG/ML
50 INJECTION INTRAMUSCULAR; INTRAVENOUS
Status: CANCELLED | OUTPATIENT
Start: 2021-07-07

## 2021-07-07 RX ORDER — FAMOTIDINE 10 MG/ML
20 INJECTION INTRAVENOUS
Status: CANCELLED | OUTPATIENT
Start: 2021-07-07

## 2021-07-07 RX ORDER — FAMOTIDINE 10 MG/ML
20 INJECTION INTRAVENOUS
Status: COMPLETED | OUTPATIENT
Start: 2021-07-07 | End: 2021-07-07

## 2021-07-07 RX ORDER — HEPARIN 100 UNIT/ML
500 SYRINGE INTRAVENOUS
Status: DISCONTINUED | OUTPATIENT
Start: 2021-07-07 | End: 2021-07-07 | Stop reason: HOSPADM

## 2021-07-07 RX ORDER — DIPHENHYDRAMINE HYDROCHLORIDE 50 MG/ML
50 INJECTION INTRAMUSCULAR; INTRAVENOUS
Status: CANCELLED | OUTPATIENT
Start: 2021-07-21

## 2021-07-07 RX ORDER — ACETAMINOPHEN 500 MG
1000 TABLET ORAL
Status: COMPLETED | OUTPATIENT
Start: 2021-07-07 | End: 2021-07-07

## 2021-07-07 RX ORDER — ACETAMINOPHEN 500 MG
1000 TABLET ORAL
Status: CANCELLED | OUTPATIENT
Start: 2021-07-21

## 2021-07-07 RX ADMIN — SODIUM CHLORIDE: 9 INJECTION, SOLUTION INTRAVENOUS at 08:07

## 2021-07-07 RX ADMIN — ACETAMINOPHEN 1000 MG: 500 TABLET ORAL at 09:07

## 2021-07-07 RX ADMIN — OCRELIZUMAB 300 MG: 300 INJECTION INTRAVENOUS at 10:07

## 2021-07-07 RX ADMIN — DEXTROSE 100 MG: 50 INJECTION, SOLUTION INTRAVENOUS at 09:07

## 2021-07-07 RX ADMIN — FAMOTIDINE 20 MG: 10 INJECTION INTRAVENOUS at 09:07

## 2021-07-07 RX ADMIN — DIPHENHYDRAMINE HYDROCHLORIDE 50 MG: 50 INJECTION, SOLUTION INTRAMUSCULAR; INTRAVENOUS at 09:07

## 2021-07-08 ENCOUNTER — PATIENT MESSAGE (OUTPATIENT)
Dept: OTHER | Facility: OTHER | Age: 41
End: 2021-07-08

## 2021-07-15 ENCOUNTER — PATIENT MESSAGE (OUTPATIENT)
Dept: OTHER | Facility: OTHER | Age: 41
End: 2021-07-15

## 2021-07-19 ENCOUNTER — PATIENT MESSAGE (OUTPATIENT)
Dept: INTERNAL MEDICINE | Facility: CLINIC | Age: 41
End: 2021-07-19

## 2021-07-19 ENCOUNTER — PATIENT MESSAGE (OUTPATIENT)
Dept: NEUROLOGY | Facility: CLINIC | Age: 41
End: 2021-07-19

## 2021-07-21 ENCOUNTER — TELEPHONE (OUTPATIENT)
Dept: INFUSION THERAPY | Facility: HOSPITAL | Age: 41
End: 2021-07-21

## 2021-07-21 ENCOUNTER — PATIENT MESSAGE (OUTPATIENT)
Dept: INTERNAL MEDICINE | Facility: CLINIC | Age: 41
End: 2021-07-21

## 2021-07-21 ENCOUNTER — INFUSION (OUTPATIENT)
Dept: INFUSION THERAPY | Facility: HOSPITAL | Age: 41
End: 2021-07-21
Payer: MEDICARE

## 2021-07-21 VITALS — HEART RATE: 74 BPM | TEMPERATURE: 98 F | RESPIRATION RATE: 20 BRPM | OXYGEN SATURATION: 100 %

## 2021-07-21 DIAGNOSIS — R05.9 COUGH: ICD-10-CM

## 2021-07-23 ENCOUNTER — PATIENT MESSAGE (OUTPATIENT)
Dept: NEUROLOGY | Facility: CLINIC | Age: 41
End: 2021-07-23

## 2021-07-27 ENCOUNTER — PATIENT MESSAGE (OUTPATIENT)
Dept: NEUROLOGY | Facility: CLINIC | Age: 41
End: 2021-07-27

## 2021-07-27 DIAGNOSIS — G35 MULTIPLE SCLEROSIS: Primary | ICD-10-CM

## 2021-07-27 DIAGNOSIS — U07.1 COVID-19: ICD-10-CM

## 2021-07-29 ENCOUNTER — LAB VISIT (OUTPATIENT)
Dept: INTERNAL MEDICINE | Facility: CLINIC | Age: 41
End: 2021-07-29
Payer: MEDICARE

## 2021-07-29 DIAGNOSIS — G35 MULTIPLE SCLEROSIS: ICD-10-CM

## 2021-07-29 PROCEDURE — U0003 INFECTIOUS AGENT DETECTION BY NUCLEIC ACID (DNA OR RNA); SEVERE ACUTE RESPIRATORY SYNDROME CORONAVIRUS 2 (SARS-COV-2) (CORONAVIRUS DISEASE [COVID-19]), AMPLIFIED PROBE TECHNIQUE, MAKING USE OF HIGH THROUGHPUT TECHNOLOGIES AS DESCRIBED BY CMS-2020-01-R: HCPCS | Performed by: CLINICAL NURSE SPECIALIST

## 2021-07-29 PROCEDURE — U0005 INFEC AGEN DETEC AMPLI PROBE: HCPCS | Performed by: CLINICAL NURSE SPECIALIST

## 2021-07-30 ENCOUNTER — PATIENT MESSAGE (OUTPATIENT)
Dept: NEUROLOGY | Facility: CLINIC | Age: 41
End: 2021-07-30

## 2021-07-30 DIAGNOSIS — U07.1 COVID-19 VIRUS DETECTED: ICD-10-CM

## 2021-07-30 DIAGNOSIS — U07.1 COVID-19: Primary | ICD-10-CM

## 2021-07-30 LAB
SARS-COV-2 RNA RESP QL NAA+PROBE: DETECTED
SARS-COV-2- CYCLE NUMBER: 36.44

## 2021-07-31 ENCOUNTER — PATIENT MESSAGE (OUTPATIENT)
Dept: INTERNAL MEDICINE | Facility: CLINIC | Age: 41
End: 2021-07-31

## 2021-08-02 ENCOUNTER — INFUSION (OUTPATIENT)
Dept: INFECTIOUS DISEASES | Facility: HOSPITAL | Age: 41
End: 2021-08-02
Attending: INTERNAL MEDICINE
Payer: MEDICARE

## 2021-08-02 VITALS
RESPIRATION RATE: 20 BRPM | WEIGHT: 250 LBS | BODY MASS INDEX: 41.65 KG/M2 | TEMPERATURE: 98 F | OXYGEN SATURATION: 99 % | HEART RATE: 65 BPM | DIASTOLIC BLOOD PRESSURE: 69 MMHG | SYSTOLIC BLOOD PRESSURE: 137 MMHG | HEIGHT: 65 IN

## 2021-08-02 DIAGNOSIS — U07.1 COVID-19: Primary | ICD-10-CM

## 2021-08-02 PROCEDURE — 25000003 PHARM REV CODE 250: Performed by: INTERNAL MEDICINE

## 2021-08-02 PROCEDURE — M0243 CASIRIVI AND IMDEVI INFUSION: HCPCS | Performed by: INTERNAL MEDICINE

## 2021-08-02 PROCEDURE — 63600175 PHARM REV CODE 636 W HCPCS: Performed by: INTERNAL MEDICINE

## 2021-08-02 RX ORDER — DIPHENHYDRAMINE HYDROCHLORIDE 50 MG/ML
25 INJECTION INTRAMUSCULAR; INTRAVENOUS ONCE AS NEEDED
Status: DISCONTINUED | OUTPATIENT
Start: 2021-08-02 | End: 2022-02-04

## 2021-08-02 RX ORDER — ALBUTEROL SULFATE 90 UG/1
2 AEROSOL, METERED RESPIRATORY (INHALATION)
Status: DISCONTINUED | OUTPATIENT
Start: 2021-08-02 | End: 2022-02-04

## 2021-08-02 RX ORDER — EPINEPHRINE 0.3 MG/.3ML
0.3 INJECTION SUBCUTANEOUS
Status: DISCONTINUED | OUTPATIENT
Start: 2021-08-02 | End: 2022-02-04

## 2021-08-02 RX ORDER — ONDANSETRON 4 MG/1
4 TABLET, ORALLY DISINTEGRATING ORAL ONCE AS NEEDED
Status: DISCONTINUED | OUTPATIENT
Start: 2021-08-02 | End: 2022-02-04

## 2021-08-02 RX ORDER — SODIUM CHLORIDE 0.9 % (FLUSH) 0.9 %
10 SYRINGE (ML) INJECTION
Status: DISCONTINUED | OUTPATIENT
Start: 2021-08-02 | End: 2022-02-04

## 2021-08-02 RX ORDER — ACETAMINOPHEN 325 MG/1
650 TABLET ORAL ONCE AS NEEDED
Status: DISCONTINUED | OUTPATIENT
Start: 2021-08-02 | End: 2022-02-04

## 2021-08-02 RX ADMIN — CASIRIVIMAB AND IMDEVIMAB 600 MG: 600; 600 INJECTION, SOLUTION, CONCENTRATE INTRAVENOUS at 12:08

## 2021-08-04 ENCOUNTER — PATIENT OUTREACH (OUTPATIENT)
Dept: OTHER | Facility: OTHER | Age: 41
End: 2021-08-04

## 2021-08-12 ENCOUNTER — PATIENT MESSAGE (OUTPATIENT)
Dept: NEUROLOGY | Facility: CLINIC | Age: 41
End: 2021-08-12

## 2021-08-13 ENCOUNTER — PATIENT OUTREACH (OUTPATIENT)
Dept: ADMINISTRATIVE | Facility: OTHER | Age: 41
End: 2021-08-13

## 2021-08-16 ENCOUNTER — PATIENT MESSAGE (OUTPATIENT)
Dept: NEUROLOGY | Facility: CLINIC | Age: 41
End: 2021-08-16

## 2021-08-17 ENCOUNTER — OFFICE VISIT (OUTPATIENT)
Dept: NEUROLOGY | Facility: CLINIC | Age: 41
End: 2021-08-17
Payer: MEDICARE

## 2021-08-17 DIAGNOSIS — U07.1 COVID-19 VIRUS INFECTION: ICD-10-CM

## 2021-08-17 DIAGNOSIS — G35 MULTIPLE SCLEROSIS: Primary | ICD-10-CM

## 2021-08-17 PROCEDURE — 99215 PR OFFICE/OUTPT VISIT, EST, LEVL V, 40-54 MIN: ICD-10-PCS | Mod: 95,,, | Performed by: PSYCHIATRY & NEUROLOGY

## 2021-08-17 PROCEDURE — 3044F PR MOST RECENT HEMOGLOBIN A1C LEVEL <7.0%: ICD-10-PCS | Mod: CPTII,95,, | Performed by: PSYCHIATRY & NEUROLOGY

## 2021-08-17 PROCEDURE — 99215 OFFICE O/P EST HI 40 MIN: CPT | Mod: 95,,, | Performed by: PSYCHIATRY & NEUROLOGY

## 2021-08-17 PROCEDURE — 3044F HG A1C LEVEL LT 7.0%: CPT | Mod: CPTII,95,, | Performed by: PSYCHIATRY & NEUROLOGY

## 2021-08-19 ENCOUNTER — PATIENT MESSAGE (OUTPATIENT)
Dept: NEUROLOGY | Facility: CLINIC | Age: 41
End: 2021-08-19

## 2021-08-26 ENCOUNTER — PATIENT MESSAGE (OUTPATIENT)
Dept: NEUROLOGY | Facility: CLINIC | Age: 41
End: 2021-08-26

## 2021-08-28 ENCOUNTER — PATIENT MESSAGE (OUTPATIENT)
Dept: NEUROLOGY | Facility: CLINIC | Age: 41
End: 2021-08-28

## 2021-08-28 DIAGNOSIS — G43.809 OTHER MIGRAINE WITHOUT STATUS MIGRAINOSUS, NOT INTRACTABLE: Primary | ICD-10-CM

## 2021-08-30 ENCOUNTER — PATIENT MESSAGE (OUTPATIENT)
Dept: NEUROLOGY | Facility: CLINIC | Age: 41
End: 2021-08-30

## 2021-08-30 DIAGNOSIS — G43.809 OTHER MIGRAINE WITHOUT STATUS MIGRAINOSUS, NOT INTRACTABLE: Primary | ICD-10-CM

## 2021-08-30 RX ORDER — METHYLPREDNISOLONE 4 MG/1
TABLET ORAL
Qty: 1 PACKAGE | Refills: 0 | Status: SHIPPED | OUTPATIENT
Start: 2021-08-30 | End: 2021-09-20

## 2021-09-01 ENCOUNTER — PATIENT MESSAGE (OUTPATIENT)
Dept: PSYCHIATRY | Facility: CLINIC | Age: 41
End: 2021-09-01

## 2021-09-01 ENCOUNTER — PATIENT MESSAGE (OUTPATIENT)
Dept: NEUROLOGY | Facility: CLINIC | Age: 41
End: 2021-09-01

## 2021-09-02 ENCOUNTER — PATIENT MESSAGE (OUTPATIENT)
Dept: PSYCHIATRY | Facility: CLINIC | Age: 41
End: 2021-09-02

## 2021-09-02 ENCOUNTER — PATIENT MESSAGE (OUTPATIENT)
Dept: NEUROLOGY | Facility: CLINIC | Age: 41
End: 2021-09-02

## 2021-09-04 ENCOUNTER — PATIENT MESSAGE (OUTPATIENT)
Dept: NEUROLOGY | Facility: CLINIC | Age: 41
End: 2021-09-04

## 2021-09-13 ENCOUNTER — PATIENT MESSAGE (OUTPATIENT)
Dept: NEUROLOGY | Facility: CLINIC | Age: 41
End: 2021-09-13

## 2021-09-14 ENCOUNTER — PATIENT MESSAGE (OUTPATIENT)
Dept: NEUROLOGY | Facility: CLINIC | Age: 41
End: 2021-09-14

## 2021-09-14 ENCOUNTER — OFFICE VISIT (OUTPATIENT)
Dept: NEUROLOGY | Facility: CLINIC | Age: 41
End: 2021-09-14
Payer: MEDICARE

## 2021-09-14 DIAGNOSIS — I10 ESSENTIAL HYPERTENSION: ICD-10-CM

## 2021-09-14 DIAGNOSIS — R29.818 SUSPECTED SLEEP APNEA: ICD-10-CM

## 2021-09-14 DIAGNOSIS — Z78.9 CAFFEINE USE: ICD-10-CM

## 2021-09-14 DIAGNOSIS — G35 MULTIPLE SCLEROSIS: ICD-10-CM

## 2021-09-14 DIAGNOSIS — E66.01 CLASS 3 SEVERE OBESITY WITH BODY MASS INDEX (BMI) OF 40.0 TO 44.9 IN ADULT, UNSPECIFIED OBESITY TYPE, UNSPECIFIED WHETHER SERIOUS COMORBIDITY PRESENT: ICD-10-CM

## 2021-09-14 PROCEDURE — 1160F PR REVIEW ALL MEDS BY PRESCRIBER/CLIN PHARMACIST DOCUMENTED: ICD-10-PCS | Mod: HCNC,CPTII,95, | Performed by: PHYSICIAN ASSISTANT

## 2021-09-14 PROCEDURE — 3044F PR MOST RECENT HEMOGLOBIN A1C LEVEL <7.0%: ICD-10-PCS | Mod: HCNC,CPTII,95, | Performed by: PHYSICIAN ASSISTANT

## 2021-09-14 PROCEDURE — 1160F RVW MEDS BY RX/DR IN RCRD: CPT | Mod: HCNC,CPTII,95, | Performed by: PHYSICIAN ASSISTANT

## 2021-09-14 PROCEDURE — 99215 OFFICE O/P EST HI 40 MIN: CPT | Mod: HCNC,95,, | Performed by: PHYSICIAN ASSISTANT

## 2021-09-14 PROCEDURE — 99215 PR OFFICE/OUTPT VISIT, EST, LEVL V, 40-54 MIN: ICD-10-PCS | Mod: HCNC,95,, | Performed by: PHYSICIAN ASSISTANT

## 2021-09-14 PROCEDURE — 99499 UNLISTED E&M SERVICE: CPT | Mod: HCNC,95,, | Performed by: PHYSICIAN ASSISTANT

## 2021-09-14 PROCEDURE — 1159F PR MEDICATION LIST DOCUMENTED IN MEDICAL RECORD: ICD-10-PCS | Mod: HCNC,CPTII,95, | Performed by: PHYSICIAN ASSISTANT

## 2021-09-14 PROCEDURE — 3044F HG A1C LEVEL LT 7.0%: CPT | Mod: HCNC,CPTII,95, | Performed by: PHYSICIAN ASSISTANT

## 2021-09-14 PROCEDURE — 1159F MED LIST DOCD IN RCRD: CPT | Mod: HCNC,CPTII,95, | Performed by: PHYSICIAN ASSISTANT

## 2021-09-14 PROCEDURE — 99499 RISK ADDL DX/OHS AUDIT: ICD-10-PCS | Mod: HCNC,95,, | Performed by: PHYSICIAN ASSISTANT

## 2021-09-14 RX ORDER — IBUPROFEN 400 MG/1
400 TABLET ORAL EVERY 4 HOURS PRN
Qty: 20 TABLET | Refills: 2 | Status: SHIPPED | OUTPATIENT
Start: 2021-09-14 | End: 2024-02-28

## 2021-09-15 ENCOUNTER — PATIENT MESSAGE (OUTPATIENT)
Dept: NEUROLOGY | Facility: CLINIC | Age: 41
End: 2021-09-15

## 2021-09-15 RX ORDER — TOPIRAMATE 25 MG/1
TABLET ORAL
Qty: 150 TABLET | Refills: 0 | Status: SHIPPED | OUTPATIENT
Start: 2021-09-15 | End: 2022-06-14

## 2021-09-17 ENCOUNTER — PATIENT MESSAGE (OUTPATIENT)
Dept: NEUROLOGY | Facility: CLINIC | Age: 41
End: 2021-09-17

## 2021-09-18 ENCOUNTER — PATIENT MESSAGE (OUTPATIENT)
Dept: NEUROLOGY | Facility: CLINIC | Age: 41
End: 2021-09-18

## 2021-09-20 ENCOUNTER — HOSPITAL ENCOUNTER (OUTPATIENT)
Dept: RADIOLOGY | Facility: HOSPITAL | Age: 41
Discharge: HOME | End: 2021-09-20
Attending: PSYCHIATRY & NEUROLOGY
Payer: MEDICARE

## 2021-09-20 DIAGNOSIS — U07.1 COVID-19 VIRUS INFECTION: ICD-10-CM

## 2021-09-20 PROCEDURE — 71046 X-RAY EXAM CHEST 2 VIEWS: CPT | Mod: 26,HCNC,, | Performed by: RADIOLOGY

## 2021-09-20 PROCEDURE — 71046 XR CHEST PA AND LATERAL: ICD-10-PCS | Mod: 26,HCNC,, | Performed by: RADIOLOGY

## 2021-09-20 PROCEDURE — 71046 X-RAY EXAM CHEST 2 VIEWS: CPT | Mod: TC,HCNC

## 2021-09-21 ENCOUNTER — TELEPHONE (OUTPATIENT)
Dept: BARIATRICS | Facility: CLINIC | Age: 41
End: 2021-09-21

## 2021-09-24 ENCOUNTER — INFUSION (OUTPATIENT)
Dept: INFUSION THERAPY | Facility: HOSPITAL | Age: 41
End: 2021-09-24
Attending: INTERNAL MEDICINE
Payer: MEDICARE

## 2021-09-24 VITALS
TEMPERATURE: 99 F | RESPIRATION RATE: 18 BRPM | SYSTOLIC BLOOD PRESSURE: 130 MMHG | DIASTOLIC BLOOD PRESSURE: 68 MMHG | HEART RATE: 71 BPM

## 2021-09-24 DIAGNOSIS — G35 MULTIPLE SCLEROSIS: Primary | ICD-10-CM

## 2021-09-24 PROCEDURE — 96366 THER/PROPH/DIAG IV INF ADDON: CPT | Mod: HCNC

## 2021-09-24 PROCEDURE — 96375 TX/PRO/DX INJ NEW DRUG ADDON: CPT | Mod: HCNC

## 2021-09-24 PROCEDURE — 96367 TX/PROPH/DG ADDL SEQ IV INF: CPT | Mod: HCNC

## 2021-09-24 PROCEDURE — 96365 THER/PROPH/DIAG IV INF INIT: CPT | Mod: HCNC

## 2021-09-24 PROCEDURE — 25000003 PHARM REV CODE 250: Mod: HCNC | Performed by: PSYCHIATRY & NEUROLOGY

## 2021-09-24 PROCEDURE — 63600175 PHARM REV CODE 636 W HCPCS: Mod: HCNC | Performed by: PSYCHIATRY & NEUROLOGY

## 2021-09-24 RX ORDER — ACETAMINOPHEN 500 MG
1000 TABLET ORAL
Status: CANCELLED | OUTPATIENT
Start: 2021-10-01

## 2021-09-24 RX ORDER — DIPHENHYDRAMINE HYDROCHLORIDE 50 MG/ML
50 INJECTION INTRAMUSCULAR; INTRAVENOUS
Status: CANCELLED | OUTPATIENT
Start: 2021-10-01

## 2021-09-24 RX ORDER — EPINEPHRINE 0.3 MG/.3ML
0.3 INJECTION SUBCUTANEOUS
Status: DISCONTINUED | OUTPATIENT
Start: 2021-09-24 | End: 2021-09-24 | Stop reason: HOSPADM

## 2021-09-24 RX ORDER — HEPARIN 100 UNIT/ML
500 SYRINGE INTRAVENOUS
Status: DISCONTINUED | OUTPATIENT
Start: 2021-09-24 | End: 2021-09-24 | Stop reason: HOSPADM

## 2021-09-24 RX ORDER — SODIUM CHLORIDE 0.9 % (FLUSH) 0.9 %
10 SYRINGE (ML) INJECTION
Status: DISCONTINUED | OUTPATIENT
Start: 2021-09-24 | End: 2021-09-24 | Stop reason: HOSPADM

## 2021-09-24 RX ORDER — DIPHENHYDRAMINE HYDROCHLORIDE 50 MG/ML
50 INJECTION INTRAMUSCULAR; INTRAVENOUS
Status: DISCONTINUED | OUTPATIENT
Start: 2021-09-24 | End: 2021-09-24 | Stop reason: HOSPADM

## 2021-09-24 RX ORDER — FAMOTIDINE 10 MG/ML
20 INJECTION INTRAVENOUS
Status: COMPLETED | OUTPATIENT
Start: 2021-09-24 | End: 2021-09-24

## 2021-09-24 RX ORDER — FAMOTIDINE 10 MG/ML
20 INJECTION INTRAVENOUS
Status: CANCELLED | OUTPATIENT
Start: 2021-10-01

## 2021-09-24 RX ORDER — ACETAMINOPHEN 500 MG
1000 TABLET ORAL
Status: COMPLETED | OUTPATIENT
Start: 2021-09-24 | End: 2021-09-24

## 2021-09-24 RX ORDER — SODIUM CHLORIDE 0.9 % (FLUSH) 0.9 %
10 SYRINGE (ML) INJECTION
Status: CANCELLED | OUTPATIENT
Start: 2021-10-01

## 2021-09-24 RX ORDER — EPINEPHRINE 0.3 MG/.3ML
0.3 INJECTION SUBCUTANEOUS
Status: CANCELLED | OUTPATIENT
Start: 2021-10-01

## 2021-09-24 RX ORDER — HEPARIN 100 UNIT/ML
500 SYRINGE INTRAVENOUS
Status: CANCELLED | OUTPATIENT
Start: 2021-10-01

## 2021-09-24 RX ADMIN — DEXTROSE 100 MG: 50 INJECTION, SOLUTION INTRAVENOUS at 11:09

## 2021-09-24 RX ADMIN — OCRELIZUMAB 300 MG: 300 INJECTION INTRAVENOUS at 11:09

## 2021-09-24 RX ADMIN — SODIUM CHLORIDE: 0.9 INJECTION, SOLUTION INTRAVENOUS at 10:09

## 2021-09-24 RX ADMIN — ACETAMINOPHEN 1000 MG: 500 TABLET ORAL at 10:09

## 2021-09-24 RX ADMIN — FAMOTIDINE 20 MG: 10 INJECTION INTRAVENOUS at 10:09

## 2021-09-24 RX ADMIN — DIPHENHYDRAMINE HYDROCHLORIDE 50 MG: 50 INJECTION INTRAMUSCULAR; INTRAVENOUS at 10:09

## 2021-09-29 ENCOUNTER — PATIENT MESSAGE (OUTPATIENT)
Dept: NEUROLOGY | Facility: CLINIC | Age: 41
End: 2021-09-29

## 2021-10-20 ENCOUNTER — PATIENT MESSAGE (OUTPATIENT)
Dept: NEUROLOGY | Facility: CLINIC | Age: 41
End: 2021-10-20
Payer: MEDICARE

## 2021-10-20 DIAGNOSIS — R35.0 URINARY FREQUENCY: Primary | ICD-10-CM

## 2021-10-26 ENCOUNTER — LAB VISIT (OUTPATIENT)
Dept: LAB | Facility: HOSPITAL | Age: 41
End: 2021-10-26
Payer: MEDICARE

## 2021-10-26 ENCOUNTER — PATIENT MESSAGE (OUTPATIENT)
Dept: INTERNAL MEDICINE | Facility: CLINIC | Age: 41
End: 2021-10-26
Payer: MEDICARE

## 2021-10-26 ENCOUNTER — PATIENT MESSAGE (OUTPATIENT)
Dept: NEUROLOGY | Facility: CLINIC | Age: 41
End: 2021-10-26
Payer: MEDICARE

## 2021-10-26 DIAGNOSIS — R35.0 URINARY FREQUENCY: ICD-10-CM

## 2021-10-26 DIAGNOSIS — N92.6 IRREGULAR MENSTRUAL CYCLE: Primary | ICD-10-CM

## 2021-10-26 LAB
BILIRUB UR QL STRIP: NEGATIVE
CLARITY UR REFRACT.AUTO: ABNORMAL
COLOR UR AUTO: YELLOW
GLUCOSE UR QL STRIP: NEGATIVE
HGB UR QL STRIP: NEGATIVE
KETONES UR QL STRIP: NEGATIVE
LEUKOCYTE ESTERASE UR QL STRIP: NEGATIVE
NITRITE UR QL STRIP: NEGATIVE
PH UR STRIP: 7 [PH] (ref 5–8)
PROT UR QL STRIP: NEGATIVE
SP GR UR STRIP: 1.01 (ref 1–1.03)
URN SPEC COLLECT METH UR: ABNORMAL

## 2021-10-26 PROCEDURE — 81003 URINALYSIS AUTO W/O SCOPE: CPT | Mod: HCNC | Performed by: CLINICAL NURSE SPECIALIST

## 2021-10-27 ENCOUNTER — PATIENT MESSAGE (OUTPATIENT)
Dept: INTERNAL MEDICINE | Facility: CLINIC | Age: 41
End: 2021-10-27
Payer: MEDICARE

## 2021-11-01 ENCOUNTER — PATIENT MESSAGE (OUTPATIENT)
Dept: NEUROLOGY | Facility: CLINIC | Age: 41
End: 2021-11-01
Payer: MEDICARE

## 2021-11-09 ENCOUNTER — PATIENT MESSAGE (OUTPATIENT)
Dept: NEUROLOGY | Facility: CLINIC | Age: 41
End: 2021-11-09
Payer: MEDICARE

## 2021-11-09 DIAGNOSIS — M62.838 MUSCLE SPASM: Primary | ICD-10-CM

## 2021-11-11 ENCOUNTER — PATIENT OUTREACH (OUTPATIENT)
Dept: OTHER | Facility: OTHER | Age: 41
End: 2021-11-11
Payer: MEDICARE

## 2021-11-11 RX ORDER — TIZANIDINE 4 MG/1
4 TABLET ORAL 2 TIMES DAILY PRN
Qty: 60 TABLET | Refills: 5 | Status: SHIPPED | OUTPATIENT
Start: 2021-11-11 | End: 2022-06-21

## 2021-11-20 ENCOUNTER — PATIENT MESSAGE (OUTPATIENT)
Dept: NEUROLOGY | Facility: CLINIC | Age: 41
End: 2021-11-20
Payer: MEDICARE

## 2021-11-30 ENCOUNTER — PATIENT MESSAGE (OUTPATIENT)
Dept: NEUROLOGY | Facility: CLINIC | Age: 41
End: 2021-11-30
Payer: MEDICARE

## 2021-11-30 ENCOUNTER — OFFICE VISIT (OUTPATIENT)
Dept: NEUROLOGY | Facility: CLINIC | Age: 41
End: 2021-11-30
Payer: MEDICARE

## 2021-11-30 DIAGNOSIS — G43.809 OTHER MIGRAINE WITHOUT STATUS MIGRAINOSUS, NOT INTRACTABLE: ICD-10-CM

## 2021-11-30 DIAGNOSIS — G35 MULTIPLE SCLEROSIS: Primary | ICD-10-CM

## 2021-11-30 PROCEDURE — 99499 UNLISTED E&M SERVICE: CPT | Mod: 95,,, | Performed by: PSYCHIATRY & NEUROLOGY

## 2021-11-30 PROCEDURE — 99215 PR OFFICE/OUTPT VISIT, EST, LEVL V, 40-54 MIN: ICD-10-PCS | Mod: HCNC,95,, | Performed by: PSYCHIATRY & NEUROLOGY

## 2021-11-30 PROCEDURE — 99499 RISK ADDL DX/OHS AUDIT: ICD-10-PCS | Mod: 95,,, | Performed by: PSYCHIATRY & NEUROLOGY

## 2021-11-30 PROCEDURE — 99215 OFFICE O/P EST HI 40 MIN: CPT | Mod: HCNC,95,, | Performed by: PSYCHIATRY & NEUROLOGY

## 2021-11-30 NOTE — PROGRESS NOTES
The patient location is: [place]  The chief complaint leading to consultation is: MS    Visit type: audiovisual    Face to Face time with patient: 30  40 minutes of total time spent on the encounter, which includes face to face time and non-face to face time preparing to see the patient (eg, review of tests), Obtaining and/or reviewing separately obtained history, Documenting clinical information in the electronic or other health record, Independently interpreting results (not separately reported) and communicating results to the patient/family/caregiver, or Care coordination (not separately reported).         Each patient to whom he or she provides medical services by telemedicine is:  (1) informed of the relationship between the physician and patient and the respective role of any other health care provider with respect to management of the patient; and (2) notified that he or she may decline to receive medical services by telemedicine and may withdraw from such care at any time.    Notes:     Subjective:          Patient ID: Jaylon Bowles is a 41 y.o. female who presents today for a routine clinic visit for MS.      MS HPI:  · DMT: ocrelizumab  · Side effects from DMT? No  · Taking vitamin D3 as recommended? Yes - [id number]/week   · Feels like her walking is slower, more pain than normal. She had stopped taking her lexapro, but symptoms improved when she resumed the medication. States that she was forgetting it, but now setting an alarm on her phone.  · Toes are cramping more, especially towards the end of the day, more difficulty walking. Stopped taking the Ampyra 3/2021. Taking tizanidine 4mg bid  · Depression is much better, especially since she's increased compliance of her medication. Able to control the tears a little bit more. Also taking topamax now (although for headaches).           Objective:        1. 25 foot timed walk:  Timed 25 Foot Walk: 1/5/2021 4/6/2021   Did patient wear an AFO? No No   Was  assistive device used? Yes Yes   Assistive device used (jewels one): Bilateral Assistance Bilateral Assistance   Bilateral device used Walker/Rollator Walker/Rollator   Time for 25 Foot Walk (seconds) 30 21.8   Time for 25 Foot Walk (seconds) 32.48 21.6       2. 9 Hole Peg Test:  No flowsheet data found.    Neurologic Exam  limitied exam   MENTAL STATUS: grossly intact. Normal language, attention.   CRANIAL NERVE EXAM: Extraocular muscles are intact.  No facial asymmetry. There is no dysarthria.   MOTOR EXAM:  At least antigravity throughout. Able to walk through home with rollator      Imaging:     Results for orders placed during the hospital encounter of 02/05/20    MRI Brain Demyelinating Without Contrast    Impression  Stable appearance of the brain and spinal cord again demonstrating findings compatible with the reported history of multiple sclerosis as further discussed above.  No new discrete lesions identified to indicate ongoing demyelination.    Mild degenerative change in the cervical spine with posterior central disc extrusion at C3-C4 with mild indentation of the ventral cord. Additional mild multilevel cervical and thoracic spondylosis, as above.  No significant neural foraminal narrowing or spinal canal stenosis elsewhere.    Electronically signed by resident: Kishan Patino MD  Date:    02/05/2020  Time:    13:34    Electronically signed by: Tonio Cohen MD  Date:    02/05/2020  Time:    16:11    Results for orders placed during the hospital encounter of 02/05/20    MRI Cervical Spine Demyelinating Without Contrast    Impression  Stable appearance of the brain and spinal cord again demonstrating findings compatible with the reported history of multiple sclerosis as further discussed above.  No new discrete lesions identified to indicate ongoing demyelination.    Mild degenerative change in the cervical spine with posterior central disc extrusion at C3-C4 with mild indentation of the ventral cord.  Additional mild multilevel cervical and thoracic spondylosis, as above.  No significant neural foraminal narrowing or spinal canal stenosis elsewhere.    Electronically signed by resident: Kishan Patino MD  Date:    02/05/2020  Time:    13:34    Electronically signed by: Tonio Cohen MD  Date:    02/05/2020  Time:    16:11    Results for orders placed during the hospital encounter of 02/05/20    MRI Thoracic Spine Demyelinating Without Contrast    Impression  Stable appearance of the brain and spinal cord again demonstrating findings compatible with the reported history of multiple sclerosis as further discussed above.  No new discrete lesions identified to indicate ongoing demyelination.    Mild degenerative change in the cervical spine with posterior central disc extrusion at C3-C4 with mild indentation of the ventral cord. Additional mild multilevel cervical and thoracic spondylosis, as above.  No significant neural foraminal narrowing or spinal canal stenosis elsewhere.    Electronically signed by resident: Kishan Patino MD  Date:    02/05/2020  Time:    13:34    Electronically signed by: Tonio Cohen MD  Date:    02/05/2020  Time:    16:11    Results for orders placed during the hospital encounter of 05/06/21    MRI Brain Demyelinating W W/O Contrast    Impression  MRI brain: No significant change from prior.  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    No new cord signal abnormality or abnormal intrathecal enhancement to suggest significant interval or active cord demyelination.    Continued multilevel degenerative change  cervical spine as detailed above.      Electronically signed by: Nain Dooley DO  Date:    05/06/2021  Time:    13:31    Results for orders placed during the hospital encounter of 05/06/21    MRI Cervical Spine Demyelinating W W/O Contrast    Impression  MRI brain: No significant change from prior.  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    No new cord signal abnormality or abnormal intrathecal enhancement to suggest significant interval or active cord demyelination.    Continued multilevel degenerative change cervical spine as detailed above.      Electronically signed by: Nani Dooley DO  Date:    05/06/2021  Time:    13:31    No results found for this or any previous visit.        Labs:     Lab Results   Component Value Date    BXMHKDJX11OD 47 01/25/2021    LBKXNWCW25NS 15 (L) 01/13/2020    PBZEKABA22KO 22 (L) 08/22/2019     Lab Results   Component Value Date    JCVINDEX 0.32 (H) 05/31/2021    JCVANTIBODY INDETERMINATE (A) 05/31/2021     Lab Results   Component Value Date    SC5VRHZT 70.8 05/31/2021    ABSOLUTECD3 747 05/31/2021    MA1DBUIO 16.7 05/31/2021    ABSOLUTECD8 177 (L) 05/31/2021    LG5MGULT 55.1 05/31/2021    ABSOLUTECD4 581 05/31/2021    LABCD48 3.29 05/31/2021     Lab Results   Component Value Date    WBC 8.84 05/31/2021    RBC 4.23 05/31/2021    HGB 11.8 (L) 05/31/2021    HCT 34.9 (L) 05/31/2021    MCV 83 05/31/2021    MCH 27.9 05/31/2021    MCHC 33.8 05/31/2021    RDW 16.5 (H) 05/31/2021     05/31/2021    MPV 12.7 05/31/2021    GRAN 7.1 05/31/2021    GRAN 80.5 (H) 05/31/2021    LYMPH 1.1 05/31/2021    LYMPH  12.0 (L) 05/31/2021    MONO 0.5 05/31/2021    MONO 5.3 05/31/2021    EOS 0.1 05/31/2021    BASO 0.04 05/31/2021    EOSINOPHIL 1.4 05/31/2021    BASOPHIL 0.5 05/31/2021     Sodium   Date Value Ref Range Status   05/31/2021 138 136 - 145 mmol/L Final     Potassium   Date Value Ref Range Status   05/31/2021 3.2 (L) 3.5 - 5.1 mmol/L Final     Chloride   Date Value Ref Range Status   05/31/2021 102 95 - 110 mmol/L Final     CO2   Date Value Ref Range Status   05/31/2021 24 23 - 29 mmol/L Final     Glucose   Date Value Ref Range Status   05/31/2021 96 70 - 110 mg/dL Final     BUN   Date Value Ref Range Status   05/31/2021 10 6 - 20 mg/dL Final     Creatinine   Date Value Ref Range Status   05/31/2021 1.0 0.5 - 1.4 mg/dL Final     Calcium   Date Value Ref Range Status   05/31/2021 9.9 8.7 - 10.5 mg/dL Final     Total Protein   Date Value Ref Range Status   05/31/2021 8.6 (H) 6.0 - 8.4 g/dL Final     Albumin   Date Value Ref Range Status   05/31/2021 4.1 3.5 - 5.2 g/dL Final     Total Bilirubin   Date Value Ref Range Status   05/31/2021 0.6 0.1 - 1.0 mg/dL Final     Comment:     For infants and newborns, interpretation of results should be based  on gestational age, weight and in agreement with clinical  observations.    Premature Infant recommended reference ranges:  Up to 24 hours.............<8.0 mg/dL  Up to 48 hours............<12.0 mg/dL  3-5 days..................<15.0 mg/dL  6-29 days.................<15.0 mg/dL       Alkaline Phosphatase   Date Value Ref Range Status   05/31/2021 60 55 - 135 U/L Final     AST   Date Value Ref Range Status   05/31/2021 16 10 - 40 U/L Final     ALT   Date Value Ref Range Status   05/31/2021 10 10 - 44 U/L Final     Anion Gap   Date Value Ref Range Status   05/31/2021 12 8 - 16 mmol/L Final     eGFR if    Date Value Ref Range Status   05/31/2021 >60.0 >60 mL/min/1.73 m^2 Final     eGFR if non    Date Value Ref Range Status   05/31/2021 >60.0 >60  mL/min/1.73 m^2 Final     Comment:     Calculation used to obtain the estimated glomerular filtration  rate (eGFR) is the CKD-EPI equation.        Lab Results   Component Value Date    HEPBSAG Negative 05/31/2021    HEPBSAB Negative 05/31/2021    HEPBCAB Negative 05/31/2021           MS Impression and Plan:     NEURO MULTIPLE SCLEROSIS IMPRESSION:   MS Status:     Number of relapses in the past year?:  0    Clinical Progression comment:  Stable to improved since last visit with better compliance of her SSRI    MRI Progression:  Stable    MRI Progression comment:  MRI brain and c-spine last performed 5/2021  Plan:     DMT:  No change in management    DMT comment:  Ocrevus, infuses March/September    Symptom Management:  No change in symptom management        Problem List Items Addressed This Visit    None     Visit Diagnoses     Other migraine without status migrainosus, not intractable              Portia Mitchell MD

## 2021-12-09 ENCOUNTER — OFFICE VISIT (OUTPATIENT)
Dept: NEUROLOGY | Facility: CLINIC | Age: 41
End: 2021-12-09
Payer: MEDICARE

## 2021-12-09 DIAGNOSIS — Z79.899 LONG TERM CURRENT USE OF IMMUNOSUPPRESSIVE DRUG: ICD-10-CM

## 2021-12-09 DIAGNOSIS — G35 MULTIPLE SCLEROSIS: Primary | ICD-10-CM

## 2021-12-09 DIAGNOSIS — M62.838 MUSCLE SPASM: ICD-10-CM

## 2021-12-09 PROCEDURE — 99215 OFFICE O/P EST HI 40 MIN: CPT | Mod: HCNC,95,, | Performed by: PSYCHIATRY & NEUROLOGY

## 2021-12-09 PROCEDURE — 99215 PR OFFICE/OUTPT VISIT, EST, LEVL V, 40-54 MIN: ICD-10-PCS | Mod: HCNC,95,, | Performed by: PSYCHIATRY & NEUROLOGY

## 2021-12-09 NOTE — PROGRESS NOTES
The patient location is: [place]  The chief complaint leading to consultation is: MS f/u    Visit type: audiovisual    Face to Face time with patient: 30min  40 minutes of total time spent on the encounter, which includes face to face time and non-face to face time preparing to see the patient (eg, review of tests), Obtaining and/or reviewing separately obtained history, Documenting clinical information in the electronic or other health record, Independently interpreting results (not separately reported) and communicating results to the patient/family/caregiver, or Care coordination (not separately reported).         Each patient to whom he or she provides medical services by telemedicine is:  (1) informed of the relationship between the physician and patient and the respective role of any other health care provider with respect to management of the patient; and (2) notified that he or she may decline to receive medical services by telemedicine and may withdraw from such care at any time.    Notes:       Subjective:          Patient ID: Jaylon Bowles is a 41 y.o. female who presents today for a routine clinic visit for MS.      MS HPI:  · DMT: ocrelizumab, infuses in Mar/Sept  · Side effects from DMT? No  · Taking vitamin D3 as recommended? Yes - 81393 units/week  · Planning to take the semester off due to increased stress. Notes some family stress, but would like to refrain from talking about this at this time.   · Got a part time job at [employer] this upcoming tax season.  · Her walking is stable after resuming her antidepressant. Toes still cramp      Medications:  Current Outpatient Medications   Medication Sig    betamethasone dipropionate (DIPROLENE) 0.05 % cream     carvediloL (COREG) 25 MG tablet Take 1 tablet (25 mg total) by mouth 2 (two) times daily with meals.    cholecalciferol, vitamin D3, 1,250 mcg (50,000 unit) Tab Take 1 tablet by mouth once a week.    clotrimazole-betamethasone 1-0.05% (LOTRISONE)  cream Apply topically 2 (two) times daily.    diclofenac (VOLTAREN) 50 MG EC tablet TAKE 1 TABLET(50 MG) BY MOUTH TWICE DAILY    escitalopram oxalate (LEXAPRO) 20 MG tablet Take 1 tablet (20 mg total) by mouth once daily.    ferrous sulfate (FEOSOL) 325 mg (65 mg iron) Tab tablet Take 1 tablet (325 mg total) by mouth once daily.    ibuprofen (ADVIL,MOTRIN) 400 MG tablet Take 1 tablet (400 mg total) by mouth every 4 (four) hours as needed for Other (headache).    levothyroxine (SYNTHROID) 112 MCG tablet Take 1 tablet (112 mcg total) by mouth before breakfast.    losartan-hydrochlorothiazide 100-12.5 mg (HYZAAR) 100-12.5 mg Tab Take 1 tablet by mouth once daily.    mometasone (ELOCON) 0.1 % ointment STEFF EXT AA QD FOR 10 DAYS    multivitamin with minerals (ONE DAILY COMPLETE ORAL) Take by mouth.    ocrelizumab (OCREVUS IV) Inject into the vein.    pantoprazole (PROTONIX) 40 MG tablet Take 1 tablet (40 mg total) by mouth once daily.    tiZANidine (ZANAFLEX) 4 MG tablet Take 1 tablet (4 mg total) by mouth 2 (two) times daily as needed.    topiramate (TOPAMAX) 25 MG tablet Take 1 tablet (25 mg total) by mouth once daily for 30 days, THEN 2 tablets (50 mg total) once daily.     Current Facility-Administered Medications   Medication Frequency    acetaminophen tablet 650 mg Once PRN    albuterol inhaler 2 puff Q20 Min PRN    diphenhydrAMINE injection 25 mg Once PRN    EPINEPHrine (EPIPEN) 0.3 mg/0.3 mL pen injection 0.3 mg PRN    methylPREDNISolone sodium succinate injection 40 mg Once PRN    ondansetron disintegrating tablet 4 mg Once PRN    sodium chloride 0.9% 500 mL flush bag PRN    sodium chloride 0.9% flush 10 mL PRN       SOCIAL HISTORY  Social History     Tobacco Use    Smoking status: Never Smoker    Smokeless tobacco: Never Used   Substance Use Topics    Alcohol use: No     Comment: social    Drug use: No     Types: Marijuana       Living arrangements - the patient her  mother.    ROS:    REVIEW OF SYMPTOMS 3/31/2021   Do you feel abnormally tired on most days? No   Do you feel you generally sleep well? Yes   Do you have difficulty controlling your bladder?  No   Do you have difficulty controlling your bowels?  No   Do you have frequent muscle cramps, tightness or spasms in your limbs?  Yes   Do you have new visual symptoms?  No   Do you have worsening difficulty with your memory or thinking? Yes   Do you have worsening symptoms of anxiety or depression?  No   For patients who walk, Do you have more difficulty walking?  Yes   Have you fallen since your last visit?  Yes   For patients who use wheelchairs: Do you have any skin wounds or breakdown? Not Applicable   Do you have difficulty using your hands?  No   Do you have shooting or burning pain? No   Do you have difficulty with sexual function?  No   If you are sexually active, are you using birth control? Y/N  N/A Not Applicable   Do you often choke when swallowing liquids or solid food?  No   Do you experience worsening symptoms when overheated? Yes   Do you need any new equipment such as a wheelchair, walker or shower chair? No   Do you receive co-pay financial assistance for your principal MS medicine? Yes   Would you be interested in participating in an MS research trial in the future? No   For patients on Gilenya, Tecfidera, Aubagio, Rituxan, Ocrevus, Tysabri, Lemtrada or Methotrexate, are you aware that you should NOT receive live virus vaccines?  Yes   Do you feel you have adequate family/friend support?  Yes   Do you have health insurance?   Yes   Are you currently employed? No   Do you receive SSDI/SSI?  Yes   Do you use marijuana or cannabis products? Yes   How often? Monthly   Have you been diagnosed with a urinary tract infection since your last visit here? No   Have you been diagnosed with a respiratory tract infection since your last visit here? No   Have you been to the emergency room since your last visit here? No    Have you been hospitalized since your last visit here?  No                Objective:        1. 25 foot timed walk:  Timed 25 Foot Walk: 1/5/2021 4/6/2021   Did patient wear an AFO? No No   Was assistive device used? Yes Yes   Assistive device used (jewels one): Bilateral Assistance Bilateral Assistance   Bilateral device used Walker/Rollator Walker/Rollator   Time for 25 Foot Walk (seconds) 30 21.8   Time for 25 Foot Walk (seconds) 32.48 21.6       2. 9 Hole Peg Test:  No flowsheet data found.    Neurologic Exam      Imaging:     Results for orders placed during the hospital encounter of 02/05/20    MRI Brain Demyelinating Without Contrast    Impression  Stable appearance of the brain and spinal cord again demonstrating findings compatible with the reported history of multiple sclerosis as further discussed above.  No new discrete lesions identified to indicate ongoing demyelination.    Mild degenerative change in the cervical spine with posterior central disc extrusion at C3-C4 with mild indentation of the ventral cord. Additional mild multilevel cervical and thoracic spondylosis, as above.  No significant neural foraminal narrowing or spinal canal stenosis elsewhere.    Electronically signed by resident: Kishan Patino MD  Date:    02/05/2020  Time:    13:34    Electronically signed by: Tonio Cohen MD  Date:    02/05/2020  Time:    16:11    Results for orders placed during the hospital encounter of 02/05/20    MRI Cervical Spine Demyelinating Without Contrast    Impression  Stable appearance of the brain and spinal cord again demonstrating findings compatible with the reported history of multiple sclerosis as further discussed above.  No new discrete lesions identified to indicate ongoing demyelination.    Mild degenerative change in the cervical spine with posterior central disc extrusion at C3-C4 with mild indentation of the ventral cord. Additional mild multilevel cervical and thoracic spondylosis, as  above.  No significant neural foraminal narrowing or spinal canal stenosis elsewhere.    Electronically signed by resident: Kishan Patino MD  Date:    02/05/2020  Time:    13:34    Electronically signed by: Tonio Cohen MD  Date:    02/05/2020  Time:    16:11    Results for orders placed during the hospital encounter of 02/05/20    MRI Thoracic Spine Demyelinating Without Contrast    Impression  Stable appearance of the brain and spinal cord again demonstrating findings compatible with the reported history of multiple sclerosis as further discussed above.  No new discrete lesions identified to indicate ongoing demyelination.    Mild degenerative change in the cervical spine with posterior central disc extrusion at C3-C4 with mild indentation of the ventral cord. Additional mild multilevel cervical and thoracic spondylosis, as above.  No significant neural foraminal narrowing or spinal canal stenosis elsewhere.    Electronically signed by resident: Kishan Patino MD  Date:    02/05/2020  Time:    13:34    Electronically signed by: Tonio Cohen MD  Date:    02/05/2020  Time:    16:11    Results for orders placed during the hospital encounter of 05/06/21    MRI Brain Demyelinating W W/O Contrast    Impression  MRI brain: No significant change from prior.  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    No new cord signal abnormality or abnormal intrathecal enhancement to suggest significant interval or active cord demyelination.    Continued multilevel degenerative change cervical spine as detailed above.      Electronically signed  by: Nain Dooley DO  Date:    05/06/2021  Time:    13:31    Results for orders placed during the hospital encounter of 05/06/21    MRI Cervical Spine Demyelinating W W/O Contrast    Impression  MRI brain: No significant change from prior.  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    No new cord signal abnormality or abnormal intrathecal enhancement to suggest significant interval or active cord demyelination.    Continued multilevel degenerative change cervical spine as detailed above.      Electronically signed by: Nain Dooley DO  Date:    05/06/2021  Time:    13:31    No results found for this or any previous visit.        Labs:     Lab Results   Component Value Date    JOBOAESX65GF 47 01/25/2021    YZFLYJUT19MS 15 (L) 01/13/2020    JHYVUKAJ89HY 22 (L) 08/22/2019     Lab Results   Component Value Date    JCVINDEX 0.32 (H) 05/31/2021    JCVANTIBODY INDETERMINATE (A) 05/31/2021     Lab Results   Component Value Date    MQ0HUQCW 70.8 05/31/2021    ABSOLUTECD3 747 05/31/2021    NA4SFRMA 16.7 05/31/2021    ABSOLUTECD8 177 (L) 05/31/2021    XC1QIHAP 55.1 05/31/2021    ABSOLUTECD4 581 05/31/2021    LABCD48 3.29 05/31/2021     Lab Results   Component Value Date    WBC 8.84 05/31/2021    RBC 4.23 05/31/2021    HGB 11.8 (L) 05/31/2021    HCT 34.9 (L) 05/31/2021    MCV 83 05/31/2021    MCH 27.9 05/31/2021    MCHC 33.8 05/31/2021    RDW 16.5 (H) 05/31/2021     05/31/2021    MPV 12.7 05/31/2021    GRAN 7.1 05/31/2021    GRAN 80.5 (H) 05/31/2021    LYMPH 1.1 05/31/2021    LYMPH 12.0 (L) 05/31/2021    MONO 0.5 05/31/2021    MONO 5.3  05/31/2021    EOS 0.1 05/31/2021    BASO 0.04 05/31/2021    EOSINOPHIL 1.4 05/31/2021    BASOPHIL 0.5 05/31/2021     Sodium   Date Value Ref Range Status   05/31/2021 138 136 - 145 mmol/L Final     Potassium   Date Value Ref Range Status   05/31/2021 3.2 (L) 3.5 - 5.1 mmol/L Final     Chloride   Date Value Ref Range Status   05/31/2021 102 95 - 110 mmol/L Final     CO2   Date Value Ref Range Status   05/31/2021 24 23 - 29 mmol/L Final     Glucose   Date Value Ref Range Status   05/31/2021 96 70 - 110 mg/dL Final     BUN   Date Value Ref Range Status   05/31/2021 10 6 - 20 mg/dL Final     Creatinine   Date Value Ref Range Status   05/31/2021 1.0 0.5 - 1.4 mg/dL Final     Calcium   Date Value Ref Range Status   05/31/2021 9.9 8.7 - 10.5 mg/dL Final     Total Protein   Date Value Ref Range Status   05/31/2021 8.6 (H) 6.0 - 8.4 g/dL Final     Albumin   Date Value Ref Range Status   05/31/2021 4.1 3.5 - 5.2 g/dL Final     Total Bilirubin   Date Value Ref Range Status   05/31/2021 0.6 0.1 - 1.0 mg/dL Final     Comment:     For infants and newborns, interpretation of results should be based  on gestational age, weight and in agreement with clinical  observations.    Premature Infant recommended reference ranges:  Up to 24 hours.............<8.0 mg/dL  Up to 48 hours............<12.0 mg/dL  3-5 days..................<15.0 mg/dL  6-29 days.................<15.0 mg/dL       Alkaline Phosphatase   Date Value Ref Range Status   05/31/2021 60 55 - 135 U/L Final     AST   Date Value Ref Range Status   05/31/2021 16 10 - 40 U/L Final     ALT   Date Value Ref Range Status   05/31/2021 10 10 - 44 U/L Final     Anion Gap   Date Value Ref Range Status   05/31/2021 12 8 - 16 mmol/L Final     eGFR if    Date Value Ref Range Status   05/31/2021 >60.0 >60 mL/min/1.73 m^2 Final     eGFR if non    Date Value Ref Range Status   05/31/2021 >60.0 >60 mL/min/1.73 m^2 Final     Comment:     Calculation used to  obtain the estimated glomerular filtration  rate (eGFR) is the CKD-EPI equation.        Lab Results   Component Value Date    HEPBSAG Negative 05/31/2021    HEPBSAB Negative 05/31/2021    HEPBCAB Negative 05/31/2021           MS Impression and Plan:     NEURO MULTIPLE SCLEROSIS IMPRESSION:   MS Status:     Number of relapses in the past year?:  0    Clinical Progression comment:  RRMS but some concern for progression.    MRI Progression:  Stable    MRI Progression comment:  MRI brain and c-spine ordered    Plan:     DMT:  No change in management    DMT comment:  Continue Ocrevus     Mobility/Equipment needs: Patient uses a scooter for mobility and would benefit from a bedside commode.    Spasms: Tizanidine 4mg bid with naps as needed.    Encouraged compliance with medications and advice.        Problem List Items Addressed This Visit    None         Portia Mitchell MD

## 2021-12-09 NOTE — PATIENT INSTRUCTIONS
Wellness Promotion in MS:    Enhancing wellness is a mccrary part of your overall multiple sclerosis (MS) treatment plan. The [facility] treatment team encourages you to maintain a healthy lifestyle and participate in wellness activities including a healthy diet, regular exercise and stress-reduction practices. Healthier people with MS maintain function and independence longer than those with diseases such as high blood pressure, diabetes, high cholesterol, and obesity. We encourage you to work closely with your primary care physician for routine health screenings and for treatment of your other health conditions.     Diet: Although there are several diets that are popular among people with MS such as the Swank, Carroll, and Wahls diets, not enough research exists to prove that they help MS. Yet common sense tells us that eating a healthy diet that benefits overall health will likely also help MS. Studies show that obesity may be a risk factor for developing MS, and that having high blood pressure, high cholesterol, and diabetes cause MS to progress faster. Luckily, changes in diet can improve obesity, high blood pressure, high cholesterol, and diabetes.   Here are our recommendations: 1. Eat more fruits, vegetables, and fiber. 2. Choose healthy fats (from nuts, seeds, vegetable oils and oily fish) and lean sources of protein (chicken, turkey, soy products, fish, and beans). 3. Limit (or eliminate) sugar and processed foods.    Exercise: Research shows that regular exercise may improve fatigue, cognitive impairment, depression, mobility, and quality of life in people with MS. Exercise guidelines published by the MS Society of [place] advise that adults with MS  and mild to moderate disability get 30 minutes of moderate-intensity aerobic exercise twice a week, and do strength training exercises twice a week. A physical or occupational therapist can recommend a personalized exercise plan based on your needs,  abilities, and interests. Modified exercise programs, such as seated yoga or aquatic exercise, or adaptive equipment such as a recumbent bicycle or tricycle make exercise possible for nearly everyone with Ms.    Stress-reduction practices: Studies show that mind-body connection activities reduce MS symptoms and improve quality of life. They can also be helpful for depression and other mood disorders that are common in MS. Mindfulness, meditation, acupuncture and massage are all examples of stress-reduction practices that strengthen the mind-body connections and have been shown to help people with MS manage stress, depression, and anxiety. Staying physically active with aerobic exercise, resistance training, yoga, and lyle chi can also help reduce depression and help you feel better by reducing stress.    Vitamin D: Evidence shows that low vitamin D increases the risk of developing MS and may make MS relapses more severe. Therefore taking vitamin D may make your MS better. You can get vitamin D from sunlight and from foods including oily fish (salmon, mackerel, sardines, herring, and trout), egg yolks, and cod liver oil. In addition, some dairy products, cereals, and juices have been fortified with vitamin D. Vitamin D levels are measured with a blood test. We suggest at least maintaining a vitamin D level in the upper half of the normal range for your laboratory. Many people with MS have low vitamin D levels in the blood, despite a good diet and sun exposure, and may need to take vitamin D3 capsules or tablets to achieve the desired blood levels of vitamin D.    Smoking: Smoking increases the risk of both getting MS and of developing secondary-progressive MS. Smoking may make disability levels progress faster, and may make certain disease modifying therapies less effective. Quitting smoking is necessary to treat your MS and your overall health.    Sleep: Sleep is key to maintaining health. Many individuals with MS  report significant disruptions in sleep and difficulty maintaining a consistent sleep schedule. Please ask for a Sleep Hygiene Information Sheet for additional advice.    Additional Resources:  http://www.nationalmssociety.org/Living-Well- With-MS/Health- Wellness  http://www.nationalmssociety.org/Resources-Support/Vcwdymx-Mgsutauam-Jcziknwq/Brochures/Staying- Well

## 2021-12-16 ENCOUNTER — TELEPHONE (OUTPATIENT)
Dept: NEUROLOGY | Facility: CLINIC | Age: 41
End: 2021-12-16
Payer: MEDICARE

## 2021-12-16 ENCOUNTER — PATIENT MESSAGE (OUTPATIENT)
Dept: NEUROLOGY | Facility: CLINIC | Age: 41
End: 2021-12-16
Payer: MEDICARE

## 2021-12-21 ENCOUNTER — TELEPHONE (OUTPATIENT)
Dept: NEUROLOGY | Facility: CLINIC | Age: 41
End: 2021-12-21
Payer: MEDICARE

## 2021-12-21 ENCOUNTER — IMMUNIZATION (OUTPATIENT)
Dept: INTERNAL MEDICINE | Facility: CLINIC | Age: 41
End: 2021-12-21
Payer: MEDICARE

## 2021-12-21 ENCOUNTER — PATIENT MESSAGE (OUTPATIENT)
Dept: NEUROLOGY | Facility: CLINIC | Age: 41
End: 2021-12-21
Payer: MEDICARE

## 2021-12-21 DIAGNOSIS — Z23 NEED FOR VACCINATION: Primary | ICD-10-CM

## 2021-12-21 PROCEDURE — 0004A COVID-19, MRNA, LNP-S, PF, 30 MCG/0.3 ML DOSE VACCINE: CPT | Mod: HCNC,CV19,PBBFAC | Performed by: INTERNAL MEDICINE

## 2021-12-28 ENCOUNTER — PATIENT MESSAGE (OUTPATIENT)
Dept: NEUROLOGY | Facility: CLINIC | Age: 41
End: 2021-12-28
Payer: MEDICARE

## 2021-12-28 DIAGNOSIS — Z74.09 IMPAIRED MOBILITY AND ACTIVITIES OF DAILY LIVING: ICD-10-CM

## 2021-12-28 DIAGNOSIS — G35 MULTIPLE SCLEROSIS: Primary | ICD-10-CM

## 2021-12-28 DIAGNOSIS — Z78.9 IMPAIRED MOBILITY AND ACTIVITIES OF DAILY LIVING: ICD-10-CM

## 2021-12-29 NOTE — TELEPHONE ENCOUNTER
Pt sent two separate [employer] messages for a bedside commode and for scooter repairs.  SW sent chat message to provider and will assist with obtaining orders.

## 2022-01-04 ENCOUNTER — PATIENT MESSAGE (OUTPATIENT)
Dept: OTHER | Facility: OTHER | Age: 42
End: 2022-01-04
Payer: MEDICARE

## 2022-01-05 ENCOUNTER — PATIENT MESSAGE (OUTPATIENT)
Dept: NEUROLOGY | Facility: CLINIC | Age: 42
End: 2022-01-05
Payer: MEDICARE

## 2022-01-06 ENCOUNTER — PATIENT MESSAGE (OUTPATIENT)
Dept: NEUROLOGY | Facility: CLINIC | Age: 42
End: 2022-01-06
Payer: MEDICARE

## 2022-01-09 ENCOUNTER — PATIENT MESSAGE (OUTPATIENT)
Dept: INTERNAL MEDICINE | Facility: CLINIC | Age: 42
End: 2022-01-09
Payer: MEDICARE

## 2022-01-09 DIAGNOSIS — E06.3 HASHIMOTO'S THYROIDITIS: ICD-10-CM

## 2022-01-09 DIAGNOSIS — I10 ESSENTIAL HYPERTENSION: ICD-10-CM

## 2022-01-10 NOTE — TELEPHONE ENCOUNTER
Care Due:                  Date            Visit Type   Department     Provider  --------------------------------------------------------------------------------                                MYCHART                              FOLLOWUP/OF  [facility] INTERNAL  Last Visit: 01-      FICE VISIT   MEDICINE       Cat Ramachandran  Next Visit: None Scheduled  None         None Found                                                            Last  Test          Frequency    Reason                     Performed    Due Date  --------------------------------------------------------------------------------    Office Visit  12 months..  carvediloL, escitalopram,   01- 01-                             levothyroxine,                             losartan-hydrochlorothiaz                             so, pantoprazole........    TSH.........  12 months..  levothyroxine............  01- 01-    Powered by [employer]. Reference number: [id number].   1/10/2022 10:49:21 AM CST

## 2022-01-11 RX ORDER — CARVEDILOL 25 MG/1
25 TABLET ORAL 2 TIMES DAILY WITH MEALS
Qty: 180 TABLET | Refills: 0 | Status: SHIPPED | OUTPATIENT
Start: 2022-01-11 | End: 2022-06-14 | Stop reason: SDUPTHER

## 2022-01-11 RX ORDER — LEVOTHYROXINE SODIUM 112 UG/1
112 TABLET ORAL
Qty: 90 TABLET | Refills: 0 | Status: SHIPPED | OUTPATIENT
Start: 2022-01-11 | End: 2022-06-14 | Stop reason: SDUPTHER

## 2022-01-13 ENCOUNTER — PATIENT MESSAGE (OUTPATIENT)
Dept: NEUROLOGY | Facility: CLINIC | Age: 42
End: 2022-01-13
Payer: MEDICARE

## 2022-01-13 ENCOUNTER — PATIENT MESSAGE (OUTPATIENT)
Dept: OPHTHALMOLOGY | Facility: CLINIC | Age: 42
End: 2022-01-13
Payer: MEDICARE

## 2022-01-14 ENCOUNTER — PATIENT OUTREACH (OUTPATIENT)
Dept: OTHER | Facility: OTHER | Age: 42
End: 2022-01-14
Payer: MEDICARE

## 2022-01-16 ENCOUNTER — PATIENT MESSAGE (OUTPATIENT)
Dept: NEUROLOGY | Facility: CLINIC | Age: 42
End: 2022-01-16
Payer: MEDICARE

## 2022-01-18 ENCOUNTER — DOCUMENTATION ONLY (OUTPATIENT)
Dept: NEUROLOGY | Facility: CLINIC | Age: 42
End: 2022-01-18
Payer: MEDICARE

## 2022-01-19 ENCOUNTER — PATIENT MESSAGE (OUTPATIENT)
Dept: NEUROLOGY | Facility: CLINIC | Age: 42
End: 2022-01-19
Payer: MEDICARE

## 2022-01-28 ENCOUNTER — PATIENT MESSAGE (OUTPATIENT)
Dept: NEUROLOGY | Facility: CLINIC | Age: 42
End: 2022-01-28
Payer: MEDICARE

## 2022-01-28 NOTE — TELEPHONE ENCOUNTER
F/U with pt on status of BSC and scooter repair.  She states [employer] delivered the BSC, but they emailed this SW advising that they are OON for scooter - even though they are the vendor that provided the scooter.  MING phone [employer] [phone] (Erica).  Erica advised that pt could pay out-of-pocket or see if another in-network vendor is willing to repair, though Erica states this is unlikely. She gave rough estimate for total OOP cost: $90 eval, $380 for , battery could cost $184 - 1200.      MING investigated pt's in-network PWC/Scooter vendors w/in 50 miles of pt's zip code, and the only vendor in-network is [employer].  Unable to reach anyone or VM at [employer].  Unable to reach anyone at [facility].  Will try calling next week.  Update sent to pt via [employer].

## 2022-01-29 DIAGNOSIS — D84.9 IMMUNOSUPPRESSED STATUS: ICD-10-CM

## 2022-02-01 ENCOUNTER — PATIENT MESSAGE (OUTPATIENT)
Dept: NEUROLOGY | Facility: CLINIC | Age: 42
End: 2022-02-01
Payer: MEDICARE

## 2022-02-01 NOTE — TELEPHONE ENCOUNTER
Phoned [employer] in [place] (in-network with pt's insurance company) to see if they would evaluate and repair pt's scooter, as they are the only DME vendor within 100 miles of pt that is in-network AND that provides power mobility equipment.  Spoke with Kim, who shared that they would not be able to help since they did not originally provide pt's scooter.  Phoned pt to discuss; LVM and sent [employer] message.

## 2022-02-02 ENCOUNTER — PATIENT MESSAGE (OUTPATIENT)
Dept: NEUROLOGY | Facility: CLINIC | Age: 42
End: 2022-02-02
Payer: MEDICARE

## 2022-02-03 NOTE — PROGRESS NOTES
Subjective:          Patient ID: Jaylon Bowles is a 41 y.o. female who presents today for a routine virtual visit for MS.  She was last seen in December by Dr. Mitchell. The history has been provided by the patient.       The patient location is: her home   The chief complaint leading to consultation is: MS     Visit type: audiovisual    Face to Face time with patient: 28 minutes  35 minutes of total time spent on the encounter, which includes face to face time and non-face to face time preparing to see the patient (eg, review of tests), Obtaining and/or reviewing separately obtained history, Documenting clinical information in the electronic or other health record, Independently interpreting results (not separately reported) and communicating results to the patient/family/caregiver, or Care coordination (not separately reported).     Each patient to whom he or she provides medical services by telemedicine is:  (1) informed of the relationship between the physician and patient and the respective role of any other health care provider with respect to management of the patient; and (2) notified that he or she may decline to receive medical services by telemedicine and may withdraw from such care at any time.      MS HPI:  · DMT: ocrelizumab  Due in March 2022   · Side effects from DMT? No  · Taking vitamin D3 as recommended? Yes -  Dose: 50,000 units once a week   · She has a bedside commode and a scooter. She is working with our  to get some repairs done on her scooter.   · She stopped going to school this semester. She has been working part-time from home.   · She continues to have cramping in her toes and feet. This happens more at night. She also gets shooting pains up her spine. She also gets a bad cramp in the left thigh after sitting for long periods of time.  She takes tizanidine 4mg at night and will sometimes take one in the afternoon if she does not have much to do (it makes her sleepy).    · She is trying to stay active when she can. However, she loses her balance a lot. She is using her walker a lot more at home. It is hard for her to get in and out of the bathtub. Her mom helps her when she can. She has a shower chair, but it is hard to use because it is a tight space. She has a fear of falling in the shower.   · She feels constipated. She has 2 BMs per week and has to strain to empty. This has been going on for about a month. She has also found herself passing gas more.     Medications:  Current Outpatient Medications   Medication Sig    betamethasone dipropionate (DIPROLENE) 0.05 % cream     carvediloL (COREG) 25 MG tablet Take 1 tablet (25 mg total) by mouth 2 (two) times daily with meals.    cholecalciferol, vitamin D3, 1,250 mcg (50,000 unit) Tab Take 1 tablet by mouth once a week.    clotrimazole-betamethasone 1-0.05% (LOTRISONE) cream Apply topically 2 (two) times daily.    diclofenac (VOLTAREN) 50 MG EC tablet TAKE 1 TABLET(50 MG) BY MOUTH TWICE DAILY    escitalopram oxalate (LEXAPRO) 20 MG tablet Take 1 tablet (20 mg total) by mouth once daily.    ferrous sulfate (FEOSOL) 325 mg (65 mg iron) Tab tablet Take 1 tablet (325 mg total) by mouth once daily.    ibuprofen (ADVIL,MOTRIN) 400 MG tablet Take 1 tablet (400 mg total) by mouth every 4 (four) hours as needed for Other (headache).    levothyroxine (SYNTHROID) 112 MCG tablet Take 1 tablet (112 mcg total) by mouth before breakfast.    losartan-hydrochlorothiazide 100-12.5 mg (HYZAAR) 100-12.5 mg Tab Take 1 tablet by mouth once daily.    mometasone (ELOCON) 0.1 % ointment STEFF EXT AA QD FOR 10 DAYS    multivitamin with minerals (ONE DAILY COMPLETE ORAL) Take by mouth.    ocrelizumab (OCREVUS IV) Inject into the vein.    pantoprazole (PROTONIX) 40 MG tablet Take 1 tablet (40 mg total) by mouth once daily.    tiZANidine (ZANAFLEX) 4 MG tablet Take 1 tablet (4 mg total) by mouth 2 (two) times daily as needed.    topiramate  (TOPAMAX) 25 MG tablet Take 1 tablet (25 mg total) by mouth once daily for 30 days, THEN 2 tablets (50 mg total) once daily.     SOCIAL HISTORY  Social History     Tobacco Use    Smoking status: Never Smoker    Smokeless tobacco: Never Used   Substance Use Topics    Alcohol use: No     Comment: social    Drug use: No     Types: Marijuana       Living arrangements - the patient lives with her mother           Objective:        1. 25 foot timed walk:  Timed 25 Foot Walk: 1/5/2021 4/6/2021   Did patient wear an AFO? No No   Was assistive device used? Yes Yes   Assistive device used (jewels one): Bilateral Assistance Bilateral Assistance   Bilateral device used Walker/Rollator Walker/Rollator   Time for 25 Foot Walk (seconds) 30 21.8   Time for 25 Foot Walk (seconds) 32.48 21.6       Neurologic Exam    Deferred   Imaging:       Results for orders placed during the hospital encounter of 05/06/21    MRI Brain Demyelinating W W/O Contrast    Impression  MRI brain: No significant change from prior.  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    No new cord signal abnormality or abnormal intrathecal enhancement to suggest significant interval or active cord demyelination.    Continued multilevel degenerative change cervical spine as detailed above.      Electronically signed by: Nain Dooley DO  Date:    05/06/2021  Time:    13:31    Results for orders placed during the hospital encounter of 05/06/21    MRI Cervical Spine Demyelinating W W/O Contrast    Impression  MRI brain: No significant change from prior.  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    No new cord signal abnormality or abnormal intrathecal enhancement to suggest significant interval or active cord demyelination.    Continued multilevel degenerative change cervical spine as detailed above.      Electronically signed by: Nain Dooley DO  Date:    05/06/2021  Time:    13:31    Labs:     Lab Results   Component Value Date    ZHUSMDHV86EO 47 01/25/2021    MYZARFPS74PN 15 (L) 01/13/2020    ENYKEXQY56JO 22 (L) 08/22/2019     Lab Results   Component Value Date    JCVINDEX 0.32 (H) 05/31/2021    JCVANTIBODY INDETERMINATE (A) 05/31/2021     Lab Results   Component Value Date    FN9MPPHF 70.8 05/31/2021    ABSOLUTECD3 747 05/31/2021    GN7OJKIW 16.7 05/31/2021    ABSOLUTECD8 177 (L) 05/31/2021    JG5IUPVF 55.1 05/31/2021    ABSOLUTECD4 581 05/31/2021    LABCD48 3.29 05/31/2021     Lab Results   Component Value Date    WBC 8.84 05/31/2021    RBC 4.23 05/31/2021    HGB 11.8 (L) 05/31/2021    HCT 34.9 (L) 05/31/2021    MCV 83 05/31/2021    MCH 27.9 05/31/2021    MCHC 33.8 05/31/2021    RDW 16.5 (H) 05/31/2021     05/31/2021    MPV 12.7 05/31/2021    GRAN 7.1 05/31/2021    GRAN 80.5 (H) 05/31/2021    LYMPH 1.1 05/31/2021    LYMPH 12.0 (L) 05/31/2021    MONO 0.5 05/31/2021    MONO 5.3 05/31/2021    EOS 0.1 05/31/2021    BASO 0.04 05/31/2021    EOSINOPHIL 1.4 05/31/2021    BASOPHIL 0.5 05/31/2021     Sodium   Date Value Ref Range Status   05/31/2021 138 136 - 145 mmol/L Final     Potassium   Date Value Ref Range Status   05/31/2021 3.2 (L) 3.5 - 5.1 mmol/L Final     Chloride   Date Value Ref Range Status   05/31/2021  102 95 - 110 mmol/L Final     CO2   Date Value Ref Range Status   05/31/2021 24 23 - 29 mmol/L Final     Glucose   Date Value Ref Range Status   05/31/2021 96 70 - 110 mg/dL Final     BUN   Date Value Ref Range Status   05/31/2021 10 6 - 20 mg/dL Final     Creatinine   Date Value Ref Range Status   05/31/2021 1.0 0.5 - 1.4 mg/dL Final     Calcium   Date Value Ref Range Status   05/31/2021 9.9 8.7 - 10.5 mg/dL Final     Total Protein   Date Value Ref Range Status   05/31/2021 8.6 (H) 6.0 - 8.4 g/dL Final     Albumin   Date Value Ref Range Status   05/31/2021 4.1 3.5 - 5.2 g/dL Final     Total Bilirubin   Date Value Ref Range Status   05/31/2021 0.6 0.1 - 1.0 mg/dL Final     Comment:     For infants and newborns, interpretation of results should be based  on gestational age, weight and in agreement with clinical  observations.    Premature Infant recommended reference ranges:  Up to 24 hours.............<8.0 mg/dL  Up to 48 hours............<12.0 mg/dL  3-5 days..................<15.0 mg/dL  6-29 days.................<15.0 mg/dL       Alkaline Phosphatase   Date Value Ref Range Status   05/31/2021 60 55 - 135 U/L Final     AST   Date Value Ref Range Status   05/31/2021 16 10 - 40 U/L Final     ALT   Date Value Ref Range Status   05/31/2021 10 10 - 44 U/L Final     Anion Gap   Date Value Ref Range Status   05/31/2021 12 8 - 16 mmol/L Final     eGFR if    Date Value Ref Range Status   05/31/2021 >60.0 >60 mL/min/1.73 m^2 Final     eGFR if non    Date Value Ref Range Status   05/31/2021 >60.0 >60 mL/min/1.73 m^2 Final     Comment:     Calculation used to obtain the estimated glomerular filtration  rate (eGFR) is the CKD-EPI equation.        Lab Results   Component Value Date    HEPBSAG Negative 05/31/2021    HEPBSAB Negative 05/31/2021    HEPBCAB Negative 05/31/2021         MS Impression and Plan:     NEURO MULTIPLE SCLEROSIS IMPRESSION:   MS Status:     Clinical Progression comment:  She  continues to have some bothersome symptoms and requires use of her walker more.   Plan:     DMT:  No change in management    DMT comment:  Continue Ocrevus and Vitamin D. Her next infusion is due in late March. We will check labs later this month. She is aware of the risks associated with immunosuppressant therapy, including increased risk of infection. We discussed Evusheld as COVID preventative, and she is open to this when she receives the call to schedule.       Symptom Management:  Implement change in symptom management    Implement Change in Symptom Management:  Spasticity (She will continue taking 4mg of tizanidine at bedtime, but I have ordered 2mg tabs so she can take 1mg in the morning and 1mg in the afternoon for daytime spasms. )       Discussed use of Miralax daily, starting with 1/2 capful, to relieve constipation.   MRI brain is due in May.   She will follow up with Dr. Mitchell after MRI.       VERÓNICA Smallwood, CNS     Problem List Items Addressed This Visit        Neurologic Problems    Multiple sclerosis - Primary    Relevant Orders    CBC auto differential    Immunoglobulins (IgG, IgA, IgM) Quantitative    Hepatitis B Surface Antigen    Hepatitis B Surface Ab, Qualitative    Hepatitis B Core Antibody, Total    MRI Brain Demyelinating Without Contrast      Other Visit Diagnoses     Muscle spasm        Relevant Medications    tiZANidine (ZANAFLEX) 2 MG tablet    Counseling regarding goals of care        Prophylactic immunotherapy        High risk medication use

## 2022-02-04 ENCOUNTER — OFFICE VISIT (OUTPATIENT)
Dept: NEUROLOGY | Facility: CLINIC | Age: 42
End: 2022-02-04
Payer: MEDICARE

## 2022-02-04 ENCOUNTER — PATIENT MESSAGE (OUTPATIENT)
Dept: NEUROLOGY | Facility: CLINIC | Age: 42
End: 2022-02-04

## 2022-02-04 ENCOUNTER — TELEPHONE (OUTPATIENT)
Dept: NEUROLOGY | Facility: CLINIC | Age: 42
End: 2022-02-04

## 2022-02-04 DIAGNOSIS — Z79.899 HIGH RISK MEDICATION USE: ICD-10-CM

## 2022-02-04 DIAGNOSIS — D84.9 IMMUNOSUPPRESSED STATUS: ICD-10-CM

## 2022-02-04 DIAGNOSIS — Z29.89 PROPHYLACTIC IMMUNOTHERAPY: ICD-10-CM

## 2022-02-04 DIAGNOSIS — M62.838 MUSCLE SPASM: ICD-10-CM

## 2022-02-04 DIAGNOSIS — Z79.899 HIGH RISK MEDICATION USE: Primary | ICD-10-CM

## 2022-02-04 DIAGNOSIS — G35 MULTIPLE SCLEROSIS: Primary | ICD-10-CM

## 2022-02-04 DIAGNOSIS — Z71.89 COUNSELING REGARDING GOALS OF CARE: ICD-10-CM

## 2022-02-04 PROCEDURE — 99214 OFFICE O/P EST MOD 30 MIN: CPT | Mod: HCNC,95,, | Performed by: CLINICAL NURSE SPECIALIST

## 2022-02-04 PROCEDURE — 1159F MED LIST DOCD IN RCRD: CPT | Mod: HCNC,CPTII,95, | Performed by: CLINICAL NURSE SPECIALIST

## 2022-02-04 PROCEDURE — 99214 PR OFFICE/OUTPT VISIT, EST, LEVL IV, 30-39 MIN: ICD-10-PCS | Mod: HCNC,95,, | Performed by: CLINICAL NURSE SPECIALIST

## 2022-02-04 PROCEDURE — 1159F PR MEDICATION LIST DOCUMENTED IN MEDICAL RECORD: ICD-10-PCS | Mod: HCNC,CPTII,95, | Performed by: CLINICAL NURSE SPECIALIST

## 2022-02-04 RX ORDER — MAGNESIUM 250 MG
500 TABLET ORAL ONCE
COMMUNITY

## 2022-02-04 RX ORDER — TIZANIDINE 2 MG/1
TABLET ORAL
Qty: 30 TABLET | Refills: 1 | Status: SHIPPED | OUTPATIENT
Start: 2022-02-04 | End: 2022-06-21

## 2022-02-04 NOTE — TELEPHONE ENCOUNTER
----- Message from VERÓNICA Geronimo, CNS sent at 2/4/2022 12:24 PM CST -----  1.) Labs at end of month on a Wednesday   2.) MRI in May  3.) F/U with Dr. Mitchell after MRI

## 2022-02-04 NOTE — TELEPHONE ENCOUNTER
Pt confirmed lab appt 02/23/22 9:00am located at [facility], MRI appt 05/02/22 11:00am located at [facility] on [facility], in clinic follow-up with Dr. Mitchell 05/23/22 11:00am (pt requested 11:00am visit).    Pt verbalized understanding of all appointments.

## 2022-02-04 NOTE — Clinical Note
Just ALEC--she wants her March infusion to be on a Wednesday. I'm setting her up for labs later this month.

## 2022-02-06 DIAGNOSIS — D84.9 IMMUNOSUPPRESSED STATUS: ICD-10-CM

## 2022-02-10 ENCOUNTER — TELEPHONE (OUTPATIENT)
Dept: NEUROLOGY | Facility: CLINIC | Age: 42
End: 2022-02-10

## 2022-02-11 ENCOUNTER — TELEPHONE (OUTPATIENT)
Dept: PSYCHIATRY | Facility: CLINIC | Age: 42
End: 2022-02-11

## 2022-02-11 NOTE — TELEPHONE ENCOUNTER
----- Message from VERÓNICA Geronimo, CNS sent at 2/4/2022 12:23 PM CST -----  Just FYI--she wants her March infusion to be on a Wednesday. I'm setting her up for labs later this month.

## 2022-02-11 NOTE — TELEPHONE ENCOUNTER
[facility] for [facility] with repairs to her scooter through [employer].  Assigning to MSW Intern Marlys Arceo to continue assisting until this issue has been resolved.

## 2022-02-16 ENCOUNTER — PATIENT MESSAGE (OUTPATIENT)
Dept: NEUROLOGY | Facility: CLINIC | Age: 42
End: 2022-02-16
Payer: MEDICARE

## 2022-02-18 NOTE — TELEPHONE ENCOUNTER
Phoned pt to discuss reimbursement for glasses. Made pt aware that unfortunately [facility] will not reimburse for glasses purchased elsewhere, and encouraged pt to visit [facility] for future needs.

## 2022-02-26 ENCOUNTER — LAB VISIT (OUTPATIENT)
Dept: LAB | Facility: HOSPITAL | Age: 42
End: 2022-02-26
Attending: INTERNAL MEDICINE
Payer: MEDICARE

## 2022-02-26 ENCOUNTER — PATIENT MESSAGE (OUTPATIENT)
Dept: NEUROLOGY | Facility: CLINIC | Age: 42
End: 2022-02-26
Payer: MEDICARE

## 2022-02-26 DIAGNOSIS — Z79.899 HIGH RISK MEDICATION USE: ICD-10-CM

## 2022-02-26 DIAGNOSIS — R79.89 ABNORMAL CBC: Primary | ICD-10-CM

## 2022-02-26 DIAGNOSIS — G35 MULTIPLE SCLEROSIS: ICD-10-CM

## 2022-02-26 LAB
BASOPHILS # BLD AUTO: 0.05 K/UL (ref 0–0.2)
BASOPHILS NFR BLD: 0.4 % (ref 0–1.9)
DIFFERENTIAL METHOD: ABNORMAL
EOSINOPHIL # BLD AUTO: 0.3 K/UL (ref 0–0.5)
EOSINOPHIL NFR BLD: 2.5 % (ref 0–8)
ERYTHROCYTE [DISTWIDTH] IN BLOOD BY AUTOMATED COUNT: 14.7 % (ref 11.5–14.5)
HCT VFR BLD AUTO: 37.1 % (ref 37–48.5)
HGB BLD-MCNC: 12.1 G/DL (ref 12–16)
IGA SERPL-MCNC: 392 MG/DL (ref 40–350)
IGG SERPL-MCNC: 1837 MG/DL (ref 650–1600)
IGM SERPL-MCNC: 139 MG/DL (ref 50–300)
IMM GRANULOCYTES # BLD AUTO: 0.04 K/UL (ref 0–0.04)
IMM GRANULOCYTES NFR BLD AUTO: 0.3 % (ref 0–0.5)
LYMPHOCYTES # BLD AUTO: 1.5 K/UL (ref 1–4.8)
LYMPHOCYTES NFR BLD: 13 % (ref 18–48)
MCH RBC QN AUTO: 27.4 PG (ref 27–31)
MCHC RBC AUTO-ENTMCNC: 32.6 G/DL (ref 32–36)
MCV RBC AUTO: 84 FL (ref 82–98)
MONOCYTES # BLD AUTO: 1 K/UL (ref 0.3–1)
MONOCYTES NFR BLD: 8.7 % (ref 4–15)
NEUTROPHILS # BLD AUTO: 8.6 K/UL (ref 1.8–7.7)
NEUTROPHILS NFR BLD: 75.1 % (ref 38–73)
NRBC BLD-RTO: 0 /100 WBC
PLATELET # BLD AUTO: 211 K/UL (ref 150–450)
PMV BLD AUTO: 13.2 FL (ref 9.2–12.9)
RBC # BLD AUTO: 4.42 M/UL (ref 4–5.4)
WBC # BLD AUTO: 11.5 K/UL (ref 3.9–12.7)

## 2022-02-26 PROCEDURE — 85025 COMPLETE CBC W/AUTO DIFF WBC: CPT | Mod: HCNC | Performed by: CLINICAL NURSE SPECIALIST

## 2022-02-26 PROCEDURE — 86704 HEP B CORE ANTIBODY TOTAL: CPT | Mod: HCNC | Performed by: CLINICAL NURSE SPECIALIST

## 2022-02-26 PROCEDURE — 36415 COLL VENOUS BLD VENIPUNCTURE: CPT | Mod: HCNC,PO | Performed by: CLINICAL NURSE SPECIALIST

## 2022-02-26 PROCEDURE — 86706 HEP B SURFACE ANTIBODY: CPT | Mod: HCNC | Performed by: CLINICAL NURSE SPECIALIST

## 2022-02-26 PROCEDURE — 87389 HIV-1 AG W/HIV-1&-2 AB AG IA: CPT | Mod: HCNC | Performed by: CLINICAL NURSE SPECIALIST

## 2022-02-26 PROCEDURE — 82784 ASSAY IGA/IGD/IGG/IGM EACH: CPT | Mod: HCNC | Performed by: CLINICAL NURSE SPECIALIST

## 2022-02-26 PROCEDURE — 87340 HEPATITIS B SURFACE AG IA: CPT | Mod: HCNC | Performed by: CLINICAL NURSE SPECIALIST

## 2022-02-28 ENCOUNTER — PATIENT MESSAGE (OUTPATIENT)
Dept: PSYCHIATRY | Facility: CLINIC | Age: 42
End: 2022-02-28
Payer: MEDICARE

## 2022-03-02 ENCOUNTER — PATIENT MESSAGE (OUTPATIENT)
Dept: NEUROLOGY | Facility: CLINIC | Age: 42
End: 2022-03-02
Payer: MEDICARE

## 2022-03-02 ENCOUNTER — PATIENT MESSAGE (OUTPATIENT)
Dept: INTERNAL MEDICINE | Facility: CLINIC | Age: 42
End: 2022-03-02
Payer: MEDICARE

## 2022-03-02 LAB
HBV CORE AB SERPL QL IA: NEGATIVE
HBV SURFACE AB SER-ACNC: NEGATIVE M[IU]/ML
HBV SURFACE AG SERPL QL IA: NEGATIVE
HIV 1+2 AB+HIV1 P24 AG SERPL QL IA: NEGATIVE

## 2022-03-16 ENCOUNTER — PATIENT MESSAGE (OUTPATIENT)
Dept: ADMINISTRATIVE | Facility: HOSPITAL | Age: 42
End: 2022-03-16
Payer: MEDICARE

## 2022-03-16 NOTE — TELEPHONE ENCOUNTER
MING received this emailed update from the [facility] re: scooter repairs:    HCP REFERRAL SUMMARY: Jaylon was referred to the [facility] for the following needs: Pt has a power scooter that she received from [employer] (vendor). It is not keeping a charge and needs to be serviced. [employer] is no longer in network with her current insurance, so they cannot bill insurance to repair. I phoned the other only vendor that is in-network with her [employer] plan AND that also managed power mobility equipment. They will not evaluate or repair since the device was not provided by their company. Requesting financial assistance to pay for evaluation/repair of scooter through [employer]. Full assessment was completed, which revealed Solomon only need at this time was the scooter repair. The [employer] was able to pay the evaluation fee as well as the repair fee to get Solomon scooter working again.  Impact: Due to [employer] assistance, Jaylon can use her scooter again. This has increased her safety and independence. Jaylon is encouraged to reach back out with any future questions or concerns.     Should you have any questions, you can reach me by email at [email or web address] or by phone at [phone], [phone].

## 2022-03-17 ENCOUNTER — PATIENT OUTREACH (OUTPATIENT)
Dept: ADMINISTRATIVE | Facility: HOSPITAL | Age: 42
End: 2022-03-17
Payer: MEDICARE

## 2022-03-17 DIAGNOSIS — Z12.31 SCREENING MAMMOGRAM FOR BREAST CANCER: Primary | ICD-10-CM

## 2022-03-18 ENCOUNTER — PATIENT MESSAGE (OUTPATIENT)
Dept: NEUROLOGY | Facility: CLINIC | Age: 42
End: 2022-03-18
Payer: MEDICARE

## 2022-03-18 ENCOUNTER — PATIENT MESSAGE (OUTPATIENT)
Dept: ADMINISTRATIVE | Facility: OTHER | Age: 42
End: 2022-03-18
Payer: MEDICARE

## 2022-03-31 ENCOUNTER — TELEPHONE (OUTPATIENT)
Dept: NEUROLOGY | Facility: CLINIC | Age: 42
End: 2022-03-31

## 2022-03-31 DIAGNOSIS — G35 MULTIPLE SCLEROSIS: Primary | ICD-10-CM

## 2022-03-31 NOTE — TELEPHONE ENCOUNTER
Routing History    From: VERÓNICA Geronimo, CNS On: 02/04/2022 12:23 PM  To: Chelo Ngo RN  Priority: Routine  Routing Comments:  Just FYI--she wants her March infusion to be on a Wednesday. I'm setting her up for labs later this month.

## 2022-04-01 ENCOUNTER — PATIENT MESSAGE (OUTPATIENT)
Dept: ADMINISTRATIVE | Facility: OTHER | Age: 42
End: 2022-04-01
Payer: MEDICARE

## 2022-04-12 DIAGNOSIS — D84.9 IMMUNOCOMPROMISED, ACQUIRED: Primary | ICD-10-CM

## 2022-04-13 ENCOUNTER — PATIENT MESSAGE (OUTPATIENT)
Dept: NEUROLOGY | Facility: CLINIC | Age: 42
End: 2022-04-13
Payer: MEDICARE

## 2022-04-15 ENCOUNTER — PATIENT MESSAGE (OUTPATIENT)
Dept: INFECTIOUS DISEASES | Facility: CLINIC | Age: 42
End: 2022-04-15
Payer: MEDICARE

## 2022-04-16 ENCOUNTER — PATIENT MESSAGE (OUTPATIENT)
Dept: NEUROLOGY | Facility: CLINIC | Age: 42
End: 2022-04-16
Payer: MEDICARE

## 2022-04-16 ENCOUNTER — LAB VISIT (OUTPATIENT)
Dept: LAB | Facility: HOSPITAL | Age: 42
End: 2022-04-16
Payer: MEDICARE

## 2022-04-16 DIAGNOSIS — S99.912A INJURY OF LEFT ANKLE, INITIAL ENCOUNTER: Primary | ICD-10-CM

## 2022-04-16 DIAGNOSIS — G35 MULTIPLE SCLEROSIS: ICD-10-CM

## 2022-04-16 DIAGNOSIS — Z91.81 HISTORY OF RECENT FALL: ICD-10-CM

## 2022-04-16 DIAGNOSIS — M79.89 SWELLING OF LEFT FOOT: ICD-10-CM

## 2022-04-16 LAB
BASOPHILS # BLD AUTO: 0.06 K/UL (ref 0–0.2)
BASOPHILS NFR BLD: 0.5 % (ref 0–1.9)
DIFFERENTIAL METHOD: ABNORMAL
EOSINOPHIL # BLD AUTO: 0.2 K/UL (ref 0–0.5)
EOSINOPHIL NFR BLD: 1.9 % (ref 0–8)
ERYTHROCYTE [DISTWIDTH] IN BLOOD BY AUTOMATED COUNT: 15 % (ref 11.5–14.5)
HCT VFR BLD AUTO: 37.4 % (ref 37–48.5)
HGB BLD-MCNC: 12.4 G/DL (ref 12–16)
IMM GRANULOCYTES # BLD AUTO: 0.04 K/UL (ref 0–0.04)
IMM GRANULOCYTES NFR BLD AUTO: 0.3 % (ref 0–0.5)
LYMPHOCYTES # BLD AUTO: 1.8 K/UL (ref 1–4.8)
LYMPHOCYTES NFR BLD: 13.8 % (ref 18–48)
MCH RBC QN AUTO: 27.1 PG (ref 27–31)
MCHC RBC AUTO-ENTMCNC: 33.2 G/DL (ref 32–36)
MCV RBC AUTO: 82 FL (ref 82–98)
MONOCYTES # BLD AUTO: 1.1 K/UL (ref 0.3–1)
MONOCYTES NFR BLD: 8.4 % (ref 4–15)
NEUTROPHILS # BLD AUTO: 9.6 K/UL (ref 1.8–7.7)
NEUTROPHILS NFR BLD: 75.1 % (ref 38–73)
NRBC BLD-RTO: 0 /100 WBC
PLATELET # BLD AUTO: 274 K/UL (ref 150–450)
PMV BLD AUTO: 12.6 FL (ref 9.2–12.9)
RBC # BLD AUTO: 4.57 M/UL (ref 4–5.4)
WBC # BLD AUTO: 12.72 K/UL (ref 3.9–12.7)

## 2022-04-16 PROCEDURE — 36415 COLL VENOUS BLD VENIPUNCTURE: CPT | Performed by: CLINICAL NURSE SPECIALIST

## 2022-04-16 PROCEDURE — 85025 COMPLETE CBC W/AUTO DIFF WBC: CPT | Performed by: CLINICAL NURSE SPECIALIST

## 2022-04-18 ENCOUNTER — PATIENT MESSAGE (OUTPATIENT)
Dept: NEUROLOGY | Facility: CLINIC | Age: 42
End: 2022-04-18
Payer: MEDICARE

## 2022-04-18 ENCOUNTER — DOCUMENTATION ONLY (OUTPATIENT)
Dept: NEUROLOGY | Facility: CLINIC | Age: 42
End: 2022-04-18
Payer: MEDICARE

## 2022-04-18 NOTE — PROGRESS NOTES
"Faxed "Service Request Action Requested' form to [facility] at their fax number [phone].    "

## 2022-04-22 ENCOUNTER — DOCUMENTATION ONLY (OUTPATIENT)
Dept: NEUROLOGY | Facility: CLINIC | Age: 42
End: 2022-04-22
Payer: MEDICARE

## 2022-04-23 ENCOUNTER — HOSPITAL ENCOUNTER (OUTPATIENT)
Dept: RADIOLOGY | Facility: HOSPITAL | Age: 42
Discharge: HOME | End: 2022-04-23
Attending: CLINICAL NURSE SPECIALIST
Payer: MEDICARE

## 2022-04-23 ENCOUNTER — PATIENT MESSAGE (OUTPATIENT)
Dept: NEUROLOGY | Facility: CLINIC | Age: 42
End: 2022-04-23
Payer: MEDICARE

## 2022-04-23 DIAGNOSIS — S99.912A INJURY OF LEFT ANKLE, INITIAL ENCOUNTER: ICD-10-CM

## 2022-04-23 DIAGNOSIS — M79.89 SWELLING OF LEFT FOOT: ICD-10-CM

## 2022-04-23 DIAGNOSIS — Z91.81 HISTORY OF RECENT FALL: ICD-10-CM

## 2022-04-23 DIAGNOSIS — S82.832A CLOSED FRACTURE OF DISTAL END OF LEFT FIBULA, UNSPECIFIED FRACTURE MORPHOLOGY, INITIAL ENCOUNTER: Primary | ICD-10-CM

## 2022-04-23 PROCEDURE — 73630 X-RAY EXAM OF FOOT: CPT | Mod: 26,LT,, | Performed by: RADIOLOGY

## 2022-04-23 PROCEDURE — 73630 XR FOOT COMPLETE 3 VIEW LEFT: ICD-10-PCS | Mod: 26,LT,, | Performed by: RADIOLOGY

## 2022-04-23 PROCEDURE — 73610 XR ANKLE COMPLETE 3 VIEW LEFT: ICD-10-PCS | Mod: 26,LT,, | Performed by: RADIOLOGY

## 2022-04-23 PROCEDURE — 73610 X-RAY EXAM OF ANKLE: CPT | Mod: TC,LT

## 2022-04-23 PROCEDURE — 73610 X-RAY EXAM OF ANKLE: CPT | Mod: 26,LT,, | Performed by: RADIOLOGY

## 2022-04-23 PROCEDURE — 73630 X-RAY EXAM OF FOOT: CPT | Mod: TC,LT

## 2022-04-25 ENCOUNTER — OFFICE VISIT (OUTPATIENT)
Dept: ORTHOPEDICS | Facility: CLINIC | Age: 42
End: 2022-04-25
Payer: MEDICARE

## 2022-04-25 ENCOUNTER — HOSPITAL ENCOUNTER (OUTPATIENT)
Dept: RADIOLOGY | Facility: HOSPITAL | Age: 42
Discharge: HOME | End: 2022-04-25
Attending: PHYSICIAN ASSISTANT
Payer: MEDICARE

## 2022-04-25 ENCOUNTER — TELEPHONE (OUTPATIENT)
Dept: ORTHOPEDICS | Facility: CLINIC | Age: 42
End: 2022-04-25

## 2022-04-25 DIAGNOSIS — M25.579 ANKLE PAIN, UNSPECIFIED CHRONICITY, UNSPECIFIED LATERALITY: Primary | ICD-10-CM

## 2022-04-25 DIAGNOSIS — M25.572 ACUTE LEFT ANKLE PAIN: ICD-10-CM

## 2022-04-25 DIAGNOSIS — M25.572 ACUTE LEFT ANKLE PAIN: Primary | ICD-10-CM

## 2022-04-25 DIAGNOSIS — S82.832A CLOSED FRACTURE OF DISTAL END OF LEFT FIBULA, UNSPECIFIED FRACTURE MORPHOLOGY, INITIAL ENCOUNTER: Primary | ICD-10-CM

## 2022-04-25 PROCEDURE — 1160F RVW MEDS BY RX/DR IN RCRD: CPT | Mod: CPTII,S$GLB,, | Performed by: PHYSICIAN ASSISTANT

## 2022-04-25 PROCEDURE — 99203 OFFICE O/P NEW LOW 30 MIN: CPT | Mod: S$GLB,,, | Performed by: PHYSICIAN ASSISTANT

## 2022-04-25 PROCEDURE — 73610 X-RAY EXAM OF ANKLE: CPT | Mod: 26,LT,, | Performed by: RADIOLOGY

## 2022-04-25 PROCEDURE — 1160F PR REVIEW ALL MEDS BY PRESCRIBER/CLIN PHARMACIST DOCUMENTED: ICD-10-PCS | Mod: CPTII,S$GLB,, | Performed by: PHYSICIAN ASSISTANT

## 2022-04-25 PROCEDURE — 1159F MED LIST DOCD IN RCRD: CPT | Mod: CPTII,S$GLB,, | Performed by: PHYSICIAN ASSISTANT

## 2022-04-25 PROCEDURE — 99999 PR PBB SHADOW E&M-EST. PATIENT-LVL III: ICD-10-PCS | Mod: PBBFAC,,, | Performed by: PHYSICIAN ASSISTANT

## 2022-04-25 PROCEDURE — 73610 XR ANKLE COMPLETE 3 VIEW LEFT: ICD-10-PCS | Mod: 26,LT,, | Performed by: RADIOLOGY

## 2022-04-25 PROCEDURE — 1159F PR MEDICATION LIST DOCUMENTED IN MEDICAL RECORD: ICD-10-PCS | Mod: CPTII,S$GLB,, | Performed by: PHYSICIAN ASSISTANT

## 2022-04-25 PROCEDURE — 73610 X-RAY EXAM OF ANKLE: CPT | Mod: TC,FY,LT

## 2022-04-25 PROCEDURE — 99999 PR PBB SHADOW E&M-EST. PATIENT-LVL III: CPT | Mod: PBBFAC,,, | Performed by: PHYSICIAN ASSISTANT

## 2022-04-25 PROCEDURE — 99203 PR OFFICE/OUTPT VISIT, NEW, LEVL III, 30-44 MIN: ICD-10-PCS | Mod: S$GLB,,, | Performed by: PHYSICIAN ASSISTANT

## 2022-04-25 RX ORDER — TRAMADOL HYDROCHLORIDE 50 MG/1
50 TABLET ORAL EVERY 6 HOURS PRN
Qty: 20 TABLET | Refills: 0 | Status: SHIPPED | OUTPATIENT
Start: 2022-04-25 | End: 2022-05-02

## 2022-04-25 NOTE — PROGRESS NOTES
Subjective:      Patient ID: Jaylon Bowles is a 41 y.o. female.    Chief Complaint: Pain of the Left Ankle      40yo F with MS presents with one week h/o left ankle pain. States she was at home. Her leg buckled and she twisted her ankle. Grainger a pop. Pain and swelling are localized to the lateral ankle. Pain worse with weightbearing. She is in a wheelchair today. Uses assisitve devices for ambulation at home. Currently taking ibuprofen and muscle relaxers for pain.       Review of Systems   Constitutional: Negative for chills and fever.   Cardiovascular: Negative for chest pain.   Respiratory: Negative for cough.    Hematologic/Lymphatic: Does not bruise/bleed easily.   Skin: Negative for poor wound healing and rash.   Musculoskeletal: Positive for falls, joint pain, myalgias and stiffness.   Gastrointestinal: Negative for abdominal pain.   Genitourinary: Negative for bladder incontinence.   Neurological: Negative for dizziness, loss of balance and weakness.   Psychiatric/Behavioral: Negative for altered mental status.       Review of patient's allergies indicates:   Allergen Reactions    Betaseron [interferon beta-1b] Hives    Gabapentin Hallucinations    Cephalexin Hives, Itching and Rash        Current Outpatient Medications   Medication Sig Dispense Refill    betamethasone dipropionate (DIPROLENE) 0.05 % cream       carvediloL (COREG) 25 MG tablet Take 1 tablet (25 mg total) by mouth 2 (two) times daily with meals. 180 tablet 0    cholecalciferol, vitamin D3, 1,250 mcg (50,000 unit) Tab Take 1 tablet by mouth once a week. 12 tablet 0    clotrimazole-betamethasone 1-0.05% (LOTRISONE) cream Apply topically 2 (two) times daily. 45 g 3    diclofenac (VOLTAREN) 50 MG EC tablet TAKE 1 TABLET(50 MG) BY MOUTH TWICE DAILY 30 tablet 0    escitalopram oxalate (LEXAPRO) 20 MG tablet Take 1 tablet (20 mg total) by mouth once daily. 90 tablet 3    ferrous sulfate (FEOSOL) 325 mg (65 mg iron) Tab tablet Take 1  tablet (325 mg total) by mouth once daily.  0    ibuprofen (ADVIL,MOTRIN) 400 MG tablet Take 1 tablet (400 mg total) by mouth every 4 (four) hours as needed for Other (headache). 20 tablet 2    levothyroxine (SYNTHROID) 112 MCG tablet Take 1 tablet (112 mcg total) by mouth before breakfast. 90 tablet 0    losartan-hydrochlorothiazide 100-12.5 mg (HYZAAR) 100-12.5 mg Tab Take 1 tablet by mouth once daily. 90 tablet 3    magnesium 250 mg Tab Take 500 mg by mouth once.      multivitamin with minerals (ONE DAILY COMPLETE ORAL) Take by mouth.      ocrelizumab (OCREVUS IV) Inject into the vein.      pantoprazole (PROTONIX) 40 MG tablet Take 1 tablet (40 mg total) by mouth once daily. 90 tablet 3    tiZANidine (ZANAFLEX) 2 MG tablet Take 1/2 tablet in the morning and 1/2 tablet in the afternoon for muscle spasms. 30 tablet 1    tiZANidine (ZANAFLEX) 4 MG tablet Take 1 tablet (4 mg total) by mouth 2 (two) times daily as needed. 60 tablet 5    topiramate (TOPAMAX) 25 MG tablet Take 1 tablet (25 mg total) by mouth once daily for 30 days, THEN 2 tablets (50 mg total) once daily. 150 tablet 0    traMADoL (ULTRAM) 50 mg tablet Take 1 tablet (50 mg total) by mouth every 6 (six) hours as needed for Pain. 20 tablet 0     No current facility-administered medications for this visit.        The patient's relevant past medical, surgical, and social history was reviewed in Epic.       Objective:      VITAL SIGNS: There were no vitals taken for this visit.    General    Nursing note and vitals reviewed.  Constitutional: She is oriented to person, place, and time. She appears well-developed and well-nourished.   Neurological: She is alert and oriented to person, place, and time.     General Musculoskeletal Exam   Gait: antalgic     Left Ankle/Foot Exam     Inspection  Effusion: Ankle - present     Swelling   The patient is swollen on the lateral malleolus.    Tenderness   The patient is tender to palpation of the lateral  malleolus.    Range of Motion   Ankle Joint  Dorsiflexion: abnormal   Plantar flexion: abnormal     Subtalar Joint   Inversion: abnormal   Eversion: abnormal     Tests   Anterior drawer: negative  Varus tilt: negative    Other   Sensation: normal      Muscle Strength   Left Lower Extremity   Anterior tibial:  3/5   Posterior tibial:  3/5   Gastrocsoleus:  3/5   Peroneal muscle:  3/5     Vascular Exam       Left Pulses  Dorsalis Pedis:      1+  Posterior Tibial:      1+         X-Ray Foot Complete 3 view Left  Narrative: EXAMINATION:  XR FOOT COMPLETE 3 VIEW LEFT    CLINICAL HISTORY:  .  Unspecified injury of left ankle, initial encounter    TECHNIQUE:  AP, lateral and oblique views of the left foot were performed.    COMPARISON:  None    FINDINGS:  There is diffuse soft tissue swelling of the anterior ankle and foot.  No acute fracture or dislocation is identified.  The fracture of the lateral malleolus is better appreciated on a dedicated left ankle series performed 04/23/2022.  There is a mild hallux valgus.  Impression: No acute fractures or dislocations related to the bones which comprise foot.  However, there is mild edema adjacent to the anterior ankle and over the dorsum of the foot.    Electronically signed by: Corinne Larsen  Date:    04/23/2022  Time:    16:17  X-Ray Ankle Complete 3 View Left  Narrative: EXAMINATION:  XR ANKLE COMPLETE 3 VIEW LEFT    CLINICAL HISTORY:  Unspecified injury of left ankle, initial encounter    TECHNIQUE:  XR ANKLE COMPLETE 3 VIEW LEFT    COMPARISON:  None    FINDINGS:  There is an oblique fracture of the distal fibula extending to the distal metaphysis without significant displacement or angulation.  The ankle mortise remains congruent.  The visualized bones of the hindfoot have a benign appearance.  There is generalized soft tissue swelling.  Impression: Distal fibular fracture    Electronically signed by: Kunal Collado Jr  Date:    04/23/2022  Time:    10:35      I have  reviewed the above radiograph and agree with the findings stated by the radiologist.           Assessment:       1. Closed fracture of distal end of left fibula, unspecified fracture morphology, initial encounter          Plan:         Jaylon was seen today for pain.    Diagnoses and all orders for this visit:    Closed fracture of distal end of left fibula, unspecified fracture morphology, initial encounter  -     Ambulatory referral/consult to Orthopedics  -     AIR CAST WALKER BOOT FOR HOME USE  -     traMADoL (ULTRAM) 50 mg tablet; Take 1 tablet (50 mg total) by mouth every 6 (six) hours as needed for Pain.      Left ankle fracture. Tall boot. WBAT. Activities as tolerated. Ice and elevation. Tramadol for pain. Follow up in 6 weeks for repeat assessment and new xrays.         Mila Leung PA-C   04/25/2022

## 2022-04-27 ENCOUNTER — CLINICAL SUPPORT (OUTPATIENT)
Dept: INFECTIOUS DISEASES | Facility: CLINIC | Age: 42
End: 2022-04-27
Payer: MEDICARE

## 2022-04-27 DIAGNOSIS — D84.9 IMMUNOCOMPROMISED, ACQUIRED: ICD-10-CM

## 2022-04-27 PROCEDURE — 90732 PPSV23 VACC 2 YRS+ SUBQ/IM: CPT | Mod: S$GLB,,, | Performed by: INTERNAL MEDICINE

## 2022-04-27 PROCEDURE — G0009 ADMIN PNEUMOCOCCAL VACCINE: HCPCS | Mod: S$GLB,,, | Performed by: INTERNAL MEDICINE

## 2022-04-27 PROCEDURE — G0010 PNEUMOCOCCAL POLYSACCHARIDE VACCINE 23-VALENT =>2YO SQ IM: ICD-10-PCS | Mod: S$GLB,,, | Performed by: INTERNAL MEDICINE

## 2022-04-27 PROCEDURE — G0009 HEPATITIS B (RECOMBINANT) ADJUVANTED, 2 DOSE: ICD-10-PCS | Mod: S$GLB,,, | Performed by: INTERNAL MEDICINE

## 2022-04-27 PROCEDURE — 90732 PNEUMOCOCCAL POLYSACCHARIDE VACCINE 23-VALENT =>2YO SQ IM: ICD-10-PCS | Mod: S$GLB,,, | Performed by: INTERNAL MEDICINE

## 2022-04-27 PROCEDURE — G0010 ADMIN HEPATITIS B VACCINE: HCPCS | Mod: S$GLB,,, | Performed by: INTERNAL MEDICINE

## 2022-04-27 PROCEDURE — 90739 HEPATITIS B (RECOMBINANT) ADJUVANTED, 2 DOSE: ICD-10-PCS | Mod: S$GLB,,, | Performed by: INTERNAL MEDICINE

## 2022-04-27 PROCEDURE — 90739 HEPB VACC 2/4 DOSE ADULT IM: CPT | Mod: S$GLB,,, | Performed by: INTERNAL MEDICINE

## 2022-04-27 NOTE — PROGRESS NOTES
Patient received 2 vaccines IM to the right deltoid, Pneumovax anterior, and Heplisav B #2 posterior.  Tolerated well and left in NAD

## 2022-05-02 ENCOUNTER — HOSPITAL ENCOUNTER (OUTPATIENT)
Dept: RADIOLOGY | Facility: HOSPITAL | Age: 42
Discharge: HOME | End: 2022-05-02
Attending: CLINICAL NURSE SPECIALIST
Payer: MEDICARE

## 2022-05-02 DIAGNOSIS — G35 MULTIPLE SCLEROSIS: ICD-10-CM

## 2022-05-02 PROCEDURE — 70551 MRI BRAIN STEM W/O DYE: CPT | Mod: TC

## 2022-05-02 PROCEDURE — 70551 MRI BRAIN DEMYELINATING WITHOUT CONTRAST: ICD-10-PCS | Mod: 26,,, | Performed by: RADIOLOGY

## 2022-05-02 PROCEDURE — 70551 MRI BRAIN STEM W/O DYE: CPT | Mod: 26,,, | Performed by: RADIOLOGY

## 2022-05-06 ENCOUNTER — PATIENT MESSAGE (OUTPATIENT)
Dept: NEUROLOGY | Facility: CLINIC | Age: 42
End: 2022-05-06
Payer: MEDICARE

## 2022-05-10 ENCOUNTER — OFFICE VISIT (OUTPATIENT)
Dept: OPHTHALMOLOGY | Facility: CLINIC | Age: 42
End: 2022-05-10
Payer: MEDICARE

## 2022-05-10 ENCOUNTER — CLINICAL SUPPORT (OUTPATIENT)
Dept: OPHTHALMOLOGY | Facility: CLINIC | Age: 42
End: 2022-05-10
Payer: MEDICARE

## 2022-05-10 ENCOUNTER — PATIENT MESSAGE (OUTPATIENT)
Dept: UROGYNECOLOGY | Facility: CLINIC | Age: 42
End: 2022-05-10
Payer: MEDICARE

## 2022-05-10 ENCOUNTER — PATIENT MESSAGE (OUTPATIENT)
Dept: NEUROLOGY | Facility: CLINIC | Age: 42
End: 2022-05-10
Payer: MEDICARE

## 2022-05-10 DIAGNOSIS — H47.293 PARTIAL OPTIC ATROPHY OF BOTH EYES: ICD-10-CM

## 2022-05-10 DIAGNOSIS — G35 MS (MULTIPLE SCLEROSIS): ICD-10-CM

## 2022-05-10 DIAGNOSIS — G35 MS (MULTIPLE SCLEROSIS): Primary | ICD-10-CM

## 2022-05-10 DIAGNOSIS — H55.04: Primary | ICD-10-CM

## 2022-05-10 DIAGNOSIS — H51.23 INO (INTERNUCLEAR OPHTHALMOPLEGIA), BILATERAL: ICD-10-CM

## 2022-05-10 PROCEDURE — 99203 OFFICE O/P NEW LOW 30 MIN: CPT | Mod: S$GLB,,, | Performed by: OPHTHALMOLOGY

## 2022-05-10 PROCEDURE — 99203 PR OFFICE/OUTPT VISIT, NEW, LEVL III, 30-44 MIN: ICD-10-PCS | Mod: S$GLB,,, | Performed by: OPHTHALMOLOGY

## 2022-05-10 PROCEDURE — 99999 PR PBB SHADOW E&M-EST. PATIENT-LVL III: ICD-10-PCS | Mod: PBBFAC,,, | Performed by: OPHTHALMOLOGY

## 2022-05-10 PROCEDURE — 1159F PR MEDICATION LIST DOCUMENTED IN MEDICAL RECORD: ICD-10-PCS | Mod: CPTII,S$GLB,, | Performed by: OPHTHALMOLOGY

## 2022-05-10 PROCEDURE — 1160F PR REVIEW ALL MEDS BY PRESCRIBER/CLIN PHARMACIST DOCUMENTED: ICD-10-PCS | Mod: CPTII,S$GLB,, | Performed by: OPHTHALMOLOGY

## 2022-05-10 PROCEDURE — 99999 PR PBB SHADOW E&M-EST. PATIENT-LVL III: CPT | Mod: PBBFAC,,, | Performed by: OPHTHALMOLOGY

## 2022-05-10 PROCEDURE — 1160F RVW MEDS BY RX/DR IN RCRD: CPT | Mod: CPTII,S$GLB,, | Performed by: OPHTHALMOLOGY

## 2022-05-10 PROCEDURE — 1159F MED LIST DOCD IN RCRD: CPT | Mod: CPTII,S$GLB,, | Performed by: OPHTHALMOLOGY

## 2022-05-10 NOTE — PROGRESS NOTES
HPI     DLS:02/05/2019 [place]  Patient here annual check Multiple Sclerosis and Review HVF.  Pt states OD>OS vision blurry at times.  Occasional eye pain.    I have personally interviewed the patient, reviewed the history and   examined the patient and agree with the technician's exam.     Last edited by Tonio Barth MD on 5/10/2022 10:53 AM. (History)            Assessment /Plan     For exam results, see Encounter Report.    Nystagmus, dissociated    MS (multiple sclerosis)  -     Butts Visual Field - OU - Extended - Both Eyes    Partial optic atrophy of both eyes  -     Butts Visual Field - OU - Extended - Both Eyes    ELMA (internuclear ophthalmoplegia), bilateral  -     Butts Visual Field - OU - Extended - Both Eyes      MsEddie Bowles's visual function appears unchanged. Repeat exam and visual field testing in one year.

## 2022-05-12 ENCOUNTER — PATIENT MESSAGE (OUTPATIENT)
Dept: NEUROLOGY | Facility: CLINIC | Age: 42
End: 2022-05-12
Payer: MEDICARE

## 2022-05-12 ENCOUNTER — INFUSION (OUTPATIENT)
Dept: INFUSION THERAPY | Facility: HOSPITAL | Age: 42
End: 2022-05-12
Payer: MEDICARE

## 2022-05-12 VITALS
RESPIRATION RATE: 16 BRPM | TEMPERATURE: 98 F | HEIGHT: 65 IN | BODY MASS INDEX: 41.65 KG/M2 | OXYGEN SATURATION: 99 % | HEART RATE: 67 BPM | SYSTOLIC BLOOD PRESSURE: 160 MMHG | WEIGHT: 250 LBS | DIASTOLIC BLOOD PRESSURE: 77 MMHG

## 2022-05-12 DIAGNOSIS — G35 MULTIPLE SCLEROSIS: Primary | ICD-10-CM

## 2022-05-12 PROCEDURE — 25000003 PHARM REV CODE 250: Performed by: PSYCHIATRY & NEUROLOGY

## 2022-05-12 PROCEDURE — 96375 TX/PRO/DX INJ NEW DRUG ADDON: CPT

## 2022-05-12 PROCEDURE — 96367 TX/PROPH/DG ADDL SEQ IV INF: CPT

## 2022-05-12 PROCEDURE — 96413 CHEMO IV INFUSION 1 HR: CPT

## 2022-05-12 PROCEDURE — 63600175 PHARM REV CODE 636 W HCPCS: Performed by: PSYCHIATRY & NEUROLOGY

## 2022-05-12 PROCEDURE — 96415 CHEMO IV INFUSION ADDL HR: CPT

## 2022-05-12 RX ORDER — ACETAMINOPHEN 500 MG
1000 TABLET ORAL
Status: CANCELLED | OUTPATIENT
Start: 2022-06-09

## 2022-05-12 RX ORDER — HEPARIN 100 UNIT/ML
500 SYRINGE INTRAVENOUS
Status: DISCONTINUED | OUTPATIENT
Start: 2022-05-12 | End: 2022-05-12 | Stop reason: HOSPADM

## 2022-05-12 RX ORDER — HEPARIN 100 UNIT/ML
500 SYRINGE INTRAVENOUS
Status: CANCELLED | OUTPATIENT
Start: 2022-06-09

## 2022-05-12 RX ORDER — DIPHENHYDRAMINE HYDROCHLORIDE 50 MG/ML
50 INJECTION INTRAMUSCULAR; INTRAVENOUS
Status: CANCELLED | OUTPATIENT
Start: 2022-06-09

## 2022-05-12 RX ORDER — SODIUM CHLORIDE 0.9 % (FLUSH) 0.9 %
10 SYRINGE (ML) INJECTION
Status: DISCONTINUED | OUTPATIENT
Start: 2022-05-12 | End: 2022-05-12 | Stop reason: HOSPADM

## 2022-05-12 RX ORDER — EPINEPHRINE 0.3 MG/.3ML
0.3 INJECTION SUBCUTANEOUS
Status: CANCELLED | OUTPATIENT
Start: 2022-06-09

## 2022-05-12 RX ORDER — FAMOTIDINE 10 MG/ML
20 INJECTION INTRAVENOUS
Status: COMPLETED | OUTPATIENT
Start: 2022-05-12 | End: 2022-05-12

## 2022-05-12 RX ORDER — SODIUM CHLORIDE 0.9 % (FLUSH) 0.9 %
10 SYRINGE (ML) INJECTION
Status: CANCELLED | OUTPATIENT
Start: 2022-06-09

## 2022-05-12 RX ORDER — ACETAMINOPHEN 500 MG
1000 TABLET ORAL
Status: COMPLETED | OUTPATIENT
Start: 2022-05-12 | End: 2022-05-12

## 2022-05-12 RX ORDER — DIPHENHYDRAMINE HYDROCHLORIDE 50 MG/ML
50 INJECTION INTRAMUSCULAR; INTRAVENOUS
Status: DISCONTINUED | OUTPATIENT
Start: 2022-05-12 | End: 2022-05-12 | Stop reason: HOSPADM

## 2022-05-12 RX ORDER — FAMOTIDINE 10 MG/ML
20 INJECTION INTRAVENOUS
Status: CANCELLED | OUTPATIENT
Start: 2022-06-09

## 2022-05-12 RX ORDER — EPINEPHRINE 0.3 MG/.3ML
0.3 INJECTION SUBCUTANEOUS
Status: DISCONTINUED | OUTPATIENT
Start: 2022-05-12 | End: 2022-05-12 | Stop reason: HOSPADM

## 2022-05-12 RX ADMIN — SODIUM CHLORIDE: 0.9 INJECTION, SOLUTION INTRAVENOUS at 07:05

## 2022-05-12 RX ADMIN — ACETAMINOPHEN 1000 MG: 500 TABLET ORAL at 07:05

## 2022-05-12 RX ADMIN — OCRELIZUMAB 600 MG: 300 INJECTION INTRAVENOUS at 09:05

## 2022-05-12 RX ADMIN — FAMOTIDINE 20 MG: 10 INJECTION INTRAVENOUS at 07:05

## 2022-05-12 RX ADMIN — DIPHENHYDRAMINE HYDROCHLORIDE 50 MG: 50 INJECTION, SOLUTION INTRAMUSCULAR; INTRAVENOUS at 07:05

## 2022-05-12 RX ADMIN — DEXTROSE 100 MG: 50 INJECTION, SOLUTION INTRAVENOUS at 08:05

## 2022-05-12 NOTE — PROGRESS NOTES
Hvf done ou. Rel/fix fair od good os coop. Good ou./chart checked for latex allergy./-5.25 /od -5.75 + .50 x 76/os-[facility]

## 2022-05-12 NOTE — PLAN OF CARE
Pt received Ocrevus today and tolerated well, without complications. VSS throughout infusion. Educated patient about Ocrevus (indications, side effects, possible reactions, chemotherapy precautions) and verbalized understanding. PIV positive for blood return, saline locked and removed prior to DC, catheter tip intact. Pt DC with no distress noted, WC off unit, via brother, pleased. Pt did go from standing to R Knee when standing from tx chair to WC, reports no injury, no break in skin, denies further eval.

## 2022-05-20 ENCOUNTER — TELEPHONE (OUTPATIENT)
Dept: NEUROLOGY | Facility: CLINIC | Age: 42
End: 2022-05-20
Payer: MEDICARE

## 2022-05-25 ENCOUNTER — PATIENT MESSAGE (OUTPATIENT)
Dept: NEUROLOGY | Facility: CLINIC | Age: 42
End: 2022-05-25
Payer: MEDICARE

## 2022-05-26 ENCOUNTER — PATIENT OUTREACH (OUTPATIENT)
Dept: ADMINISTRATIVE | Facility: HOSPITAL | Age: 42
End: 2022-05-26
Payer: MEDICARE

## 2022-05-30 ENCOUNTER — TELEPHONE (OUTPATIENT)
Dept: NEUROLOGY | Facility: CLINIC | Age: 42
End: 2022-05-30
Payer: MEDICARE

## 2022-06-06 ENCOUNTER — OFFICE VISIT (OUTPATIENT)
Dept: ORTHOPEDICS | Facility: CLINIC | Age: 42
End: 2022-06-06
Payer: MEDICARE

## 2022-06-06 ENCOUNTER — HOSPITAL ENCOUNTER (OUTPATIENT)
Dept: RADIOLOGY | Facility: HOSPITAL | Age: 42
Discharge: HOME | End: 2022-06-06
Attending: PHYSICIAN ASSISTANT
Payer: MEDICARE

## 2022-06-06 VITALS
DIASTOLIC BLOOD PRESSURE: 85 MMHG | HEIGHT: 65 IN | HEART RATE: 61 BPM | WEIGHT: 250 LBS | BODY MASS INDEX: 41.65 KG/M2 | SYSTOLIC BLOOD PRESSURE: 142 MMHG

## 2022-06-06 DIAGNOSIS — M25.572 ACUTE LEFT ANKLE PAIN: ICD-10-CM

## 2022-06-06 DIAGNOSIS — S82.832A CLOSED FRACTURE OF DISTAL END OF LEFT FIBULA, UNSPECIFIED FRACTURE MORPHOLOGY, INITIAL ENCOUNTER: Primary | ICD-10-CM

## 2022-06-06 DIAGNOSIS — M25.579 ANKLE PAIN, UNSPECIFIED CHRONICITY, UNSPECIFIED LATERALITY: ICD-10-CM

## 2022-06-06 DIAGNOSIS — G35 MULTIPLE SCLEROSIS: ICD-10-CM

## 2022-06-06 PROCEDURE — 3079F DIAST BP 80-89 MM HG: CPT | Mod: CPTII,S$GLB,, | Performed by: PHYSICIAN ASSISTANT

## 2022-06-06 PROCEDURE — 3079F PR MOST RECENT DIASTOLIC BLOOD PRESSURE 80-89 MM HG: ICD-10-PCS | Mod: CPTII,S$GLB,, | Performed by: PHYSICIAN ASSISTANT

## 2022-06-06 PROCEDURE — 73610 X-RAY EXAM OF ANKLE: CPT | Mod: TC,FY,LT

## 2022-06-06 PROCEDURE — 99999 PR PBB SHADOW E&M-EST. PATIENT-LVL V: ICD-10-PCS | Mod: PBBFAC,,, | Performed by: PHYSICIAN ASSISTANT

## 2022-06-06 PROCEDURE — 99213 OFFICE O/P EST LOW 20 MIN: CPT | Mod: S$GLB,,, | Performed by: PHYSICIAN ASSISTANT

## 2022-06-06 PROCEDURE — 1160F RVW MEDS BY RX/DR IN RCRD: CPT | Mod: CPTII,S$GLB,, | Performed by: PHYSICIAN ASSISTANT

## 2022-06-06 PROCEDURE — 99213 PR OFFICE/OUTPT VISIT, EST, LEVL III, 20-29 MIN: ICD-10-PCS | Mod: S$GLB,,, | Performed by: PHYSICIAN ASSISTANT

## 2022-06-06 PROCEDURE — 99999 PR PBB SHADOW E&M-EST. PATIENT-LVL V: CPT | Mod: PBBFAC,,, | Performed by: PHYSICIAN ASSISTANT

## 2022-06-06 PROCEDURE — 1160F PR REVIEW ALL MEDS BY PRESCRIBER/CLIN PHARMACIST DOCUMENTED: ICD-10-PCS | Mod: CPTII,S$GLB,, | Performed by: PHYSICIAN ASSISTANT

## 2022-06-06 PROCEDURE — 3077F PR MOST RECENT SYSTOLIC BLOOD PRESSURE >= 140 MM HG: ICD-10-PCS | Mod: CPTII,S$GLB,, | Performed by: PHYSICIAN ASSISTANT

## 2022-06-06 PROCEDURE — 73610 X-RAY EXAM OF ANKLE: CPT | Mod: 26,LT,, | Performed by: RADIOLOGY

## 2022-06-06 PROCEDURE — 1159F MED LIST DOCD IN RCRD: CPT | Mod: CPTII,S$GLB,, | Performed by: PHYSICIAN ASSISTANT

## 2022-06-06 PROCEDURE — 1159F PR MEDICATION LIST DOCUMENTED IN MEDICAL RECORD: ICD-10-PCS | Mod: CPTII,S$GLB,, | Performed by: PHYSICIAN ASSISTANT

## 2022-06-06 PROCEDURE — 3008F BODY MASS INDEX DOCD: CPT | Mod: CPTII,S$GLB,, | Performed by: PHYSICIAN ASSISTANT

## 2022-06-06 PROCEDURE — 73610 XR ANKLE COMPLETE 3 VIEW LEFT: ICD-10-PCS | Mod: 26,LT,, | Performed by: RADIOLOGY

## 2022-06-06 PROCEDURE — 3077F SYST BP >= 140 MM HG: CPT | Mod: CPTII,S$GLB,, | Performed by: PHYSICIAN ASSISTANT

## 2022-06-06 PROCEDURE — 3008F PR BODY MASS INDEX (BMI) DOCUMENTED: ICD-10-PCS | Mod: CPTII,S$GLB,, | Performed by: PHYSICIAN ASSISTANT

## 2022-06-06 NOTE — PROGRESS NOTES
Subjective:      Patient ID: Jaylon Bowles is a 41 y.o. female.    Chief Complaint: Pain of the Left Ankle      41-year-old morbidly obese female 6 week follow-up left ankle minimally displaced distal fibular fracture.  She is currently still in her boot and in a wheelchair.  She states she has mostly pain at night.  She has a history of MS and states that her left ankle and leg have always been weak.  No other complaints.      Review of Systems   Constitutional: Negative for chills and fever.   Cardiovascular: Negative for chest pain.   Respiratory: Negative for cough.    Hematologic/Lymphatic: Does not bruise/bleed easily.   Skin: Negative for poor wound healing and rash.   Musculoskeletal: Positive for joint pain, myalgias and stiffness.   Gastrointestinal: Negative for abdominal pain.   Genitourinary: Negative for bladder incontinence.   Neurological: Negative for dizziness, loss of balance and weakness.   Psychiatric/Behavioral: Negative for altered mental status.       Review of patient's allergies indicates:   Allergen Reactions    Betaseron [interferon beta-1b] Hives    Gabapentin Hallucinations    Cephalexin Hives, Itching and Rash        Current Outpatient Medications   Medication Sig Dispense Refill    carvediloL (COREG) 25 MG tablet Take 1 tablet (25 mg total) by mouth 2 (two) times daily with meals. 180 tablet 0    cholecalciferol, vitamin D3, 1,250 mcg (50,000 unit) Tab Take 1 tablet by mouth once a week. 12 tablet 0    clotrimazole-betamethasone 1-0.05% (LOTRISONE) cream Apply topically 2 (two) times daily. 45 g 3    escitalopram oxalate (LEXAPRO) 20 MG tablet Take 1 tablet (20 mg total) by mouth once daily. 90 tablet 3    ferrous sulfate (FEOSOL) 325 mg (65 mg iron) Tab tablet Take 1 tablet (325 mg total) by mouth once daily.  0    ibuprofen (ADVIL,MOTRIN) 400 MG tablet Take 1 tablet (400 mg total) by mouth every 4 (four) hours as needed for Other (headache). 20 tablet 2    levothyroxine  "(SYNTHROID) 112 MCG tablet Take 1 tablet (112 mcg total) by mouth before breakfast. 90 tablet 0    losartan-hydrochlorothiazide 100-12.5 mg (HYZAAR) 100-12.5 mg Tab Take 1 tablet by mouth once daily. 90 tablet 3    magnesium 250 mg Tab Take 500 mg by mouth once.      multivitamin with minerals (ONE DAILY COMPLETE ORAL) Take by mouth.      ocrelizumab (OCREVUS IV) Inject into the vein.      pantoprazole (PROTONIX) 40 MG tablet Take 1 tablet (40 mg total) by mouth once daily. 90 tablet 3    tiZANidine (ZANAFLEX) 2 MG tablet Take 1/2 tablet in the morning and 1/2 tablet in the afternoon for muscle spasms. 30 tablet 1    tiZANidine (ZANAFLEX) 4 MG tablet Take 1 tablet (4 mg total) by mouth 2 (two) times daily as needed. 60 tablet 5    betamethasone dipropionate (DIPROLENE) 0.05 % cream       diclofenac (VOLTAREN) 50 MG EC tablet TAKE 1 TABLET(50 MG) BY MOUTH TWICE DAILY (Patient not taking: Reported on 6/6/2022) 30 tablet 0    topiramate (TOPAMAX) 25 MG tablet Take 1 tablet (25 mg total) by mouth once daily for 30 days, THEN 2 tablets (50 mg total) once daily. (Patient not taking: Reported on 6/6/2022) 150 tablet 0     No current facility-administered medications for this visit.        The patient's relevant past medical, surgical, and social history was reviewed in Epic.       Objective:      VITAL SIGNS: BP (!) 142/85 (BP Location: Left arm, Patient Position: Sitting, BP Method: X-Large (Automatic))   Pulse 61   Ht 5' 5" (1.651 m)   Wt 113.4 kg (250 lb)   BMI 41.60 kg/m²     General    Nursing note and vitals reviewed.  Constitutional: She is oriented to person, place, and time. She appears well-developed and well-nourished.   Neurological: She is alert and oriented to person, place, and time.         Left Ankle/Foot Exam     Swelling   The patient is swollen on the lateral malleolus.    Tenderness   The patient is tender to palpation of the lateral malleolus.    Range of Motion   Ankle " Joint  Dorsiflexion: abnormal   Plantar flexion: abnormal     Subtalar Joint   Inversion: abnormal   Eversion: abnormal     Other   Sensation: normal      Muscle Strength   Left Lower Extremity   Anterior tibial:  3/5   Posterior tibial:  3/5   Gastrocsoleus:  3/5   Peroneal muscle:  3/5     Vascular Exam       Left Pulses  Dorsalis Pedis:      2+  Posterior Tibial:      1+         X-Ray Ankle Complete 3 View Left  Narrative: EXAMINATION:  XR ANKLE COMPLETE 3 VIEW LEFT    CLINICAL HISTORY:  Pain in unspecified ankle and joints of unspecified foot    TECHNIQUE:  3 views of the left ankle    COMPARISON:  None    FINDINGS:  Previously noted distal fibular fracture with mild dorsal lateral displacement is not significantly changed in alignment however, there does appear to be interval subtle bony callus bridging of the fracture lucencies which are still prominently visible.    No new acute displaced fracture or dislocation.    Soft tissue swelling surrounds the ankle, not significantly changed.  Impression: 1. Previously noted distal fibular fracture with mild dorsal lateral displacement is not significantly changed in alignment however, there does appear to be interval subtle bony callus bridging of the fracture lucencies which are still prominently visible.    Electronically signed by: Giuseppe Marquis  Date:    06/06/2022  Time:    12:17      I have reviewed the above radiograph and agree with the findings stated by the radiologist.           Assessment:       1. Closed fracture of distal end of left fibula, unspecified fracture morphology, initial encounter    2. Acute left ankle pain    3. Multiple sclerosis          Plan:         Jaylon was seen today for pain.    Diagnoses and all orders for this visit:    Closed fracture of distal end of left fibula, unspecified fracture morphology, initial encounter  -     Ambulatory referral/consult to Home Health; Future    Acute left ankle pain  -     Ambulatory  referral/consult to Home Health; Future    Multiple sclerosis  -     Ambulatory referral/consult to Home Health; Future    Mild tenderness continues over the lateral malleolus.  She does have some healing on x-rays noted.  She has significant weakness in her left lower extremity which she states is normal from the MS. I recommend that she get into physical therapy.  Patient would like home physical therapy due to transportation issues.  I will order home health PT for left ankle strengthening and stabilization.  Gait training.  Wean out of the boot over the next 1-2 weeks.  I would like her to follow-up in 6-8 weeks for recheck.          Mila Leung PA-C   06/06/2022

## 2022-06-07 ENCOUNTER — PATIENT MESSAGE (OUTPATIENT)
Dept: NEUROLOGY | Facility: CLINIC | Age: 42
End: 2022-06-07
Payer: MEDICARE

## 2022-06-14 ENCOUNTER — OFFICE VISIT (OUTPATIENT)
Dept: INTERNAL MEDICINE | Facility: CLINIC | Age: 42
End: 2022-06-14
Payer: MEDICARE

## 2022-06-14 VITALS
HEIGHT: 65 IN | BODY MASS INDEX: 42.9 KG/M2 | TEMPERATURE: 99 F | DIASTOLIC BLOOD PRESSURE: 88 MMHG | WEIGHT: 257.5 LBS | OXYGEN SATURATION: 97 % | HEART RATE: 64 BPM | SYSTOLIC BLOOD PRESSURE: 126 MMHG | RESPIRATION RATE: 18 BRPM

## 2022-06-14 DIAGNOSIS — H60.502 ACUTE OTITIS EXTERNA OF LEFT EAR, UNSPECIFIED TYPE: ICD-10-CM

## 2022-06-14 DIAGNOSIS — G35 MULTIPLE SCLEROSIS: ICD-10-CM

## 2022-06-14 DIAGNOSIS — F32.A ANXIETY AND DEPRESSION: ICD-10-CM

## 2022-06-14 DIAGNOSIS — Z00.00 ANNUAL PHYSICAL EXAM: ICD-10-CM

## 2022-06-14 DIAGNOSIS — I10 ESSENTIAL HYPERTENSION: Primary | ICD-10-CM

## 2022-06-14 DIAGNOSIS — Z74.09 IMPAIRED MOBILITY AND ADLS: ICD-10-CM

## 2022-06-14 DIAGNOSIS — Z78.9 IMPAIRED MOBILITY AND ADLS: ICD-10-CM

## 2022-06-14 DIAGNOSIS — E06.3 HASHIMOTO'S THYROIDITIS: ICD-10-CM

## 2022-06-14 DIAGNOSIS — K21.9 CHRONIC GERD: ICD-10-CM

## 2022-06-14 DIAGNOSIS — F41.9 ANXIETY AND DEPRESSION: ICD-10-CM

## 2022-06-14 DIAGNOSIS — F32.A MODERATE DEPRESSIVE EPISODE: ICD-10-CM

## 2022-06-14 DIAGNOSIS — F43.21 GRIEF: ICD-10-CM

## 2022-06-14 PROCEDURE — 3079F PR MOST RECENT DIASTOLIC BLOOD PRESSURE 80-89 MM HG: ICD-10-PCS | Mod: CPTII,S$GLB,, | Performed by: INTERNAL MEDICINE

## 2022-06-14 PROCEDURE — 99999 PR PBB SHADOW E&M-EST. PATIENT-LVL IV: ICD-10-PCS | Mod: PBBFAC,,, | Performed by: INTERNAL MEDICINE

## 2022-06-14 PROCEDURE — 1160F RVW MEDS BY RX/DR IN RCRD: CPT | Mod: CPTII,S$GLB,, | Performed by: INTERNAL MEDICINE

## 2022-06-14 PROCEDURE — 99499 RISK ADDL DX/OHS AUDIT: ICD-10-PCS | Mod: S$GLB,,, | Performed by: INTERNAL MEDICINE

## 2022-06-14 PROCEDURE — 1159F PR MEDICATION LIST DOCUMENTED IN MEDICAL RECORD: ICD-10-PCS | Mod: CPTII,S$GLB,, | Performed by: INTERNAL MEDICINE

## 2022-06-14 PROCEDURE — 3074F PR MOST RECENT SYSTOLIC BLOOD PRESSURE < 130 MM HG: ICD-10-PCS | Mod: CPTII,S$GLB,, | Performed by: INTERNAL MEDICINE

## 2022-06-14 PROCEDURE — 3044F HG A1C LEVEL LT 7.0%: CPT | Mod: CPTII,S$GLB,, | Performed by: INTERNAL MEDICINE

## 2022-06-14 PROCEDURE — 3008F BODY MASS INDEX DOCD: CPT | Mod: CPTII,S$GLB,, | Performed by: INTERNAL MEDICINE

## 2022-06-14 PROCEDURE — 99499 UNLISTED E&M SERVICE: CPT | Mod: S$GLB,,, | Performed by: INTERNAL MEDICINE

## 2022-06-14 PROCEDURE — 3074F SYST BP LT 130 MM HG: CPT | Mod: CPTII,S$GLB,, | Performed by: INTERNAL MEDICINE

## 2022-06-14 PROCEDURE — 1160F PR REVIEW ALL MEDS BY PRESCRIBER/CLIN PHARMACIST DOCUMENTED: ICD-10-PCS | Mod: CPTII,S$GLB,, | Performed by: INTERNAL MEDICINE

## 2022-06-14 PROCEDURE — 99214 PR OFFICE/OUTPT VISIT, EST, LEVL IV, 30-39 MIN: ICD-10-PCS | Mod: S$GLB,,, | Performed by: INTERNAL MEDICINE

## 2022-06-14 PROCEDURE — 99214 OFFICE O/P EST MOD 30 MIN: CPT | Mod: S$GLB,,, | Performed by: INTERNAL MEDICINE

## 2022-06-14 PROCEDURE — 3079F DIAST BP 80-89 MM HG: CPT | Mod: CPTII,S$GLB,, | Performed by: INTERNAL MEDICINE

## 2022-06-14 PROCEDURE — 1159F MED LIST DOCD IN RCRD: CPT | Mod: CPTII,S$GLB,, | Performed by: INTERNAL MEDICINE

## 2022-06-14 PROCEDURE — 99999 PR PBB SHADOW E&M-EST. PATIENT-LVL IV: CPT | Mod: PBBFAC,,, | Performed by: INTERNAL MEDICINE

## 2022-06-14 PROCEDURE — 3008F PR BODY MASS INDEX (BMI) DOCUMENTED: ICD-10-PCS | Mod: CPTII,S$GLB,, | Performed by: INTERNAL MEDICINE

## 2022-06-14 PROCEDURE — 3044F PR MOST RECENT HEMOGLOBIN A1C LEVEL <7.0%: ICD-10-PCS | Mod: CPTII,S$GLB,, | Performed by: INTERNAL MEDICINE

## 2022-06-14 RX ORDER — CARVEDILOL 25 MG/1
25 TABLET ORAL 2 TIMES DAILY WITH MEALS
Qty: 180 TABLET | Refills: 3 | Status: SHIPPED | OUTPATIENT
Start: 2022-06-14 | End: 2023-03-22 | Stop reason: SDUPTHER

## 2022-06-14 RX ORDER — LOSARTAN POTASSIUM AND HYDROCHLOROTHIAZIDE 12.5; 1 MG/1; MG/1
1 TABLET ORAL DAILY
Qty: 90 TABLET | Refills: 3 | Status: SHIPPED | OUTPATIENT
Start: 2022-06-14 | End: 2023-03-22 | Stop reason: SDUPTHER

## 2022-06-14 RX ORDER — HYDROCORTISONE AND ACETIC ACID 20.75; 10.375 MG/ML; MG/ML
3 SOLUTION AURICULAR (OTIC) 2 TIMES DAILY
Qty: 10 ML | Refills: 0 | Status: SHIPPED | OUTPATIENT
Start: 2022-06-14 | End: 2022-06-21

## 2022-06-14 RX ORDER — ESCITALOPRAM OXALATE 20 MG/1
20 TABLET ORAL DAILY
Qty: 90 TABLET | Refills: 3 | Status: SHIPPED | OUTPATIENT
Start: 2022-06-14 | End: 2023-03-22 | Stop reason: SDUPTHER

## 2022-06-14 RX ORDER — PANTOPRAZOLE SODIUM 40 MG/1
40 TABLET, DELAYED RELEASE ORAL DAILY
Qty: 90 TABLET | Refills: 3 | Status: SHIPPED | OUTPATIENT
Start: 2022-06-14 | End: 2023-06-19 | Stop reason: SDUPTHER

## 2022-06-14 RX ORDER — LEVOTHYROXINE SODIUM 112 UG/1
112 TABLET ORAL
Qty: 90 TABLET | Refills: 3 | Status: SHIPPED | OUTPATIENT
Start: 2022-06-14 | End: 2023-03-22 | Stop reason: SDUPTHER

## 2022-06-14 NOTE — PROGRESS NOTES
Subjective:     PCP: Cat Ramachandran MD    Jaylon Bowles is a 41 y.o. female who presents for an annual exam.    Hypertension: The patient has been taking medications as instructed, no medication side effects noted, no chest pain on exertion, no dyspnea on exertion and no swelling of ankles.    BP Readings from Last 3 Encounters:   06/14/22 126/88   06/06/22 (!) 142/85   05/12/22 (!) 160/77     Depression: She presents for follow up of depression. Current symptoms include anhedonia, depressed mood, difficulty concentrating, fatigue and insomnia. Symptoms have been gradually worsening since that time. Patient denies recurrent thoughts of death. Previous treatment includes: medication. She complains of the following side effects from the treatment: none. PHQ-9 12 (moderate)    Thyroid Disease: Presents for follow up of hypothyroidism. Current symptoms: none. Patient denies change in energy level, heat / cold intolerance and palpitations. Symptoms have stabilized.      Medical History:   Past Medical History:   Diagnosis Date    Depression     Hypertension     Migraine headache     Multiple sclerosis     Thyroid disease        Family History: family history includes Diabetes in her father; Hypertension in her mother; Stroke in her father; Thyroid disease in her mother.    Surgical History: History reviewed. No pertinent surgical history.     Social History:  reports that she has never smoked. She has never used smokeless tobacco. She reports that she does not drink alcohol and does not use drugs.     Allergies:   Review of patient's allergies indicates:   Allergen Reactions    Betaseron [interferon beta-1b] Hives    Gabapentin Hallucinations    Cephalexin Hives, Itching and Rash       Medications:   Current Outpatient Medications   Medication Sig    ferrous sulfate (FEOSOL) 325 mg (65 mg iron) Tab tablet Take 1 tablet (325 mg total) by mouth once daily.    ibuprofen (ADVIL,MOTRIN) 400 MG tablet Take  1 tablet (400 mg total) by mouth every 4 (four) hours as needed for Other (headache).    magnesium 250 mg Tab Take 500 mg by mouth once.    multivitamin with minerals (ONE DAILY COMPLETE ORAL) Take by mouth.    ocrelizumab (OCREVUS IV) Inject into the vein.    carvediloL (COREG) 25 MG tablet Take 1 tablet (25 mg total) by mouth 2 (two) times daily with meals.    cholecalciferol, vitamin D3, 1,250 mcg (50,000 unit) Tab Take 1 tablet by mouth once a week.    EScitalopram oxalate (LEXAPRO) 20 MG tablet Take 1 tablet (20 mg total) by mouth once daily.    levothyroxine (SYNTHROID) 112 MCG tablet Take 1 tablet (112 mcg total) by mouth before breakfast.    losartan-hydrochlorothiazide 100-12.5 mg (HYZAAR) 100-12.5 mg Tab Take 1 tablet by mouth once daily.    pantoprazole (PROTONIX) 40 MG tablet Take 1 tablet (40 mg total) by mouth once daily.    tiZANidine (ZANAFLEX) 2 MG tablet TAKE 1/2 TABLET BY MOUTH IN THE MORNING AND IN THE AFTERNOON FOR MUSCLE SPASMS    tiZANidine (ZANAFLEX) 4 MG tablet TAKE 1 TABLET(4 MG) BY MOUTH TWICE DAILY AS NEEDED     No current facility-administered medications for this visit.       Health Maintenance:   Health Maintenance Topics with due status: Not Due       Topic Last Completion Date    Cervical Cancer Screening 08/16/2019    Influenza Vaccine 01/14/2020    TETANUS VACCINE 06/10/2021    Pneumococcal Vaccines (Age 0-64) 04/27/2022     Lab Results   Component Value Date    HEPCAB Negative 05/31/2021    [id number] Negative 02/26/2022      Eye Exam: Last Ophthalmology Visit: 5/10/2022 [facility]    Dental Exam: every 6 months   OB/GYN: Jamee Berg   Pap smear: 8/2019   Mammogram: scheduled   HIV: 2/2022, neg   Hepatitic C  Ab: 5/2021    Vaccinations:  Immunization History   Administered Date(s) Administered    COVID-19, MRNA, LN-S, PF (Pfizer) (Purple Cap) 04/02/2021, 04/23/2021, 12/21/2021    Hepatitis A, Adult 05/25/2021, 06/10/2021, 06/10/2021    Hepatitis B  (recombinant) Adjuvanted, 2 dose 05/25/2021, 04/27/2022    Pneumococcal Conjugate - 13 Valent 05/25/2021    Pneumococcal Polysaccharide - 23 Valent 04/27/2022    Tdap 06/10/2021, 06/10/2021      Tetanus: 6/2021   Pneumovax: 4/2022   Prevnar-13: 5/2021   Covid vaccine: done    ADL's: has a Scooter, walker, wheelchair and bedside commode, frequently falls and stopped showering due to fear about falls.    Body mass index is 42.85 kg/m².  Wt Readings from Last 3 Encounters:   06/14/22 116.8 kg (257 lb 8 oz)   06/06/22 113.4 kg (250 lb)   05/12/22 113.4 kg (250 lb)       Review of Systems   Constitutional: Negative for chills, diaphoresis, fatigue and fever.   HENT: Positive for ear pain (left). Negative for congestion, dental problem, ear discharge, postnasal drip, rhinorrhea, sinus pressure, sore throat and trouble swallowing.    Eyes: Negative for redness and visual disturbance.   Respiratory: Negative for cough, chest tightness and shortness of breath.    Cardiovascular: Negative for chest pain, palpitations and leg swelling.   Gastrointestinal: Negative for abdominal pain, blood in stool, constipation, diarrhea, nausea and vomiting.   Endocrine: Negative for polydipsia and polyuria.   Genitourinary: Negative for decreased urine volume, dysuria, frequency and hematuria.        + incontinence   Musculoskeletal: Positive for arthralgias (left foot in boot) and gait problem. Negative for back pain and myalgias.        Reports 7 falls in the last 6 months   Skin: Negative for rash and wound.   Neurological: Positive for weakness (legs give out on her when she tries to change position). Negative for dizziness, numbness and headaches.   Hematological: Negative for adenopathy.   Psychiatric/Behavioral: Positive for decreased concentration and dysphoric mood. Negative for sleep disturbance. The patient is nervous/anxious (she reports stress related to family interactions).         Lost interest in making jewelry, more  sluggish          Objective:     Physical Exam  Vitals reviewed.   Constitutional:       General: She is awake. She is not in acute distress.     Appearance: Normal appearance. She is well-developed and well-groomed. She is not diaphoretic.   HENT:      Head: Normocephalic and atraumatic.      Right Ear: Hearing, tympanic membrane, ear canal and external ear normal. Tympanic membrane is not erythematous or bulging.      Left Ear: Hearing, tympanic membrane, ear canal and external ear normal. Tympanic membrane is not erythematous or bulging.      Nose: Nose normal. No congestion.      Mouth/Throat:      Mouth: Mucous membranes are moist.      Tongue: No lesions.      Pharynx: Oropharynx is clear. Uvula midline. No oropharyngeal exudate or posterior oropharyngeal erythema.   Eyes:      General: Lids are normal. Vision grossly intact. No scleral icterus.     Extraocular Movements:      Right eye: Nystagmus present.      Left eye: Nystagmus present.      Conjunctiva/sclera:      Right eye: Right conjunctiva is not injected.      Left eye: Left conjunctiva is not injected.      Pupils: Pupils are equal, round, and reactive to light.   Neck:      Thyroid: No thyroid mass or thyromegaly.   Cardiovascular:      Rate and Rhythm: Normal rate and regular rhythm.      Pulses: Normal pulses.      Heart sounds: Normal heart sounds. No murmur heard.  Pulmonary:      Effort: Pulmonary effort is normal. No respiratory distress.      Breath sounds: Normal breath sounds. No decreased breath sounds or wheezing.   Chest:   Breasts:      Right: No supraclavicular adenopathy.      Left: No supraclavicular adenopathy.       Abdominal:      General: Bowel sounds are normal. There is no distension.      Palpations: Abdomen is soft. Abdomen is not rigid.      Tenderness: There is no abdominal tenderness. There is no guarding or rebound.   Musculoskeletal:         General: Normal range of motion.      Cervical back: Normal range of motion and  neck supple.      Right lower leg: No edema.      Left lower leg: No edema.   Lymphadenopathy:      Cervical: No cervical adenopathy.      Upper Body:      Right upper body: No supraclavicular adenopathy.      Left upper body: No supraclavicular adenopathy.   Skin:     General: Skin is warm and dry.      Coloration: Skin is not cyanotic.      Findings: No lesion or rash.      Nails: There is no clubbing.   Neurological:      General: No focal deficit present.      Mental Status: She is alert and oriented to person, place, and time.      Motor: Weakness present.   Psychiatric:         Attention and Perception: Attention normal.         Mood and Affect: Mood is depressed.         Behavior: Behavior is cooperative.          Assessment:        1. Essential hypertension    2. Acute otitis externa of left ear, unspecified type    3. Hashimoto's thyroiditis    4. Anxiety and depression    5. Moderate depressive episode    6. Grief    7. Multiple sclerosis    8. Mixed incontinence    9. Chronic GERD    10. Annual physical exam    11. Impaired mobility and ADLs           Plan:     1. Essential hypertension  - BP is controlled, continue losartan, HCTZ, carvedilol  - Hemoglobin A1C; Future  - CBC Auto Differential; Future  - Comprehensive Metabolic Panel; Future  - Lipid Panel; Future  - TSH; Future  - Urinalysis; Future  - losartan-hydrochlorothiazide 100-12.5 mg (HYZAAR) 100-12.5 mg Tab; Take 1 tablet by mouth once daily.  Dispense: 90 tablet; Refill: 3  - carvediloL (COREG) 25 MG tablet; Take 1 tablet (25 mg total) by mouth 2 (two) times daily with meals.  Dispense: 180 tablet; Refill: 3    2. Acute otitis externa of left ear, unspecified type  - acetic acid-hydrocortisone (VOSOL-HC) otic solution; Place 3 drops into the left ear 2 (two) times daily. for 7 days  Dispense: 10 mL; Refill: 0    3. Hashimoto's thyroiditis  - Lipid Panel; Future  - TSH; Future  - stable, asymptomatic, continue levothyroxine  - levothyroxine  (SYNTHROID) 112 MCG tablet; Take 1 tablet (112 mcg total) by mouth before breakfast.  Dispense: 90 tablet; Refill: 3    4. Anxiety and depression  - EScitalopram oxalate (LEXAPRO) 20 MG tablet; Take 1 tablet (20 mg total) by mouth once daily.  Dispense: 90 tablet; Refill: 3    5. Moderate depressive episode  - EScitalopram oxalate (LEXAPRO) 20 MG tablet; Take 1 tablet (20 mg total) by mouth once daily.  Dispense: 90 tablet; Refill: 3  - she reports that CBD products help her to relax but there is potential for interactions that are unknown    6. Grief  - consider seeing a therapist individually    7. Multiple sclerosis  - managed by Neurology, on Ocrevus  - she was advised to discuss frequent falls     8. Chronic GERD  - pantoprazole (PROTONIX) 40 MG tablet; Take 1 tablet (40 mg total) by mouth once daily.  Dispense: 90 tablet; Refill: 3    9. Annual physical exam  - Hemoglobin A1C; Future  - CBC Auto Differential; Future  - Comprehensive Metabolic Panel; Future  - Lipid Panel; Future  - TSH; Future  - Urinalysis; Future    10. Impaired mobility and ADLs  - uses wheelchair      RTC in 6 months for follow-up or sooner if needed    __________________________    Cat Ramachandran MD, PharmD  [facility]- [facility] Medicine  [employer] Board of [employer]esity [employer] diplomate  Office [phone]

## 2022-06-15 ENCOUNTER — OFFICE VISIT (OUTPATIENT)
Dept: NEUROLOGY | Facility: CLINIC | Age: 42
End: 2022-06-15
Payer: MEDICARE

## 2022-06-15 DIAGNOSIS — R42 SPELL OF DIZZINESS: ICD-10-CM

## 2022-06-15 DIAGNOSIS — R29.818 SUSPECTED SLEEP APNEA: ICD-10-CM

## 2022-06-15 DIAGNOSIS — G35 MULTIPLE SCLEROSIS: Primary | ICD-10-CM

## 2022-06-15 PROCEDURE — 99499 UNLISTED E&M SERVICE: CPT | Mod: 95,,, | Performed by: PSYCHIATRY & NEUROLOGY

## 2022-06-15 RX ORDER — CHOLECALCIFEROL (VITAMIN D3) 1250 MCG
1 TABLET ORAL WEEKLY
Qty: 12 TABLET | Refills: 3 | Status: SHIPPED | OUTPATIENT
Start: 2022-06-15

## 2022-06-15 NOTE — PROGRESS NOTES
"The patient location is: [place]  The chief complaint leading to consultation is: MS f/u    Visit type: audiovisual    Face to Face time with patient: 30min  40 minutes of total time spent on the encounter, which includes face to face time and non-face to face time preparing to see the patient (eg, review of tests), Obtaining and/or reviewing separately obtained history, Documenting clinical information in the electronic or other health record, Independently interpreting results (not separately reported) and communicating results to the patient/family/caregiver, or Care coordination (not separately reported).       Each patient to whom he or she provides medical services by telemedicine is:  (1) informed of the relationship between the physician and patient and the respective role of any other health care provider with respect to management of the patient; and (2) notified that he or she may decline to receive medical services by telemedicine and may withdraw from such care at any time.    Notes:     Jaylon Bowles is a 41 y.o. female who is being seen for routine follow up for MS f/u.      HPI:    SUBJECTIVE:  Fell and broke her ankle while trying to get to the commode. Leg just gave away. She heard the ankle crack but waited a day to got to ortho because she "didn't want to believe it was broken."  Had even more falls recently (estimated 7 in 6 months). Last fall was on day of last Ocrevus infusion 5/12/22. Sometimes her falls are when she is few steps away from her walker. For instance, the way her room is configured, she cannot readily access her walker when she gets. Feels like she gets vertigo when she initially stands.   Studying for a tax exam  [employer] -- shower bench    Current Outpatient Medications on File Prior to Visit   Medication Sig Dispense Refill Last Dose    acetic acid-hydrocortisone (VOSOL-HC) otic solution Place 3 drops into the left ear 2 (two) times daily. for 7 days 10 mL 0     carvediloL (COREG) " 25 MG tablet Take 1 tablet (25 mg total) by mouth 2 (two) times daily with meals. 180 tablet 3     cholecalciferol, vitamin D3, 1,250 mcg (50,000 unit) Tab Take 1 tablet by mouth once a week. 12 tablet 0     EScitalopram oxalate (LEXAPRO) 20 MG tablet Take 1 tablet (20 mg total) by mouth once daily. 90 tablet 3     ferrous sulfate (FEOSOL) 325 mg (65 mg iron) Tab tablet Take 1 tablet (325 mg total) by mouth once daily.  0     ibuprofen (ADVIL,MOTRIN) 400 MG tablet Take 1 tablet (400 mg total) by mouth every 4 (four) hours as needed for Other (headache). 20 tablet 2     levothyroxine (SYNTHROID) 112 MCG tablet Take 1 tablet (112 mcg total) by mouth before breakfast. 90 tablet 3     losartan-hydrochlorothiazide 100-12.5 mg (HYZAAR) 100-12.5 mg Tab Take 1 tablet by mouth once daily. 90 tablet 3     magnesium 250 mg Tab Take 500 mg by mouth once.       multivitamin with minerals (ONE DAILY COMPLETE ORAL) Take by mouth.       ocrelizumab (OCREVUS IV) Inject into the vein.       pantoprazole (PROTONIX) 40 MG tablet Take 1 tablet (40 mg total) by mouth once daily. 90 tablet 3     tiZANidine (ZANAFLEX) 2 MG tablet Take 1/2 tablet in the morning and 1/2 tablet in the afternoon for muscle spasms. 30 tablet 1     tiZANidine (ZANAFLEX) 4 MG tablet Take 1 tablet (4 mg total) by mouth 2 (two) times daily as needed. 60 tablet 5        REVIEW OF SYMPTOMS 3/31/2021   Do you feel abnormally tired on most days? No   Do you feel you generally sleep well? Yes   Do you have difficulty controlling your bladder?  No   Do you have difficulty controlling your bowels?  No   Do you have frequent muscle cramps, tightness or spasms in your limbs?  Yes   Do you have new visual symptoms?  No   Do you have worsening difficulty with your memory or thinking? Yes   Do you have worsening symptoms of anxiety or depression?  No   For patients who walk, Do you have more difficulty walking?  Yes   Have you fallen since your last visit?  Yes   For patients  who use wheelchairs: Do you have any skin wounds or breakdown? Not Applicable   Do you have difficulty using your hands?  No   Do you have shooting or burning pain? No   Do you have difficulty with sexual function?  No   If you are sexually active, are you using birth control? Y/N  N/A Not Applicable   Do you often choke when swallowing liquids or solid food?  No   Do you experience worsening symptoms when overheated? Yes   Do you need any new equipment such as a wheelchair, walker or shower chair? No   Do you receive co-pay financial assistance for your principal MS medicine? Yes   Would you be interested in participating in an MS research trial in the future? No   For patients on Gilenya, Tecfidera, Aubagio, Rituxan, Ocrevus, Tysabri, Lemtrada or Methotrexate, are you aware that you should NOT receive live virus vaccines?  Yes   Do you feel you have adequate family/friend support?  Yes   Do you have health insurance?   Yes   Are you currently employed? No   Do you receive SSDI/SSI?  Yes   Do you use marijuana or cannabis products? Yes   How often? Monthly   Have you been diagnosed with a urinary tract infection since your last visit here? No   Have you been diagnosed with a respiratory tract infection since your last visit here? No   Have you been to the emergency room since your last visit here? No   Have you been hospitalized since your last visit here?  No       Social History     Tobacco Use    Smoking status: Never Smoker    Smokeless tobacco: Never Used   Substance Use Topics    Alcohol use: No     Comment: social    Drug use: No     Types: Marijuana       MENTAL STATUS: grossly intact. Normal language, attention.   CRANIAL NERVE EXAM: Extraocular muscles are intact.  No facial asymmetry. Tongue midline. Shoulder shrug normal b/l. There is no dysarthria.   MOTOR EXAM:  Rapid sequential movements are normal. No pronator drift. Can stand on either leg without issue, can hop on either leg  times. Strength is at  least 4/5 in all groups in the lower extremities and upper extremities.   SENSORY EXAM: Normal to LT in UE's  COORDINATION: Normal modified finger-to-nose exam.   GAIT: Narrow based and stable.      Interval imaging:  Results for orders placed or performed during the hospital encounter of 05/02/22 (from the past 2160 hour(s))   MRI Brain Demyelinating Without Contrast    Impression    Brain appears stable from prior exam, again demonstrating findings compatible with the reported history of multiple sclerosis.  No new discrete lesions to indicate ongoing demyelination.    Electronically signed by resident: Bernard Palafox  Date:    05/02/2022  Time:    12:07    Electronically signed by: Tonio Cohen MD  Date:    05/02/2022  Time:    13:35           Interval Labs  Lab Results   Component Value Date    [id number] 375 02/20/2018      Sodium   Date Value Ref Range Status   05/31/2021 138 136 - 145 mmol/L Final     Potassium   Date Value Ref Range Status   05/31/2021 3.2 (L) 3.5 - 5.1 mmol/L Final     Chloride   Date Value Ref Range Status   05/31/2021 102 95 - 110 mmol/L Final     CO2   Date Value Ref Range Status   05/31/2021 24 23 - 29 mmol/L Final     Glucose   Date Value Ref Range Status   05/31/2021 96 70 - 110 mg/dL Final     BUN   Date Value Ref Range Status   05/31/2021 10 6 - 20 mg/dL Final     Creatinine   Date Value Ref Range Status   05/31/2021 1.0 0.5 - 1.4 mg/dL Final     Calcium   Date Value Ref Range Status   05/31/2021 9.9 8.7 - 10.5 mg/dL Final     Total Protein   Date Value Ref Range Status   05/31/2021 8.6 (H) 6.0 - 8.4 g/dL Final     Albumin   Date Value Ref Range Status   05/31/2021 4.1 3.5 - 5.2 g/dL Final     Total Bilirubin   Date Value Ref Range Status   05/31/2021 0.6 0.1 - 1.0 mg/dL Final     Comment:     For infants and newborns, interpretation of results should be based  on gestational age, weight and in agreement with clinical  observations.    Premature Infant recommended reference  ranges:  Up to 24 hours.............<8.0 mg/dL  Up to 48 hours............<12.0 mg/dL  3-5 days..................<15.0 mg/dL  6-29 days.................<15.0 mg/dL       Alkaline Phosphatase   Date Value Ref Range Status   05/31/2021 60 55 - 135 U/L Final     AST   Date Value Ref Range Status   05/31/2021 16 10 - 40 U/L Final     ALT   Date Value Ref Range Status   05/31/2021 10 10 - 44 U/L Final     Anion Gap   Date Value Ref Range Status   05/31/2021 12 8 - 16 mmol/L Final     eGFR if    Date Value Ref Range Status   05/31/2021 >60.0 >60 mL/min/1.73 m^2 Final     eGFR if non    Date Value Ref Range Status   05/31/2021 >60.0 >60 mL/min/1.73 m^2 Final     Comment:     Calculation used to obtain the estimated glomerular filtration  rate (eGFR) is the CKD-EPI equation.        Lab Results   Component Value Date    WBC 12.72 (H) 04/16/2022    RBC 4.57 04/16/2022    HGB 12.4 04/16/2022    HCT 37.4 04/16/2022    MCV 82 04/16/2022    MCH 27.1 04/16/2022    MCHC 33.2 04/16/2022    RDW 15.0 (H) 04/16/2022     04/16/2022    MPV 12.6 04/16/2022    GRAN 9.6 (H) 04/16/2022    GRAN 75.1 (H) 04/16/2022    LYMPH 1.8 04/16/2022    LYMPH 13.8 (L) 04/16/2022    MONO 1.1 (H) 04/16/2022    MONO 8.4 04/16/2022    EOS 0.2 04/16/2022    BASO 0.06 04/16/2022    EOSINOPHIL 1.9 04/16/2022    BASOPHIL 0.5 04/16/2022           A/P:        -   - f/u in 2-3 months in person with me    Encounter closed for Dr. Mitchell 7/19/25 [facility]

## 2022-06-22 ENCOUNTER — PATIENT MESSAGE (OUTPATIENT)
Dept: NEUROLOGY | Facility: CLINIC | Age: 42
End: 2022-06-22
Payer: MEDICARE

## 2022-06-24 ENCOUNTER — PATIENT MESSAGE (OUTPATIENT)
Dept: PSYCHIATRY | Facility: CLINIC | Age: 42
End: 2022-06-24
Payer: MEDICARE

## 2022-06-25 ENCOUNTER — LAB VISIT (OUTPATIENT)
Dept: LAB | Facility: HOSPITAL | Age: 42
End: 2022-06-25
Attending: INTERNAL MEDICINE
Payer: MEDICARE

## 2022-06-25 DIAGNOSIS — Z00.00 ANNUAL PHYSICAL EXAM: ICD-10-CM

## 2022-06-25 DIAGNOSIS — I10 ESSENTIAL HYPERTENSION: ICD-10-CM

## 2022-06-25 LAB
BILIRUB UR QL STRIP: NEGATIVE
CLARITY UR REFRACT.AUTO: ABNORMAL
COLOR UR AUTO: YELLOW
GLUCOSE UR QL STRIP: NEGATIVE
HGB UR QL STRIP: NEGATIVE
KETONES UR QL STRIP: NEGATIVE
LEUKOCYTE ESTERASE UR QL STRIP: NEGATIVE
NITRITE UR QL STRIP: NEGATIVE
PH UR STRIP: 5 [PH] (ref 5–8)
PROT UR QL STRIP: NEGATIVE
SP GR UR STRIP: 1.01 (ref 1–1.03)
URN SPEC COLLECT METH UR: ABNORMAL

## 2022-06-25 PROCEDURE — 81003 URINALYSIS AUTO W/O SCOPE: CPT | Performed by: INTERNAL MEDICINE

## 2022-06-26 PROBLEM — R79.89 ABNORMAL TSH: Status: ACTIVE | Noted: 2022-06-26

## 2022-06-27 ENCOUNTER — TELEPHONE (OUTPATIENT)
Dept: INTERNAL MEDICINE | Facility: CLINIC | Age: 42
End: 2022-06-27
Payer: MEDICARE

## 2022-06-27 NOTE — TELEPHONE ENCOUNTER
----- Message from Cat Ramachandran MD sent at 6/26/2022 10:38 AM CDT -----  The urinalysis is normal.

## 2022-07-18 ENCOUNTER — PATIENT MESSAGE (OUTPATIENT)
Dept: NEUROLOGY | Facility: CLINIC | Age: 42
End: 2022-07-18
Payer: MEDICARE

## 2022-08-02 ENCOUNTER — TELEPHONE (OUTPATIENT)
Dept: INTERNAL MEDICINE | Facility: CLINIC | Age: 42
End: 2022-08-02
Payer: MEDICARE

## 2022-08-12 ENCOUNTER — OFFICE VISIT (OUTPATIENT)
Dept: ORTHOPEDICS | Facility: CLINIC | Age: 42
End: 2022-08-12
Payer: MEDICARE

## 2022-08-12 VITALS
WEIGHT: 257.5 LBS | HEART RATE: 57 BPM | HEIGHT: 65 IN | SYSTOLIC BLOOD PRESSURE: 157 MMHG | BODY MASS INDEX: 42.9 KG/M2 | DIASTOLIC BLOOD PRESSURE: 79 MMHG

## 2022-08-12 DIAGNOSIS — S82.832D CLOSED FRACTURE OF DISTAL END OF LEFT FIBULA WITH ROUTINE HEALING, UNSPECIFIED FRACTURE MORPHOLOGY, SUBSEQUENT ENCOUNTER: ICD-10-CM

## 2022-08-12 DIAGNOSIS — S93.492A SPRAIN OF ANTERIOR TALOFIBULAR LIGAMENT OF LEFT ANKLE, INITIAL ENCOUNTER: Primary | ICD-10-CM

## 2022-08-12 PROCEDURE — 3077F SYST BP >= 140 MM HG: CPT | Mod: CPTII,S$GLB,, | Performed by: PHYSICIAN ASSISTANT

## 2022-08-12 PROCEDURE — 1160F RVW MEDS BY RX/DR IN RCRD: CPT | Mod: CPTII,S$GLB,, | Performed by: PHYSICIAN ASSISTANT

## 2022-08-12 PROCEDURE — 99213 OFFICE O/P EST LOW 20 MIN: CPT | Mod: S$GLB,,, | Performed by: PHYSICIAN ASSISTANT

## 2022-08-12 PROCEDURE — 3044F PR MOST RECENT HEMOGLOBIN A1C LEVEL <7.0%: ICD-10-PCS | Mod: CPTII,S$GLB,, | Performed by: PHYSICIAN ASSISTANT

## 2022-08-12 PROCEDURE — 99999 PR PBB SHADOW E&M-EST. PATIENT-LVL III: ICD-10-PCS | Mod: PBBFAC,,, | Performed by: PHYSICIAN ASSISTANT

## 2022-08-12 PROCEDURE — 3008F BODY MASS INDEX DOCD: CPT | Mod: CPTII,S$GLB,, | Performed by: PHYSICIAN ASSISTANT

## 2022-08-12 PROCEDURE — 3078F PR MOST RECENT DIASTOLIC BLOOD PRESSURE < 80 MM HG: ICD-10-PCS | Mod: CPTII,S$GLB,, | Performed by: PHYSICIAN ASSISTANT

## 2022-08-12 PROCEDURE — 1159F PR MEDICATION LIST DOCUMENTED IN MEDICAL RECORD: ICD-10-PCS | Mod: CPTII,S$GLB,, | Performed by: PHYSICIAN ASSISTANT

## 2022-08-12 PROCEDURE — 3008F PR BODY MASS INDEX (BMI) DOCUMENTED: ICD-10-PCS | Mod: CPTII,S$GLB,, | Performed by: PHYSICIAN ASSISTANT

## 2022-08-12 PROCEDURE — 3077F PR MOST RECENT SYSTOLIC BLOOD PRESSURE >= 140 MM HG: ICD-10-PCS | Mod: CPTII,S$GLB,, | Performed by: PHYSICIAN ASSISTANT

## 2022-08-12 PROCEDURE — 99213 PR OFFICE/OUTPT VISIT, EST, LEVL III, 20-29 MIN: ICD-10-PCS | Mod: S$GLB,,, | Performed by: PHYSICIAN ASSISTANT

## 2022-08-12 PROCEDURE — 1159F MED LIST DOCD IN RCRD: CPT | Mod: CPTII,S$GLB,, | Performed by: PHYSICIAN ASSISTANT

## 2022-08-12 PROCEDURE — 1160F PR REVIEW ALL MEDS BY PRESCRIBER/CLIN PHARMACIST DOCUMENTED: ICD-10-PCS | Mod: CPTII,S$GLB,, | Performed by: PHYSICIAN ASSISTANT

## 2022-08-12 PROCEDURE — 99999 PR PBB SHADOW E&M-EST. PATIENT-LVL III: CPT | Mod: PBBFAC,,, | Performed by: PHYSICIAN ASSISTANT

## 2022-08-12 PROCEDURE — 3078F DIAST BP <80 MM HG: CPT | Mod: CPTII,S$GLB,, | Performed by: PHYSICIAN ASSISTANT

## 2022-08-12 PROCEDURE — 3044F HG A1C LEVEL LT 7.0%: CPT | Mod: CPTII,S$GLB,, | Performed by: PHYSICIAN ASSISTANT

## 2022-08-12 NOTE — PROGRESS NOTES
Subjective:      Patient ID: Jaylon Bowles is a 42 y.o. female.    Chief Complaint: Pain of the Left Ankle      42-year-old female phmx MS followup left ankle distal fibular fracture.  Patient states she was doing well with home health physical therapy.  She was noting improvement until about a month ago she sustained a fall onto her left ankle again.  She did not have any physical therapy after that.  She states she still has continued lateral ankle pain and swelling.  she is using an ankle brace today.  She is also in a wheelchair.  States she has chronic left lower extremity weakness due to her MS.      Review of Systems   Constitutional: Negative for chills and fever.   Cardiovascular: Negative for chest pain.   Respiratory: Negative for cough.    Hematologic/Lymphatic: Does not bruise/bleed easily.   Skin: Negative for poor wound healing and rash.   Musculoskeletal: Positive for joint pain, joint swelling, myalgias and stiffness.   Gastrointestinal: Negative for abdominal pain.   Genitourinary: Negative for bladder incontinence.   Neurological: Negative for dizziness, loss of balance and weakness.   Psychiatric/Behavioral: Negative for altered mental status.       Review of patient's allergies indicates:   Allergen Reactions    Betaseron [interferon beta-1b] Hives    Gabapentin Hallucinations    Cephalexin Hives, Itching and Rash        Current Outpatient Medications   Medication Sig Dispense Refill    carvediloL (COREG) 25 MG tablet Take 1 tablet (25 mg total) by mouth 2 (two) times daily with meals. 180 tablet 3    cholecalciferol, vitamin D3, 1,250 mcg (50,000 unit) Tab Take 1 tablet by mouth once a week. 12 tablet 3    EScitalopram oxalate (LEXAPRO) 20 MG tablet Take 1 tablet (20 mg total) by mouth once daily. 90 tablet 3    ferrous sulfate (FEOSOL) 325 mg (65 mg iron) Tab tablet Take 1 tablet (325 mg total) by mouth once daily.  0    ibuprofen (ADVIL,MOTRIN) 400 MG tablet Take 1 tablet (400 mg  "total) by mouth every 4 (four) hours as needed for Other (headache). 20 tablet 2    levothyroxine (SYNTHROID) 112 MCG tablet Take 1 tablet (112 mcg total) by mouth before breakfast. 90 tablet 3    losartan-hydrochlorothiazide 100-12.5 mg (HYZAAR) 100-12.5 mg Tab Take 1 tablet by mouth once daily. 90 tablet 3    magnesium 250 mg Tab Take 500 mg by mouth once.      multivitamin with minerals (ONE DAILY COMPLETE ORAL) Take by mouth.      ocrelizumab (OCREVUS IV) Inject into the vein.      pantoprazole (PROTONIX) 40 MG tablet Take 1 tablet (40 mg total) by mouth once daily. 90 tablet 3    tiZANidine (ZANAFLEX) 2 MG tablet TAKE 1/2 TABLET BY MOUTH IN THE MORNING AND IN THE AFTERNOON FOR MUSCLE SPASMS 30 tablet 1    tiZANidine (ZANAFLEX) 4 MG tablet TAKE 1 TABLET(4 MG) BY MOUTH TWICE DAILY AS NEEDED 60 tablet 5     No current facility-administered medications for this visit.        The patient's relevant past medical, surgical, and social history was reviewed in Epic.       Objective:      VITAL SIGNS: BP (!) 157/79   Pulse (!) 57   Ht 5' 5" (1.651 m)   Wt 116.8 kg (257 lb 8 oz)   BMI 42.85 kg/m²     General    Nursing note and vitals reviewed.  Constitutional: She is oriented to person, place, and time. She appears well-developed and well-nourished.   Neurological: She is alert and oriented to person, place, and time.     General Musculoskeletal Exam   Gait: antalgic     Left Ankle/Foot Exam     Swelling   The patient is swollen on the anterior talofibular ligament.    Tenderness   The patient is tender to palpation of the ATF.    Range of Motion   Ankle Joint  Dorsiflexion: abnormal   Plantar flexion: abnormal     Subtalar Joint   Inversion: abnormal   Eversion: abnormal     Tests   Anterior drawer: 1+    Other   Sensation: normal      Muscle Strength   Left Lower Extremity   Anterior tibial:  4/5   Posterior tibial:  4/5   Gastrocsoleus:  4/5   Peroneal muscle:  4/5     Vascular Exam       Left " Pulses  Dorsalis Pedis:      2+  Posterior Tibial:      1+                 Assessment:       1. Sprain of anterior talofibular ligament of left ankle, initial encounter    2. Closed fracture of distal end of left fibula with routine healing, unspecified fracture morphology, subsequent encounter          Plan:         Jaylon was seen today for pain.    Diagnoses and all orders for this visit:    Sprain of anterior talofibular ligament of left ankle, initial encounter  -     SUBSEQUENT HOME HEALTH ORDERS    Closed fracture of distal end of left fibula with routine healing, unspecified fracture morphology, subsequent encounter    Patient now has tenderness over the ATFL.  No bony tenderness.  I believe she sprained her ankle during the fall one month ago.  I will put a new order in for home health physical therapy to work on her left ankle again.  Recommend ice and elevation to help with swelling.  Explained that I do not want her relying on her ankle brace.  She will only need to follow up as needed.    Diagnoses and plan discussed with the patient, as well as the expected course and duration of his symptoms.  All questions and concerns were addressed prior to the end of the visit.   Instructed patient to call office if they have any future questions/concerns or to schedule apt. Patient will return to see me if symptoms worsen or fail to improve    Note dictated with voice recognition software, please excuse any grammatical errors.        Mila Leung PA-C   08/12/2022

## 2022-09-08 ENCOUNTER — PATIENT MESSAGE (OUTPATIENT)
Dept: NEUROLOGY | Facility: CLINIC | Age: 42
End: 2022-09-08
Payer: MEDICARE

## 2022-09-19 ENCOUNTER — TELEPHONE (OUTPATIENT)
Dept: PSYCHIATRY | Facility: CLINIC | Age: 42
End: 2022-09-19
Payer: MEDICARE

## 2022-09-19 NOTE — TELEPHONE ENCOUNTER
Phoned pt and left message about getting a quote for a scooter lift for her vehicle. Will send message in [employer].

## 2022-09-20 ENCOUNTER — PATIENT MESSAGE (OUTPATIENT)
Dept: NEUROLOGY | Facility: CLINIC | Age: 42
End: 2022-09-20
Payer: MEDICARE

## 2022-09-20 DIAGNOSIS — Z79.899 OTHER LONG TERM (CURRENT) DRUG THERAPY: ICD-10-CM

## 2022-09-20 DIAGNOSIS — G35 MULTIPLE SCLEROSIS: Primary | ICD-10-CM

## 2022-09-30 ENCOUNTER — EXTERNAL HOME HEALTH (OUTPATIENT)
Dept: HOME HEALTH SERVICES | Facility: HOSPITAL | Age: 42
End: 2022-09-30
Payer: MEDICARE

## 2022-10-14 ENCOUNTER — LAB VISIT (OUTPATIENT)
Dept: LAB | Facility: HOSPITAL | Age: 42
End: 2022-10-14
Payer: MEDICARE

## 2022-10-14 DIAGNOSIS — G35 MULTIPLE SCLEROSIS: ICD-10-CM

## 2022-10-14 DIAGNOSIS — Z79.899 OTHER LONG TERM (CURRENT) DRUG THERAPY: ICD-10-CM

## 2022-10-14 LAB
25(OH)D3+25(OH)D2 SERPL-MCNC: 33 NG/ML (ref 30–96)
BASOPHILS # BLD AUTO: 0.05 K/UL (ref 0–0.2)
BASOPHILS NFR BLD: 0.5 % (ref 0–1.9)
DIFFERENTIAL METHOD: ABNORMAL
EOSINOPHIL # BLD AUTO: 0.2 K/UL (ref 0–0.5)
EOSINOPHIL NFR BLD: 1.9 % (ref 0–8)
ERYTHROCYTE [DISTWIDTH] IN BLOOD BY AUTOMATED COUNT: 14.4 % (ref 11.5–14.5)
HBV CORE AB SERPL QL IA: NORMAL
HBV SURFACE AG SERPL QL IA: NORMAL
HCT VFR BLD AUTO: 38.1 % (ref 37–48.5)
HGB BLD-MCNC: 12.2 G/DL (ref 12–16)
IGA SERPL-MCNC: 417 MG/DL (ref 40–350)
IGG SERPL-MCNC: 1945 MG/DL (ref 650–1600)
IGM SERPL-MCNC: 129 MG/DL (ref 50–300)
IMM GRANULOCYTES # BLD AUTO: 0.04 K/UL (ref 0–0.04)
IMM GRANULOCYTES NFR BLD AUTO: 0.4 % (ref 0–0.5)
LYMPHOCYTES # BLD AUTO: 1.4 K/UL (ref 1–4.8)
LYMPHOCYTES NFR BLD: 12.9 % (ref 18–48)
MCH RBC QN AUTO: 27.7 PG (ref 27–31)
MCHC RBC AUTO-ENTMCNC: 32 G/DL (ref 32–36)
MCV RBC AUTO: 86 FL (ref 82–98)
MONOCYTES # BLD AUTO: 0.6 K/UL (ref 0.3–1)
MONOCYTES NFR BLD: 5.6 % (ref 4–15)
NEUTROPHILS # BLD AUTO: 8.4 K/UL (ref 1.8–7.7)
NEUTROPHILS NFR BLD: 78.7 % (ref 38–73)
NRBC BLD-RTO: 0 /100 WBC
PLATELET # BLD AUTO: 322 K/UL (ref 150–450)
PMV BLD AUTO: 12.7 FL (ref 9.2–12.9)
RBC # BLD AUTO: 4.41 M/UL (ref 4–5.4)
WBC # BLD AUTO: 10.67 K/UL (ref 3.9–12.7)

## 2022-10-14 PROCEDURE — 82784 ASSAY IGA/IGD/IGG/IGM EACH: CPT | Mod: 59 | Performed by: CLINICAL NURSE SPECIALIST

## 2022-10-14 PROCEDURE — 87340 HEPATITIS B SURFACE AG IA: CPT | Performed by: CLINICAL NURSE SPECIALIST

## 2022-10-14 PROCEDURE — 85025 COMPLETE CBC W/AUTO DIFF WBC: CPT | Performed by: CLINICAL NURSE SPECIALIST

## 2022-10-14 PROCEDURE — 86704 HEP B CORE ANTIBODY TOTAL: CPT | Performed by: CLINICAL NURSE SPECIALIST

## 2022-10-14 PROCEDURE — 36415 COLL VENOUS BLD VENIPUNCTURE: CPT | Performed by: CLINICAL NURSE SPECIALIST

## 2022-10-14 PROCEDURE — 86706 HEP B SURFACE ANTIBODY: CPT | Performed by: CLINICAL NURSE SPECIALIST

## 2022-10-14 PROCEDURE — 82306 VITAMIN D 25 HYDROXY: CPT | Performed by: CLINICAL NURSE SPECIALIST

## 2022-10-15 ENCOUNTER — PATIENT MESSAGE (OUTPATIENT)
Dept: NEUROLOGY | Facility: CLINIC | Age: 42
End: 2022-10-15
Payer: MEDICARE

## 2022-10-15 DIAGNOSIS — R76.8 ELEVATED IMMUNOGLOBULIN A: Primary | ICD-10-CM

## 2022-10-15 LAB
HBV SURFACE AB SER-ACNC: >1000 MIU/ML
HBV SURFACE AB SER-ACNC: REACTIVE M[IU]/ML

## 2022-10-16 ENCOUNTER — PATIENT MESSAGE (OUTPATIENT)
Dept: NEUROLOGY | Facility: CLINIC | Age: 42
End: 2022-10-16
Payer: MEDICARE

## 2022-10-16 DIAGNOSIS — D72.89 HIGH GRANULOCYTE COUNT: ICD-10-CM

## 2022-10-16 DIAGNOSIS — G35 MULTIPLE SCLEROSIS: Primary | ICD-10-CM

## 2022-10-17 ENCOUNTER — PATIENT MESSAGE (OUTPATIENT)
Dept: NEUROLOGY | Facility: CLINIC | Age: 42
End: 2022-10-17
Payer: MEDICARE

## 2022-10-29 ENCOUNTER — PATIENT MESSAGE (OUTPATIENT)
Dept: NEUROLOGY | Facility: CLINIC | Age: 42
End: 2022-10-29
Payer: MEDICARE

## 2022-11-01 ENCOUNTER — LAB VISIT (OUTPATIENT)
Dept: LAB | Facility: HOSPITAL | Age: 42
End: 2022-11-01
Payer: MEDICARE

## 2022-11-01 DIAGNOSIS — G35 MULTIPLE SCLEROSIS: ICD-10-CM

## 2022-11-01 DIAGNOSIS — D72.89 HIGH GRANULOCYTE COUNT: ICD-10-CM

## 2022-11-01 DIAGNOSIS — R76.8 ELEVATED IMMUNOGLOBULIN A: ICD-10-CM

## 2022-11-01 LAB
BASOPHILS # BLD AUTO: 0.06 K/UL (ref 0–0.2)
BASOPHILS NFR BLD: 0.5 % (ref 0–1.9)
DIFFERENTIAL METHOD: ABNORMAL
EOSINOPHIL # BLD AUTO: 0.3 K/UL (ref 0–0.5)
EOSINOPHIL NFR BLD: 2.8 % (ref 0–8)
ERYTHROCYTE [DISTWIDTH] IN BLOOD BY AUTOMATED COUNT: 14.7 % (ref 11.5–14.5)
HCT VFR BLD AUTO: 38.3 % (ref 37–48.5)
HGB BLD-MCNC: 12.5 G/DL (ref 12–16)
IMM GRANULOCYTES # BLD AUTO: 0.04 K/UL (ref 0–0.04)
IMM GRANULOCYTES NFR BLD AUTO: 0.4 % (ref 0–0.5)
LYMPHOCYTES # BLD AUTO: 1.6 K/UL (ref 1–4.8)
LYMPHOCYTES NFR BLD: 14.2 % (ref 18–48)
MCH RBC QN AUTO: 27.7 PG (ref 27–31)
MCHC RBC AUTO-ENTMCNC: 32.6 G/DL (ref 32–36)
MCV RBC AUTO: 85 FL (ref 82–98)
MONOCYTES # BLD AUTO: 0.8 K/UL (ref 0.3–1)
MONOCYTES NFR BLD: 7.5 % (ref 4–15)
NEUTROPHILS # BLD AUTO: 8.3 K/UL (ref 1.8–7.7)
NEUTROPHILS NFR BLD: 74.6 % (ref 38–73)
NRBC BLD-RTO: 0 /100 WBC
PLATELET # BLD AUTO: 194 K/UL (ref 150–450)
PMV BLD AUTO: 13.3 FL (ref 9.2–12.9)
RBC # BLD AUTO: 4.51 M/UL (ref 4–5.4)
WBC # BLD AUTO: 11.09 K/UL (ref 3.9–12.7)

## 2022-11-01 PROCEDURE — 84165 PATHOLOGIST INTERPRETATION SPE: ICD-10-PCS | Mod: 26,,, | Performed by: PATHOLOGY

## 2022-11-01 PROCEDURE — 85025 COMPLETE CBC W/AUTO DIFF WBC: CPT | Performed by: CLINICAL NURSE SPECIALIST

## 2022-11-01 PROCEDURE — 84165 PROTEIN E-PHORESIS SERUM: CPT | Performed by: CLINICAL NURSE SPECIALIST

## 2022-11-01 PROCEDURE — 84165 PROTEIN E-PHORESIS SERUM: CPT | Mod: 26,,, | Performed by: PATHOLOGY

## 2022-11-01 PROCEDURE — 36415 COLL VENOUS BLD VENIPUNCTURE: CPT | Performed by: CLINICAL NURSE SPECIALIST

## 2022-11-02 ENCOUNTER — DOCUMENT SCAN (OUTPATIENT)
Dept: HOME HEALTH SERVICES | Facility: HOSPITAL | Age: 42
End: 2022-11-02
Payer: MEDICARE

## 2022-11-02 ENCOUNTER — PATIENT MESSAGE (OUTPATIENT)
Dept: NEUROLOGY | Facility: CLINIC | Age: 42
End: 2022-11-02
Payer: MEDICARE

## 2022-11-02 LAB
ALBUMIN SERPL ELPH-MCNC: 3.99 G/DL (ref 3.35–5.55)
ALPHA1 GLOB SERPL ELPH-MCNC: 0.31 G/DL (ref 0.17–0.41)
ALPHA2 GLOB SERPL ELPH-MCNC: 0.7 G/DL (ref 0.43–0.99)
B-GLOBULIN SERPL ELPH-MCNC: 1.01 G/DL (ref 0.5–1.1)
GAMMA GLOB SERPL ELPH-MCNC: 1.79 G/DL (ref 0.67–1.58)
PROT SERPL-MCNC: 7.8 G/DL (ref 6–8.4)

## 2022-11-03 ENCOUNTER — PATIENT MESSAGE (OUTPATIENT)
Dept: NEUROLOGY | Facility: CLINIC | Age: 42
End: 2022-11-03
Payer: MEDICARE

## 2022-11-03 LAB — PATHOLOGIST INTERPRETATION SPE: NORMAL

## 2022-11-13 ENCOUNTER — PATIENT MESSAGE (OUTPATIENT)
Dept: NEUROLOGY | Facility: CLINIC | Age: 42
End: 2022-11-13
Payer: MEDICARE

## 2022-11-18 ENCOUNTER — INFUSION (OUTPATIENT)
Dept: INFUSION THERAPY | Facility: HOSPITAL | Age: 42
End: 2022-11-18
Payer: MEDICARE

## 2022-11-18 ENCOUNTER — TELEPHONE (OUTPATIENT)
Dept: INTERNAL MEDICINE | Facility: CLINIC | Age: 42
End: 2022-11-18

## 2022-11-18 VITALS
HEIGHT: 65 IN | SYSTOLIC BLOOD PRESSURE: 197 MMHG | RESPIRATION RATE: 18 BRPM | HEART RATE: 66 BPM | OXYGEN SATURATION: 98 % | WEIGHT: 250.31 LBS | DIASTOLIC BLOOD PRESSURE: 89 MMHG | TEMPERATURE: 98 F | BODY MASS INDEX: 41.7 KG/M2

## 2022-11-18 DIAGNOSIS — G35 MULTIPLE SCLEROSIS: Primary | ICD-10-CM

## 2022-11-18 PROCEDURE — 63600175 PHARM REV CODE 636 W HCPCS: Performed by: PSYCHIATRY & NEUROLOGY

## 2022-11-18 PROCEDURE — 96415 CHEMO IV INFUSION ADDL HR: CPT

## 2022-11-18 PROCEDURE — 25000003 PHARM REV CODE 250: Performed by: PSYCHIATRY & NEUROLOGY

## 2022-11-18 PROCEDURE — 96375 TX/PRO/DX INJ NEW DRUG ADDON: CPT

## 2022-11-18 PROCEDURE — 96367 TX/PROPH/DG ADDL SEQ IV INF: CPT

## 2022-11-18 PROCEDURE — 96413 CHEMO IV INFUSION 1 HR: CPT

## 2022-11-18 RX ORDER — SODIUM CHLORIDE 0.9 % (FLUSH) 0.9 %
10 SYRINGE (ML) INJECTION
Status: DISCONTINUED | OUTPATIENT
Start: 2022-11-18 | End: 2022-11-18 | Stop reason: HOSPADM

## 2022-11-18 RX ORDER — FAMOTIDINE 10 MG/ML
20 INJECTION INTRAVENOUS
Status: COMPLETED | OUTPATIENT
Start: 2022-11-18 | End: 2022-11-18

## 2022-11-18 RX ORDER — HEPARIN 100 UNIT/ML
500 SYRINGE INTRAVENOUS
Status: DISCONTINUED | OUTPATIENT
Start: 2022-11-18 | End: 2022-11-18 | Stop reason: HOSPADM

## 2022-11-18 RX ORDER — FAMOTIDINE 10 MG/ML
20 INJECTION INTRAVENOUS
Status: CANCELLED | OUTPATIENT
Start: 2022-11-25

## 2022-11-18 RX ORDER — DIPHENHYDRAMINE HYDROCHLORIDE 50 MG/ML
50 INJECTION INTRAMUSCULAR; INTRAVENOUS
Status: CANCELLED | OUTPATIENT
Start: 2022-11-25

## 2022-11-18 RX ORDER — SODIUM CHLORIDE 0.9 % (FLUSH) 0.9 %
10 SYRINGE (ML) INJECTION
Status: CANCELLED | OUTPATIENT
Start: 2022-11-25

## 2022-11-18 RX ORDER — ACETAMINOPHEN 500 MG
1000 TABLET ORAL
Status: CANCELLED | OUTPATIENT
Start: 2022-11-25

## 2022-11-18 RX ORDER — ACETAMINOPHEN 500 MG
1000 TABLET ORAL
Status: COMPLETED | OUTPATIENT
Start: 2022-11-18 | End: 2022-11-18

## 2022-11-18 RX ORDER — DIPHENHYDRAMINE HYDROCHLORIDE 50 MG/ML
50 INJECTION INTRAMUSCULAR; INTRAVENOUS
Status: DISCONTINUED | OUTPATIENT
Start: 2022-11-18 | End: 2022-11-18 | Stop reason: HOSPADM

## 2022-11-18 RX ORDER — EPINEPHRINE 0.3 MG/.3ML
0.3 INJECTION SUBCUTANEOUS
Status: CANCELLED | OUTPATIENT
Start: 2022-11-25

## 2022-11-18 RX ORDER — EPINEPHRINE 0.3 MG/.3ML
0.3 INJECTION SUBCUTANEOUS
Status: DISCONTINUED | OUTPATIENT
Start: 2022-11-18 | End: 2022-11-18 | Stop reason: HOSPADM

## 2022-11-18 RX ORDER — HEPARIN 100 UNIT/ML
500 SYRINGE INTRAVENOUS
Status: CANCELLED | OUTPATIENT
Start: 2022-11-25

## 2022-11-18 RX ADMIN — DEXTROSE 100 MG: 50 INJECTION, SOLUTION INTRAVENOUS at 10:11

## 2022-11-18 RX ADMIN — FAMOTIDINE 20 MG: 10 INJECTION INTRAVENOUS at 10:11

## 2022-11-18 RX ADMIN — SODIUM CHLORIDE: 0.9 INJECTION, SOLUTION INTRAVENOUS at 09:11

## 2022-11-18 RX ADMIN — DIPHENHYDRAMINE HYDROCHLORIDE 50 MG: 50 INJECTION, SOLUTION INTRAMUSCULAR; INTRAVENOUS at 09:11

## 2022-11-18 RX ADMIN — OCRELIZUMAB 600 MG: 300 INJECTION INTRAVENOUS at 10:11

## 2022-11-18 RX ADMIN — ACETAMINOPHEN 1000 MG: 500 TABLET ORAL at 09:11

## 2022-11-18 NOTE — TELEPHONE ENCOUNTER
BP elevated to 226/97 in Neurology clinic during Ocrevus infusion. Decreased to 160/70's. She is in digital HTN program but does not check BP regularly- last reading was in October.    She should be taking Coreg 25mg bid and Hyzaar 100/12.5mg daily. How long before her appointment today did she take these medications?     Please arrange a f/u with anyone in clinic to check her BP and make changes in regimen if needed.

## 2022-11-18 NOTE — PLAN OF CARE
0820  Veronica ambulated into treatment area with walker assist accompanied buy Mother.  VS taken 1987/90 P59.  Patient advised she had not taken her BP meds yet.  Patient then took meds.    0905 VSS , see flow sheet for details. Assessment done.  PIV inserted flushed, blood return noted.  Started NS @ 25 cc/hr KVO while waiting for Ocrevus from pharmacy.  [facility] for safety  
1600  Patient completed Ocrevus infusion and subsequent flush and observation period for one hour.  Patient tolerated well.  PIV discontinued without issue.  Patient ambulated with walker assist off floor accompanied by mother.  
Unknown

## 2022-12-01 ENCOUNTER — PATIENT MESSAGE (OUTPATIENT)
Dept: INTERNAL MEDICINE | Facility: CLINIC | Age: 42
End: 2022-12-01
Payer: MEDICARE

## 2022-12-01 ENCOUNTER — PATIENT MESSAGE (OUTPATIENT)
Dept: PSYCHIATRY | Facility: CLINIC | Age: 42
End: 2022-12-01
Payer: MEDICARE

## 2022-12-15 ENCOUNTER — PATIENT MESSAGE (OUTPATIENT)
Dept: NEUROLOGY | Facility: CLINIC | Age: 42
End: 2022-12-15

## 2023-01-10 ENCOUNTER — PATIENT MESSAGE (OUTPATIENT)
Dept: NEUROLOGY | Facility: CLINIC | Age: 43
End: 2023-01-10
Payer: MEDICARE

## 2023-01-10 DIAGNOSIS — G35 MULTIPLE SCLEROSIS: ICD-10-CM

## 2023-01-10 DIAGNOSIS — R53.83 FATIGUE, UNSPECIFIED TYPE: Primary | ICD-10-CM

## 2023-01-10 DIAGNOSIS — Z79.899 OTHER LONG TERM (CURRENT) DRUG THERAPY: ICD-10-CM

## 2023-01-12 ENCOUNTER — TELEPHONE (OUTPATIENT)
Dept: NEUROLOGY | Facility: CLINIC | Age: 43
End: 2023-01-12
Payer: MEDICARE

## 2023-01-12 ENCOUNTER — PATIENT MESSAGE (OUTPATIENT)
Dept: NEUROLOGY | Facility: CLINIC | Age: 43
End: 2023-01-12
Payer: MEDICARE

## 2023-01-30 ENCOUNTER — TELEPHONE (OUTPATIENT)
Dept: NEUROLOGY | Facility: CLINIC | Age: 43
End: 2023-01-30
Payer: MEDICARE

## 2023-01-30 ENCOUNTER — PATIENT MESSAGE (OUTPATIENT)
Dept: NEUROLOGY | Facility: CLINIC | Age: 43
End: 2023-01-30
Payer: MEDICARE

## 2023-01-30 ENCOUNTER — TELEPHONE (OUTPATIENT)
Dept: NEUROLOGY | Facility: CLINIC | Age: 43
End: 2023-01-30

## 2023-01-30 NOTE — TELEPHONE ENCOUNTER
Spoke to pt and rescheduled her appt with AP on 1/31 to AP's next available on 4/5 at 9:30 AM. I also added pt to the wait list, per her request. I rescheduled pt's labs that were on 1/31 to 4/1 at 11:00 AM.

## 2023-01-31 NOTE — TELEPHONE ENCOUNTER
----- Message from Alicia Mayes CMA sent at 1/30/2023 12:30 PM CST -----  Regarding: Reschedule appt  Contact: [phone]  Pt would like to reschedule appt on 1/31/23. I was unable to reschedule all three appts set for 1/31/23 per pt request. She would like to be seen on 2/9/23. First available is 3/23/23, pt declined. Please call.     Thank you

## 2023-02-07 DIAGNOSIS — Z00.00 ENCOUNTER FOR MEDICARE ANNUAL WELLNESS EXAM: ICD-10-CM

## 2023-02-09 DIAGNOSIS — Z00.00 ENCOUNTER FOR MEDICARE ANNUAL WELLNESS EXAM: ICD-10-CM

## 2023-02-10 ENCOUNTER — EXTERNAL HOME HEALTH (OUTPATIENT)
Dept: HOME HEALTH SERVICES | Facility: HOSPITAL | Age: 43
End: 2023-02-10
Payer: MEDICARE

## 2023-02-11 ENCOUNTER — PATIENT MESSAGE (OUTPATIENT)
Dept: NEUROLOGY | Facility: CLINIC | Age: 43
End: 2023-02-11
Payer: MEDICARE

## 2023-02-11 DIAGNOSIS — G35 MULTIPLE SCLEROSIS: Primary | ICD-10-CM

## 2023-02-15 ENCOUNTER — OFFICE VISIT (OUTPATIENT)
Dept: NEUROLOGY | Facility: CLINIC | Age: 43
End: 2023-02-15
Payer: MEDICARE

## 2023-02-15 DIAGNOSIS — M62.838 MUSCLE SPASM: ICD-10-CM

## 2023-02-15 DIAGNOSIS — R41.0 EPISODE OF CONFUSION: ICD-10-CM

## 2023-02-15 DIAGNOSIS — R26.9 GAIT DISTURBANCE: ICD-10-CM

## 2023-02-15 DIAGNOSIS — G35 MULTIPLE SCLEROSIS: Primary | ICD-10-CM

## 2023-02-15 DIAGNOSIS — Z79.899 HIGH RISK MEDICATION USE: ICD-10-CM

## 2023-02-15 DIAGNOSIS — Z71.89 COUNSELING REGARDING GOALS OF CARE: ICD-10-CM

## 2023-02-15 DIAGNOSIS — Z29.89 PROPHYLACTIC IMMUNOTHERAPY: ICD-10-CM

## 2023-02-15 DIAGNOSIS — Z79.899 OTHER LONG TERM (CURRENT) DRUG THERAPY: ICD-10-CM

## 2023-02-15 PROCEDURE — 1159F MED LIST DOCD IN RCRD: CPT | Mod: HCNC,CPTII,95, | Performed by: CLINICAL NURSE SPECIALIST

## 2023-02-15 PROCEDURE — 1159F PR MEDICATION LIST DOCUMENTED IN MEDICAL RECORD: ICD-10-PCS | Mod: HCNC,CPTII,95, | Performed by: CLINICAL NURSE SPECIALIST

## 2023-02-15 PROCEDURE — 99215 OFFICE O/P EST HI 40 MIN: CPT | Mod: HCNC,95,, | Performed by: CLINICAL NURSE SPECIALIST

## 2023-02-15 PROCEDURE — 99215 PR OFFICE/OUTPT VISIT, EST, LEVL V, 40-54 MIN: ICD-10-PCS | Mod: HCNC,95,, | Performed by: CLINICAL NURSE SPECIALIST

## 2023-02-15 RX ORDER — TIZANIDINE 4 MG/1
TABLET ORAL
Qty: 60 TABLET | Refills: 5 | Status: SHIPPED | OUTPATIENT
Start: 2023-02-15 | End: 2023-09-25 | Stop reason: SDUPTHER

## 2023-02-15 RX ORDER — TIZANIDINE 2 MG/1
TABLET ORAL
Qty: 30 TABLET | Refills: 1 | Status: SHIPPED | OUTPATIENT
Start: 2023-02-15 | End: 2023-09-25 | Stop reason: SDUPTHER

## 2023-02-15 NOTE — PROGRESS NOTES
Subjective:          Patient ID: Jaylon Bowles is a 42 y.o. female who presents today for a fit-in virtual visit for MS.  She was last seen in 6/15/22. The history has been provided by the patient.       The patient location is: her home   The chief complaint leading to consultation is: MS     Visit type: audiovisual    Face to Face time with patient: 30 minutes   45 minutes of total time spent on the encounter, which includes face to face time and non-face to face time preparing to see the patient (eg, review of tests), Obtaining and/or reviewing separately obtained history, Documenting clinical information in the electronic or other health record, Independently interpreting results (not separately reported) and communicating results to the patient/family/caregiver, or Care coordination (not separately reported).         Each patient to whom he or she provides medical services by telemedicine is:  (1) informed of the relationship between the physician and patient and the respective role of any other health care provider with respect to management of the patient; and (2) notified that he or she may decline to receive medical services by telemedicine and may withdraw from such care at any time.      MS HPI:  DMT: Ocrevus, last one in November 2022; due next in May 2023  Side effects from DMT? Yes - she had elevated blood pressure with last infusion; she did not feel comfortable with the nurse at the last infusion   Taking vitamin D3 as recommended? Yes -  Dose: 50,000 units once a week   She had an episode when working last week. She was working and on a call when she developed some pressure on the right side of her head (which she has had before), but she immediately became very confused. It took about 10 minutes for her to feel normal. She was aware and did not lose consciousness. She was able to move around normally. The head pressure continues today. She denies any speech issues during the episode. She denies  "any unusual weakness on one side of the body. She generally has sensitivity to sound and does have a history of headaches.   She had a blurry spot in her left eye yesterday. She has ongoing weakness to the left side of her body from MS (not new).  She feels like her processing speed is slower, and it takes longer for information to "sink in." She has to ask people to repeat themselves often because she will forget what they said. However, there are some moments during which she feels excellent.   She has started taking B12 for fatigue.   She states that her blood pressure has been good.   She denies any recent infections, but her eczema has flared up recently.       Medications:  Current Outpatient Medications   Medication Sig    carvediloL (COREG) 25 MG tablet Take 1 tablet (25 mg total) by mouth 2 (two) times daily with meals.    cholecalciferol, vitamin D3, 1,250 mcg (50,000 unit) Tab Take 1 tablet by mouth once a week.    EScitalopram oxalate (LEXAPRO) 20 MG tablet Take 1 tablet (20 mg total) by mouth once daily.    ferrous sulfate (FEOSOL) 325 mg (65 mg iron) Tab tablet Take 1 tablet (325 mg total) by mouth once daily.    ibuprofen (ADVIL,MOTRIN) 400 MG tablet Take 1 tablet (400 mg total) by mouth every 4 (four) hours as needed for Other (headache).    levothyroxine (SYNTHROID) 112 MCG tablet Take 1 tablet (112 mcg total) by mouth before breakfast.    losartan-hydrochlorothiazide 100-12.5 mg (HYZAAR) 100-12.5 mg Tab Take 1 tablet by mouth once daily.    magnesium 250 mg Tab Take 500 mg by mouth once.    multivitamin with minerals (ONE DAILY COMPLETE ORAL) Take by mouth.    ocrelizumab (OCREVUS IV) Inject into the vein.    pantoprazole (PROTONIX) 40 MG tablet Take 1 tablet (40 mg total) by mouth once daily.    tiZANidine (ZANAFLEX) 2 MG tablet TAKE 1/2 TABLET BY MOUTH IN THE MORNING AND IN THE AFTERNOON FOR MUSCLE SPASMS    tiZANidine (ZANAFLEX) 4 MG tablet TAKE 1 TABLET(4 MG) BY MOUTH TWICE DAILY AS NEEDED "         SOCIAL HISTORY  Social History     Tobacco Use    Smoking status: Never    Smokeless tobacco: Never   Substance Use Topics    Alcohol use: No     Comment: social    Drug use: No     Types: Marijuana       Living arrangements - the patient lives with her mom    ROS:  2/15/23     Do you feel abnormally tired on most days? No--started taking B12   Do you feel you generally sleep well? Yes--she has trouble falling asleep, but sleeps for about 6 hours    Do you have difficulty controlling your bladder?  No   Do you have difficulty controlling your bowels?  No   Do you have frequent muscle cramps, tightness or spasms in your limbs?  Yes   Do you have new visual symptoms?  Yes--as above; wears glasses    Do you have worsening difficulty with your memory or thinking? No--cognition as above    Do you have worsening symptoms of anxiety or depression?  No--taking Lexapro, but it is not worse than what it was; depression comes out of nowhere sometimes. She sings and listens to music to help her manage mood or writes.    For patients who walk, Do you have more difficulty walking?  No--left ankle is feeling  better    Have you fallen since your last visit?  No--no falls since last visit    For patients who use wheelchairs: Do you have any skin wounds or breakdown? Not Applicable   Do you have difficulty using your hands?  No--takes breaks when she is typing   Do you have shooting or burning pain? No--lower back pain and sometimes ankle pain; she had MS hug recently; constant headaches    Do you have difficulty with sexual function?  Not assessed    If you are sexually active, are you using birth control? Y/N  N/A Not Applicable   Do you often choke when swallowing liquids or solid food?  No   Do you experience worsening symptoms when overheated? Yes   Do you need any new equipment such as a wheelchair, walker or shower chair? No   Do you receive co-pay financial assistance for your principal MS medicine? Not Applicable    Would you be interested in participating in an MS research trial in the future? No   For patients on Gilenya, Tecfidera, Aubagio, Rituxan, Ocrevus, Tysabri, Lemtrada or Methotrexate, are you aware that you should NOT receive live virus vaccines?  Yes   Do you feel you have adequate family/friend support?  Yes   Do you have health insurance?   Yes   Are you currently employed? No   Do you receive SSDI/SSI?  Yes   Do you use marijuana or cannabis products? Yes--CBD for pain    How often? Daily   Have you been diagnosed with a urinary tract infection since your last visit here? No   Have you been diagnosed with a respiratory tract infection since your last visit here? No   Have you been to the emergency room since your last visit here? No   Have you been hospitalized since your last visit here?  No            Objective:        1. 25 foot timed walk:  Timed 25 Foot Walk: 1/5/2021 4/6/2021   Did patient wear an AFO? No No   Was assistive device used? Yes Yes   Assistive device used (jewels one): Bilateral Assistance Bilateral Assistance   Bilateral device used Walker/Rollator Walker/Rollator   Time for 25 Foot Walk (seconds) 30 21.8   Time for 25 Foot Walk (seconds) 32.48 21.6       Neurologic Exam    Deferred     Imaging:     Results for orders placed during the hospital encounter of 05/02/22    MRI Brain Demyelinating Without Contrast    Impression  Brain appears stable from prior exam, again demonstrating findings compatible with the reported history of multiple sclerosis.  No new discrete lesions to indicate ongoing demyelination.    Electronically signed by resident: Bernard Palafox  Date:    05/02/2022  Time:    12:07    Electronically signed by: Tonio Cohen MD  Date:    05/02/2022  Time:    13:35      Labs:     Lab Results   Component Value Date    KTFBFHMP41AW 33 10/14/2022    VTLHMQKP77KT 47 01/25/2021    KIRYQYZQ61YD 15 (L) 01/13/2020     Lab Results   Component Value Date    JCVINDEX 0.32 (H) 05/31/2021     JCVANTIBODY INDETERMINATE (A) 05/31/2021     Lab Results   Component Value Date    YH9KWPDY 70.8 05/31/2021    ABSOLUTECD3 747 05/31/2021    FT3KVFXJ 16.7 05/31/2021    ABSOLUTECD8 177 (L) 05/31/2021    EA1MWTDM 55.1 05/31/2021    ABSOLUTECD4 581 05/31/2021    LABCD48 3.29 05/31/2021     Lab Results   Component Value Date    WBC 11.09 11/01/2022    RBC 4.51 11/01/2022    HGB 12.5 11/01/2022    HCT 38.3 11/01/2022    MCV 85 11/01/2022    MCH 27.7 11/01/2022    MCHC 32.6 11/01/2022    RDW 14.7 (H) 11/01/2022     11/01/2022    MPV 13.3 (H) 11/01/2022    GRAN 8.3 (H) 11/01/2022    GRAN 74.6 (H) 11/01/2022    LYMPH 1.6 11/01/2022    LYMPH 14.2 (L) 11/01/2022    MONO 0.8 11/01/2022    MONO 7.5 11/01/2022    EOS 0.3 11/01/2022    BASO 0.06 11/01/2022    EOSINOPHIL 2.8 11/01/2022    BASOPHIL 0.5 11/01/2022     Sodium   Date Value Ref Range Status   06/25/2022 137 136 - 145 mmol/L Final     Potassium   Date Value Ref Range Status   06/25/2022 3.6 3.5 - 5.1 mmol/L Final     Chloride   Date Value Ref Range Status   06/25/2022 106 95 - 110 mmol/L Final     CO2   Date Value Ref Range Status   06/25/2022 23 23 - 29 mmol/L Final     Glucose   Date Value Ref Range Status   06/25/2022 85 70 - 110 mg/dL Final     BUN   Date Value Ref Range Status   06/25/2022 11 6 - 20 mg/dL Final     Creatinine   Date Value Ref Range Status   06/25/2022 0.8 0.5 - 1.4 mg/dL Final     Calcium   Date Value Ref Range Status   06/25/2022 9.3 8.7 - 10.5 mg/dL Final     Total Protein   Date Value Ref Range Status   06/25/2022 7.7 6.0 - 8.4 g/dL Final     Albumin   Date Value Ref Range Status   06/25/2022 3.6 3.5 - 5.2 g/dL Final     Total Bilirubin   Date Value Ref Range Status   06/25/2022 0.5 0.1 - 1.0 mg/dL Final     Comment:     For infants and newborns, interpretation of results should be based  on gestational age, weight and in agreement with clinical  observations.    Premature Infant recommended reference ranges:  Up to 24  hours.............<8.0 mg/dL  Up to 48 hours............<12.0 mg/dL  3-5 days..................<15.0 mg/dL  6-29 days.................<15.0 mg/dL       Alkaline Phosphatase   Date Value Ref Range Status   06/25/2022 67 55 - 135 U/L Final     AST   Date Value Ref Range Status   06/25/2022 19 10 - 40 U/L Final     ALT   Date Value Ref Range Status   06/25/2022 15 10 - 44 U/L Final     Anion Gap   Date Value Ref Range Status   06/25/2022 8 8 - 16 mmol/L Final     eGFR if    Date Value Ref Range Status   06/25/2022 >60.0 >60 mL/min/1.73 m^2 Final     eGFR if non    Date Value Ref Range Status   06/25/2022 >60.0 >60 mL/min/1.73 m^2 Final     Comment:     Calculation used to obtain the estimated glomerular filtration  rate (eGFR) is the CKD-EPI equation.        Lab Results   Component Value Date    HEPBSAG Non-reactive 10/14/2022    HEPBSAB >1000.00 10/14/2022    HEPBSAB Reactive 10/14/2022    HEPBCAB Non-reactive 10/14/2022       MS Impression and Plan:     NEURO MULTIPLE SCLEROSIS IMPRESSION:   MS Status:     Number of relapses in the past year?:  0    Clinical Progression:  Clinically Stable  Plan:     DMT:  No change in management    DMT comment:  Continue Ocrevus and Vitamin D. Her next infusion is due in May. We will check safety labs in April. She is aware of the risks associated with immunosuppressant therapy, including increased risk of infection.         Symptom Management:  Implement change in symptom management    Implement Change in Symptom Management:  Spasticity (Tizanidine has been refilled.)     The episode described by the patient while working may have been a migraine, but I would like to evaluate her for stroke/TIA in light of hypertension history. We will get a new brain MRI now. She seems to have recovered from this.     I will see her back in clinic as scheduled in April. Dr. Mitchell to call patient       VERÓNICA Smallwood, CNS    Problem List Items Addressed This  Visit          Neurologic Problems    Multiple sclerosis - Primary    Relevant Orders    CBC Auto Differential    Comprehensive Metabolic Panel    Hepatitis B Core Antibody, Total    Hepatitis B Surface Antigen    Immunoglobulins (IgG, IgA, IgM) Quantitative    Vitamin D    Vitamin B12     Other Visit Diagnoses       Muscle spasm        Relevant Medications    tiZANidine (ZANAFLEX) 4 MG tablet    tiZANidine (ZANAFLEX) 2 MG tablet    Other long term (current) drug therapy        Relevant Orders    Vitamin D    Vitamin B12

## 2023-02-15 NOTE — Clinical Note
Dr. Duarte Simmons! I just wanted to give you a heads up that Jaylon had an episode of confusion following a sharp pain in her head last week. She never lost consciousness and was fully awake, but just felt general confusion about what she was supposed to be doing. No facial weakness, trouble speaking, or unusual one-sided weakness. We are going to move up her MRI to evaluate.   Viviana

## 2023-02-20 ENCOUNTER — PATIENT MESSAGE (OUTPATIENT)
Dept: PSYCHIATRY | Facility: CLINIC | Age: 43
End: 2023-02-20
Payer: MEDICARE

## 2023-02-24 ENCOUNTER — TELEPHONE (OUTPATIENT)
Dept: NEUROLOGY | Facility: CLINIC | Age: 43
End: 2023-02-24
Payer: MEDICARE

## 2023-02-24 NOTE — TELEPHONE ENCOUNTER
Spoke to pt and schedule brain MRI for 2/27 at 1:15 PM. I also informed pt that AP wants her to keep the in person appt with her on 4/5 at 9:30 AM. I also gave pt the number for gynecology.

## 2023-02-24 NOTE — TELEPHONE ENCOUNTER
----- Message from VERÓNICA Geronimo, CNS sent at 2/15/2023  3:16 PM CST -----  MRI brain soon  Keep April f/u with me  Please give her the number to gynecology

## 2023-02-27 ENCOUNTER — HOSPITAL ENCOUNTER (OUTPATIENT)
Dept: RADIOLOGY | Facility: HOSPITAL | Age: 43
Discharge: HOME | End: 2023-02-27
Attending: CLINICAL NURSE SPECIALIST
Payer: MEDICARE

## 2023-02-27 DIAGNOSIS — G35 MULTIPLE SCLEROSIS: ICD-10-CM

## 2023-02-27 PROCEDURE — 25500020 PHARM REV CODE 255: Mod: HCNC | Performed by: CLINICAL NURSE SPECIALIST

## 2023-02-27 PROCEDURE — 70553 MRI BRAIN STEM W/O & W/DYE: CPT | Mod: TC,HCNC

## 2023-02-27 PROCEDURE — A9585 GADOBUTROL INJECTION: HCPCS | Mod: HCNC | Performed by: CLINICAL NURSE SPECIALIST

## 2023-02-27 PROCEDURE — 70553 MRI BRAIN STEM W/O & W/DYE: CPT | Mod: 26,HCNC,, | Performed by: RADIOLOGY

## 2023-02-27 PROCEDURE — 70553 MRI BRAIN DEMYELINATING W/ WO CONTRAST: ICD-10-PCS | Mod: 26,HCNC,, | Performed by: RADIOLOGY

## 2023-02-27 RX ORDER — GADOBUTROL 604.72 MG/ML
10 INJECTION INTRAVENOUS
Status: COMPLETED | OUTPATIENT
Start: 2023-02-27 | End: 2023-02-27

## 2023-02-27 RX ADMIN — GADOBUTROL 10 ML: 604.72 INJECTION INTRAVENOUS at 02:02

## 2023-02-28 ENCOUNTER — PATIENT MESSAGE (OUTPATIENT)
Dept: NEUROLOGY | Facility: CLINIC | Age: 43
End: 2023-02-28
Payer: MEDICARE

## 2023-03-17 ENCOUNTER — PATIENT MESSAGE (OUTPATIENT)
Dept: NEUROLOGY | Facility: CLINIC | Age: 43
End: 2023-03-17
Payer: MEDICARE

## 2023-03-22 ENCOUNTER — PATIENT MESSAGE (OUTPATIENT)
Dept: INTERNAL MEDICINE | Facility: CLINIC | Age: 43
End: 2023-03-22
Payer: MEDICARE

## 2023-03-22 DIAGNOSIS — E06.3 HASHIMOTO'S THYROIDITIS: ICD-10-CM

## 2023-03-22 DIAGNOSIS — I10 ESSENTIAL HYPERTENSION: ICD-10-CM

## 2023-03-22 DIAGNOSIS — F41.9 ANXIETY AND DEPRESSION: ICD-10-CM

## 2023-03-22 DIAGNOSIS — F32.A ANXIETY AND DEPRESSION: ICD-10-CM

## 2023-03-22 DIAGNOSIS — F32.A MODERATE DEPRESSIVE EPISODE: ICD-10-CM

## 2023-03-22 NOTE — TELEPHONE ENCOUNTER
Care Due:                  Date            Visit Type   Department     Provider  --------------------------------------------------------------------------------                                EP -                              PRIMARY      MET INTERNAL  Last Visit: 06-      CARE ([facility])   MEDICINE       Cat Ramachandran  Next Visit: None Scheduled  None         None Found                                                            Last  Test          Frequency    Reason                     Performed    Due Date  --------------------------------------------------------------------------------    Office Visit  12 months..  EScitalopram, carvediloL,   06- 06-                             levothyroxine,                             losartan-hydrochlorothiaz                             so, pantoprazole........    CMP.........  12 months..  losartan-hydrochlorothiaz  06- 06-                             so......................    TSH.........  12 months..  levothyroxine............  06- 06-    [facility] Embedded Care Gaps. Reference number: [id number]. 3/22/2023   1:39:22 PM CDT

## 2023-03-23 DIAGNOSIS — E06.3 HASHIMOTO'S THYROIDITIS: ICD-10-CM

## 2023-03-23 DIAGNOSIS — I10 ESSENTIAL HYPERTENSION: Primary | ICD-10-CM

## 2023-03-23 RX ORDER — LOSARTAN POTASSIUM AND HYDROCHLOROTHIAZIDE 12.5; 1 MG/1; MG/1
1 TABLET ORAL DAILY
Qty: 90 TABLET | Refills: 0 | Status: SHIPPED | OUTPATIENT
Start: 2023-03-23 | End: 2023-10-23 | Stop reason: SDUPTHER

## 2023-03-23 RX ORDER — CARVEDILOL 25 MG/1
25 TABLET ORAL 2 TIMES DAILY WITH MEALS
Qty: 180 TABLET | Refills: 0 | Status: SHIPPED | OUTPATIENT
Start: 2023-03-23 | End: 2023-10-23 | Stop reason: SDUPTHER

## 2023-03-23 RX ORDER — ESCITALOPRAM OXALATE 20 MG/1
20 TABLET ORAL DAILY
Qty: 90 TABLET | Refills: 0 | Status: SHIPPED | OUTPATIENT
Start: 2023-03-23 | End: 2023-09-14 | Stop reason: SDUPTHER

## 2023-03-23 RX ORDER — LEVOTHYROXINE SODIUM 112 UG/1
112 TABLET ORAL
Qty: 90 TABLET | Refills: 0 | Status: SHIPPED | OUTPATIENT
Start: 2023-03-23 | End: 2023-10-23 | Stop reason: SDUPTHER

## 2023-03-24 NOTE — PROGRESS NOTES
Chart reviewed.   Requested updates from Care Everywhere.  Immunizations NOT in Links   HM updated.  
No

## 2023-04-03 NOTE — TELEPHONE ENCOUNTER
Added STD testing   No Repair - Repaired With Adjacent Surgical Defect Text (Leave Blank If You Do Not Want): After obtaining clear surgical margins the defect was repaired concurrently with another surgical defect which was in close approximation.

## 2023-04-24 ENCOUNTER — TELEPHONE (OUTPATIENT)
Dept: NEUROLOGY | Facility: CLINIC | Age: 43
End: 2023-04-24
Payer: MEDICARE

## 2023-04-24 ENCOUNTER — PATIENT MESSAGE (OUTPATIENT)
Dept: NEUROLOGY | Facility: CLINIC | Age: 43
End: 2023-04-24
Payer: MEDICARE

## 2023-04-24 NOTE — TELEPHONE ENCOUNTER
LVM telling pt to call us back to schedule [facility] [employer] labs. Will also send pt a message via portal.

## 2023-05-02 ENCOUNTER — PATIENT MESSAGE (OUTPATIENT)
Dept: NEUROLOGY | Facility: CLINIC | Age: 43
End: 2023-05-02
Payer: MEDICARE

## 2023-05-06 NOTE — PROGRESS NOTES
"Subjective:          Patient ID: Jaylon Bowles is a 42 y.o. female who presents today for a routine clinic visit for MS.  She was last seen in February 2023. The history has been provided by the patient.     MS HPI:  DMT: ocrelizumab due in May   Side effects from DMT? No  Taking vitamin D3 as recommended? Yes -  Dose: 50,000 units once a week.   She feels like her strength is overall lower, and she has more fatigue. She also gets "really bad brain fog."   She has two sores on her left leg. She thinks that she might have eczema on her left calf and a sore on the ankle from a brace that she had been wearing. She denies any obvious UTIs.   She has not been exercising.   She reports that her left leg gets very cramped and tight. This happens more at night. She also reports that her balance is off. She has had a few falls, and she usually requires help in getting up. The right leg stiffens up when she falls. She takes tizanidine 2mg in the daytime and 4mg at bedtime. She is also taking magnesium.     Medications:  Current Outpatient Medications   Medication Sig    carvediloL (COREG) 25 MG tablet Take 1 tablet (25 mg total) by mouth 2 (two) times daily with meals.    cholecalciferol, vitamin D3, 1,250 mcg (50,000 unit) Tab Take 1 tablet by mouth once a week.    EScitalopram oxalate (LEXAPRO) 20 MG tablet Take 1 tablet (20 mg total) by mouth once daily.    ferrous sulfate (FEOSOL) 325 mg (65 mg iron) Tab tablet Take 1 tablet (325 mg total) by mouth once daily.    ibuprofen (ADVIL,MOTRIN) 400 MG tablet Take 1 tablet (400 mg total) by mouth every 4 (four) hours as needed for Other (headache).    levothyroxine (SYNTHROID) 112 MCG tablet Take 1 tablet (112 mcg total) by mouth before breakfast.    losartan-hydrochlorothiazide 100-12.5 mg (HYZAAR) 100-12.5 mg Tab Take 1 tablet by mouth once daily.    magnesium 250 mg Tab Take 500 mg by mouth once.    multivitamin with minerals (ONE DAILY COMPLETE ORAL) Take by mouth.    " "ocrelizumab (OCREVUS IV) Inject into the vein.    pantoprazole (PROTONIX) 40 MG tablet Take 1 tablet (40 mg total) by mouth once daily.    tiZANidine (ZANAFLEX) 2 MG tablet TAKE 1/2 TABLET BY MOUTH IN THE MORNING AND IN THE AFTERNOON FOR MUSCLE SPASMS    tiZANidine (ZANAFLEX) 4 MG tablet TAKE 1 TABLET(4 MG) BY MOUTH TWICE DAILY AS NEEDED     SOCIAL HISTORY  Social History     Tobacco Use    Smoking status: Never    Smokeless tobacco: Never   Substance Use Topics    Alcohol use: No     Comment: social    Drug use: No     Types: Marijuana     Living arrangements - the patient lives alone.    ROS:  REVIEW OF SYMPTOMS 5/4/2023   Do you feel abnormally tired on most days? Yes--fatigue is one of her most significant symptoms    Do you feel you generally sleep well? Yes--she reports that she is having trouble falling asleep, but she stays asleep without an issue. She only gets about 4-5 hours of sleep per night. Her mom is ill with heart failure, and her mom's cough often keeps her awake.    Do you have difficulty controlling your bladder?  No   Do you have difficulty controlling your bowels?  No--she reports some constipation; she has BM every few days, and it is sometimes hard to pass.    Do you have frequent muscle cramps, tightness or spasms in your limbs?  Yes--as above    Do you have new visual symptoms?  Yes--she has intermittent blurred vision, but this happens often. She sees "sparkles" in her vision. She wears glasses all the time. These changes do not also coincide with a headache. She has not seen the eye doctor in a year.    Do you have worsening difficulty with your memory or thinking? No--she has brain fog a lot    Do you have worsening symptoms of anxiety or depression?  No--ongoing; situational depression re: family stuff    For patients who walk, Do you have more difficulty walking?  Yes--she uses a rollator around the house   Have you fallen since your last visit?  Yes   For patients who use wheelchairs: " Do you have any skin wounds or breakdown? Not Applicable   Do you have difficulty using your hands?  Yes--takes breaks during work when typing because her hands become sluggish   Do you have shooting or burning pain? Yes--she has back pain, ankle pain, and headaches. She does not do anything for her lower back pain. She tries to stretch. She takes ibuprofen for headaches. She also uses CBD, which helps her to relax.    Do you have difficulty with sexual function?  Not assessed   If you are sexually active, are you using birth control? Y/N  N/A Not Applicable   Do you often choke when swallowing liquids or solid food?  No   Do you experience worsening symptoms when overheated? Yes--plans to stay inside this summer; the heat drains her    Do you need any new equipment such as a wheelchair, walker or shower chair? No   Do you receive co-pay financial assistance for your principal MS medicine? Not Applicable   Would you be interested in participating in an MS research trial in the future? No   For patients on Gilenya, Tecfidera, Aubagio, Rituxan, Ocrevus, Tysabri, Lemtrada or Methotrexate, are you aware that you should NOT receive live virus vaccines?  Yes   Do you feel you have adequate family/friend support?  Yes--her mom is her primary support; she has a good friend that she talks to often    Do you have health insurance?   Yes   Are you currently employed? No   Do you receive SSDI/SSI?  Yes   Do you use marijuana or cannabis products? No   How often? Daily   Have you been diagnosed with a urinary tract infection since your last visit here? No   Have you been diagnosed with a respiratory tract infection since your last visit here? No   Have you been to the emergency room since your last visit here? No   Have you been hospitalized since your last visit here?  No            Objective:        1. 25 foot timed walk:  Timed 25 Foot Walk: 1/5/2021 4/6/2021   Did patient wear an AFO? No No   Was assistive device used? Yes  Yes   Assistive device used (jewels one): Bilateral Assistance Bilateral Assistance   Bilateral device used Walker/Rollator Walker/Rollator   Time for 25 Foot Walk (seconds) 30 21.8   Time for 25 Foot Walk (seconds) 32.48 21.6     Neurologic Exam    Visual acuity: 20/20 OS, 20/200 OD with glasses      MENTAL STATUS: language is fluent, normal verbal comprehension, attention is normal, patient is alert and oriented x 3.      CRANIAL NERVE EXAM: b/l ELMA with intermittent nystagmus. Vertical gaze intact. No facial asymmetry. Hearing intact to voice and finger rub. Uvula is midline, and palate moves symmetrically.       MOTOR EXAM: Normal bulk and tone throughout UE and LE bilaterally.  Strength is 5/5 in UE; 5/5 right KE, KF, DF; and 4-/5 left hip flexor, left KF and DF 5/5      REFLEXES: 3+ biceps and patellar bilaterally, 2+ elsewhere      SENSORY EXAM: vibration intact throughout upper and lower extremities     COORDINATION: moderate dysmetria bilaterally with finger to nose; Rapid sequential movements are slower on left side. Heel to shin more difficult with left heel to right shin      GAIT: wide based, requires walker. Romberg with mild sway. Gait is ataxic.        Imaging:       Results for orders placed during the hospital encounter of 02/27/23    MRI Brain Demyelinating W W/O Contrast    Impression  Brain appears stable from prior exam, again demonstrating findings compatible with the reported history of multiple sclerosis.  No new or enhancing lesions to indicate ongoing or active demyelination.      Electronically signed by: Tonio Cohen MD  Date:    02/27/2023  Time:    14:47    Images were reviewed with the patient.   Labs:     Lab Results   Component Value Date    JRQWGAVB87GL 33 10/14/2022    SOEOTIVA97CJ 47 01/25/2021    VLZDQTAU62NU 15 (L) 01/13/2020       Lab Results   Component Value Date    WBC 11.09 11/01/2022    RBC 4.51 11/01/2022    HGB 12.5 11/01/2022    HCT 38.3 11/01/2022    MCV 85 11/01/2022     MCH 27.7 11/01/2022    MCHC 32.6 11/01/2022    RDW 14.7 (H) 11/01/2022     11/01/2022    MPV 13.3 (H) 11/01/2022    GRAN 8.3 (H) 11/01/2022    GRAN 74.6 (H) 11/01/2022    LYMPH 1.6 11/01/2022    LYMPH 14.2 (L) 11/01/2022    MONO 0.8 11/01/2022    MONO 7.5 11/01/2022    EOS 0.3 11/01/2022    BASO 0.06 11/01/2022    EOSINOPHIL 2.8 11/01/2022    BASOPHIL 0.5 11/01/2022     Sodium   Date Value Ref Range Status   06/25/2022 137 136 - 145 mmol/L Final     Potassium   Date Value Ref Range Status   06/25/2022 3.6 3.5 - 5.1 mmol/L Final     Chloride   Date Value Ref Range Status   06/25/2022 106 95 - 110 mmol/L Final     CO2   Date Value Ref Range Status   06/25/2022 23 23 - 29 mmol/L Final     Glucose   Date Value Ref Range Status   06/25/2022 85 70 - 110 mg/dL Final     BUN   Date Value Ref Range Status   06/25/2022 11 6 - 20 mg/dL Final     Creatinine   Date Value Ref Range Status   06/25/2022 0.8 0.5 - 1.4 mg/dL Final     Calcium   Date Value Ref Range Status   06/25/2022 9.3 8.7 - 10.5 mg/dL Final     Total Protein   Date Value Ref Range Status   06/25/2022 7.7 6.0 - 8.4 g/dL Final     Albumin   Date Value Ref Range Status   06/25/2022 3.6 3.5 - 5.2 g/dL Final     Total Bilirubin   Date Value Ref Range Status   06/25/2022 0.5 0.1 - 1.0 mg/dL Final     Comment:     For infants and newborns, interpretation of results should be based  on gestational age, weight and in agreement with clinical  observations.    Premature Infant recommended reference ranges:  Up to 24 hours.............<8.0 mg/dL  Up to 48 hours............<12.0 mg/dL  3-5 days..................<15.0 mg/dL  6-29 days.................<15.0 mg/dL       Alkaline Phosphatase   Date Value Ref Range Status   06/25/2022 67 55 - 135 U/L Final     AST   Date Value Ref Range Status   06/25/2022 19 10 - 40 U/L Final     ALT   Date Value Ref Range Status   06/25/2022 15 10 - 44 U/L Final     Anion Gap   Date Value Ref Range Status   06/25/2022 8 8 - 16 mmol/L  Final     eGFR if    Date Value Ref Range Status   06/25/2022 >60.0 >60 mL/min/1.73 m^2 Final     eGFR if non    Date Value Ref Range Status   06/25/2022 >60.0 >60 mL/min/1.73 m^2 Final     Comment:     Calculation used to obtain the estimated glomerular filtration  rate (eGFR) is the CKD-EPI equation.        Lab Results   Component Value Date    HEPBSAG Non-reactive 10/14/2022    HEPBSAB >1000.00 10/14/2022    HEPBSAB Reactive 10/14/2022    HEPBCAB Non-reactive 10/14/2022       MS Impression and Plan:     NEURO MULTIPLE SCLEROSIS IMPRESSION:   MS Status:     Clinical Progression:  Clinically Stable    MRI Progression:  Stable  Plan:     DMT:  No change in management    DMT comment:  Continue Ocrevus and Vitamin D. Her infusion is due this month. Labs are due today. She is aware of the risks associated with immunosuppressant therapy, including increased risk of infection.       Symptom Management:  Implement change in symptom management    Implement Change in Symptom Management:  Gait, Fatigue and Vision (Referral placed to home health PT for focus on gait/balance/strengthening. Rx for amantadine 100mg twice daily sent to pharmacy for fatigue. We will help her to schedule follow-up with Dr. Barth in [place].)       Application for MSAA equipment program provided to patient. She is interested in a grabber.   Discussed increasing water intake and taking fiber supplement for bowel regularity    She will follow up with Dr. Durham in 3 months.     Total time spent with patient: 58 minutes   Total time spent on encounter: 70 minutes        Problem List Items Addressed This Visit          Neurologic Problems    Multiple sclerosis    Relevant Medications    amantadine  mg Tab    Other Relevant Orders    Ambulatory referral/consult to Home Health     Other Visit Diagnoses       Fatigue, unspecified type    -  Primary    Relevant Medications    amantadine  mg Tab    Gait disturbance         Relevant Orders    Ambulatory referral/consult to Home Health                VERÓNICA Smallwood, CNS

## 2023-05-10 ENCOUNTER — LAB VISIT (OUTPATIENT)
Dept: LAB | Facility: HOSPITAL | Age: 43
End: 2023-05-10
Payer: MEDICARE

## 2023-05-10 ENCOUNTER — OFFICE VISIT (OUTPATIENT)
Dept: NEUROLOGY | Facility: CLINIC | Age: 43
End: 2023-05-10
Payer: MEDICARE

## 2023-05-10 ENCOUNTER — PATIENT MESSAGE (OUTPATIENT)
Dept: NEUROLOGY | Facility: CLINIC | Age: 43
End: 2023-05-10

## 2023-05-10 VITALS
WEIGHT: 253.88 LBS | HEART RATE: 56 BPM | BODY MASS INDEX: 42.3 KG/M2 | HEIGHT: 65 IN | SYSTOLIC BLOOD PRESSURE: 131 MMHG | DIASTOLIC BLOOD PRESSURE: 82 MMHG

## 2023-05-10 DIAGNOSIS — R26.9 GAIT DISTURBANCE: ICD-10-CM

## 2023-05-10 DIAGNOSIS — R53.83 FATIGUE, UNSPECIFIED TYPE: ICD-10-CM

## 2023-05-10 DIAGNOSIS — G35 MULTIPLE SCLEROSIS: ICD-10-CM

## 2023-05-10 DIAGNOSIS — Z71.89 COUNSELING REGARDING GOALS OF CARE: ICD-10-CM

## 2023-05-10 DIAGNOSIS — Z79.899 OTHER LONG TERM (CURRENT) DRUG THERAPY: ICD-10-CM

## 2023-05-10 DIAGNOSIS — Z29.89 PROPHYLACTIC IMMUNOTHERAPY: ICD-10-CM

## 2023-05-10 DIAGNOSIS — Z79.899 HIGH RISK MEDICATION USE: ICD-10-CM

## 2023-05-10 DIAGNOSIS — G35 MULTIPLE SCLEROSIS: Primary | ICD-10-CM

## 2023-05-10 LAB
25(OH)D3+25(OH)D2 SERPL-MCNC: 49 NG/ML (ref 30–96)
ALBUMIN SERPL BCP-MCNC: 3.8 G/DL (ref 3.5–5.2)
ALP SERPL-CCNC: 75 U/L (ref 55–135)
ALT SERPL W/O P-5'-P-CCNC: 10 U/L (ref 10–44)
ANION GAP SERPL CALC-SCNC: 7 MMOL/L (ref 8–16)
AST SERPL-CCNC: 21 U/L (ref 10–40)
BASOPHILS # BLD AUTO: 0.06 K/UL (ref 0–0.2)
BASOPHILS NFR BLD: 0.5 % (ref 0–1.9)
BILIRUB SERPL-MCNC: 0.7 MG/DL (ref 0.1–1)
BUN SERPL-MCNC: 9 MG/DL (ref 6–20)
CALCIUM SERPL-MCNC: 9.6 MG/DL (ref 8.7–10.5)
CHLORIDE SERPL-SCNC: 101 MMOL/L (ref 95–110)
CO2 SERPL-SCNC: 26 MMOL/L (ref 23–29)
CREAT SERPL-MCNC: 0.8 MG/DL (ref 0.5–1.4)
DIFFERENTIAL METHOD: ABNORMAL
EOSINOPHIL # BLD AUTO: 0.2 K/UL (ref 0–0.5)
EOSINOPHIL NFR BLD: 1.4 % (ref 0–8)
ERYTHROCYTE [DISTWIDTH] IN BLOOD BY AUTOMATED COUNT: 14.6 % (ref 11.5–14.5)
EST. GFR  (NO RACE VARIABLE): >60 ML/MIN/1.73 M^2
GLUCOSE SERPL-MCNC: 79 MG/DL (ref 70–110)
HBV CORE AB SERPL QL IA: NORMAL
HBV SURFACE AG SERPL QL IA: NORMAL
HCT VFR BLD AUTO: 39.8 % (ref 37–48.5)
HGB BLD-MCNC: 12.9 G/DL (ref 12–16)
IGA SERPL-MCNC: 413 MG/DL (ref 40–350)
IGG SERPL-MCNC: 1942 MG/DL (ref 650–1600)
IGM SERPL-MCNC: 116 MG/DL (ref 50–300)
IMM GRANULOCYTES # BLD AUTO: 0.03 K/UL (ref 0–0.04)
IMM GRANULOCYTES NFR BLD AUTO: 0.3 % (ref 0–0.5)
LYMPHOCYTES # BLD AUTO: 1.5 K/UL (ref 1–4.8)
LYMPHOCYTES NFR BLD: 13.3 % (ref 18–48)
MCH RBC QN AUTO: 27.4 PG (ref 27–31)
MCHC RBC AUTO-ENTMCNC: 32.4 G/DL (ref 32–36)
MCV RBC AUTO: 85 FL (ref 82–98)
MONOCYTES # BLD AUTO: 0.6 K/UL (ref 0.3–1)
MONOCYTES NFR BLD: 5.7 % (ref 4–15)
NEUTROPHILS # BLD AUTO: 8.9 K/UL (ref 1.8–7.7)
NEUTROPHILS NFR BLD: 78.8 % (ref 38–73)
NRBC BLD-RTO: 0 /100 WBC
PLATELET # BLD AUTO: 270 K/UL (ref 150–450)
PMV BLD AUTO: 12.4 FL (ref 9.2–12.9)
POTASSIUM SERPL-SCNC: 3.8 MMOL/L (ref 3.5–5.1)
PROT SERPL-MCNC: 8.4 G/DL (ref 6–8.4)
RBC # BLD AUTO: 4.7 M/UL (ref 4–5.4)
SODIUM SERPL-SCNC: 134 MMOL/L (ref 136–145)
VIT B12 SERPL-MCNC: 452 PG/ML (ref 210–950)
WBC # BLD AUTO: 11.32 K/UL (ref 3.9–12.7)

## 2023-05-10 PROCEDURE — 87340 HEPATITIS B SURFACE AG IA: CPT | Mod: HCNC | Performed by: CLINICAL NURSE SPECIALIST

## 2023-05-10 PROCEDURE — 36415 COLL VENOUS BLD VENIPUNCTURE: CPT | Mod: HCNC | Performed by: CLINICAL NURSE SPECIALIST

## 2023-05-10 PROCEDURE — 3075F PR MOST RECENT SYSTOLIC BLOOD PRESS GE 130-139MM HG: ICD-10-PCS | Mod: HCNC,CPTII,S$GLB, | Performed by: CLINICAL NURSE SPECIALIST

## 2023-05-10 PROCEDURE — 99999 PR PBB SHADOW E&M-EST. PATIENT-LVL V: ICD-10-PCS | Mod: PBBFAC,HCNC,, | Performed by: CLINICAL NURSE SPECIALIST

## 2023-05-10 PROCEDURE — 3079F PR MOST RECENT DIASTOLIC BLOOD PRESSURE 80-89 MM HG: ICD-10-PCS | Mod: HCNC,CPTII,S$GLB, | Performed by: CLINICAL NURSE SPECIALIST

## 2023-05-10 PROCEDURE — 99417 PR PROLONGED SVC, OUTPT, W/WO DIRECT PT CONTACT,  EA ADDTL 15 MIN: ICD-10-PCS | Mod: HCNC,S$GLB,, | Performed by: CLINICAL NURSE SPECIALIST

## 2023-05-10 PROCEDURE — 82306 VITAMIN D 25 HYDROXY: CPT | Mod: HCNC | Performed by: CLINICAL NURSE SPECIALIST

## 2023-05-10 PROCEDURE — 99999 PR PBB SHADOW E&M-EST. PATIENT-LVL V: CPT | Mod: PBBFAC,HCNC,, | Performed by: CLINICAL NURSE SPECIALIST

## 2023-05-10 PROCEDURE — 3008F BODY MASS INDEX DOCD: CPT | Mod: HCNC,CPTII,S$GLB, | Performed by: CLINICAL NURSE SPECIALIST

## 2023-05-10 PROCEDURE — 99417 PROLNG OP E/M EACH 15 MIN: CPT | Mod: HCNC,S$GLB,, | Performed by: CLINICAL NURSE SPECIALIST

## 2023-05-10 PROCEDURE — 1159F PR MEDICATION LIST DOCUMENTED IN MEDICAL RECORD: ICD-10-PCS | Mod: HCNC,CPTII,S$GLB, | Performed by: CLINICAL NURSE SPECIALIST

## 2023-05-10 PROCEDURE — 3079F DIAST BP 80-89 MM HG: CPT | Mod: HCNC,CPTII,S$GLB, | Performed by: CLINICAL NURSE SPECIALIST

## 2023-05-10 PROCEDURE — 1159F MED LIST DOCD IN RCRD: CPT | Mod: HCNC,CPTII,S$GLB, | Performed by: CLINICAL NURSE SPECIALIST

## 2023-05-10 PROCEDURE — 85025 COMPLETE CBC W/AUTO DIFF WBC: CPT | Mod: HCNC | Performed by: CLINICAL NURSE SPECIALIST

## 2023-05-10 PROCEDURE — 99215 PR OFFICE/OUTPT VISIT, EST, LEVL V, 40-54 MIN: ICD-10-PCS | Mod: HCNC,S$GLB,, | Performed by: CLINICAL NURSE SPECIALIST

## 2023-05-10 PROCEDURE — 82784 ASSAY IGA/IGD/IGG/IGM EACH: CPT | Mod: HCNC | Performed by: CLINICAL NURSE SPECIALIST

## 2023-05-10 PROCEDURE — 99215 OFFICE O/P EST HI 40 MIN: CPT | Mod: HCNC,S$GLB,, | Performed by: CLINICAL NURSE SPECIALIST

## 2023-05-10 PROCEDURE — 86704 HEP B CORE ANTIBODY TOTAL: CPT | Mod: HCNC | Performed by: CLINICAL NURSE SPECIALIST

## 2023-05-10 PROCEDURE — 80053 COMPREHEN METABOLIC PANEL: CPT | Mod: HCNC | Performed by: CLINICAL NURSE SPECIALIST

## 2023-05-10 PROCEDURE — 3008F PR BODY MASS INDEX (BMI) DOCUMENTED: ICD-10-PCS | Mod: HCNC,CPTII,S$GLB, | Performed by: CLINICAL NURSE SPECIALIST

## 2023-05-10 PROCEDURE — 82607 VITAMIN B-12: CPT | Mod: HCNC | Performed by: CLINICAL NURSE SPECIALIST

## 2023-05-10 PROCEDURE — 3075F SYST BP GE 130 - 139MM HG: CPT | Mod: HCNC,CPTII,S$GLB, | Performed by: CLINICAL NURSE SPECIALIST

## 2023-05-10 RX ORDER — AMANTADINE HYDROCHLORIDE 100 MG/1
TABLET ORAL
Qty: 60 TABLET | Refills: 2 | Status: SHIPPED | OUTPATIENT
Start: 2023-05-10 | End: 2023-12-21 | Stop reason: SDUPTHER

## 2023-05-10 RX ORDER — OCRELIZUMAB 300 MG/10ML
INJECTION INTRAVENOUS
COMMUNITY
Start: 2022-11-18

## 2023-05-10 NOTE — PATIENT INSTRUCTIONS
Consider earplugs to help with sleep.     For constipation, try to increase water intake and take Benefiber or Metamucil.

## 2023-05-10 NOTE — LETTER
May 10, 2023          No Recipients             Timothy kristin [facility] [address]  [address]  [facility] [address]  Phone: [phone]   Patient: Jaylon Bowles   MR Number: [id number]   YOB: 1980   Date of Visit: 5/10/2023     To Whom it May Concern:    Jaylon Bowles is a patient under my care for the treatment of multiple sclerosis, a chronic neurological condition. If you require any additional information, please contact me at [phone].     Sincerely,        VERÓNICA Geronimo, CNS

## 2023-05-11 ENCOUNTER — DOCUMENTATION ONLY (OUTPATIENT)
Dept: NEUROLOGY | Facility: CLINIC | Age: 43
End: 2023-05-11
Payer: MEDICARE

## 2023-05-18 ENCOUNTER — PATIENT MESSAGE (OUTPATIENT)
Dept: NEUROLOGY | Facility: CLINIC | Age: 43
End: 2023-05-18
Payer: MEDICARE

## 2023-05-18 ENCOUNTER — TELEPHONE (OUTPATIENT)
Dept: NEUROLOGY | Facility: CLINIC | Age: 43
End: 2023-05-18
Payer: MEDICARE

## 2023-05-18 DIAGNOSIS — G35 MULTIPLE SCLEROSIS: Primary | ICD-10-CM

## 2023-05-24 ENCOUNTER — LAB VISIT (OUTPATIENT)
Dept: LAB | Facility: HOSPITAL | Age: 43
End: 2023-05-24
Payer: MEDICARE

## 2023-05-24 DIAGNOSIS — G35 MULTIPLE SCLEROSIS: ICD-10-CM

## 2023-05-24 LAB
BILIRUB UR QL STRIP: NEGATIVE
CLARITY UR REFRACT.AUTO: CLEAR
COLOR UR AUTO: YELLOW
GLUCOSE UR QL STRIP: NEGATIVE
HGB UR QL STRIP: NEGATIVE
KETONES UR QL STRIP: NEGATIVE
LEUKOCYTE ESTERASE UR QL STRIP: NEGATIVE
NITRITE UR QL STRIP: NEGATIVE
PH UR STRIP: 6 [PH] (ref 5–8)
PROT UR QL STRIP: NEGATIVE
SP GR UR STRIP: 1.01 (ref 1–1.03)
URN SPEC COLLECT METH UR: NORMAL

## 2023-05-24 PROCEDURE — 81003 URINALYSIS AUTO W/O SCOPE: CPT | Mod: HCNC | Performed by: CLINICAL NURSE SPECIALIST

## 2023-05-28 ENCOUNTER — PATIENT MESSAGE (OUTPATIENT)
Dept: NEUROLOGY | Facility: CLINIC | Age: 43
End: 2023-05-28
Payer: MEDICARE

## 2023-06-14 DIAGNOSIS — G35 MULTIPLE SCLEROSIS: Primary | ICD-10-CM

## 2023-06-14 DIAGNOSIS — H47.293 PARTIAL OPTIC ATROPHY OF BOTH EYES: ICD-10-CM

## 2023-06-14 DIAGNOSIS — H51.23 INO (INTERNUCLEAR OPHTHALMOPLEGIA), BILATERAL: ICD-10-CM

## 2023-06-16 ENCOUNTER — PATIENT MESSAGE (OUTPATIENT)
Dept: NEUROLOGY | Facility: CLINIC | Age: 43
End: 2023-06-16
Payer: MEDICARE

## 2023-06-16 NOTE — TELEPHONE ENCOUNTER
----- Message from Ginette Shaw sent at 6/16/2023 12:03 PM CDT -----  Regarding: order for infusion   [employer] Calling in regards for an at home infusion order for pt , medication Ocrevus has been     approved on 5-22 good for a year  auth # [id number]        auth line [id number] Case# [id number]

## 2023-06-19 DIAGNOSIS — K21.9 CHRONIC GERD: ICD-10-CM

## 2023-06-19 RX ORDER — PANTOPRAZOLE SODIUM 40 MG/1
40 TABLET, DELAYED RELEASE ORAL DAILY
Qty: 90 TABLET | Refills: 0 | Status: SHIPPED | OUTPATIENT
Start: 2023-06-19 | End: 2023-06-21

## 2023-06-19 NOTE — TELEPHONE ENCOUNTER
Care Due:                  Date            Visit Type   Department     Provider  --------------------------------------------------------------------------------                                EP -                              PRIMARY      MET INTERNAL  Last Visit: 06-      CARE ([facility])   MEDICINE       Cat Ramachandran  Next Visit: None Scheduled  None         None Found                                                            Last  Test          Frequency    Reason                     Performed    Due Date  --------------------------------------------------------------------------------    Office Visit  12 months..  EScitalopram, carvediloL,   06- 06-                             levothyroxine,                             losartan-hydrochlorothiaz                             so, pantoprazole........    TSH.........  12 months..  levothyroxine............  06- 06-    Health [facility] Embedded Care Due Messages. Reference number: [id number].   6/19/2023 2:52:39 PM CDT

## 2023-06-21 ENCOUNTER — PATIENT MESSAGE (OUTPATIENT)
Dept: NEUROLOGY | Facility: CLINIC | Age: 43
End: 2023-06-21
Payer: MEDICARE

## 2023-06-21 NOTE — TELEPHONE ENCOUNTER
Called to schedule annual exam pt states that she will schedule when she can due to her taking care of her sick mom also she has not been taking protonix

## 2023-06-23 RX ORDER — EPINEPHRINE 0.3 MG/.3ML
0.3 INJECTION SUBCUTANEOUS
Status: CANCELLED | OUTPATIENT
Start: 2023-06-23

## 2023-06-23 RX ORDER — SODIUM CHLORIDE 0.9 % (FLUSH) 0.9 %
10 SYRINGE (ML) INJECTION
Status: CANCELLED | OUTPATIENT
Start: 2023-06-23

## 2023-06-23 RX ORDER — HEPARIN 100 UNIT/ML
500 SYRINGE INTRAVENOUS
Status: CANCELLED | OUTPATIENT
Start: 2023-06-23

## 2023-06-23 RX ORDER — DIPHENHYDRAMINE HYDROCHLORIDE 50 MG/ML
50 INJECTION INTRAMUSCULAR; INTRAVENOUS
Status: CANCELLED | OUTPATIENT
Start: 2023-06-23

## 2023-06-23 RX ORDER — ACETAMINOPHEN 500 MG
1000 TABLET ORAL
Status: CANCELLED | OUTPATIENT
Start: 2023-06-23

## 2023-06-23 RX ORDER — FAMOTIDINE 10 MG/ML
20 INJECTION INTRAVENOUS
Status: CANCELLED | OUTPATIENT
Start: 2023-06-23

## 2023-07-06 ENCOUNTER — PATIENT MESSAGE (OUTPATIENT)
Dept: NEUROLOGY | Facility: CLINIC | Age: 43
End: 2023-07-06
Payer: MEDICARE

## 2023-07-06 ENCOUNTER — INFUSION (OUTPATIENT)
Dept: INFUSION THERAPY | Facility: HOSPITAL | Age: 43
End: 2023-07-06
Payer: MEDICARE

## 2023-07-06 VITALS
TEMPERATURE: 98 F | SYSTOLIC BLOOD PRESSURE: 128 MMHG | WEIGHT: 252.88 LBS | DIASTOLIC BLOOD PRESSURE: 76 MMHG | BODY MASS INDEX: 42.13 KG/M2 | HEIGHT: 65 IN | HEART RATE: 62 BPM | RESPIRATION RATE: 18 BRPM

## 2023-07-06 DIAGNOSIS — G35 MULTIPLE SCLEROSIS: Primary | ICD-10-CM

## 2023-07-06 PROCEDURE — 96367 TX/PROPH/DG ADDL SEQ IV INF: CPT | Mod: HCNC

## 2023-07-06 PROCEDURE — 96366 THER/PROPH/DIAG IV INF ADDON: CPT | Mod: HCNC

## 2023-07-06 PROCEDURE — 96375 TX/PRO/DX INJ NEW DRUG ADDON: CPT | Mod: HCNC

## 2023-07-06 PROCEDURE — 96365 THER/PROPH/DIAG IV INF INIT: CPT | Mod: HCNC

## 2023-07-06 PROCEDURE — 63600175 PHARM REV CODE 636 W HCPCS: Mod: HCNC | Performed by: CLINICAL NURSE SPECIALIST

## 2023-07-06 PROCEDURE — 25000003 PHARM REV CODE 250: Mod: HCNC | Performed by: CLINICAL NURSE SPECIALIST

## 2023-07-06 RX ORDER — EPINEPHRINE 0.3 MG/.3ML
0.3 INJECTION SUBCUTANEOUS
Status: DISCONTINUED | OUTPATIENT
Start: 2023-07-06 | End: 2023-07-06 | Stop reason: HOSPADM

## 2023-07-06 RX ORDER — ACETAMINOPHEN 500 MG
1000 TABLET ORAL
Status: COMPLETED | OUTPATIENT
Start: 2023-07-06 | End: 2023-07-06

## 2023-07-06 RX ORDER — EPINEPHRINE 0.3 MG/.3ML
0.3 INJECTION SUBCUTANEOUS
Status: CANCELLED | OUTPATIENT
Start: 2023-10-19

## 2023-07-06 RX ORDER — FAMOTIDINE 10 MG/ML
20 INJECTION INTRAVENOUS
Status: CANCELLED | OUTPATIENT
Start: 2023-10-19

## 2023-07-06 RX ORDER — HEPARIN 100 UNIT/ML
500 SYRINGE INTRAVENOUS
Status: DISCONTINUED | OUTPATIENT
Start: 2023-07-06 | End: 2023-07-06 | Stop reason: HOSPADM

## 2023-07-06 RX ORDER — DIPHENHYDRAMINE HYDROCHLORIDE 50 MG/ML
50 INJECTION INTRAMUSCULAR; INTRAVENOUS
Status: DISCONTINUED | OUTPATIENT
Start: 2023-07-06 | End: 2023-07-06 | Stop reason: HOSPADM

## 2023-07-06 RX ORDER — ACETAMINOPHEN 500 MG
1000 TABLET ORAL
Status: CANCELLED | OUTPATIENT
Start: 2023-10-19

## 2023-07-06 RX ORDER — SODIUM CHLORIDE 0.9 % (FLUSH) 0.9 %
10 SYRINGE (ML) INJECTION
Status: CANCELLED | OUTPATIENT
Start: 2023-10-19

## 2023-07-06 RX ORDER — DIPHENHYDRAMINE HYDROCHLORIDE 50 MG/ML
50 INJECTION INTRAMUSCULAR; INTRAVENOUS
Status: CANCELLED | OUTPATIENT
Start: 2023-10-19

## 2023-07-06 RX ORDER — SODIUM CHLORIDE 0.9 % (FLUSH) 0.9 %
10 SYRINGE (ML) INJECTION
Status: DISCONTINUED | OUTPATIENT
Start: 2023-07-06 | End: 2023-07-06 | Stop reason: HOSPADM

## 2023-07-06 RX ORDER — FAMOTIDINE 10 MG/ML
20 INJECTION INTRAVENOUS
Status: COMPLETED | OUTPATIENT
Start: 2023-07-06 | End: 2023-07-06

## 2023-07-06 RX ORDER — HEPARIN 100 UNIT/ML
500 SYRINGE INTRAVENOUS
Status: CANCELLED | OUTPATIENT
Start: 2023-10-19

## 2023-07-06 RX ADMIN — DIPHENHYDRAMINE HYDROCHLORIDE 50 MG: 50 INJECTION, SOLUTION INTRAMUSCULAR; INTRAVENOUS at 09:07

## 2023-07-06 RX ADMIN — OCRELIZUMAB 600 MG: 300 INJECTION INTRAVENOUS at 10:07

## 2023-07-06 RX ADMIN — SODIUM CHLORIDE: 9 INJECTION, SOLUTION INTRAVENOUS at 08:07

## 2023-07-06 RX ADMIN — FAMOTIDINE 20 MG: 10 INJECTION INTRAVENOUS at 09:07

## 2023-07-06 RX ADMIN — ACETAMINOPHEN 1000 MG: 500 TABLET ORAL at 08:07

## 2023-07-06 RX ADMIN — DEXTROSE 100 MG: 50 INJECTION, SOLUTION INTRAVENOUS at 09:07

## 2023-07-06 NOTE — PLAN OF CARE
Problem: Adult Inpatient Plan of Care  Goal: Plan of Care Review  Outcome: Ongoing, Progressing   Patient tolerated infusion well today. NAD noted. Discharged home via rollator with brother by her side

## 2023-07-12 ENCOUNTER — PATIENT MESSAGE (OUTPATIENT)
Dept: NEUROLOGY | Facility: CLINIC | Age: 43
End: 2023-07-12
Payer: MEDICARE

## 2023-07-21 ENCOUNTER — PATIENT MESSAGE (OUTPATIENT)
Dept: PSYCHIATRY | Facility: CLINIC | Age: 43
End: 2023-07-21
Payer: MEDICARE

## 2023-08-16 ENCOUNTER — PES CALL (OUTPATIENT)
Dept: ADMINISTRATIVE | Facility: CLINIC | Age: 43
End: 2023-08-16
Payer: MEDICARE

## 2023-09-13 ENCOUNTER — PATIENT MESSAGE (OUTPATIENT)
Dept: INTERNAL MEDICINE | Facility: CLINIC | Age: 43
End: 2023-09-13
Payer: MEDICARE

## 2023-09-14 ENCOUNTER — PATIENT MESSAGE (OUTPATIENT)
Dept: INTERNAL MEDICINE | Facility: CLINIC | Age: 43
End: 2023-09-14
Payer: MEDICARE

## 2023-09-14 ENCOUNTER — OFFICE VISIT (OUTPATIENT)
Dept: INTERNAL MEDICINE | Facility: CLINIC | Age: 43
End: 2023-09-14
Payer: MEDICARE

## 2023-09-14 DIAGNOSIS — F33.1 RECURRENT DEPRESSIVE DISORDER, CURRENT EPISODE MODERATE: ICD-10-CM

## 2023-09-14 DIAGNOSIS — E06.3 HASHIMOTO'S THYROIDITIS: ICD-10-CM

## 2023-09-14 DIAGNOSIS — Z01.419 WOMEN'S ANNUAL ROUTINE GYNECOLOGICAL EXAMINATION: ICD-10-CM

## 2023-09-14 DIAGNOSIS — Z20.2 POSSIBLE EXPOSURE TO STD: ICD-10-CM

## 2023-09-14 DIAGNOSIS — G35 MULTIPLE SCLEROSIS: ICD-10-CM

## 2023-09-14 DIAGNOSIS — Z11.4 ENCOUNTER FOR SCREENING FOR HIV: ICD-10-CM

## 2023-09-14 DIAGNOSIS — R53.83 FATIGUE, UNSPECIFIED TYPE: ICD-10-CM

## 2023-09-14 DIAGNOSIS — R73.09 ELEVATED RANDOM BLOOD GLUCOSE LEVEL: ICD-10-CM

## 2023-09-14 DIAGNOSIS — I10 ESSENTIAL HYPERTENSION: Primary | ICD-10-CM

## 2023-09-14 DIAGNOSIS — E66.01 MORBID OBESITY WITH BMI OF 40.0-44.9, ADULT: ICD-10-CM

## 2023-09-14 DIAGNOSIS — Z12.31 ENCOUNTER FOR SCREENING MAMMOGRAM FOR HIGH-RISK PATIENT: ICD-10-CM

## 2023-09-14 DIAGNOSIS — L30.9 ECZEMA, UNSPECIFIED TYPE: ICD-10-CM

## 2023-09-14 PROCEDURE — 1159F MED LIST DOCD IN RCRD: CPT | Mod: HCNC,CPTII,95, | Performed by: INTERNAL MEDICINE

## 2023-09-14 PROCEDURE — 1159F PR MEDICATION LIST DOCUMENTED IN MEDICAL RECORD: ICD-10-PCS | Mod: HCNC,CPTII,95, | Performed by: INTERNAL MEDICINE

## 2023-09-14 PROCEDURE — 3044F HG A1C LEVEL LT 7.0%: CPT | Mod: HCNC,CPTII,95, | Performed by: INTERNAL MEDICINE

## 2023-09-14 PROCEDURE — 99214 OFFICE O/P EST MOD 30 MIN: CPT | Mod: HCNC,95,, | Performed by: INTERNAL MEDICINE

## 2023-09-14 PROCEDURE — 3044F PR MOST RECENT HEMOGLOBIN A1C LEVEL <7.0%: ICD-10-PCS | Mod: HCNC,CPTII,95, | Performed by: INTERNAL MEDICINE

## 2023-09-14 PROCEDURE — 99214 PR OFFICE/OUTPT VISIT, EST, LEVL IV, 30-39 MIN: ICD-10-PCS | Mod: HCNC,95,, | Performed by: INTERNAL MEDICINE

## 2023-09-14 PROCEDURE — 1160F PR REVIEW ALL MEDS BY PRESCRIBER/CLIN PHARMACIST DOCUMENTED: ICD-10-PCS | Mod: HCNC,CPTII,95, | Performed by: INTERNAL MEDICINE

## 2023-09-14 PROCEDURE — 1160F RVW MEDS BY RX/DR IN RCRD: CPT | Mod: HCNC,CPTII,95, | Performed by: INTERNAL MEDICINE

## 2023-09-14 RX ORDER — ESCITALOPRAM OXALATE 20 MG/1
30 TABLET ORAL DAILY
Qty: 135 TABLET | Refills: 0 | Status: SHIPPED | OUTPATIENT
Start: 2023-09-14 | End: 2023-10-23 | Stop reason: SDUPTHER

## 2023-09-14 NOTE — PROGRESS NOTES
"Primary Care Telemedicine Note    The patient location is:  Patient Home     The chief complaint leading to consultation is: depression, HTN, thyroid disease    -------> due to physical limitations related to MS, completed annual exam    Total time spent with patient: 36 minutes    Visit type: Virtual visit with synchronous audio only and video      Each patient to whom he or she provides medical services by telemedicine is:  (1) informed of the relationship between the physician and patient and the respective role of any other health care provider with respect to management of the patient; and (2) notified that he or she may decline to receive medical services by telemedicine and may withdraw from such care at any time.        Subjective:        Patient ID: Jaylon Bowles is a 43 y.o. female.    Chief Complaint: Depression, Fatigue, and Thyroid Problem      HPI    MS flare-up frequent since she started with ocrevus and she has not felt well for 2 weeks with left leg stiffness and weakness.    Depression: She presents for follow up of depression. Current symptoms include anhedonia, depressed mood, fatigue, and insomnia. Symptoms have been gradually worsening since that time. Patient denies recurrent thoughts of death. Previous treatment includes: individual therapy and medication. She complains of the following side effects from the treatment: none. She has been taking Lexapro 20mg/d and she feels that it is not working very well.     Anxiety: She presents for follow up of anxiety disorder. Current symptoms: fatigue, irritable, racing thoughts. She denies current suicidal and homicidal ideation. She complains of the following side effects from the treatment: none. Not working well, when she misses a dose, she feels like body is "heavy". Loss of interest.    Hypertension: The patient has been taking medications as instructed, no medication side effects noted, no chest pain on exertion, no dyspnea on exertion, and " no swelling of ankles    BP Readings from Last 3 Encounters:   10/24/23 122/70   07/06/23 128/76   05/10/23 131/82       Thyroid Disease: Presents for follow up of hypothyroidism. Current symptoms: none. Patient denies diarrhea and palpitations. Symptoms have been well-controlled.      Review of Systems   Constitutional:  Positive for fatigue. Negative for chills, diaphoresis and fever.   HENT:  Negative for congestion, ear pain, sinus pressure and sore throat.    Eyes:  Negative for pain and discharge.   Respiratory:  Negative for cough and shortness of breath.    Cardiovascular:  Negative for chest pain, palpitations and leg swelling.   Gastrointestinal:  Negative for abdominal pain, constipation, diarrhea, nausea and vomiting.   Endocrine: Negative for polydipsia and polyuria.   Musculoskeletal:  Positive for gait problem and myalgias. Negative for arthralgias.   Skin:  Positive for rash (eczema). Negative for wound.   Neurological:  Positive for weakness (legs, frequent falls) and numbness. Negative for dizziness, tremors and headaches.   Psychiatric/Behavioral:  Positive for dysphoric mood. The patient is nervous/anxious.            Objective:        Physical Exam  Constitutional:       General: She is not in acute distress.     Appearance: Normal appearance. She is well-developed. She is not toxic-appearing or diaphoretic.   HENT:      Head: Normocephalic and atraumatic.      Right Ear: Hearing normal.      Left Ear: Hearing normal.      Nose: No congestion.   Eyes:      General: Lids are normal.   Pulmonary:      Effort: No tachypnea or respiratory distress.   Musculoskeletal:      Cervical back: Normal range of motion.   Neurological:      Mental Status: She is alert and oriented to person, place, and time.      Comments: Alert and oriented   Psychiatric:         Attention and Perception: Attention normal.         Mood and Affect: Mood normal.         Behavior: Behavior is cooperative.                Assessment:       1. Essential hypertension    2. Recurrent depressive disorder, current episode moderate    3. Fatigue, unspecified type    4. Multiple sclerosis    5. Hashimoto's thyroiditis    6. Eczema, unspecified type    7. Possible exposure to STD    8. Encounter for screening for HIV    9. Women's annual routine gynecological examination    10. Encounter for screening mammogram for high-risk patient    11. Elevated random blood glucose level    12. Morbid obesity with BMI of 40.0-44.9, adult              Plan:     1. Essential hypertension  - Hemoglobin A1C; Future  - CBC Auto Differential; Future  - Comprehensive Metabolic Panel; Future  - Lipid Panel; Future  - Urinalysis; Future    2. Recurrent depressive disorder, current episode moderate  - discussed changing to another medication but she is hesitant  - consider speaking with a psychologist  - will decrease to Lexapro 20mg/d which she has been taking  - will discuss further at next appointment    3. Fatigue, unspecified type  - Vitamin D; Future  - CBC Auto Differential; Future  - TSH; Future    4. Multiple sclerosis  - reports MS-related symptoms, advised to f/u with Neurology    5. Hashimoto's thyroiditis  - TSH; Future  - continue levothyroxine    6. Eczema, unspecified type  - Ambulatory referral/consult to Dermatology; Future    7. Possible exposure to STD  - C. trachomatis/N. gonorrhoeae by AMP DNA; Future  - HSV 1 & 2, IgG; Future  - RPR; Future    8. Encounter for screening for HIV  - HIV 1/2 Ag/Ab (4th Gen); Future    9. Women's annual routine gynecological examination  - Ambulatory referral/consult to Obstetrics / Gynecology; Future    10. Encounter for screening mammogram for high-risk patient  - Mammo Digital Screening Bilat w/ Bjorn; Future    11. Elevated random blood glucose level  - Hemoglobin A1C; Future    12. Morbid obesity with BMI of 40.0-44.9, adult  - Vitamin D; Future  - Hemoglobin A1C; Future      RTC in 3 months for follow-up or  sooner if needed (virtual ok)    __________________________    Cat Ramachandran MD, PharmD  [facility]- Internal Medicine  American Board of Obesity Medicine diplomate  Office [phone]

## 2023-09-20 NOTE — TELEPHONE ENCOUNTER
She needed to provide a few home readings since BP was not checked at the time of the appointment. Does she need refills for coreg and losartan-hctz?

## 2023-09-22 ENCOUNTER — LAB VISIT (OUTPATIENT)
Dept: LAB | Facility: HOSPITAL | Age: 43
End: 2023-09-22
Attending: INTERNAL MEDICINE
Payer: MEDICARE

## 2023-09-22 DIAGNOSIS — E06.3 HASHIMOTO'S THYROIDITIS: ICD-10-CM

## 2023-09-22 DIAGNOSIS — R53.83 FATIGUE, UNSPECIFIED TYPE: ICD-10-CM

## 2023-09-22 DIAGNOSIS — E66.01 MORBID OBESITY WITH BMI OF 40.0-44.9, ADULT: ICD-10-CM

## 2023-09-22 DIAGNOSIS — I10 ESSENTIAL HYPERTENSION: ICD-10-CM

## 2023-09-22 DIAGNOSIS — Z11.4 ENCOUNTER FOR SCREENING FOR HIV: ICD-10-CM

## 2023-09-22 DIAGNOSIS — R73.09 ELEVATED RANDOM BLOOD GLUCOSE LEVEL: ICD-10-CM

## 2023-09-22 DIAGNOSIS — Z20.2 POSSIBLE EXPOSURE TO STD: ICD-10-CM

## 2023-09-22 LAB
25(OH)D3+25(OH)D2 SERPL-MCNC: 35 NG/ML (ref 30–96)
ALBUMIN SERPL BCP-MCNC: 3.9 G/DL (ref 3.5–5.2)
ALP SERPL-CCNC: 73 U/L (ref 55–135)
ALT SERPL W/O P-5'-P-CCNC: 11 U/L (ref 10–44)
ANION GAP SERPL CALC-SCNC: 9 MMOL/L (ref 8–16)
AST SERPL-CCNC: 16 U/L (ref 10–40)
BASOPHILS # BLD AUTO: 0.07 K/UL (ref 0–0.2)
BASOPHILS NFR BLD: 0.5 % (ref 0–1.9)
BILIRUB SERPL-MCNC: 0.8 MG/DL (ref 0.1–1)
BUN SERPL-MCNC: 16 MG/DL (ref 6–20)
CALCIUM SERPL-MCNC: 10.5 MG/DL (ref 8.7–10.5)
CHLORIDE SERPL-SCNC: 105 MMOL/L (ref 95–110)
CHOLEST SERPL-MCNC: 186 MG/DL (ref 120–199)
CHOLEST/HDLC SERPL: 3.9 {RATIO} (ref 2–5)
CO2 SERPL-SCNC: 25 MMOL/L (ref 23–29)
CREAT SERPL-MCNC: 1 MG/DL (ref 0.5–1.4)
DIFFERENTIAL METHOD: ABNORMAL
EOSINOPHIL # BLD AUTO: 0.2 K/UL (ref 0–0.5)
EOSINOPHIL NFR BLD: 1.3 % (ref 0–8)
ERYTHROCYTE [DISTWIDTH] IN BLOOD BY AUTOMATED COUNT: 14.8 % (ref 11.5–14.5)
EST. GFR  (NO RACE VARIABLE): >60 ML/MIN/1.73 M^2
ESTIMATED AVG GLUCOSE: 97 MG/DL (ref 68–131)
GLUCOSE SERPL-MCNC: 83 MG/DL (ref 70–110)
HBA1C MFR BLD: 5 % (ref 4–5.6)
HCT VFR BLD AUTO: 39.3 % (ref 37–48.5)
HDLC SERPL-MCNC: 48 MG/DL (ref 40–75)
HDLC SERPL: 25.8 % (ref 20–50)
HGB BLD-MCNC: 13 G/DL (ref 12–16)
HIV 1+2 AB+HIV1 P24 AG SERPL QL IA: NORMAL
IMM GRANULOCYTES # BLD AUTO: 0.05 K/UL (ref 0–0.04)
IMM GRANULOCYTES NFR BLD AUTO: 0.4 % (ref 0–0.5)
LDLC SERPL CALC-MCNC: 124.6 MG/DL (ref 63–159)
LYMPHOCYTES # BLD AUTO: 1.8 K/UL (ref 1–4.8)
LYMPHOCYTES NFR BLD: 14.4 % (ref 18–48)
MCH RBC QN AUTO: 27.6 PG (ref 27–31)
MCHC RBC AUTO-ENTMCNC: 33.1 G/DL (ref 32–36)
MCV RBC AUTO: 83 FL (ref 82–98)
MONOCYTES # BLD AUTO: 1 K/UL (ref 0.3–1)
MONOCYTES NFR BLD: 7.4 % (ref 4–15)
NEUTROPHILS # BLD AUTO: 9.7 K/UL (ref 1.8–7.7)
NEUTROPHILS NFR BLD: 76 % (ref 38–73)
NONHDLC SERPL-MCNC: 138 MG/DL
NRBC BLD-RTO: 0 /100 WBC
PLATELET # BLD AUTO: 232 K/UL (ref 150–450)
PMV BLD AUTO: 12.6 FL (ref 9.2–12.9)
POTASSIUM SERPL-SCNC: 4.1 MMOL/L (ref 3.5–5.1)
PROT SERPL-MCNC: 8.5 G/DL (ref 6–8.4)
RBC # BLD AUTO: 4.71 M/UL (ref 4–5.4)
SODIUM SERPL-SCNC: 139 MMOL/L (ref 136–145)
TRIGL SERPL-MCNC: 67 MG/DL (ref 30–150)
TSH SERPL DL<=0.005 MIU/L-ACNC: 3.32 UIU/ML (ref 0.4–4)
WBC # BLD AUTO: 12.79 K/UL (ref 3.9–12.7)

## 2023-09-22 PROCEDURE — 85025 COMPLETE CBC W/AUTO DIFF WBC: CPT | Mod: HCNC | Performed by: INTERNAL MEDICINE

## 2023-09-22 PROCEDURE — 87389 HIV-1 AG W/HIV-1&-2 AB AG IA: CPT | Mod: HCNC | Performed by: INTERNAL MEDICINE

## 2023-09-22 PROCEDURE — 82306 VITAMIN D 25 HYDROXY: CPT | Mod: HCNC | Performed by: INTERNAL MEDICINE

## 2023-09-22 PROCEDURE — 36415 COLL VENOUS BLD VENIPUNCTURE: CPT | Mod: HCNC | Performed by: INTERNAL MEDICINE

## 2023-09-22 PROCEDURE — 86592 SYPHILIS TEST NON-TREP QUAL: CPT | Mod: HCNC | Performed by: INTERNAL MEDICINE

## 2023-09-22 PROCEDURE — 80061 LIPID PANEL: CPT | Mod: HCNC | Performed by: INTERNAL MEDICINE

## 2023-09-22 PROCEDURE — 84443 ASSAY THYROID STIM HORMONE: CPT | Mod: HCNC | Performed by: INTERNAL MEDICINE

## 2023-09-22 PROCEDURE — 86696 HERPES SIMPLEX TYPE 2 TEST: CPT | Mod: HCNC | Performed by: INTERNAL MEDICINE

## 2023-09-22 PROCEDURE — 80053 COMPREHEN METABOLIC PANEL: CPT | Mod: HCNC | Performed by: INTERNAL MEDICINE

## 2023-09-22 PROCEDURE — 83036 HEMOGLOBIN GLYCOSYLATED A1C: CPT | Mod: HCNC | Performed by: INTERNAL MEDICINE

## 2023-09-23 LAB — RPR SER QL: NORMAL

## 2023-09-25 ENCOUNTER — PATIENT MESSAGE (OUTPATIENT)
Dept: NEUROLOGY | Facility: CLINIC | Age: 43
End: 2023-09-25
Payer: MEDICARE

## 2023-09-25 ENCOUNTER — PATIENT MESSAGE (OUTPATIENT)
Dept: INTERNAL MEDICINE | Facility: CLINIC | Age: 43
End: 2023-09-25
Payer: MEDICARE

## 2023-09-25 DIAGNOSIS — M62.838 MUSCLE SPASM: ICD-10-CM

## 2023-09-25 LAB
HSV1 IGG SERPL QL IA: POSITIVE
HSV2 IGG SERPL QL IA: POSITIVE

## 2023-09-25 RX ORDER — TIZANIDINE 2 MG/1
TABLET ORAL
Qty: 30 TABLET | Refills: 1 | Status: SHIPPED | OUTPATIENT
Start: 2023-09-25

## 2023-09-25 RX ORDER — TIZANIDINE 4 MG/1
TABLET ORAL
Qty: 60 TABLET | Refills: 5 | Status: SHIPPED | OUTPATIENT
Start: 2023-09-25

## 2023-09-26 ENCOUNTER — PATIENT MESSAGE (OUTPATIENT)
Dept: INTERNAL MEDICINE | Facility: CLINIC | Age: 43
End: 2023-09-26
Payer: MEDICARE

## 2023-09-26 NOTE — TELEPHONE ENCOUNTER
Spoke to patient and explained that Ocrevus did not cause her to be positive for herpes type 2. She will call for any sores or lesions on mouth or genital area. She has never had any outbreaks.

## 2023-10-21 ENCOUNTER — PATIENT MESSAGE (OUTPATIENT)
Dept: INTERNAL MEDICINE | Facility: CLINIC | Age: 43
End: 2023-10-21
Payer: MEDICARE

## 2023-10-21 DIAGNOSIS — F33.1 RECURRENT DEPRESSIVE DISORDER, CURRENT EPISODE MODERATE: ICD-10-CM

## 2023-10-21 DIAGNOSIS — E06.3 HASHIMOTO'S THYROIDITIS: ICD-10-CM

## 2023-10-21 DIAGNOSIS — I10 ESSENTIAL HYPERTENSION: ICD-10-CM

## 2023-10-23 ENCOUNTER — TELEPHONE (OUTPATIENT)
Dept: ADMINISTRATIVE | Facility: CLINIC | Age: 43
End: 2023-10-23
Payer: MEDICARE

## 2023-10-23 NOTE — TELEPHONE ENCOUNTER
No care due was identified.  Health [facility] Embedded Care Due Messages. Reference number: [id number].   10/23/2023 9:05:50 AM CDT

## 2023-10-23 NOTE — TELEPHONE ENCOUNTER
LOV: 9/14/2023  LRF:Coreg 3/23/23, lexapro 9/14/2023, synthroid 3/23/23, losartan-HCTZ 3/23/23  RTC:none

## 2023-10-24 ENCOUNTER — OFFICE VISIT (OUTPATIENT)
Dept: INTERNAL MEDICINE | Facility: CLINIC | Age: 43
End: 2023-10-24
Payer: MEDICARE

## 2023-10-24 VITALS
WEIGHT: 258.81 LBS | DIASTOLIC BLOOD PRESSURE: 70 MMHG | BODY MASS INDEX: 43.12 KG/M2 | OXYGEN SATURATION: 99 % | HEART RATE: 64 BPM | SYSTOLIC BLOOD PRESSURE: 122 MMHG | HEIGHT: 65 IN

## 2023-10-24 DIAGNOSIS — F06.70 MILD NEUROCOGNITIVE DISORDER DUE TO ANOTHER MEDICAL CONDITION: ICD-10-CM

## 2023-10-24 DIAGNOSIS — F33.1 RECURRENT DEPRESSIVE DISORDER, CURRENT EPISODE MODERATE: ICD-10-CM

## 2023-10-24 DIAGNOSIS — Z12.4 CERVICAL CANCER SCREENING: ICD-10-CM

## 2023-10-24 DIAGNOSIS — I10 ESSENTIAL HYPERTENSION: ICD-10-CM

## 2023-10-24 DIAGNOSIS — G35 MULTIPLE SCLEROSIS: ICD-10-CM

## 2023-10-24 DIAGNOSIS — E66.01 SEVERE OBESITY (BMI >= 40): ICD-10-CM

## 2023-10-24 DIAGNOSIS — D50.9 IRON DEFICIENCY ANEMIA, UNSPECIFIED IRON DEFICIENCY ANEMIA TYPE: ICD-10-CM

## 2023-10-24 DIAGNOSIS — Z00.00 ENCOUNTER FOR MEDICARE ANNUAL WELLNESS EXAM: Primary | ICD-10-CM

## 2023-10-24 DIAGNOSIS — E03.9 HYPOTHYROIDISM, UNSPECIFIED TYPE: ICD-10-CM

## 2023-10-24 PROBLEM — R79.89 ABNORMAL TSH: Status: RESOLVED | Noted: 2022-06-26 | Resolved: 2023-10-24

## 2023-10-24 PROCEDURE — 1159F MED LIST DOCD IN RCRD: CPT | Mod: HCNC,CPTII,S$GLB, | Performed by: PHYSICIAN ASSISTANT

## 2023-10-24 PROCEDURE — 1159F PR MEDICATION LIST DOCUMENTED IN MEDICAL RECORD: ICD-10-PCS | Mod: HCNC,CPTII,S$GLB, | Performed by: PHYSICIAN ASSISTANT

## 2023-10-24 PROCEDURE — 3078F PR MOST RECENT DIASTOLIC BLOOD PRESSURE < 80 MM HG: ICD-10-PCS | Mod: HCNC,CPTII,S$GLB, | Performed by: PHYSICIAN ASSISTANT

## 2023-10-24 PROCEDURE — G0439 PR MEDICARE ANNUAL WELLNESS SUBSEQUENT VISIT: ICD-10-PCS | Mod: HCNC,S$GLB,, | Performed by: PHYSICIAN ASSISTANT

## 2023-10-24 PROCEDURE — 3074F PR MOST RECENT SYSTOLIC BLOOD PRESSURE < 130 MM HG: ICD-10-PCS | Mod: HCNC,CPTII,S$GLB, | Performed by: PHYSICIAN ASSISTANT

## 2023-10-24 PROCEDURE — 3044F PR MOST RECENT HEMOGLOBIN A1C LEVEL <7.0%: ICD-10-PCS | Mod: HCNC,CPTII,S$GLB, | Performed by: PHYSICIAN ASSISTANT

## 2023-10-24 PROCEDURE — G0439 PPPS, SUBSEQ VISIT: HCPCS | Mod: HCNC,S$GLB,, | Performed by: PHYSICIAN ASSISTANT

## 2023-10-24 PROCEDURE — 99999 PR PBB SHADOW E&M-EST. PATIENT-LVL V: ICD-10-PCS | Mod: PBBFAC,HCNC,, | Performed by: PHYSICIAN ASSISTANT

## 2023-10-24 PROCEDURE — 1160F RVW MEDS BY RX/DR IN RCRD: CPT | Mod: HCNC,CPTII,S$GLB, | Performed by: PHYSICIAN ASSISTANT

## 2023-10-24 PROCEDURE — 3074F SYST BP LT 130 MM HG: CPT | Mod: HCNC,CPTII,S$GLB, | Performed by: PHYSICIAN ASSISTANT

## 2023-10-24 PROCEDURE — 3044F HG A1C LEVEL LT 7.0%: CPT | Mod: HCNC,CPTII,S$GLB, | Performed by: PHYSICIAN ASSISTANT

## 2023-10-24 PROCEDURE — 1160F PR REVIEW ALL MEDS BY PRESCRIBER/CLIN PHARMACIST DOCUMENTED: ICD-10-PCS | Mod: HCNC,CPTII,S$GLB, | Performed by: PHYSICIAN ASSISTANT

## 2023-10-24 PROCEDURE — 99999 PR PBB SHADOW E&M-EST. PATIENT-LVL V: CPT | Mod: PBBFAC,HCNC,, | Performed by: PHYSICIAN ASSISTANT

## 2023-10-24 PROCEDURE — 3078F DIAST BP <80 MM HG: CPT | Mod: HCNC,CPTII,S$GLB, | Performed by: PHYSICIAN ASSISTANT

## 2023-10-24 RX ORDER — ESCITALOPRAM OXALATE 20 MG/1
30 TABLET ORAL DAILY
Qty: 135 TABLET | Refills: 1 | Status: SHIPPED | OUTPATIENT
Start: 2023-10-24

## 2023-10-24 RX ORDER — CARVEDILOL 25 MG/1
25 TABLET ORAL 2 TIMES DAILY WITH MEALS
Qty: 180 TABLET | Refills: 1 | Status: SHIPPED | OUTPATIENT
Start: 2023-10-24 | End: 2024-02-28 | Stop reason: SDUPTHER

## 2023-10-24 RX ORDER — LOSARTAN POTASSIUM AND HYDROCHLOROTHIAZIDE 12.5; 1 MG/1; MG/1
1 TABLET ORAL DAILY
Qty: 90 TABLET | Refills: 1 | Status: SHIPPED | OUTPATIENT
Start: 2023-10-24 | End: 2024-02-28 | Stop reason: SDUPTHER

## 2023-10-24 RX ORDER — LEVOTHYROXINE SODIUM 112 UG/1
112 TABLET ORAL
Qty: 90 TABLET | Refills: 1 | Status: SHIPPED | OUTPATIENT
Start: 2023-10-24 | End: 2024-02-28 | Stop reason: SDUPTHER

## 2023-10-24 NOTE — PROGRESS NOTES
"Jaylon Bowles presented for a  Medicare AWV and comprehensive Health Risk Assessment today. The following components were reviewed and updated:    Medical history  Family History  Social history  Allergies and Current Medications  Health Risk Assessment  Health Maintenance  Care Team         ** See Completed Assessments for Annual Wellness Visit within the encounter summary.**         The following assessments were completed:  Living Situation  CAGE  Depression Screening  Timed Get Up and Go  Whisper Test  Cognitive Function Screening    Nutrition Screening  ADL Screening  PAQ Screening        Vitals:    10/24/23 1053   BP: 122/70   BP Location: Left arm   Patient Position: Sitting   BP Method: Large (Manual)   Pulse: 64   SpO2: 99%   Weight: 117.4 kg (258 lb 13.1 oz)   Height: 5' 5" (1.651 m)     Body mass index is 43.07 kg/m².  Physical Exam  Vitals reviewed.   Constitutional:       General: She is not in acute distress.     Appearance: She is well-developed. She is not ill-appearing.   HENT:      Head: Normocephalic and atraumatic.   Cardiovascular:      Rate and Rhythm: Normal rate and regular rhythm.      Heart sounds: No murmur heard.  Pulmonary:      Effort: Pulmonary effort is normal.      Breath sounds: Normal breath sounds. No wheezing or rales.   Musculoskeletal:      Right lower leg: No edema.      Left lower leg: No edema.      Comments: In wheelchair   Skin:     General: Skin is warm and dry.      Findings: No rash.   Neurological:      Mental Status: She is alert.   Psychiatric:         Mood and Affect: Mood normal.               Diagnoses and health risks identified today and associated recommendations/orders:    1. Encounter for Medicare annual wellness exam  Exam and Assessments performed  Chart Review Complete  Health Maintenance Reviewed and updated  - Ambulatory Referral/Consult to Enhanced Annual Wellness Visit (eAWV)    2. Multiple sclerosis  Stable  Followed by Neurology    3. Recurrent " depressive disorder, current episode moderate  Stable on current meds  PHQ2 = score 6  Discussed psychiatry/therapy referral, pt declines at this time  Followed by PCP    4. Severe obesity (BMI >= 40)  BMI reviewed  Lifestyle mods discussed  Followed by PCP    5. Essential hypertension  Stable on current meds  Followed by PCP    6. Mild neurocognitive disorder due to another medical condition  Stable  Followed by PCP/Neurology    7. Iron deficiency anemia, unspecified iron deficiency anemia type  Stable on iron therapy  Followed by PCP    8. Hypothyroidism, unspecified type  Stable  Followed by PCP    9. Cervical cancer screening  Last PAP 2019  - Ambulatory referral/consult to Gynecology; Future      Provided Tymesha with a 5-10 year written screening schedule and personal prevention plan. Recommendations were developed using the USPSTF age appropriate recommendations. Education, counseling, and referrals were provided as needed. After Visit Summary printed and given to patient which includes a list of additional screenings\tests needed.    Follow up in about 2 months (around 12/24/2023) for PCP follow up and 1 year for next Medicare AWV.    Anastasia Coker PA-C    I offered to discuss advanced care planning, including how to pick a person who would make decisions for you if you were unable to make them for yourself, called a health care power of , and what kind of decisions you might make such as use of life sustaining treatments such as ventilators and tube feeding when faced with a life limiting illness recorded on a living will that they will need to know. (How you want to be cared for as you near the end of your natural life)     X Patient is interested in learning more about how to make advanced directives.  I provided them paperwork and offered to discuss this with them.    Future Appointments   Date Time Provider Department Center   12/11/2023 10:00 AM [facility]    1/19/2024  8:45 AM David Viera MD [facility] [address]   1/19/2024  1:30 PM [facility] [place]

## 2023-10-24 NOTE — PATIENT INSTRUCTIONS
Counseling and Referral of Other Preventative  (Italic type indicates deductible and co-insurance are waived)    Patient Name: Jaylon Bowles  Today's Date: 10/24/2023    Health Maintenance       Date Due Completion Date    Mammogram 08/16/2020 8/16/2019    Influenza Vaccine (1) 06/30/2024 (Originally 9/1/2023) 1/14/2020 (Declined)    Override on 1/14/2020: Declined (pt declined immunization until next season)    COVID-19 Vaccine (4 - 2023-24 season) 10/24/2024 (Originally 9/1/2023) 12/21/2021    Cervical Cancer Screening 08/16/2024 8/16/2019    Hemoglobin A1c (Diabetic Prevention Screening) 09/22/2026 9/22/2023    Pneumococcal Vaccines (Age 0-64) (3 - PPSV23 or PCV20) 04/27/2027 4/27/2022    TETANUS VACCINE 06/10/2031 6/10/2021        Orders Placed This Encounter   Procedures    Ambulatory referral/consult to Gynecology     The following information is provided to all patients.  This information is to help you find resources for any of the problems found today that may be affecting your health:                Living healthy guide: www.[facility].[place].gov      Understanding Diabetes: www.diabetes.org      Eating healthy: www.cdc.gov/healthyweight      CDC home safety checklist: www.cdc.gov/steadi/patient.html      Agency on Aging: www.goea.[place].[facility]      Alcoholics anonymous (AA): www.aa.org      Physical Activity: www.michael.nih.gov/as1yyee      Tobacco use: www.quitwithusla.org

## 2023-11-04 PROBLEM — G31.84 MILD COGNITIVE IMPAIRMENT OF UNCERTAIN OR UNKNOWN ETIOLOGY: Status: ACTIVE | Noted: 2022-08-22

## 2023-11-04 PROBLEM — Z91.81 HISTORY OF FALLING: Status: ACTIVE | Noted: 2022-08-22

## 2023-11-16 ENCOUNTER — TELEPHONE (OUTPATIENT)
Dept: NEUROLOGY | Facility: CLINIC | Age: 43
End: 2023-11-16
Payer: MEDICARE

## 2023-11-16 ENCOUNTER — PATIENT MESSAGE (OUTPATIENT)
Dept: NEUROLOGY | Facility: CLINIC | Age: 43
End: 2023-11-16
Payer: MEDICARE

## 2023-11-16 DIAGNOSIS — M62.838 MUSCLE SPASM: Primary | ICD-10-CM

## 2023-11-16 DIAGNOSIS — D72.828 GRANULOCYTOSIS: ICD-10-CM

## 2023-11-16 NOTE — TELEPHONE ENCOUNTER
Spoke with pt to schedule her labs and clinic visit with  ; scheduled pt on 11/20/23 for labs and 12/21/23 for her clinic visit. Pt confirmed both appointments.

## 2023-11-20 ENCOUNTER — LAB VISIT (OUTPATIENT)
Dept: LAB | Facility: HOSPITAL | Age: 43
End: 2023-11-20
Payer: MEDICARE

## 2023-11-20 DIAGNOSIS — M62.838 MUSCLE SPASM: ICD-10-CM

## 2023-11-20 LAB
ALBUMIN SERPL BCP-MCNC: 3.6 G/DL (ref 3.5–5.2)
ALP SERPL-CCNC: 63 U/L (ref 55–135)
ALT SERPL W/O P-5'-P-CCNC: 17 U/L (ref 10–44)
ANION GAP SERPL CALC-SCNC: 9 MMOL/L (ref 8–16)
AST SERPL-CCNC: 21 U/L (ref 10–40)
BASOPHILS # BLD AUTO: 0.05 K/UL (ref 0–0.2)
BASOPHILS NFR BLD: 0.5 % (ref 0–1.9)
BILIRUB SERPL-MCNC: 0.5 MG/DL (ref 0.1–1)
BUN SERPL-MCNC: 10 MG/DL (ref 6–20)
CALCIUM SERPL-MCNC: 9.4 MG/DL (ref 8.7–10.5)
CHLORIDE SERPL-SCNC: 104 MMOL/L (ref 95–110)
CO2 SERPL-SCNC: 26 MMOL/L (ref 23–29)
CREAT SERPL-MCNC: 0.9 MG/DL (ref 0.5–1.4)
DIFFERENTIAL METHOD: ABNORMAL
EOSINOPHIL # BLD AUTO: 0.2 K/UL (ref 0–0.5)
EOSINOPHIL NFR BLD: 2.1 % (ref 0–8)
ERYTHROCYTE [DISTWIDTH] IN BLOOD BY AUTOMATED COUNT: 14.9 % (ref 11.5–14.5)
EST. GFR  (NO RACE VARIABLE): >60 ML/MIN/1.73 M^2
GLUCOSE SERPL-MCNC: 78 MG/DL (ref 70–110)
HBV CORE AB SERPL QL IA: NORMAL
HBV SURFACE AG SERPL QL IA: NORMAL
HCT VFR BLD AUTO: 37.7 % (ref 37–48.5)
HGB BLD-MCNC: 12.3 G/DL (ref 12–16)
IGA SERPL-MCNC: 374 MG/DL (ref 40–350)
IGG SERPL-MCNC: 1797 MG/DL (ref 650–1600)
IGM SERPL-MCNC: 99 MG/DL (ref 50–300)
IMM GRANULOCYTES # BLD AUTO: 0.03 K/UL (ref 0–0.04)
IMM GRANULOCYTES NFR BLD AUTO: 0.3 % (ref 0–0.5)
LYMPHOCYTES # BLD AUTO: 1.7 K/UL (ref 1–4.8)
LYMPHOCYTES NFR BLD: 15.6 % (ref 18–48)
MCH RBC QN AUTO: 27.5 PG (ref 27–31)
MCHC RBC AUTO-ENTMCNC: 32.6 G/DL (ref 32–36)
MCV RBC AUTO: 84 FL (ref 82–98)
MONOCYTES # BLD AUTO: 0.7 K/UL (ref 0.3–1)
MONOCYTES NFR BLD: 7 % (ref 4–15)
NEUTROPHILS # BLD AUTO: 7.9 K/UL (ref 1.8–7.7)
NEUTROPHILS NFR BLD: 74.5 % (ref 38–73)
NRBC BLD-RTO: 0 /100 WBC
PLATELET # BLD AUTO: 227 K/UL (ref 150–450)
PMV BLD AUTO: 11.7 FL (ref 9.2–12.9)
POTASSIUM SERPL-SCNC: 3.7 MMOL/L (ref 3.5–5.1)
PROT SERPL-MCNC: 7.9 G/DL (ref 6–8.4)
RBC # BLD AUTO: 4.48 M/UL (ref 4–5.4)
SODIUM SERPL-SCNC: 139 MMOL/L (ref 136–145)
WBC # BLD AUTO: 10.61 K/UL (ref 3.9–12.7)

## 2023-11-20 PROCEDURE — 86704 HEP B CORE ANTIBODY TOTAL: CPT | Mod: HCNC | Performed by: CLINICAL NURSE SPECIALIST

## 2023-11-20 PROCEDURE — 82784 ASSAY IGA/IGD/IGG/IGM EACH: CPT | Mod: 59,HCNC | Performed by: CLINICAL NURSE SPECIALIST

## 2023-11-20 PROCEDURE — 36415 COLL VENOUS BLD VENIPUNCTURE: CPT | Mod: HCNC | Performed by: CLINICAL NURSE SPECIALIST

## 2023-11-20 PROCEDURE — 87340 HEPATITIS B SURFACE AG IA: CPT | Mod: HCNC | Performed by: CLINICAL NURSE SPECIALIST

## 2023-11-20 PROCEDURE — 80053 COMPREHEN METABOLIC PANEL: CPT | Mod: HCNC | Performed by: CLINICAL NURSE SPECIALIST

## 2023-11-20 PROCEDURE — 85025 COMPLETE CBC W/AUTO DIFF WBC: CPT | Mod: HCNC | Performed by: CLINICAL NURSE SPECIALIST

## 2023-11-22 ENCOUNTER — TELEPHONE (OUTPATIENT)
Dept: NEUROLOGY | Facility: CLINIC | Age: 43
End: 2023-11-22
Payer: MEDICARE

## 2023-11-22 ENCOUNTER — PATIENT MESSAGE (OUTPATIENT)
Dept: NEUROLOGY | Facility: CLINIC | Age: 43
End: 2023-11-22
Payer: MEDICARE

## 2023-11-27 ENCOUNTER — TELEPHONE (OUTPATIENT)
Dept: NEUROLOGY | Facility: CLINIC | Age: 43
End: 2023-11-27
Payer: MEDICARE

## 2023-11-27 DIAGNOSIS — R79.89 ABNORMAL CBC: Primary | ICD-10-CM

## 2023-11-27 NOTE — TELEPHONE ENCOUNTER
Scheduled pt labs on 11/28 with her U/A that she already has scheduled; pt kept her U/A for 11/28 and did not need to reschedule her appointment.

## 2023-11-28 ENCOUNTER — LAB VISIT (OUTPATIENT)
Dept: LAB | Facility: HOSPITAL | Age: 43
End: 2023-11-28
Payer: MEDICARE

## 2023-11-28 DIAGNOSIS — D72.828 GRANULOCYTOSIS: ICD-10-CM

## 2023-11-28 LAB
BILIRUB UR QL STRIP: NEGATIVE
CLARITY UR REFRACT.AUTO: CLEAR
COLOR UR AUTO: YELLOW
GLUCOSE UR QL STRIP: NEGATIVE
HGB UR QL STRIP: NEGATIVE
KETONES UR QL STRIP: NEGATIVE
LEUKOCYTE ESTERASE UR QL STRIP: NEGATIVE
NITRITE UR QL STRIP: NEGATIVE
PH UR STRIP: 6 [PH] (ref 5–8)
PROT UR QL STRIP: NEGATIVE
SP GR UR STRIP: 1.02 (ref 1–1.03)
URN SPEC COLLECT METH UR: NORMAL

## 2023-11-28 PROCEDURE — 81003 URINALYSIS AUTO W/O SCOPE: CPT | Mod: HCNC | Performed by: CLINICAL NURSE SPECIALIST

## 2023-12-01 ENCOUNTER — TELEPHONE (OUTPATIENT)
Dept: NEUROLOGY | Facility: CLINIC | Age: 43
End: 2023-12-01

## 2023-12-01 DIAGNOSIS — D72.828 GRANULOCYTOSIS: Primary | ICD-10-CM

## 2023-12-02 ENCOUNTER — PATIENT MESSAGE (OUTPATIENT)
Dept: NEUROLOGY | Facility: CLINIC | Age: 43
End: 2023-12-02
Payer: MEDICARE

## 2023-12-04 NOTE — TELEPHONE ENCOUNTER
Called pt and scheduled an appt with hematology at the [facility] at the next available morning appt on 2/2/24 at 11:00 AM.

## 2023-12-21 ENCOUNTER — LAB VISIT (OUTPATIENT)
Dept: LAB | Facility: HOSPITAL | Age: 43
End: 2023-12-21
Attending: STUDENT IN AN ORGANIZED HEALTH CARE EDUCATION/TRAINING PROGRAM
Payer: MEDICARE

## 2023-12-21 ENCOUNTER — OFFICE VISIT (OUTPATIENT)
Dept: NEUROLOGY | Facility: CLINIC | Age: 43
End: 2023-12-21
Payer: MEDICARE

## 2023-12-21 VITALS
WEIGHT: 259.56 LBS | DIASTOLIC BLOOD PRESSURE: 82 MMHG | SYSTOLIC BLOOD PRESSURE: 129 MMHG | BODY MASS INDEX: 40.74 KG/M2 | HEIGHT: 67 IN | HEART RATE: 56 BPM

## 2023-12-21 DIAGNOSIS — G35 MULTIPLE SCLEROSIS: ICD-10-CM

## 2023-12-21 DIAGNOSIS — R53.83 FATIGUE, UNSPECIFIED TYPE: ICD-10-CM

## 2023-12-21 PROCEDURE — 1159F MED LIST DOCD IN RCRD: CPT | Mod: HCNC,CPTII,S$GLB, | Performed by: STUDENT IN AN ORGANIZED HEALTH CARE EDUCATION/TRAINING PROGRAM

## 2023-12-21 PROCEDURE — 99417 PROLNG OP E/M EACH 15 MIN: CPT | Mod: HCNC,S$GLB,, | Performed by: STUDENT IN AN ORGANIZED HEALTH CARE EDUCATION/TRAINING PROGRAM

## 2023-12-21 PROCEDURE — 88185 FLOWCYTOMETRY/TC ADD-ON: CPT | Mod: 59,HCNC | Performed by: STUDENT IN AN ORGANIZED HEALTH CARE EDUCATION/TRAINING PROGRAM

## 2023-12-21 PROCEDURE — 0361U NEURFLMNT LT CHN DIG IA QUAN: CPT | Mod: HCNC | Performed by: STUDENT IN AN ORGANIZED HEALTH CARE EDUCATION/TRAINING PROGRAM

## 2023-12-21 PROCEDURE — 3079F DIAST BP 80-89 MM HG: CPT | Mod: HCNC,CPTII,S$GLB, | Performed by: STUDENT IN AN ORGANIZED HEALTH CARE EDUCATION/TRAINING PROGRAM

## 2023-12-21 PROCEDURE — 36415 COLL VENOUS BLD VENIPUNCTURE: CPT | Mod: HCNC | Performed by: STUDENT IN AN ORGANIZED HEALTH CARE EDUCATION/TRAINING PROGRAM

## 2023-12-21 PROCEDURE — 99215 PR OFFICE/OUTPT VISIT, EST, LEVL V, 40-54 MIN: ICD-10-PCS | Mod: HCNC,S$GLB,, | Performed by: STUDENT IN AN ORGANIZED HEALTH CARE EDUCATION/TRAINING PROGRAM

## 2023-12-21 PROCEDURE — 3074F SYST BP LT 130 MM HG: CPT | Mod: HCNC,CPTII,S$GLB, | Performed by: STUDENT IN AN ORGANIZED HEALTH CARE EDUCATION/TRAINING PROGRAM

## 2023-12-21 PROCEDURE — 99999 PR PBB SHADOW E&M-EST. PATIENT-LVL IV: CPT | Mod: PBBFAC,HCNC,, | Performed by: STUDENT IN AN ORGANIZED HEALTH CARE EDUCATION/TRAINING PROGRAM

## 2023-12-21 PROCEDURE — 99215 OFFICE O/P EST HI 40 MIN: CPT | Mod: HCNC,S$GLB,, | Performed by: STUDENT IN AN ORGANIZED HEALTH CARE EDUCATION/TRAINING PROGRAM

## 2023-12-21 PROCEDURE — 3079F PR MOST RECENT DIASTOLIC BLOOD PRESSURE 80-89 MM HG: ICD-10-PCS | Mod: HCNC,CPTII,S$GLB, | Performed by: STUDENT IN AN ORGANIZED HEALTH CARE EDUCATION/TRAINING PROGRAM

## 2023-12-21 PROCEDURE — 3074F PR MOST RECENT SYSTOLIC BLOOD PRESSURE < 130 MM HG: ICD-10-PCS | Mod: HCNC,CPTII,S$GLB, | Performed by: STUDENT IN AN ORGANIZED HEALTH CARE EDUCATION/TRAINING PROGRAM

## 2023-12-21 PROCEDURE — 99999 PR PBB SHADOW E&M-EST. PATIENT-LVL IV: ICD-10-PCS | Mod: PBBFAC,HCNC,, | Performed by: STUDENT IN AN ORGANIZED HEALTH CARE EDUCATION/TRAINING PROGRAM

## 2023-12-21 PROCEDURE — 3008F PR BODY MASS INDEX (BMI) DOCUMENTED: ICD-10-PCS | Mod: HCNC,CPTII,S$GLB, | Performed by: STUDENT IN AN ORGANIZED HEALTH CARE EDUCATION/TRAINING PROGRAM

## 2023-12-21 PROCEDURE — 99417 PR PROLONGED SVC, OUTPT, W/WO DIRECT PT CONTACT,  EA ADDTL 15 MIN: ICD-10-PCS | Mod: HCNC,S$GLB,, | Performed by: STUDENT IN AN ORGANIZED HEALTH CARE EDUCATION/TRAINING PROGRAM

## 2023-12-21 PROCEDURE — 1159F PR MEDICATION LIST DOCUMENTED IN MEDICAL RECORD: ICD-10-PCS | Mod: HCNC,CPTII,S$GLB, | Performed by: STUDENT IN AN ORGANIZED HEALTH CARE EDUCATION/TRAINING PROGRAM

## 2023-12-21 PROCEDURE — 3044F HG A1C LEVEL LT 7.0%: CPT | Mod: HCNC,CPTII,S$GLB, | Performed by: STUDENT IN AN ORGANIZED HEALTH CARE EDUCATION/TRAINING PROGRAM

## 2023-12-21 PROCEDURE — 3044F PR MOST RECENT HEMOGLOBIN A1C LEVEL <7.0%: ICD-10-PCS | Mod: HCNC,CPTII,S$GLB, | Performed by: STUDENT IN AN ORGANIZED HEALTH CARE EDUCATION/TRAINING PROGRAM

## 2023-12-21 PROCEDURE — 3008F BODY MASS INDEX DOCD: CPT | Mod: HCNC,CPTII,S$GLB, | Performed by: STUDENT IN AN ORGANIZED HEALTH CARE EDUCATION/TRAINING PROGRAM

## 2023-12-21 RX ORDER — AMANTADINE HYDROCHLORIDE 100 MG/1
100 TABLET ORAL 2 TIMES DAILY
Qty: 90 TABLET | Refills: 7 | Status: SHIPPED | OUTPATIENT
Start: 2023-12-21 | End: 2024-12-20

## 2023-12-21 RX ORDER — EPINEPHRINE 0.22MG
500 AEROSOL WITH ADAPTER (ML) INHALATION DAILY
Qty: 150 CAPSULE | Refills: 11 | Status: SHIPPED | OUTPATIENT
Start: 2023-12-21 | End: 2024-12-20

## 2023-12-21 NOTE — PROGRESS NOTES
[facility]  Follow Up Patient Visit      Disease Summary     Principle neurological diagnosis: MS    Date of symptom onset: 2006  Date of diagnosis: 2007  Disease type at diagnosis: RR  Disease type currently: SP  Previous therapy: Avonex (ineffective), Copaxone (breakthrough relapse), Betaseron (hives), Rebif (emotional s/e and needle fatigue), Tecfidera 7/2018 - 7/2021 (progression)  Current therapy: Ocrevus 7/7/21 - present  Last MRI Brain: 2/7/23 - stable   Last MRI C-spine: 5/6/21  Last MRI T-spine: 2/5/20  CSF: 2007 consistent with MS reportedly  JCV status: 0.32 indeterminate (2021)  Other relevant labs and tests: Vitamin D 35  (9/2023)    Interval history:     This is a patient previously seen by Dr. Mitchell here for follow up and establishing care.     Current MS symptoms:  Worsening leg stiffness compared to 2021, toes cramping that are painful  Increased falls   Balance worse compared to 6 months  Gait difficulty requiring walker, usually trouble with gait is due to dizziness and leg weakness   Arm weakness - more sloppy   Depressed mood   Constipated   Urinary urgency with incontinence  Double vision that is intermittent, and right eye blurriness > left eye   Finger tingling, not painful  MS hugs more frequently  Fatigue - hasn't noticed significant difference with Amantadine but she's been off last few months due to insurance coverage issues, she does drink coffee  Cognitive changes     Overall she feels worse than she did 6 months ago.     ROS:     SOCIAL HISTORY    Social History     Socioeconomic History    Marital status: Single   Tobacco Use    Smoking status: Never    Smokeless tobacco: Never   Substance and Sexual Activity    Alcohol use: No     Comment: social    Drug use: No     Types: Marijuana    Sexual activity: Not Currently     Partners: Male     Birth control/protection: None     Social Determinants of Health     Financial Resource Strain: Low Risk  (10/24/2023)     Overall Financial Resource Strain (CARDIA)     Difficulty of Paying Living Expenses: Not hard at all   Recent Concern: Financial Resource Strain - Medium Risk (9/13/2023)    Overall Financial Resource Strain (CARDIA)     Difficulty of Paying Living Expenses: Somewhat hard   Food Insecurity: No Food Insecurity (10/24/2023)    Hunger Vital Sign     Worried About Running Out of Food in the Last Year: Never true     Ran Out of Food in the Last Year: Never true   Recent Concern: Food Insecurity - Food Insecurity Present (9/13/2023)    Hunger Vital Sign     Worried About Running Out of Food in the Last Year: Sometimes true     Ran Out of Food in the Last Year: Sometimes true   Transportation Needs: No Transportation Needs (10/24/2023)    PRAPARE - Transportation     Lack of Transportation (Medical): No     Lack of Transportation (Non-Medical): No   Recent Concern: Transportation Needs - Unmet Transportation Needs (9/13/2023)    PRAPARE - Transportation     Lack of Transportation (Medical): Yes     Lack of Transportation (Non-Medical): Yes   Physical Activity: Insufficiently Active (10/24/2023)    Exercise Vital Sign     Days of Exercise per Week: 1 day     Minutes of Exercise per Session: 30 min   Stress: Stress Concern Present (10/24/2023)    [employer] - Occupational Stress Questionnaire     Feeling of Stress : To some extent   Social Connections: Moderately Isolated (10/24/2023)    Social Connection and Isolation Panel [NHANES]     Frequency of Communication with Friends and Family: More than three times a week     Frequency of Social Gatherings with Friends and Family: More than three times a week     Attends Tenriism Services: More than 4 times per year     Active Member of Clubs or Organizations: No     Attends Club or Organization Meetings: Never     Marital Status: Never    Housing Stability: Low Risk  (10/24/2023)    Housing Stability Vital Sign      Unable to Pay for Housing in the Last Year: No     Number of Places Lived in the Last Year: 1     Unstable Housing in the Last Year: No           5/4/2023     7:26 PM   REVIEW OF SYMPTOMS   Do you feel abnormally tired on most days? Yes   Do you feel you generally sleep well? Yes   Do you have difficulty controlling your bladder?  No   Do you have difficulty controlling your bowels?  No   Do you have frequent muscle cramps, tightness or spasms in your limbs?  Yes   Do you have new visual symptoms?  Yes   Do you have worsening difficulty with your memory or thinking? No   Do you have worsening symptoms of anxiety or depression?  No   For patients who walk, Do you have more difficulty walking?  Yes   Have you fallen since your last visit?  Yes   For patients who use wheelchairs: Do you have any skin wounds or breakdown? Not Applicable   Do you have difficulty using your hands?  Yes   Do you have shooting or burning pain? Yes   Do you have difficulty with sexual function?  No   If you are sexually active, are you using birth control? Y/N  N/A Not Applicable   Do you often choke when swallowing liquids or solid food?  No   Do you experience worsening symptoms when overheated? Yes   Do you need any new equipment such as a wheelchair, walker or shower chair? No   Do you receive co-pay financial assistance for your principal MS medicine? Not Applicable   Would you be interested in participating in an MS research trial in the future? No   For patients on Gilenya, Tecfidera, Aubagio, Rituxan, Ocrevus, Tysabri, Lemtrada or Methotrexate, are you aware that you should NOT receive live virus vaccines?  Yes   Do you feel you have adequate family/friend support?  Yes   Do you have health insurance?   Yes   Are you currently employed? No   Do you receive SSDI/SSI?  Yes   Do you use marijuana or cannabis products? No   How often? Daily   Have you been diagnosed with a urinary tract infection since your last visit here? No   Have  you been diagnosed with a respiratory tract infection since your last visit here? No   Have you been to the emergency room since your last visit here? No   Have you been hospitalized since your last visit here?  No         12/13/2022     8:16 AM   FSS SCORE & INTERPRETATION   FSS SCORE  63   FSS SCORE INTERPRETATION May be suffering from fatigue         12/13/2022     8:15 AM   MS TOÑO-D SCORE & INTERPRETATION   TOÑO-D SCORE  15   TOÑO-D INTERPRETATION  Mild to moderate Depression         12/13/2022     8:10 AM   MS MOY-7 SCORE & INTERPRETATION   MOY-7 SCORE  1   MOY-7 SCORE INTERPRETATION Normal         12/13/2022     8:18 AM   PEQ MS MOS PAIN EFFECTS SCORE & INTERPRETATION   PES SCORE 24   PES SCORE INTERPRETATION Scores can range from 6-30.  Items are scaled so that higher scores indicate a greater impact of pain on a patients mood and behavior.         9/15/2019    12:06 PM   PEQ MS SEXUAL SATISFACTION SCORE & INTERPRETATION   SSS SCORE  11   SSS SCORE INTERPRETATION Scores can range from 4-24.  Higher scores indicate greater problems with sexual satisfaction.         12/13/2022     8:21 AM   MS BLADDER CONTROL SCORE & INTERPRETATION   BLCS SCORE 3   BLCS SCORE INTERPRETATION  Scores can range from 0-22, with higher scores indicating greater bladder control problems.         12/13/2022     8:27 AM   MS BOWEL CONTROL SCORE & INTERPRETATION   BWCS SCORE 0   BWCS SCORE INTERPRETATION Scores can range from 0-26, with higher scores indicating greater bowel control problems.         12/13/2022     8:22 AM   PEQ MS IMPACT OF VISUAL IMPAIRMENT SCORE & INTERPRETATION   SHELBI SCALE SCORE  0   SHELBI SCORE INTERPRETATION Scores can range from 0-15, with higher scores indicating greater impact of visual problems on daily activites.         12/13/2022     8:25 AM   MS PDQ SCORE & INTERPRETATION   PDQ RETROSPECTIVE MEMORY SUBSCALE 5   PDQ ATTENTION/CONCENTRATION SUBSCALE 7   PDQ PROSPECTIVE MEMORY SUBSCALE 4   PDQ  "PLANNING/ORGANIZATION SUBSCALE 10   PDQ TOTAL SCORE 26   PDQ SCORE INTERPRETATION Scores can range from 0-80, with higher scores indicating greater perceived cognitive impairment.         12/13/2022     8:29 AM   MSSS SCORE & INTERPRETATION   MSSS TANGIBLE SUPPORT SUBSCALE 100   MSSS EMOTIONAL/INFORMATIONAL SUPPORT SUBSCALE 100   MSSS AFFECTIONATE SUPPORT SUBSCALE 100   MSSS POSITIVE SOCIAL INTERACTION SUBSCALE 100   MSSS TOTAL SCORE 100   MSSS SCORE INTERPRETATION Scores can range from 0-100, with higher scores indicating greater perceived support.         Exam:     Vitals:    12/21/23 0850   BP: 129/82   Pulse: (!) 56   Weight: 117.8 kg (259 lb 9.5 oz)   Height: 5' 7" (1.702 m)          In general, the patient is well nourished and appears to be in no acute distress.    MENTAL STATUS: language is fluent, normal verbal comprehension, short-term and remote memory is intact, attention is normal, patient is alert and oriented, fund of knowlege is appropriate by vocabulary.     CRANIAL NERVE EXAM:  B/l ELMA with subjective dizziness, no facial asymmetry. Facial sensation is intact bilaterally. There is no dysarthria. Uvula is midline, and palate moves symmetrically. Shoulder shrug intact bilaterlly. Tongue protrusion is midline. Hearing is intact to finger rub bilaterally. Neck is supple with full ROM    MOTOR EXAM: Normal bulk and tone throughout UE and LE bilaterally.   No pronator drift; slowed rapid finger tapping in LUE    Strength:  R deltoid 5/5, L deltoid 5/5  R biceps 5/5, L biceps 5/5  R triceps 5/5, L triceps 4/5  R finger flexors 5/5, L finger flexors 4+/5  R finger extensors 5/5, L finger extensors 5/5  R finger abductors 5/5, L finger abductors 4/5  R  hip flexors 4/5, L hip flexors 3-/5  R knee extensors 5/5, L knee extensors 4/5  R knee flexors 5/5, L knee flexors 4+/5  R ankle dorsiflexors 5/5, L ankle dorsiflexors 3+/5  R ankle plantarflexors 5/5, L ankle plantarflexors 4/5    SENSORY EXAM: Decreased " PP in RUE and RLE, intact vibration and LT throughout.     COORDINATION: Dysmetria in L on FTN    GAIT: Slowed, ataxic gait, with L circumduction and L foot drop, rollator dependent.           4/6/2021    12:00 AM 5/10/2023    12:01 AM 12/21/2023    12:01 AM   Timed 25 Foot Walk:   Did patient wear an AFO? No No No   Was assistive device used? Yes Yes Yes   Assistive device used (jewels one): Bilateral Assistance Bilateral Assistance Bilateral Assistance   Bilateral device used Walker/Rollator Walker/Rollator Walker/Rollator   Time for 25 Foot Walk (seconds) 21.8 22.7 28.45   Time for 25 Foot Walk (seconds) 21.6  29.38     Imaging (personally reviewed):           Results for orders placed during the hospital encounter of 05/02/22    MRI Brain Demyelinating Without Contrast    Impression  Brain appears stable from prior exam, again demonstrating findings compatible with the reported history of multiple sclerosis.  No new discrete lesions to indicate ongoing demyelination.    Electronically signed by resident: Bernard Palafox  Date:    05/02/2022  Time:    12:07    Electronically signed by: Tonio Cohen MD  Date:    05/02/2022  Time:    13:35    Results for orders placed during the hospital encounter of 02/05/20    MRI Cervical Spine Demyelinating Without Contrast    Impression  Stable appearance of the brain and spinal cord again demonstrating findings compatible with the reported history of multiple sclerosis as further discussed above.  No new discrete lesions identified to indicate ongoing demyelination.    Mild degenerative change in the cervical spine with posterior central disc extrusion at C3-C4 with mild indentation of the ventral cord. Additional mild multilevel cervical and thoracic spondylosis, as above.  No significant neural foraminal narrowing or spinal canal stenosis elsewhere.    Electronically signed by resident: Kishan Patino MD  Date:    02/05/2020  Time:    13:34    Electronically signed  by: Tonio Cohen MD  Date:    02/05/2020  Time:    16:11    Results for orders placed during the hospital encounter of 02/05/20    MRI Thoracic Spine Demyelinating Without Contrast    Impression  Stable appearance of the brain and spinal cord again demonstrating findings compatible with the reported history of multiple sclerosis as further discussed above.  No new discrete lesions identified to indicate ongoing demyelination.    Mild degenerative change in the cervical spine with posterior central disc extrusion at C3-C4 with mild indentation of the ventral cord. Additional mild multilevel cervical and thoracic spondylosis, as above.  No significant neural foraminal narrowing or spinal canal stenosis elsewhere.    Electronically signed by resident: Kishan Patino MD  Date:    02/05/2020  Time:    13:34    Electronically signed by: Tonio Cohen MD  Date:    02/05/2020  Time:    16:11    Results for orders placed during the hospital encounter of 02/27/23    MRI Brain Demyelinating W W/O Contrast    Impression  Brain appears stable from prior exam, again demonstrating findings compatible with the reported history of multiple sclerosis.  No new or enhancing lesions to indicate ongoing or active demyelination.      Electronically signed by: Tonio Cohen MD  Date:    02/27/2023  Time:    14:47    Results for orders placed during the hospital encounter of 05/06/21    MRI Cervical Spine Demyelinating W W/O Contrast    Impression  MRI brain: No significant change from prior.  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    No new cord signal  abnormality or abnormal intrathecal enhancement to suggest significant interval or active cord demyelination.    Continued multilevel degenerative change cervical spine as detailed above.      Electronically signed by: Nain Dooley DO  Date:    05/06/2021  Time:    13:31    No results found for this or any previous visit.      MRI C-spine  5/6/21      Labs:     Lab Results   Component Value Date    HOVXTZEY50SQ 35 09/22/2023    GSCCWVDD73TJ 49 05/10/2023    PECLTOPM30PY 33 10/14/2022     Lab Results   Component Value Date    JCVINDEX 0.32 (H) 05/31/2021    JCVANTIBODY INDETERMINATE (A) 05/31/2021     Lab Results   Component Value Date    VY0CNMBH 70.8 05/31/2021    ABSOLUTECD3 747 05/31/2021    RO8DLSQA 16.7 05/31/2021    ABSOLUTECD8 177 (L) 05/31/2021    BH6MVPTK 55.1 05/31/2021    ABSOLUTECD4 581 05/31/2021    LABCD48 3.29 05/31/2021     Lab Results   Component Value Date    WBC 11.41 11/28/2023    RBC 4.59 11/28/2023    HGB 12.6 11/28/2023    HCT 38.7 11/28/2023    MCV 84 11/28/2023    MCH 27.5 11/28/2023    MCHC 32.6 11/28/2023    RDW 15.3 (H) 11/28/2023     11/28/2023    MPV 12.9 11/28/2023    GRAN 8.5 (H) 11/28/2023    GRAN 74.0 (H) 11/28/2023    LYMPH 1.7 11/28/2023    LYMPH 15.1 (L) 11/28/2023    MONO 0.9 11/28/2023    MONO 8.1 11/28/2023    EOS 0.2 11/28/2023    BASO 0.06 11/28/2023    EOSINOPHIL 2.1 11/28/2023    BASOPHIL 0.5 11/28/2023     Sodium   Date Value Ref Range Status   11/20/2023 139 136 - 145 mmol/L Final     Potassium   Date Value Ref Range Status   11/20/2023 3.7 3.5 - 5.1 mmol/L Final     Chloride   Date Value Ref Range Status   11/20/2023 104 95 - 110 mmol/L Final     CO2   Date Value Ref Range Status   11/20/2023 26 23 - 29 mmol/L Final     Glucose   Date Value Ref Range Status   11/20/2023 78 70 - 110 mg/dL Final     BUN   Date Value Ref Range Status   11/20/2023 10 6 - 20 mg/dL Final     Creatinine   Date Value Ref Range Status   11/20/2023 0.9 0.5 - 1.4 mg/dL Final     Calcium    Date Value Ref Range Status   11/20/2023 9.4 8.7 - 10.5 mg/dL Final     Total Protein   Date Value Ref Range Status   11/20/2023 7.9 6.0 - 8.4 g/dL Final     Albumin   Date Value Ref Range Status   11/20/2023 3.6 3.5 - 5.2 g/dL Final     Total Bilirubin   Date Value Ref Range Status   11/20/2023 0.5 0.1 - 1.0 mg/dL Final     Comment:     For infants and newborns, interpretation of results should be based  on gestational age, weight and in agreement with clinical  observations.    Premature Infant recommended reference ranges:  Up to 24 hours.............<8.0 mg/dL  Up to 48 hours............<12.0 mg/dL  3-5 days..................<15.0 mg/dL  6-29 days.................<15.0 mg/dL       Alkaline Phosphatase   Date Value Ref Range Status   11/20/2023 63 55 - 135 U/L Final     AST   Date Value Ref Range Status   11/20/2023 21 10 - 40 U/L Final     ALT   Date Value Ref Range Status   11/20/2023 17 10 - 44 U/L Final     Anion Gap   Date Value Ref Range Status   11/20/2023 9 8 - 16 mmol/L Final     eGFR if    Date Value Ref Range Status   06/25/2022 >60.0 >60 mL/min/1.73 m^2 Final     eGFR if non    Date Value Ref Range Status   06/25/2022 >60.0 >60 mL/min/1.73 m^2 Final     Comment:     Calculation used to obtain the estimated glomerular filtration  rate (eGFR) is the CKD-EPI equation.                   Diagnosis/Assessment/Plan:     Multiple Sclerosis  -Assessment: SPMS with significant disability on exam, active progression over the past 2 years. She is tolerating Ocrevus well.   -Imaging: Updated MRI brain and spine in Feb 2024  -Disease Modifying Therapies:Continue Ocrevus, discussed that this may slow down disability progression but doesn't stop it, will check some labs today to evaluate drug efficacy  Symptom management   -Weekly vitamin D   -Fatigue: continue amantadine (will send to [facility]), trial of CoQ10    -Neuro-optho follow up  Plan discussed and questions were answered to  satisfaction.  Return to clinic in 3 month with AP.      NEURO MULTIPLE SCLEROSIS IMPRESSION:   MS Status:     Number of relapses in the past year?:  0    Clinical Progression:  Worsened    Type:  Progressive    MRI Progression:  Stable  Plan:     DMT:  No change in management    Symptom Management:  Implement change in symptom management    Implement Change in Symptom Management:  Fatigue        Total time spent with the patient: 70 minutes, including face to face consultation, chart review and coordination of care, on the day of the visit. This includes face to face time and non-face to face time preparing to see the patient (eg, review of tests), obtaining and/or reviewing separately obtained history, documenting clinical information in the electronic or other health record, independently interpreting resultsand communicating results to the patient/family/caregiver, or care coordination.         Coco Durham MD, MSc  Attending neurologist

## 2023-12-21 NOTE — PATIENT INSTRUCTIONS
Make an account at [email or web address]    Coenzyme Q10 500mg a day for fatigue  Resume Amantadine 10mg twice a daily for fatigue  Remind to take vitamin D [id number] weekly    We will contact Dr. Barth's office for eye exam reminder  Updated MRI in Feb 2024  Labs today   3 month follow up Viviana

## 2023-12-21 NOTE — PROGRESS NOTES
SW assisted Pt with [facility] sign up and medication order at the end of appointment. Pt documented username and password in her phone's notes scot.

## 2023-12-22 ENCOUNTER — TELEPHONE (OUTPATIENT)
Dept: OPHTHALMOLOGY | Facility: CLINIC | Age: 43
End: 2023-12-22
Payer: MEDICARE

## 2023-12-22 LAB
PATH REPORT.FINAL DX SPEC: NORMAL
RITUXAN SENSITIVITY (CD20): NORMAL

## 2023-12-22 NOTE — TELEPHONE ENCOUNTER
----- Message from Tonio Barth MD sent at 12/21/2023 11:11 AM CST -----  Referral for double vision and blurred vision.   ----- Message -----  From: Claudia Ivory MA  Sent: 12/21/2023  10:07 AM CST  To: Tonio Barth MD    Good Morning , I have this pt above who has a referral in for an eye exam ; if there is any availability can she be scheduled  at the next soonest date ?   Thank you    Best     Claudia MENDOZA       (4) no limitation

## 2023-12-22 NOTE — TELEPHONE ENCOUNTER
L/M in regards to scheduling an appointment w/--Referral for double vision and blurred vision. jannie

## 2023-12-26 LAB — NEUROFILAMENT LIGHT CHAIN, PLASMA: 7.2 PG/ML

## 2024-01-12 DIAGNOSIS — G35 MULTIPLE SCLEROSIS: Primary | ICD-10-CM

## 2024-01-18 ENCOUNTER — TELEPHONE (OUTPATIENT)
Dept: ADMINISTRATIVE | Facility: HOSPITAL | Age: 44
End: 2024-01-18
Payer: MEDICARE

## 2024-01-19 ENCOUNTER — OFFICE VISIT (OUTPATIENT)
Dept: DERMATOLOGY | Facility: CLINIC | Age: 44
End: 2024-01-19
Payer: MEDICARE

## 2024-01-19 DIAGNOSIS — L30.9 ECZEMA, UNSPECIFIED TYPE: ICD-10-CM

## 2024-01-19 DIAGNOSIS — L28.0 LICHEN SIMPLEX CHRONICUS: Primary | ICD-10-CM

## 2024-01-19 PROCEDURE — 1160F RVW MEDS BY RX/DR IN RCRD: CPT | Mod: HCNC,CPTII,S$GLB, | Performed by: STUDENT IN AN ORGANIZED HEALTH CARE EDUCATION/TRAINING PROGRAM

## 2024-01-19 PROCEDURE — 99204 OFFICE O/P NEW MOD 45 MIN: CPT | Mod: 25,HCNC,S$GLB, | Performed by: STUDENT IN AN ORGANIZED HEALTH CARE EDUCATION/TRAINING PROGRAM

## 2024-01-19 PROCEDURE — 99999 PR PBB SHADOW E&M-EST. PATIENT-LVL III: CPT | Mod: PBBFAC,HCNC,, | Performed by: STUDENT IN AN ORGANIZED HEALTH CARE EDUCATION/TRAINING PROGRAM

## 2024-01-19 PROCEDURE — 11900 INJECT SKIN LESIONS </W 7: CPT | Mod: HCNC,S$GLB,, | Performed by: STUDENT IN AN ORGANIZED HEALTH CARE EDUCATION/TRAINING PROGRAM

## 2024-01-19 PROCEDURE — 1159F MED LIST DOCD IN RCRD: CPT | Mod: HCNC,CPTII,S$GLB, | Performed by: STUDENT IN AN ORGANIZED HEALTH CARE EDUCATION/TRAINING PROGRAM

## 2024-01-19 RX ORDER — BETAMETHASONE DIPROPIONATE 0.5 MG/G
CREAM TOPICAL 2 TIMES DAILY
Qty: 100 G | Refills: 2 | Status: SHIPPED | OUTPATIENT
Start: 2024-01-19

## 2024-01-19 NOTE — PROGRESS NOTES
Subjective:      Patient ID:  Jaylon Bowles is a 43 y.o. female who presents for   Chief Complaint   Patient presents with    Rash     Rash - Initial  Affected locations: arms and legs.  Duration: 10 years  Signs / symptoms: dryness, redness, scaling and itching  Timing: constant  Treatments tried: Aquaphor and oatmeal lotion.  Improvement on treatment: mild    Saw Dr. De Paz 2018 for LSC. Given ILK which helped    Review of Systems   Skin:  Positive for itching, rash and dry skin.       Objective:   Physical Exam   Constitutional: She appears well-developed and well-nourished. No distress.   Neurological: She is alert and oriented to person, place, and time. She is not disoriented.   Psychiatric: She has a normal mood and affect.   Skin:   Areas Examined (abnormalities noted in diagram):   RUE Inspected  LLE Inspection Performed            Diagram Legend     Erythematous scaling macule/papule c/w actinic keratosis       Vascular papule c/w angioma      Pigmented verrucoid papule/plaque c/w seborrheic keratosis      Yellow umbilicated papule c/w sebaceous hyperplasia      Irregularly shaped tan macule c/w lentigo     1-2 mm smooth white papules consistent with Milia      Movable subcutaneous cyst with punctum c/w epidermal inclusion cyst      Subcutaneous movable cyst c/w pilar cyst      Firm pink to brown papule c/w dermatofibroma      Pedunculated fleshy papule(s) c/w skin tag(s)      Evenly pigmented macule c/w junctional nevus     Mildly variegated pigmented, slightly irregular-bordered macule c/w mildly atypical nevus      Flesh colored to evenly pigmented papule c/w intradermal nevus       Pink pearly papule/plaque c/w basal cell carcinoma      Erythematous hyperkeratotic cursted plaque c/w SCC      Surgical scar with no sign of skin cancer recurrence      Open and closed comedones      Inflammatory papules and pustules      Verrucoid papule consistent consistent with wart     Erythematous eczematous patches  and plaques     Dystrophic onycholytic nail with subungual debris c/w onychomycosis     Umbilicated papule    Erythematous-base heme-crusted tan verrucoid plaque consistent with inflamed seborrheic keratosis     Erythematous Silvery Scaling Plaque c/w Psoriasis     See annotation      Assessment / Plan:        Lichen simplex chronicus  Eczema, unspecified type  -     augmented betamethasone dipropionate (DIPROLENE-AF) 0.05 % cream; Apply topically 2 (two) times daily. Use to affected areas for up to 2 weeks then take a 1 week break or decrease to 3 times weekly. Do not apply to groin or face. Use to the rough itchy areas on arms and legs then cover with bandage  Dispense: 100 g; Refill: 2    - asteatotic dermatitis and LSC on leg and arm. Counseled on dry skin care. Counseled to avoid scratching ILK today as below then use diprolene AF under occlusion PRN    Intralesional Kenalog 5mg/cc (3 cc total) injected into 2 lesions on the right arm and leg leg today after obtaining verbal consent including risk of surrounding hypopigmentation. Patient tolerated procedure well.    Units: 2  NDC for Kenalog 10mg/cc:  [id number]         Follow up if symptoms worsen or fail to improve.

## 2024-01-19 NOTE — PATIENT INSTRUCTIONS
XEROSIS (DRY SKIN)        Definition    Xerosis is the term for dry skin.  We all have a natural oil coating over our skin produced by the skin oil glands.  If this oil is removed, the skin becomes dry which can lead to cracking, which can lead to inflammation.  Xerosis is usually a long-term problem that recurs often, especially in the winter.    Cause    Long hot baths or showers can remove our natural oil and lead to xerosis.  One should never take more than one bath or shower a day and for no longer than ten minutes.  Use of harsh soaps such as Zest, Dial, and Ivory can worsen and cause xerosis.  Cold winter weather worsens xerosis because the amount of moisture contained in cold air is much less than the amount of moisture in warm air.    Treatment    Treatment is intended to restore the natural oil to your skin.  Keep the skin lubricated.    Do not take more than one bath or shower a day.  Use lukewarm water, not hot.  Hot water dries out the skin.    Use a gentle moisturizing soap such as Cetaphil soap, Oil of Olay, Dove, Basis, Ivory moisture care, Restoraderm cleanser.    When toweling dry, dont rub.  Blot the skin so there is still some water left on the skin.  You should apply a moisturizing cream to all of the skin such as Cerave cream, Cetaphil cream, Lipikar Conception AP+ Intense Repair Moisturizing Cream or Restoraderm or Eucerin Original Formula cream.   Alpha hydroxyacid lotions, i.e., AmLactin, also work very well for preventing dry skin, but may burn when used on inflamed or reddened skin.    If you like to swim during the winter months, you should not use soap when getting out of the pool.  When you have finished swimming, rinse off the chlorine with cool to warm water.  If this will be the only shower of the day, then you may use Cetaphil or another mild soap to cleanse your skin.  After the shower, apply a moisturizing cream to all of the skin as above.        [address] [facility] [place] [address]/ [phone] FAX/ [email or web address]

## 2024-01-22 ENCOUNTER — PATIENT MESSAGE (OUTPATIENT)
Dept: PSYCHIATRY | Facility: CLINIC | Age: 44
End: 2024-01-22
Payer: MEDICARE

## 2024-01-26 ENCOUNTER — INFUSION (OUTPATIENT)
Dept: INFUSION THERAPY | Facility: HOSPITAL | Age: 44
End: 2024-01-26
Payer: MEDICARE

## 2024-01-26 VITALS
RESPIRATION RATE: 18 BRPM | DIASTOLIC BLOOD PRESSURE: 74 MMHG | HEART RATE: 66 BPM | WEIGHT: 257.94 LBS | SYSTOLIC BLOOD PRESSURE: 126 MMHG | TEMPERATURE: 98 F | HEIGHT: 67 IN | BODY MASS INDEX: 40.48 KG/M2

## 2024-01-26 DIAGNOSIS — G35 MULTIPLE SCLEROSIS: Primary | ICD-10-CM

## 2024-01-26 PROCEDURE — 63600175 PHARM REV CODE 636 W HCPCS: Mod: HCNC | Performed by: CLINICAL NURSE SPECIALIST

## 2024-01-26 PROCEDURE — 96367 TX/PROPH/DG ADDL SEQ IV INF: CPT | Mod: HCNC

## 2024-01-26 PROCEDURE — 96365 THER/PROPH/DIAG IV INF INIT: CPT | Mod: HCNC

## 2024-01-26 PROCEDURE — 25000003 PHARM REV CODE 250: Mod: HCNC | Performed by: CLINICAL NURSE SPECIALIST

## 2024-01-26 PROCEDURE — 96375 TX/PRO/DX INJ NEW DRUG ADDON: CPT | Mod: HCNC

## 2024-01-26 PROCEDURE — 96366 THER/PROPH/DIAG IV INF ADDON: CPT | Mod: HCNC

## 2024-01-26 RX ORDER — HEPARIN 100 UNIT/ML
500 SYRINGE INTRAVENOUS
OUTPATIENT
Start: 2024-06-07

## 2024-01-26 RX ORDER — DIPHENHYDRAMINE HYDROCHLORIDE 50 MG/ML
50 INJECTION INTRAMUSCULAR; INTRAVENOUS
OUTPATIENT
Start: 2024-06-07

## 2024-01-26 RX ORDER — EPINEPHRINE 0.3 MG/.3ML
0.3 INJECTION SUBCUTANEOUS
OUTPATIENT
Start: 2024-06-07

## 2024-01-26 RX ORDER — ACETAMINOPHEN 500 MG
1000 TABLET ORAL
Status: COMPLETED | OUTPATIENT
Start: 2024-01-26 | End: 2024-01-26

## 2024-01-26 RX ORDER — FAMOTIDINE 10 MG/ML
20 INJECTION INTRAVENOUS
Status: COMPLETED | OUTPATIENT
Start: 2024-01-26 | End: 2024-01-26

## 2024-01-26 RX ORDER — FAMOTIDINE 10 MG/ML
20 INJECTION INTRAVENOUS
OUTPATIENT
Start: 2024-06-07

## 2024-01-26 RX ORDER — HEPARIN 100 UNIT/ML
500 SYRINGE INTRAVENOUS
Status: DISCONTINUED | OUTPATIENT
Start: 2024-01-26 | End: 2024-01-26 | Stop reason: HOSPADM

## 2024-01-26 RX ORDER — ACETAMINOPHEN 500 MG
1000 TABLET ORAL
OUTPATIENT
Start: 2024-06-07

## 2024-01-26 RX ORDER — SODIUM CHLORIDE 0.9 % (FLUSH) 0.9 %
10 SYRINGE (ML) INJECTION
Status: DISCONTINUED | OUTPATIENT
Start: 2024-01-26 | End: 2024-01-26 | Stop reason: HOSPADM

## 2024-01-26 RX ORDER — DIPHENHYDRAMINE HYDROCHLORIDE 50 MG/ML
50 INJECTION INTRAMUSCULAR; INTRAVENOUS
Status: DISCONTINUED | OUTPATIENT
Start: 2024-01-26 | End: 2024-01-26 | Stop reason: HOSPADM

## 2024-01-26 RX ORDER — EPINEPHRINE 0.3 MG/.3ML
0.3 INJECTION SUBCUTANEOUS
Status: DISCONTINUED | OUTPATIENT
Start: 2024-01-26 | End: 2024-01-26 | Stop reason: HOSPADM

## 2024-01-26 RX ORDER — SODIUM CHLORIDE 0.9 % (FLUSH) 0.9 %
10 SYRINGE (ML) INJECTION
OUTPATIENT
Start: 2024-06-07

## 2024-01-26 RX ADMIN — ACETAMINOPHEN 1000 MG: 500 TABLET ORAL at 09:01

## 2024-01-26 RX ADMIN — SODIUM CHLORIDE: 0.9 INJECTION, SOLUTION INTRAVENOUS at 09:01

## 2024-01-26 RX ADMIN — FAMOTIDINE 20 MG: 10 INJECTION INTRAVENOUS at 09:01

## 2024-01-26 RX ADMIN — OCRELIZUMAB 600 MG: 300 INJECTION INTRAVENOUS at 10:01

## 2024-01-26 RX ADMIN — DIPHENHYDRAMINE HYDROCHLORIDE 50 MG: 50 INJECTION, SOLUTION INTRAMUSCULAR; INTRAVENOUS at 09:01

## 2024-01-26 RX ADMIN — DEXTROSE 100 MG: 50 INJECTION, SOLUTION INTRAVENOUS at 09:01

## 2024-01-26 NOTE — PLAN OF CARE
1448 pt tolerated ocrevus infusion without issue, pt has no upcoming appts scheduled at this time, assisted to WC, no distress noted upon d/c to home with brother

## 2024-01-26 NOTE — PLAN OF CARE
0920 pt here for maintenance Ocrevus infusion , hx, meds, allergies reviewed, pt with no new complaints at this time, assisted to recliner form WC, warm blanket provided, continue to monitor

## 2024-01-27 ENCOUNTER — PATIENT MESSAGE (OUTPATIENT)
Dept: NEUROLOGY | Facility: CLINIC | Age: 44
End: 2024-01-27
Payer: MEDICARE

## 2024-02-09 ENCOUNTER — PATIENT MESSAGE (OUTPATIENT)
Dept: NEUROLOGY | Facility: CLINIC | Age: 44
End: 2024-02-09
Payer: MEDICARE

## 2024-02-13 ENCOUNTER — PATIENT MESSAGE (OUTPATIENT)
Dept: NEUROLOGY | Facility: CLINIC | Age: 44
End: 2024-02-13
Payer: MEDICARE

## 2024-02-14 ENCOUNTER — PATIENT MESSAGE (OUTPATIENT)
Dept: NEUROLOGY | Facility: CLINIC | Age: 44
End: 2024-02-14
Payer: MEDICARE

## 2024-02-17 ENCOUNTER — PATIENT MESSAGE (OUTPATIENT)
Dept: INTERNAL MEDICINE | Facility: CLINIC | Age: 44
End: 2024-02-17
Payer: MEDICARE

## 2024-02-28 ENCOUNTER — OFFICE VISIT (OUTPATIENT)
Dept: INTERNAL MEDICINE | Facility: CLINIC | Age: 44
End: 2024-02-28
Payer: MEDICARE

## 2024-02-28 ENCOUNTER — HOSPITAL ENCOUNTER (OUTPATIENT)
Dept: RADIOLOGY | Facility: HOSPITAL | Age: 44
Discharge: HOME | End: 2024-02-28
Attending: INTERNAL MEDICINE
Payer: MEDICARE

## 2024-02-28 VITALS
HEART RATE: 69 BPM | BODY MASS INDEX: 42.57 KG/M2 | SYSTOLIC BLOOD PRESSURE: 130 MMHG | RESPIRATION RATE: 18 BRPM | OXYGEN SATURATION: 98 % | HEIGHT: 65 IN | TEMPERATURE: 98 F | WEIGHT: 255.5 LBS | DIASTOLIC BLOOD PRESSURE: 70 MMHG

## 2024-02-28 DIAGNOSIS — G89.29 CHRONIC RIGHT-SIDED LOW BACK PAIN WITH RIGHT-SIDED SCIATICA: Primary | ICD-10-CM

## 2024-02-28 DIAGNOSIS — E66.01 MORBID OBESITY WITH BMI OF 40.0-44.9, ADULT: ICD-10-CM

## 2024-02-28 DIAGNOSIS — R20.0 NUMBNESS OF RIGHT FOOT: ICD-10-CM

## 2024-02-28 DIAGNOSIS — M54.41 CHRONIC RIGHT-SIDED LOW BACK PAIN WITH RIGHT-SIDED SCIATICA: Primary | ICD-10-CM

## 2024-02-28 DIAGNOSIS — F33.0 RECURRENT DEPRESSIVE DISORDER, CURRENT EPISODE MILD: ICD-10-CM

## 2024-02-28 DIAGNOSIS — G35 MULTIPLE SCLEROSIS: ICD-10-CM

## 2024-02-28 DIAGNOSIS — M54.41 CHRONIC RIGHT-SIDED LOW BACK PAIN WITH RIGHT-SIDED SCIATICA: ICD-10-CM

## 2024-02-28 DIAGNOSIS — G89.29 CHRONIC RIGHT-SIDED LOW BACK PAIN WITH RIGHT-SIDED SCIATICA: ICD-10-CM

## 2024-02-28 DIAGNOSIS — R53.83 FATIGUE, UNSPECIFIED TYPE: ICD-10-CM

## 2024-02-28 DIAGNOSIS — E06.3 HASHIMOTO'S THYROIDITIS: ICD-10-CM

## 2024-02-28 DIAGNOSIS — I10 ESSENTIAL HYPERTENSION: ICD-10-CM

## 2024-02-28 PROCEDURE — 99214 OFFICE O/P EST MOD 30 MIN: CPT | Mod: HCNC,S$GLB,, | Performed by: INTERNAL MEDICINE

## 2024-02-28 PROCEDURE — 1159F MED LIST DOCD IN RCRD: CPT | Mod: HCNC,CPTII,S$GLB, | Performed by: INTERNAL MEDICINE

## 2024-02-28 PROCEDURE — 99999 PR PBB SHADOW E&M-EST. PATIENT-LVL IV: CPT | Mod: PBBFAC,HCNC,, | Performed by: INTERNAL MEDICINE

## 2024-02-28 PROCEDURE — 72110 X-RAY EXAM L-2 SPINE 4/>VWS: CPT | Mod: TC,HCNC,PO

## 2024-02-28 PROCEDURE — 3078F DIAST BP <80 MM HG: CPT | Mod: HCNC,CPTII,S$GLB, | Performed by: INTERNAL MEDICINE

## 2024-02-28 PROCEDURE — 1160F RVW MEDS BY RX/DR IN RCRD: CPT | Mod: HCNC,CPTII,S$GLB, | Performed by: INTERNAL MEDICINE

## 2024-02-28 PROCEDURE — 72110 X-RAY EXAM L-2 SPINE 4/>VWS: CPT | Mod: 26,HCNC,, | Performed by: RADIOLOGY

## 2024-02-28 PROCEDURE — 3008F BODY MASS INDEX DOCD: CPT | Mod: HCNC,CPTII,S$GLB, | Performed by: INTERNAL MEDICINE

## 2024-02-28 PROCEDURE — 3075F SYST BP GE 130 - 139MM HG: CPT | Mod: HCNC,CPTII,S$GLB, | Performed by: INTERNAL MEDICINE

## 2024-02-28 RX ORDER — LEVOTHYROXINE SODIUM 112 UG/1
112 TABLET ORAL
Qty: 90 TABLET | Refills: 1 | Status: SHIPPED | OUTPATIENT
Start: 2024-02-28

## 2024-02-28 RX ORDER — CARVEDILOL 25 MG/1
25 TABLET ORAL 2 TIMES DAILY WITH MEALS
Qty: 180 TABLET | Refills: 1 | Status: SHIPPED | OUTPATIENT
Start: 2024-02-28

## 2024-02-28 RX ORDER — LOSARTAN POTASSIUM AND HYDROCHLOROTHIAZIDE 12.5; 1 MG/1; MG/1
1 TABLET ORAL DAILY
Qty: 90 TABLET | Refills: 1 | Status: SHIPPED | OUTPATIENT
Start: 2024-02-28 | End: 2024-06-05

## 2024-02-28 NOTE — PROGRESS NOTES
Subjective:     Jaylon Bowles is a 43 y.o. female who presents for   Chief Complaint   Patient presents with    Back Pain    Hypertension    Thyroid Problem    Depression       HPI    Back Pain: The patient has had recurrent self limited episodes of low back pain in the past. Symptoms have been present for several months and are unchanged.  Onset was related to / precipitated by no known injury. The pain is located in the right lumbar area and radiates to the right thigh. The pain is described as aching and occurs intermittently.  Symptoms are improved by muscle relaxants but they cause her to feel drowsy. She has also tried rest which provided no symptom relief. She reports numbness in the right foot.     Hypertension: The patient has been taking medications as instructed, no medication side effects noted, no chest pain on exertion, no dyspnea on exertion, and no swelling of ankles.    BP Readings from Last 3 Encounters:   02/28/24 130/70   01/26/24 126/74   12/21/23 129/82     Thyroid Disease: Presents for follow up of hypothyroidism. Current symptoms: change in energy level. Patient denies palpitations. Symptoms have been intermittent.    Depression: She presents for follow up of depression. Current symptoms include depressed mood, fatigue, and impaired memory. Symptoms have been gradually improving since that time. Patient denies hypersomnia. Previous treatment includes: medication. She complains of the following side effects from the treatment: none.    Frequent falls in 3 months (4-5)    Body mass index is 42.52 kg/m².      Review of Systems   Constitutional:  Positive for fatigue. Negative for chills, diaphoresis and fever.   HENT:  Negative for congestion, ear pain, rhinorrhea, sinus pressure and sore throat.    Eyes:  Negative for discharge and visual disturbance.   Respiratory:  Negative for cough and shortness of breath.    Cardiovascular:  Negative for chest pain and palpitations.    Gastrointestinal:  Negative for abdominal pain, constipation, diarrhea, nausea and vomiting.   Endocrine: Negative for cold intolerance, heat intolerance, polydipsia and polyuria.   Musculoskeletal:  Positive for back pain and gait problem. Negative for arthralgias and myalgias.   Neurological:  Positive for weakness (legs) and numbness. Negative for dizziness and headaches.   Psychiatric/Behavioral:  Positive for dysphoric mood (she reports mild improvement). The patient is not nervous/anxious.           Objective:     Physical Exam  Vitals reviewed.   Constitutional:       General: She is awake. She is not in acute distress.     Appearance: Normal appearance. She is well-developed and well-groomed.   HENT:      Head: Normocephalic and atraumatic.      Right Ear: Hearing and external ear normal.      Left Ear: Hearing and external ear normal.      Nose: Nose normal. No congestion.      Mouth/Throat:      Mouth: Mucous membranes are moist.   Eyes:      General: Lids are normal. Vision grossly intact.   Cardiovascular:      Rate and Rhythm: Normal rate and regular rhythm.      Heart sounds: Normal heart sounds. No murmur heard.  Pulmonary:      Effort: Pulmonary effort is normal.      Breath sounds: Normal breath sounds. No decreased breath sounds or wheezing.   Abdominal:      General: Bowel sounds are normal. There is no distension.   Musculoskeletal:         General: Normal range of motion.      Cervical back: Normal range of motion.      Right lower leg: No edema.      Left lower leg: No edema.   Skin:     General: Skin is warm and dry.      Findings: No lesion or rash.   Neurological:      Mental Status: She is alert and oriented to person, place, and time.      Motor: Weakness present.      Comments: In wheelchair   Psychiatric:         Attention and Perception: Attention normal.         Mood and Affect: Mood normal.         Behavior: Behavior is cooperative.            Assessment:      1. Chronic right-sided  low back pain with right-sided sciatica    2. Numbness of right foot    3. Essential hypertension    4. Recurrent depressive disorder, current episode mild    5. Fatigue, unspecified type    6. Hashimoto's thyroiditis    7. Morbid obesity with BMI of 40.0-44.9, adult    8. Multiple sclerosis           Plan:     1. Chronic right-sided low back pain with right-sided sciatica  - X-Ray Lumbar Spine 5 View; Future  - review x-ray, consider MRI    2. Numbness of right foot  - X-Ray Lumbar Spine 5 View; Future    3. Essential hypertension  - controlled, continue carvedilol, losartan, HCTZ  - Basic Metabolic Panel; Future  - losartan-hydrochlorothiazide 100-12.5 mg (HYZAAR) 100-12.5 mg Tab; Take 1 tablet by mouth once daily.  Dispense: 90 tablet; Refill: 1  - carvediloL (COREG) 25 MG tablet; Take 1 tablet (25 mg total) by mouth 2 (two) times daily with meals.  Dispense: 180 tablet; Refill: 1    4. Recurrent depressive disorder, current episode mild  - mild improvement since last appointment, continue Lexapro    5. Fatigue, unspecified type  - CBC Auto Differential; Future  - Vitamin D; Future    6. Hashimoto's thyroiditis  - levothyroxine (SYNTHROID) 112 MCG tablet; Take 1 tablet (112 mcg total) by mouth before breakfast.  Dispense: 90 tablet; Refill: 1    7. Morbid obesity with BMI of 40.0-44.9, adult  - Vitamin D; Future    8. Multiple sclerosis  - stable, on Ocrevus, monitor      Call if there is no improvement in symptoms    __________________________    Cat Ramachandran MD, PharmD  [facility]- Internal Medicine  American Board of Obesity Medicine diplomate  Office [phone]

## 2024-02-29 ENCOUNTER — PATIENT MESSAGE (OUTPATIENT)
Dept: INTERNAL MEDICINE | Facility: CLINIC | Age: 44
End: 2024-02-29
Payer: MEDICARE

## 2024-02-29 DIAGNOSIS — M54.41 CHRONIC RIGHT-SIDED LOW BACK PAIN WITH RIGHT-SIDED SCIATICA: Primary | ICD-10-CM

## 2024-02-29 DIAGNOSIS — R20.0 NUMBNESS OF RIGHT FOOT: ICD-10-CM

## 2024-02-29 DIAGNOSIS — M54.16 LUMBAR RADICULOPATHY, CHRONIC: ICD-10-CM

## 2024-02-29 DIAGNOSIS — G89.29 CHRONIC RIGHT-SIDED LOW BACK PAIN WITH RIGHT-SIDED SCIATICA: Primary | ICD-10-CM

## 2024-02-29 RX ORDER — TRAMADOL HYDROCHLORIDE AND ACETAMINOPHEN 37.5; 325 MG/1; MG/1
1 TABLET, FILM COATED ORAL EVERY 12 HOURS PRN
Qty: 10 TABLET | Refills: 0 | Status: SHIPPED | OUTPATIENT
Start: 2024-02-29 | End: 2024-03-27 | Stop reason: SDUPTHER

## 2024-02-29 RX ORDER — METHYLPREDNISOLONE 4 MG/1
TABLET ORAL
Qty: 1 EACH | Refills: 0 | Status: SHIPPED | OUTPATIENT
Start: 2024-02-29 | End: 2024-03-21

## 2024-03-04 NOTE — TELEPHONE ENCOUNTER
We discussed MRI-L spine. She wanted it to be scheduled with the other spine MRI's ordered by Neurology.

## 2024-03-05 NOTE — TELEPHONE ENCOUNTER
Called pt no answer, unable to book MRI ordered by pcp because of no available spots, notified pt via portal

## 2024-03-06 ENCOUNTER — HOSPITAL ENCOUNTER (OUTPATIENT)
Dept: RADIOLOGY | Facility: HOSPITAL | Age: 44
Discharge: HOME | End: 2024-03-06
Attending: STUDENT IN AN ORGANIZED HEALTH CARE EDUCATION/TRAINING PROGRAM
Payer: MEDICARE

## 2024-03-06 ENCOUNTER — PATIENT MESSAGE (OUTPATIENT)
Dept: NEUROLOGY | Facility: CLINIC | Age: 44
End: 2024-03-06
Payer: MEDICARE

## 2024-03-06 DIAGNOSIS — G35 MULTIPLE SCLEROSIS: ICD-10-CM

## 2024-03-06 PROBLEM — F33.0 RECURRENT DEPRESSIVE DISORDER, CURRENT EPISODE MILD: Status: ACTIVE | Noted: 2021-01-25

## 2024-03-06 PROBLEM — M54.41 CHRONIC RIGHT-SIDED LOW BACK PAIN WITH RIGHT-SIDED SCIATICA: Status: ACTIVE | Noted: 2024-03-06

## 2024-03-06 PROBLEM — G89.29 CHRONIC RIGHT-SIDED LOW BACK PAIN WITH RIGHT-SIDED SCIATICA: Status: ACTIVE | Noted: 2024-03-06

## 2024-03-06 PROCEDURE — 72146 MRI CHEST SPINE W/O DYE: CPT | Mod: 26,HCNC,, | Performed by: STUDENT IN AN ORGANIZED HEALTH CARE EDUCATION/TRAINING PROGRAM

## 2024-03-06 PROCEDURE — 72141 MRI NECK SPINE W/O DYE: CPT | Mod: TC,HCNC

## 2024-03-06 PROCEDURE — 70551 MRI BRAIN STEM W/O DYE: CPT | Mod: 26,HCNC,, | Performed by: STUDENT IN AN ORGANIZED HEALTH CARE EDUCATION/TRAINING PROGRAM

## 2024-03-06 PROCEDURE — 72141 MRI NECK SPINE W/O DYE: CPT | Mod: 26,HCNC,, | Performed by: STUDENT IN AN ORGANIZED HEALTH CARE EDUCATION/TRAINING PROGRAM

## 2024-03-06 PROCEDURE — 70551 MRI BRAIN STEM W/O DYE: CPT | Mod: TC,HCNC

## 2024-03-06 PROCEDURE — 72146 MRI CHEST SPINE W/O DYE: CPT | Mod: TC,HCNC

## 2024-03-07 NOTE — TELEPHONE ENCOUNTER
Should not continue steroid. Consider NSAID like Voltaren if she had no adverse reactions with it in the past.    See my email

## 2024-03-08 RX ORDER — DICLOFENAC SODIUM 75 MG/1
75 TABLET, DELAYED RELEASE ORAL 2 TIMES DAILY PRN
Qty: 30 TABLET | Refills: 0 | Status: SHIPPED | OUTPATIENT
Start: 2024-03-08 | End: 2024-04-15 | Stop reason: SDUPTHER

## 2024-03-14 ENCOUNTER — PATIENT MESSAGE (OUTPATIENT)
Dept: INTERNAL MEDICINE | Facility: CLINIC | Age: 44
End: 2024-03-14
Payer: MEDICARE

## 2024-03-14 NOTE — TELEPHONE ENCOUNTER
The x-ray showed significant narrowing between the L5 bone of the lumbar spine and the sacral bones. There could be a bulging disc. Is she planning to schedule the MRI of the lower back?     Did she try Voltaren? It is similar to the Toradol shot in its effects.    So what shot is she referring to?

## 2024-03-25 ENCOUNTER — OFFICE VISIT (OUTPATIENT)
Dept: NEUROLOGY | Facility: CLINIC | Age: 44
End: 2024-03-25
Payer: MEDICARE

## 2024-03-25 ENCOUNTER — HOSPITAL ENCOUNTER (OUTPATIENT)
Dept: RADIOLOGY | Facility: HOSPITAL | Age: 44
Discharge: HOME | End: 2024-03-25
Attending: INTERNAL MEDICINE
Payer: MEDICARE

## 2024-03-25 ENCOUNTER — HOSPITAL ENCOUNTER (EMERGENCY)
Facility: HOSPITAL | Age: 44
Discharge: HOME | End: 2024-03-25
Attending: EMERGENCY MEDICINE
Payer: MEDICARE

## 2024-03-25 VITALS
SYSTOLIC BLOOD PRESSURE: 188 MMHG | HEIGHT: 65 IN | TEMPERATURE: 98 F | WEIGHT: 250 LBS | HEART RATE: 68 BPM | RESPIRATION RATE: 18 BRPM | DIASTOLIC BLOOD PRESSURE: 82 MMHG | BODY MASS INDEX: 41.65 KG/M2

## 2024-03-25 VITALS
SYSTOLIC BLOOD PRESSURE: 162 MMHG | BODY MASS INDEX: 42.52 KG/M2 | HEIGHT: 65 IN | HEART RATE: 60 BPM | DIASTOLIC BLOOD PRESSURE: 113 MMHG

## 2024-03-25 DIAGNOSIS — Z71.89 COUNSELING REGARDING GOALS OF CARE: ICD-10-CM

## 2024-03-25 DIAGNOSIS — G35 MULTIPLE SCLEROSIS: Primary | ICD-10-CM

## 2024-03-25 DIAGNOSIS — M54.41 CHRONIC RIGHT-SIDED LOW BACK PAIN WITH RIGHT-SIDED SCIATICA: ICD-10-CM

## 2024-03-25 DIAGNOSIS — Z79.899 OTHER LONG TERM (CURRENT) DRUG THERAPY: ICD-10-CM

## 2024-03-25 DIAGNOSIS — F32.A DEPRESSION, UNSPECIFIED DEPRESSION TYPE: ICD-10-CM

## 2024-03-25 DIAGNOSIS — R68.89 HEAT SENSITIVITY: ICD-10-CM

## 2024-03-25 DIAGNOSIS — Z29.89 PROPHYLACTIC IMMUNOTHERAPY: ICD-10-CM

## 2024-03-25 DIAGNOSIS — G89.29 CHRONIC RIGHT-SIDED LOW BACK PAIN WITH RIGHT-SIDED SCIATICA: ICD-10-CM

## 2024-03-25 DIAGNOSIS — R20.0 NUMBNESS OF RIGHT FOOT: ICD-10-CM

## 2024-03-25 DIAGNOSIS — R26.9 GAIT DISTURBANCE: ICD-10-CM

## 2024-03-25 DIAGNOSIS — M54.16 LUMBAR RADICULOPATHY, CHRONIC: ICD-10-CM

## 2024-03-25 DIAGNOSIS — Z79.899 HIGH RISK MEDICATION USE: ICD-10-CM

## 2024-03-25 DIAGNOSIS — M54.31 RIGHT SIDED SCIATICA: ICD-10-CM

## 2024-03-25 DIAGNOSIS — M54.31 SCIATICA OF RIGHT SIDE: Primary | ICD-10-CM

## 2024-03-25 DIAGNOSIS — Z74.09 IMPAIRED MOBILITY: ICD-10-CM

## 2024-03-25 LAB
B-HCG UR QL: NEGATIVE
CTP QC/QA: YES

## 2024-03-25 PROCEDURE — 99999 PR PBB SHADOW E&M-EST. PATIENT-LVL IV: CPT | Mod: PBBFAC,HCNC,, | Performed by: CLINICAL NURSE SPECIALIST

## 2024-03-25 PROCEDURE — 63600175 PHARM REV CODE 636 W HCPCS: Mod: HCNC | Performed by: PHYSICIAN ASSISTANT

## 2024-03-25 PROCEDURE — 99417 PROLNG OP E/M EACH 15 MIN: CPT | Mod: HCNC,S$GLB,, | Performed by: CLINICAL NURSE SPECIALIST

## 2024-03-25 PROCEDURE — 3077F SYST BP >= 140 MM HG: CPT | Mod: HCNC,CPTII,S$GLB, | Performed by: CLINICAL NURSE SPECIALIST

## 2024-03-25 PROCEDURE — 72148 MRI LUMBAR SPINE W/O DYE: CPT | Mod: 26,HCNC,, | Performed by: RADIOLOGY

## 2024-03-25 PROCEDURE — 72148 MRI LUMBAR SPINE W/O DYE: CPT | Mod: TC,HCNC

## 2024-03-25 PROCEDURE — G2211 COMPLEX E/M VISIT ADD ON: HCPCS | Mod: HCNC,S$GLB,, | Performed by: CLINICAL NURSE SPECIALIST

## 2024-03-25 PROCEDURE — 81025 URINE PREGNANCY TEST: CPT | Mod: HCNC | Performed by: PHYSICIAN ASSISTANT

## 2024-03-25 PROCEDURE — 3080F DIAST BP >= 90 MM HG: CPT | Mod: HCNC,CPTII,S$GLB, | Performed by: CLINICAL NURSE SPECIALIST

## 2024-03-25 PROCEDURE — 3008F BODY MASS INDEX DOCD: CPT | Mod: HCNC,CPTII,S$GLB, | Performed by: CLINICAL NURSE SPECIALIST

## 2024-03-25 PROCEDURE — 99284 EMERGENCY DEPT VISIT MOD MDM: CPT | Mod: 25,HCNC

## 2024-03-25 PROCEDURE — 96372 THER/PROPH/DIAG INJ SC/IM: CPT | Performed by: PHYSICIAN ASSISTANT

## 2024-03-25 PROCEDURE — 99215 OFFICE O/P EST HI 40 MIN: CPT | Mod: HCNC,S$GLB,, | Performed by: CLINICAL NURSE SPECIALIST

## 2024-03-25 PROCEDURE — 25000003 PHARM REV CODE 250: Mod: HCNC | Performed by: PHYSICIAN ASSISTANT

## 2024-03-25 RX ORDER — ACETAMINOPHEN 500 MG
1000 TABLET ORAL
Status: COMPLETED | OUTPATIENT
Start: 2024-03-25 | End: 2024-03-25

## 2024-03-25 RX ORDER — KETOROLAC TROMETHAMINE 30 MG/ML
15 INJECTION, SOLUTION INTRAMUSCULAR; INTRAVENOUS
Status: COMPLETED | OUTPATIENT
Start: 2024-03-25 | End: 2024-03-25

## 2024-03-25 RX ORDER — LIDOCAINE 50 MG/G
1 PATCH TOPICAL
Status: DISCONTINUED | OUTPATIENT
Start: 2024-03-25 | End: 2024-03-25 | Stop reason: HOSPADM

## 2024-03-25 RX ADMIN — KETOROLAC TROMETHAMINE 15 MG: 30 INJECTION, SOLUTION INTRAMUSCULAR; INTRAVENOUS at 04:03

## 2024-03-25 RX ADMIN — LIDOCAINE 5% 1 PATCH: 700 PATCH TOPICAL at 04:03

## 2024-03-25 RX ADMIN — ACETAMINOPHEN 1000 MG: 500 TABLET ORAL at 04:03

## 2024-03-25 NOTE — H&P (VIEW-ONLY)
DATE: 3/25/2024  PATIENT: Jaylon Bowles    Supervising Physician: Thompson Mendoza M.D.    CHIEF COMPLAINT: right leg pain    HISTORY:  Jaylon Bowles is a 43 y.o. female with a pmhx of MS here for initial evaluation of low back and right leg pain (Back - 10, Leg - 10).  The pain in the back of the right leg is what bothers her most.  The pain has been present for a month without specific injury. The patient describes the pain as shooting.  The pain is worse with walking and improved by nothing. There is positive associated numbness and tingling. There is positive subjective weakness. Prior treatments have included a medrol dose pack and home exercises, but no ESIs or surgery.    The patient denies myelopathic symptoms such as handwriting changes or difficulty with buttons/coins/keys. Denies perineal paresthesias, bowel/bladder dysfunction.    PAST MEDICAL/SURGICAL HISTORY:  Past Medical History:   Diagnosis Date    Depression     Hypertension     Migraine headache     Multiple sclerosis     Thyroid disease      No past surgical history on file.    Medications:   Current Outpatient Medications on File Prior to Visit   Medication Sig Dispense Refill    amantadine  mg Tab Take 1 tablet (100 mg total) by mouth 2 (two) times a day. Take 1 tablet by mouth in the morning and 1 tablet at noon if needed. 90 tablet 7    augmented betamethasone dipropionate (DIPROLENE-AF) 0.05 % cream Apply topically 2 (two) times daily. Use to affected areas for up to 2 weeks then take a 1 week break or decrease to 3 times weekly. Do not apply to groin or face. Use to the rough itchy areas on arms and legs then cover with bandage 100 g 2    carvediloL (COREG) 25 MG tablet Take 1 tablet (25 mg total) by mouth 2 (two) times daily with meals. 180 tablet 1    cholecalciferol, vitamin D3, 1,250 mcg (50,000 unit) Tab Take 1 tablet by mouth once a week. 12 tablet 3    coenzyme Q10 100 mg capsule Take 5 capsules (500 mg total) by mouth once  daily. 150 capsule 11    diclofenac (VOLTAREN) 75 MG EC tablet Take 1 tablet (75 mg total) by mouth 2 (two) times daily as needed (pain). 30 tablet 0    EScitalopram oxalate (LEXAPRO) 20 MG tablet Take 1.5 tablets (30 mg total) by mouth once daily. 135 tablet 1    ferrous sulfate (FEOSOL) 325 mg (65 mg iron) Tab tablet Take 1 tablet (325 mg total) by mouth once daily.  0    levothyroxine (SYNTHROID) 112 MCG tablet Take 1 tablet (112 mcg total) by mouth before breakfast. 90 tablet 1    losartan-hydrochlorothiazide 100-12.5 mg (HYZAAR) 100-12.5 mg Tab Take 1 tablet by mouth once daily. 90 tablet 1    magnesium 250 mg Tab Take 500 mg by mouth once.      multivitamin with minerals (ONE DAILY COMPLETE ORAL) Take by mouth.      OCREVUS 30 mg/mL Soln       tiZANidine (ZANAFLEX) 2 MG tablet TAKE 1/2 TABLET BY MOUTH IN THE MORNING AND IN THE AFTERNOON FOR MUSCLE SPASMS 30 tablet 1    tiZANidine (ZANAFLEX) 4 MG tablet TAKE 1 TABLET(4 MG) BY MOUTH TWICE DAILY AS NEEDED 60 tablet 5    tramadol-acetaminophen 37.5-325 mg (ULTRACET) 37.5-325 mg Tab Take 1 tablet by mouth every 12 (twelve) hours as needed for Pain (pain). 10 tablet 0     No current facility-administered medications on file prior to visit.       Social History:   Social History     Socioeconomic History    Marital status: Single   Tobacco Use    Smoking status: Never    Smokeless tobacco: Never   Substance and Sexual Activity    Alcohol use: No     Comment: social    Drug use: No     Types: Marijuana    Sexual activity: Not Currently     Partners: Male     Birth control/protection: None     Social Determinants of Health     Financial Resource Strain: Medium Risk (1/19/2024)    Overall Financial Resource Strain (CARDIA)     Difficulty of Paying Living Expenses: Somewhat hard   Food Insecurity: Food Insecurity Present (1/19/2024)    Hunger Vital Sign     Worried About Running Out of Food in the Last Year: Sometimes true     Ran Out of Food in the Last Year: Never true    Transportation Needs: No Transportation Needs (1/19/2024)    PRAPARE - Transportation     Lack of Transportation (Medical): No     Lack of Transportation (Non-Medical): No   Physical Activity: Insufficiently Active (1/19/2024)    Exercise Vital Sign     Days of Exercise per Week: 2 days     Minutes of Exercise per Session: 30 min   Stress: Stress Concern Present (1/19/2024)    [employer] [place] [employer] - Occupational Stress Questionnaire     Feeling of Stress : Very much   Social Connections: Moderately Isolated (1/19/2024)    Social Connection and Isolation Panel [NHANES]     Frequency of Communication with Friends and Family: More than three times a week     Frequency of Social Gatherings with Friends and Family: More than three times a week     Attends [employer] Services: More than 4 times per year     Active Member of Clubs or Organizations: No     Attends Club or Organization Meetings: Patient declined     Marital Status: Never    Housing Stability: Low Risk  (1/19/2024)    Housing Stability Vital Sign     Unable to Pay for Housing in the Last Year: No     Number of Places Lived in the Last Year: 0     Unstable Housing in the Last Year: No       REVIEW OF SYSTEMS:  Constitution: Negative. Negative for chills, fever and night sweats.   Cardiovascular: Negative for chest pain and syncope.   Respiratory: Negative for cough and shortness of breath.   Gastrointestinal: See HPI. Negative for nausea/vomiting. Negative for abdominal pain.  Genitourinary: See HPI. Negative for discoloration or dysuria.  Skin: Negative for dry skin, itching and rash.   Hematologic/Lymphatic: Negative for bleeding problem. Does not bruise/bleed easily.   Musculoskeletal: Negative for falls and muscle weakness.   Neurological: See HPI. No seizures.   Endocrine: Negative for polydipsia, polyphagia and polyuria.   Allergic/Immunologic: Negative for hives and persistent infections.     EXAM:  LMP 02/15/2024 (Exact  Date)     General: The patient is a very pleasant 43 y.o. female in no apparent distress, the patient is oriented to person, place and time.  Psych: Normal mood and affect  HEENT: Vision grossly intact, hearing intact to the spoken word.  Lungs: Respirations unlabored.  Gait: Normal station and gait, no difficulty with toe or heel walk.   Skin: Dorsal lumbar skin negative for rashes, lesions, hairy patches and surgical scars. There is negative lumbar tenderness to palpation.  Range of motion: Lumbar range of motion is acceptable.  Spinal Balance: Global saggital and coronal spinal balance acceptable, not significant for scoliosis and kyphosis.  Musculoskeletal: No pain with the range of motion of the bilateral hips. No trochanteric tenderness to palpation.  Vascular: Bilateral lower extremities warm and well perfused, dorsalis pedis pulses 2+ bilaterally.  Neurological: 3/5 strength and tone in all major motor groups in the bilateral lower extremities. Altered sensation to light touch in the L2-S1 dermatomes in RLE compared to LLE  Deep tendon reflexes symmetric 2+ in the bilateral lower extremities.  Negative Babinski bilaterally. Straight leg raise positive on right    IMAGING:      Today I personally reviewed AP, Lat and Flex/Ex  upright L-spine films that demonstrate  grade 1 L4/L5 anterolisthesis.     MRI lumbar demonstrates mild degenerative changes at L4-5 L5-S1.     There is no height or weight on file to calculate BMI.    Hemoglobin A1C   Date Value Ref Range Status   09/22/2023 5.0 4.0 - 5.6 % Final     Comment:     ADA Screening Guidelines:  5.7-6.4%  Consistent with prediabetes  >or=6.5%  Consistent with diabetes    High levels of fetal hemoglobin interfere with the HbA1C  assay. Heterozygous hemoglobin variants (HbS, HgC, etc)do  not significantly interfere with this assay.   However, presence of multiple variants may affect accuracy.     06/25/2022 5.0 4.0 - 5.6 % Final     Comment:     ADA Screening  Guidelines:  5.7-6.4%  Consistent with prediabetes  >or=6.5%  Consistent with diabetes    High levels of fetal hemoglobin interfere with the HbA1C  assay. Heterozygous hemoglobin variants (HbS, HgC, etc)do  not significantly interfere with this assay.   However, presence of multiple variants may affect accuracy.     01/25/2021 5.2 4.0 - 5.6 % Final     Comment:     ADA Screening Guidelines:  5.7-6.4%  Consistent with prediabetes  >or=6.5%  Consistent with diabetes    High levels of fetal hemoglobin interfere with the HbA1C  assay. Heterozygous hemoglobin variants (HbS, HgC, etc)do  not significantly interfere with this assay.   However, presence of multiple variants may affect accuracy.             ASSESSMENT/PLAN:    There are no diagnoses linked to this encounter.    Today we discussed at length all of the different treatment options including anti-inflammatories, acetaminophen, rest, ice, heat, physical therapy including strengthening and stretching exercises, home exercises, ROM, aerobic conditioning, aqua therapy, other modalities including ultrasound, massage, and dry needling, epidural steroid injections and finally surgical intervention.      Pt presents with chronic lumbar radiculopathy. Failure of conservative rx. Will send lyrica to pharmacy. Will order right L4-5 L5-S1 TFESI with pain management. Pt will fu if pain persists.

## 2024-03-25 NOTE — ED PROVIDER NOTES
Encounter Date: 3/25/2024       History     Chief Complaint   Patient presents with    Back Pain     Goes down L leg, big toe numb, started weeks ago, denies bowel/bladder  incontinence     43-year-old female with past medical history of thyroid disease, migraines, hypertension, multiple sclerosis presents emergency department for right lower back pain.  Reports 3 weeks of pain to her right lower back that radiates down her leg to her great toe.  Reports mild numbness to the great toe.  Denies any fevers/chills, IVDU, saddle anesthesia loss of bowel or bladder.  Saw her PCP for this 2 weeks ago and started on a Medrol Dosepak states her symptoms have been worsening since finishing the Medrol Dosepak.  Currently taking diclofenac and tizanidine.  Saw her Neurology today for her MS and has been referred to pain management for her sciatica.        Review of patient's allergies indicates:   Allergen Reactions    Betaseron [interferon beta-1b] Hives    Gabapentin Hallucinations    Cephalexin Hives, Itching and Rash     Past Medical History:   Diagnosis Date    Depression     Hypertension     Migraine headache     Multiple sclerosis     Thyroid disease      No past surgical history on file.  Family History   Problem Relation Age of Onset    Hypertension Mother     Thyroid disease Mother     Diabetes Father     Stroke Father     Vaginal cancer Neg Hx     Endometrial cancer Neg Hx     Cervical cancer Neg Hx     Breast cancer Neg Hx     Ovarian cancer Neg Hx      Social History     Tobacco Use    Smoking status: Never    Smokeless tobacco: Never   Substance Use Topics    Alcohol use: No     Comment: social    Drug use: No     Types: Marijuana     Review of Systems   Constitutional:  Negative for chills and fever.   HENT:  Negative for sore throat.    Respiratory:  Negative for cough and shortness of breath.    Cardiovascular:  Negative for chest pain.   Gastrointestinal:  Negative for abdominal pain.   Genitourinary:   Negative for difficulty urinating and dysuria.   Musculoskeletal:  Positive for back pain.   Skin: Negative.    Neurological:  Negative for weakness.   Psychiatric/Behavioral:  Negative for confusion.        Physical Exam     Initial Vitals [03/25/24 1406]   BP Pulse Resp Temp SpO2   (!) 188/82 68 18 97.8 °F (36.6 °C) --      MAP       --         Physical Exam    Nursing note and vitals reviewed.  Constitutional: She appears well-developed and well-nourished.   HENT:   Head: Normocephalic and atraumatic.   Eyes: Conjunctivae are normal.   Neck: Neck supple.   Normal range of motion.  Cardiovascular:  Normal rate.           Pulmonary/Chest: Breath sounds normal.   Abdominal: Abdomen is soft. She exhibits no distension.   Musculoskeletal:      Cervical back: Normal range of motion and neck supple.      Comments: No midline C, T or L-spine tenderness.  Right lower paraspinal tenderness.  No weakness in the lower extremity.  Lower extremities neurovascularly intact.     Neurological: She is alert and oriented to person, place, and time. GCS score is 15. GCS eye subscore is 4. GCS verbal subscore is 5. GCS motor subscore is 6.   Skin: Skin is warm and dry.         ED Course   Procedures  Labs Reviewed   POCT URINE PREGNANCY          Imaging Results    None          Medications   ketorolac injection 15 mg (has no administration in time range)   acetaminophen tablet 1,000 mg (has no administration in time range)   LIDOcaine 5 % patch 1 patch (has no administration in time range)     Medical Decision Making  43-year-old female presents ED for right lower back pain that radiates down her right leg.      Differential includes but not limited to muscle strain, sciatica, cauda equina, spinal epidural abscess     Patient denies any red flag symptoms and no indication for emergent MRI at this time.  Recent MRI of the L-spine demyelinating with no acute changes.  No focal deficits on exam.  She was referred to pain management from  her neurologist earlier today for this and encouraged her to follow-up with pain management.  Will continue with multimodal pain management.  Will give a Toradol injection, Tylenol and Lidoderm patch in the ED. Patient reports she does have Salonpas at home and we discussed continuing her diclofenac, Tylenol, Lidoderm and tizanidine.  Return ED precautions given.    Amount and/or Complexity of Data Reviewed  Labs: ordered.    Risk  OTC drugs.  Prescription drug management.                                      Clinical Impression:  Final diagnoses:  [M54.31] Sciatica of right side (Primary)          ED Disposition Condition    Discharge Stable          ED Prescriptions    None       Follow-up Information       Follow up With Specialties Details Why Contact Info    Cat Ramachandran MD Internal Medicine Schedule an appointment as soon as possible for a visit  As needed [address] [facility] [address]  [phone]               Danielle Reis PA-C  03/25/24 4220

## 2024-03-25 NOTE — Clinical Note
Jackelyn, I believe you saw her for counseling in the past. She is interested in doing so again if you have capacity. Just let me know, and I can place referral if appropriate.

## 2024-03-25 NOTE — PROGRESS NOTES
Subjective:          Patient ID: Jaylon Bowles is a 43 y.o. female who presents today for a routine clinic visit for MS.  She was last seen by Dr. Durham in December. The history has been provided by the patient. She is accompanied by her brother, Ralph.       MS HPI:  DMT: Ocrevus; last infused in January; due in July   Side effects from DMT? Has some jerking after Benadryl   Taking vitamin D3 as recommended? Yes   She is having significant lower back pain that is going down the entire right leg. She is taking Aleve, using Salonpas patches, Icy Hot, Diclofenac, Tramadol when needed. She does not tolerate gabapentin. Her pain is so severe today that she is considering going to the ER.   She is having a hard time walking. It is hard to lift her left leg. She is having cramping in the left toes, although this also happens on the right sometimes. She falls about twice a week. She needs help to get up. She also has dizziness at times.   She is having fatigue, but attributes this to some of the medications she is taking.   She is working from home.   She is dealing with some sinus issues.  She denies any recent fevers.   She is not currently exercising. She has not done PT recently.   She is taking [employer] mushrooms, and she feels like this has been helpful for her memory.     Medications:  Current Outpatient Medications   Medication Sig    amantadine  mg Tab Take 1 tablet (100 mg total) by mouth 2 (two) times a day. Take 1 tablet by mouth in the morning and 1 tablet at noon if needed.    augmented betamethasone dipropionate (DIPROLENE-AF) 0.05 % cream Apply topically 2 (two) times daily. Use to affected areas for up to 2 weeks then take a 1 week break or decrease to 3 times weekly. Do not apply to groin or face. Use to the rough itchy areas on arms and legs then cover with bandage    carvediloL (COREG) 25 MG tablet Take 1 tablet (25 mg total) by mouth 2 (two) times daily with meals.    cholecalciferol,  vitamin D3, 1,250 mcg (50,000 unit) Tab Take 1 tablet by mouth once a week.    coenzyme Q10 100 mg capsule Take 5 capsules (500 mg total) by mouth once daily.    diclofenac (VOLTAREN) 75 MG EC tablet Take 1 tablet (75 mg total) by mouth 2 (two) times daily as needed (pain).    EScitalopram oxalate (LEXAPRO) 20 MG tablet Take 1.5 tablets (30 mg total) by mouth once daily.    ferrous sulfate (FEOSOL) 325 mg (65 mg iron) Tab tablet Take 1 tablet (325 mg total) by mouth once daily.    levothyroxine (SYNTHROID) 112 MCG tablet Take 1 tablet (112 mcg total) by mouth before breakfast.    losartan-hydrochlorothiazide 100-12.5 mg (HYZAAR) 100-12.5 mg Tab Take 1 tablet by mouth once daily.    magnesium 250 mg Tab Take 500 mg by mouth once.    multivitamin with minerals (ONE DAILY COMPLETE ORAL) Take by mouth.    OCREVUS 30 mg/mL Soln     tiZANidine (ZANAFLEX) 2 MG tablet TAKE 1/2 TABLET BY MOUTH IN THE MORNING AND IN THE AFTERNOON FOR MUSCLE SPASMS    tiZANidine (ZANAFLEX) 4 MG tablet TAKE 1 TABLET(4 MG) BY MOUTH TWICE DAILY AS NEEDED    tramadol-acetaminophen 37.5-325 mg (ULTRACET) 37.5-325 mg Tab Take 1 tablet by mouth every 12 (twelve) hours as needed for Pain (pain).         SOCIAL HISTORY  Social History     Tobacco Use    Smoking status: Never    Smokeless tobacco: Never   Substance Use Topics    Alcohol use: No     Comment: social    Drug use: No     Types: Marijuana       Living arrangements - the patient lives with her mom and brother     ROS:      3/25/24   REVIEW OF SYMPTOMS   Do you feel abnormally tired on most days? Yes   Do you feel you generally sleep well? Yes--wakes up a lot at night because of pain; pain is less when she is lying on the left side    Do you have difficulty controlling your bladder?  No   Do you have difficulty controlling your bowels?  No   Do you have frequent muscle cramps, tightness or spasms in your limbs?  Yes   Do you have new visual symptoms?  Yes--wears glasses all the time    Do you  have worsening difficulty with your memory or thinking? No--she has been writing things down   Do you have worsening symptoms of anxiety or depression?  Yes--she takes Lexapro; she is not interested in counseling    For patients who walk, Do you have more difficulty walking?  Yes   Have you fallen since your last visit?  Yes   For patients who use wheelchairs: Do you have any skin wounds or breakdown? Not Applicable   Do you have difficulty using your hands?  Yes--sluggish with typing sometimes    Do you have shooting or burning pain? Yes   Do you have difficulty with sexual function?  No   If you are sexually active, are you using birth control? Y/N  N/A Not Applicable   Do you often choke when swallowing liquids or solid food?  No   Do you experience worsening symptoms when overheated? Yes   Do you need any new equipment such as a wheelchair, walker or shower chair? No   Do you receive co-pay financial assistance for your principal MS medicine? Does not pay OOP for Ocrevus    Would you be interested in participating in an MS research trial in the future? No   For patients on Gilenya, Tecfidera, Aubagio, Rituxan, Ocrevus, Tysabri, Lemtrada or Methotrexate, are you aware that you should NOT receive live virus vaccines?  Yes; discussed with the patient    Do you feel you have adequate family/friend support?  Yes   Do you have health insurance?   Yes   Are you currently employed? No   Do you receive SSDI/SSI?  Yes   Do you use marijuana or cannabis products? No   How often? Daily   Have you been diagnosed with a urinary tract infection since your last visit here? No   Have you been diagnosed with a respiratory tract infection since your last visit here? No   Have you been to the emergency room since your last visit here? No   Have you been hospitalized since your last visit here?  No              Objective:        1. 25 foot timed walk: 45 seconds today with walker; has right-sciatica pain       5/10/2023    12:01 AM  12/21/2023    12:01 AM   Timed 25 Foot Walk:   Did patient wear an AFO? No No   Was assistive device used? Yes Yes   Assistive device used (jewels one): Bilateral Assistance Bilateral Assistance   Bilateral device used Walker/Rollator Walker/Rollator   Time for 25 Foot Walk (seconds) 22.7 28.45   Time for 25 Foot Walk (seconds)  29.38       Neurologic Exam    In general, the patient is well nourished and appears to be in no acute distress.     MENTAL STATUS: language is fluent, normal verbal comprehension, short-term and remote memory is intact, attention is normal, patient is alert and oriented, fund of knowlege is appropriate by vocabulary.      CRANIAL NERVE EXAM:  No facial asymmetry. There is no dysarthria    Strength:  R deltoid 5/5, L deltoid 5/5  R biceps 5/5, L biceps 5/5  R triceps 5/5, L triceps 4/5  Reduced  strength on the left     R  hip flexors 4/5, L hip flexors 3+/5  R knee extensors 5/5, L knee extensors 4/5  R knee flexors 5/5, L knee flexors 4+/5  R ankle dorsiflexors 5/5, L ankle dorsiflexors 4/5      COORDINATION: Dysmetria in L on FTN     GAIT: Slowed, ataxic gait, with L circumduction and L foot drop, rollator dependent; very slow; right leg is painful      Imaging:     Results for orders placed during the hospital encounter of 03/06/24    MRI Brain Demyelinating Without Contrast    Impression  Brain appears stable from prior exam, again demonstrating findings compatible with the reported history of multiple sclerosis.  No new discrete lesions to indicate ongoing demyelination.      Electronically signed by: Brody De Anda  Date:    03/06/2024  Time:    09:22    Results for orders placed during the hospital encounter of 03/06/24    MRI Cervical Spine Demyelinating Without Contrast    Impression  Stable appearance of the cervical and thoracic cord in keeping with patient's reported history of demyelinating process.  No new cord lesions identified.    Stable mild cystic change about the central  cord at the C7 level.    Multilevel cervical and thoracic spondylosis, unchanged from comparison imaging.      Electronically signed by: Brody De Anda  Date:    03/06/2024  Time:    09:15    Results for orders placed during the hospital encounter of 03/06/24    MRI Thoracic Spine Demyelinating Without Contrast    Impression  Stable appearance of the cervical and thoracic cord in keeping with patient's reported history of demyelinating process.  No new cord lesions identified.    Stable mild cystic change about the central cord at the C7 level.    Multilevel cervical and thoracic spondylosis, unchanged from comparison imaging.      Electronically signed by: Brody De Anda  Date:    03/06/2024  Time:    09:15    Results were reviewed with the patient.       Labs:     Lab Results   Component Value Date    JTAMQIMW48JG 31 02/28/2024    [id number] 35 09/22/2023    [id number] 49 05/10/2023       Lab Results   Component Value Date    WBC 10.37 02/28/2024    RBC 4.37 02/28/2024    HGB 12.1 02/28/2024    HCT 37.3 02/28/2024    MCV 85 02/28/2024    MCH 27.7 02/28/2024    MCHC 32.4 02/28/2024    RDW 15.7 (H) 02/28/2024     02/28/2024    MPV 13.4 (H) 02/28/2024    GRAN 8.1 (H) 02/28/2024    GRAN 77.9 (H) 02/28/2024    LYMPH 1.3 02/28/2024    LYMPH 12.1 (L) 02/28/2024    MONO 0.8 02/28/2024    MONO 7.2 02/28/2024    EOS 0.2 02/28/2024    BASO 0.05 02/28/2024    EOSINOPHIL 1.9 02/28/2024    BASOPHIL 0.5 02/28/2024     Sodium   Date Value Ref Range Status   02/28/2024 138 136 - 145 mmol/L Final     Potassium   Date Value Ref Range Status   02/28/2024 3.5 3.5 - 5.1 mmol/L Final     Chloride   Date Value Ref Range Status   02/28/2024 105 95 - 110 mmol/L Final     CO2   Date Value Ref Range Status   02/28/2024 26 23 - 29 mmol/L Final     Glucose   Date Value Ref Range Status   02/28/2024 87 70 - 110 mg/dL Final     BUN   Date Value Ref Range Status   02/28/2024 7 6 - 20 mg/dL Final     Creatinine   Date Value Ref Range Status    02/28/2024 0.9 0.5 - 1.4 mg/dL Final     Calcium   Date Value Ref Range Status   02/28/2024 9.6 8.7 - 10.5 mg/dL Final     Total Protein   Date Value Ref Range Status   11/20/2023 7.9 6.0 - 8.4 g/dL Final     Albumin   Date Value Ref Range Status   11/20/2023 3.6 3.5 - 5.2 g/dL Final     Total Bilirubin   Date Value Ref Range Status   11/20/2023 0.5 0.1 - 1.0 mg/dL Final     Comment:     For infants and newborns, interpretation of results should be based  on gestational age, weight and in agreement with clinical  observations.    Premature Infant recommended reference ranges:  Up to 24 hours.............<8.0 mg/dL  Up to 48 hours............<12.0 mg/dL  3-5 days..................<15.0 mg/dL  6-29 days.................<15.0 mg/dL       Alkaline Phosphatase   Date Value Ref Range Status   11/20/2023 63 55 - 135 U/L Final     AST   Date Value Ref Range Status   11/20/2023 21 10 - 40 U/L Final     ALT   Date Value Ref Range Status   11/20/2023 17 10 - 44 U/L Final     Anion Gap   Date Value Ref Range Status   02/28/2024 7 (L) 8 - 16 mmol/L Final     eGFR if    Date Value Ref Range Status   06/25/2022 >60.0 >60 mL/min/1.73 m^2 Final     eGFR if non    Date Value Ref Range Status   06/25/2022 >60.0 >60 mL/min/1.73 m^2 Final     Comment:     Calculation used to obtain the estimated glomerular filtration  rate (eGFR) is the CKD-EPI equation.        Lab Results   Component Value Date    HEPBSAG Non-reactive 11/20/2023    HEPBSAB >1000.00 10/14/2022    HEPBSAB Reactive 10/14/2022    HEPBCAB Non-reactive 11/20/2023           MS Impression and Plan:     NEURO MULTIPLE SCLEROSIS IMPRESSION:   Number of relapses in the past year?:  0  Clinical Progression:  Worsened  Clinical Progression comment:  Her walk is much slower today, but I suspect that it is influenced by severe right leg pain (likely sciatica).   MS Classification:  Secondarily Progressive MS  DMT:  No change in management  DMT  comment:  Continue Ocrevus and Vitamin D. Her infusion is due in July. We will check safety labs in June. She is aware of the risks associated with immunosuppressant therapy, including increased risk of infection.     Symptom Management:  Implement change in symptom management  Implement Change in Symptom Management:  Pain, Mood and Heat Sensitivity  Referral placed to back and spine for eval and pain management of sciatica. We will help to schedule her L-spine MRI previously ordered by her PCP.   She ultimately does express interest in counseling. Will discuss with our .   Order and application for cooling vest were provided to the patient.   Referral placed to [facility] for gait/balance training, overall strengthening.     She will follow up with Dr. Durham in 3-4 months.   The visit today is associated with current or anticipated ongoing medical care related to this patient's single serious condition/complex condition of multiple sclerosis.    Total time spent with patient: 57 minutes   Total time spent on encounter: 70 minutes         VERÓNICA Smallwood, CNS    Problem List Items Addressed This Visit          Neurologic Problems    Multiple sclerosis    Relevant Orders    CBC Auto Differential    Comprehensive Metabolic Panel    Hepatitis B Core Antibody, Total    Hepatitis B Surface Antigen    Immunoglobulins (IgG, IgA, IgM) Quantitative     Other Visit Diagnoses       Right sided sciatica    -  Primary    Relevant Orders    Ambulatory referral/consult to [facility]    Ambulatory referral/consult to Physical/Occupational Therapy    Heat sensitivity        Relevant Orders    HME - OTHER    Other long term (current) drug therapy        Impaired mobility        Relevant Orders    Ambulatory referral/consult to Physical/Occupational Therapy

## 2024-03-25 NOTE — PROGRESS NOTES
DATE: 3/25/2024  PATIENT: Jaylon Bowles    Supervising Physician: Thompson Mendoza M.D.    CHIEF COMPLAINT: right leg pain    HISTORY:  Jaylon Bowles is a 43 y.o. female with a pmhx of MS here for initial evaluation of low back and right leg pain (Back - 10, Leg - 10).  The pain in the back of the right leg is what bothers her most.  The pain has been present for a month without specific injury. The patient describes the pain as shooting.  The pain is worse with walking and improved by nothing. There is positive associated numbness and tingling. There is positive subjective weakness. Prior treatments have included a medrol dose pack and home exercises, but no ESIs or surgery.    The patient denies myelopathic symptoms such as handwriting changes or difficulty with buttons/coins/keys. Denies perineal paresthesias, bowel/bladder dysfunction.    PAST MEDICAL/SURGICAL HISTORY:  Past Medical History:   Diagnosis Date    Depression     Hypertension     Migraine headache     Multiple sclerosis     Thyroid disease      No past surgical history on file.    Medications:   Current Outpatient Medications on File Prior to Visit   Medication Sig Dispense Refill    amantadine  mg Tab Take 1 tablet (100 mg total) by mouth 2 (two) times a day. Take 1 tablet by mouth in the morning and 1 tablet at noon if needed. 90 tablet 7    augmented betamethasone dipropionate (DIPROLENE-AF) 0.05 % cream Apply topically 2 (two) times daily. Use to affected areas for up to 2 weeks then take a 1 week break or decrease to 3 times weekly. Do not apply to groin or face. Use to the rough itchy areas on arms and legs then cover with bandage 100 g 2    carvediloL (COREG) 25 MG tablet Take 1 tablet (25 mg total) by mouth 2 (two) times daily with meals. 180 tablet 1    cholecalciferol, vitamin D3, 1,250 mcg (50,000 unit) Tab Take 1 tablet by mouth once a week. 12 tablet 3    coenzyme Q10 100 mg capsule Take 5 capsules (500 mg total) by mouth once  daily. 150 capsule 11    diclofenac (VOLTAREN) 75 MG EC tablet Take 1 tablet (75 mg total) by mouth 2 (two) times daily as needed (pain). 30 tablet 0    EScitalopram oxalate (LEXAPRO) 20 MG tablet Take 1.5 tablets (30 mg total) by mouth once daily. 135 tablet 1    ferrous sulfate (FEOSOL) 325 mg (65 mg iron) Tab tablet Take 1 tablet (325 mg total) by mouth once daily.  0    levothyroxine (SYNTHROID) 112 MCG tablet Take 1 tablet (112 mcg total) by mouth before breakfast. 90 tablet 1    losartan-hydrochlorothiazide 100-12.5 mg (HYZAAR) 100-12.5 mg Tab Take 1 tablet by mouth once daily. 90 tablet 1    magnesium 250 mg Tab Take 500 mg by mouth once.      multivitamin with minerals (ONE DAILY COMPLETE ORAL) Take by mouth.      OCREVUS 30 mg/mL Soln       tiZANidine (ZANAFLEX) 2 MG tablet TAKE 1/2 TABLET BY MOUTH IN THE MORNING AND IN THE AFTERNOON FOR MUSCLE SPASMS 30 tablet 1    tiZANidine (ZANAFLEX) 4 MG tablet TAKE 1 TABLET(4 MG) BY MOUTH TWICE DAILY AS NEEDED 60 tablet 5    tramadol-acetaminophen 37.5-325 mg (ULTRACET) 37.5-325 mg Tab Take 1 tablet by mouth every 12 (twelve) hours as needed for Pain (pain). 10 tablet 0     No current facility-administered medications on file prior to visit.       Social History:   Social History     Socioeconomic History    Marital status: Single   Tobacco Use    Smoking status: Never    Smokeless tobacco: Never   Substance and Sexual Activity    Alcohol use: No     Comment: social    Drug use: No     Types: Marijuana    Sexual activity: Not Currently     Partners: Male     Birth control/protection: None     Social Determinants of Health     Financial Resource Strain: Medium Risk (1/19/2024)    Overall Financial Resource Strain (CARDIA)     Difficulty of Paying Living Expenses: Somewhat hard   Food Insecurity: Food Insecurity Present (1/19/2024)    Hunger Vital Sign     Worried About Running Out of Food in the Last Year: Sometimes true     Ran Out of Food in the Last Year: Never true    Transportation Needs: No Transportation Needs (1/19/2024)    PRAPARE - Transportation     Lack of Transportation (Medical): No     Lack of Transportation (Non-Medical): No   Physical Activity: Insufficiently Active (1/19/2024)    Exercise Vital Sign     Days of Exercise per Week: 2 days     Minutes of Exercise per Session: 30 min   Stress: Stress Concern Present (1/19/2024)    [employer] - Occupational Stress Questionnaire     Feeling of Stress : Very much   Social Connections: Moderately Isolated (1/19/2024)    Social Connection and Isolation Panel [NHANES]     Frequency of Communication with Friends and Family: More than three times a week     Frequency of Social Gatherings with Friends and Family: More than three times a week     Attends Mu-ism Services: More than 4 times per year     Active Member of Clubs or Organizations: No     Attends Club or Organization Meetings: Patient declined     Marital Status: Never    Housing Stability: Low Risk  (1/19/2024)    Housing Stability Vital Sign     Unable to Pay for Housing in the Last Year: No     Number of Places Lived in the Last Year: 0     Unstable Housing in the Last Year: No       REVIEW OF SYSTEMS:  Constitution: Negative. Negative for chills, fever and night sweats.   Cardiovascular: Negative for chest pain and syncope.   Respiratory: Negative for cough and shortness of breath.   Gastrointestinal: See HPI. Negative for nausea/vomiting. Negative for abdominal pain.  Genitourinary: See HPI. Negative for discoloration or dysuria.  Skin: Negative for dry skin, itching and rash.   Hematologic/Lymphatic: Negative for bleeding problem. Does not bruise/bleed easily.   Musculoskeletal: Negative for falls and muscle weakness.   Neurological: See HPI. No seizures.   Endocrine: Negative for polydipsia, polyphagia and polyuria.   Allergic/Immunologic: Negative for hives and persistent infections.     EXAM:  LMP 02/15/2024 (Exact  Date)     General: The patient is a very pleasant 43 y.o. female in no apparent distress, the patient is oriented to person, place and time.  Psych: Normal mood and affect  HEENT: Vision grossly intact, hearing intact to the spoken word.  Lungs: Respirations unlabored.  Gait: Normal station and gait, no difficulty with toe or heel walk.   Skin: Dorsal lumbar skin negative for rashes, lesions, hairy patches and surgical scars. There is negative lumbar tenderness to palpation.  Range of motion: Lumbar range of motion is acceptable.  Spinal Balance: Global saggital and coronal spinal balance acceptable, not significant for scoliosis and kyphosis.  Musculoskeletal: No pain with the range of motion of the bilateral hips. No trochanteric tenderness to palpation.  Vascular: Bilateral lower extremities warm and well perfused, dorsalis pedis pulses 2+ bilaterally.  Neurological: 3/5 strength and tone in all major motor groups in the bilateral lower extremities. Altered sensation to light touch in the L2-S1 dermatomes in RLE compared to LLE  Deep tendon reflexes symmetric 2+ in the bilateral lower extremities.  Negative Babinski bilaterally. Straight leg raise positive on right    IMAGING:      Today I personally reviewed AP, Lat and Flex/Ex  upright L-spine films that demonstrate  grade 1 L4/L5 anterolisthesis.     MRI lumbar demonstrates mild degenerative changes at L4-5 L5-S1.     There is no height or weight on file to calculate BMI.    Hemoglobin A1C   Date Value Ref Range Status   09/22/2023 5.0 4.0 - 5.6 % Final     Comment:     ADA Screening Guidelines:  5.7-6.4%  Consistent with prediabetes  >or=6.5%  Consistent with diabetes    High levels of fetal hemoglobin interfere with the HbA1C  assay. Heterozygous hemoglobin variants (HbS, HgC, etc)do  not significantly interfere with this assay.   However, presence of multiple variants may affect accuracy.     06/25/2022 5.0 4.0 - 5.6 % Final     Comment:     ADA Screening  Guidelines:  5.7-6.4%  Consistent with prediabetes  >or=6.5%  Consistent with diabetes    High levels of fetal hemoglobin interfere with the HbA1C  assay. Heterozygous hemoglobin variants (HbS, HgC, etc)do  not significantly interfere with this assay.   However, presence of multiple variants may affect accuracy.     01/25/2021 5.2 4.0 - 5.6 % Final     Comment:     ADA Screening Guidelines:  5.7-6.4%  Consistent with prediabetes  >or=6.5%  Consistent with diabetes    High levels of fetal hemoglobin interfere with the HbA1C  assay. Heterozygous hemoglobin variants (HbS, HgC, etc)do  not significantly interfere with this assay.   However, presence of multiple variants may affect accuracy.             ASSESSMENT/PLAN:    There are no diagnoses linked to this encounter.    Today we discussed at length all of the different treatment options including anti-inflammatories, acetaminophen, rest, ice, heat, physical therapy including strengthening and stretching exercises, home exercises, ROM, aerobic conditioning, aqua therapy, other modalities including ultrasound, massage, and dry needling, epidural steroid injections and finally surgical intervention.      Pt presents with chronic lumbar radiculopathy. Failure of conservative rx. Will send lyrica to pharmacy. Will order right L4-5 L5-S1 TFESI with pain management. Pt will fu if pain persists.

## 2024-03-25 NOTE — DISCHARGE INSTRUCTIONS
You were seen in the emergency department for sciatica.  Please rest stay hydrated and continue your diclofenac, tizanidine, over-the-counter Tylenol as well as the topical pain patches such as Salonpas for your symptoms.  If you continue to have symptoms you will need to follow-up with pain management.  You may also follow-up with your primary care doctor.

## 2024-03-25 NOTE — ED NOTES
Patient identifiers verified and correct for  Ms Bowles  C/C:  BAck pain radiated down leg  SEE NN  APPEARANCE: awake and alert in NAD. PAIN  10/10  SKIN: warm, dry and intact. No breakdown or bruising.  MUSCULOSKELETAL: Patient moving all extremities spontaneously, no obvious swelling or deformities noted. Ambulates independently.  RESPIRATORY: Denies shortness of breath.Respirations unlabored.   CARDIAC: Denies CP, 2+ distal pulses; no peripheral edema  ABDOMEN: S/ND/NT, Denies nausea  : voids spontaneously, denies difficulty  Neurologic: AAO x 4; follows commands equal strength in all extremities; denies numbness/tingling. Denies dizziness Denies new weakness

## 2024-03-25 NOTE — ED NOTES
Discharge home with family, states understanding to follow up as directed.  Out of ED with family via wheelchair, arielle shot time

## 2024-03-26 ENCOUNTER — PATIENT MESSAGE (OUTPATIENT)
Dept: PSYCHIATRY | Facility: CLINIC | Age: 44
End: 2024-03-26
Payer: MEDICARE

## 2024-03-26 ENCOUNTER — PATIENT MESSAGE (OUTPATIENT)
Dept: INTERNAL MEDICINE | Facility: CLINIC | Age: 44
End: 2024-03-26
Payer: MEDICARE

## 2024-03-26 ENCOUNTER — PATIENT OUTREACH (OUTPATIENT)
Dept: EMERGENCY MEDICINE | Facility: HOSPITAL | Age: 44
End: 2024-03-26
Payer: MEDICARE

## 2024-03-26 DIAGNOSIS — G89.29 CHRONIC RIGHT-SIDED LOW BACK PAIN WITH RIGHT-SIDED SCIATICA: Primary | ICD-10-CM

## 2024-03-26 DIAGNOSIS — M54.41 CHRONIC RIGHT-SIDED LOW BACK PAIN WITH RIGHT-SIDED SCIATICA: Primary | ICD-10-CM

## 2024-03-26 NOTE — PROGRESS NOTES
Pt reminded of her f/u with [facility] on 3-28-24 at 11am.. Pt verbalized understanding.

## 2024-03-26 NOTE — PROGRESS NOTES
ED Navigator f/u from recent ED visit. Pt states that she is doing ok and is resting to get relief from the pain. Pt denies having any needs at this time. She is scheduled to f/u with [facility] on 3-28-24 at 11am. ED Navigator will continue to f/u.  .

## 2024-03-27 ENCOUNTER — DOCUMENTATION ONLY (OUTPATIENT)
Dept: NEUROLOGY | Facility: CLINIC | Age: 44
End: 2024-03-27
Payer: MEDICARE

## 2024-03-27 RX ORDER — TRAMADOL HYDROCHLORIDE AND ACETAMINOPHEN 37.5; 325 MG/1; MG/1
1 TABLET, FILM COATED ORAL EVERY 12 HOURS PRN
Qty: 10 TABLET | Refills: 0 | Status: SHIPPED | OUTPATIENT
Start: 2024-03-27 | End: 2024-04-15 | Stop reason: SDUPTHER

## 2024-03-27 NOTE — TELEPHONE ENCOUNTER
Spoke with patient. She has an appointment with Orthopedics tomorrow. She reports that Ultracet helps at night. Will send a few more pills.    Will call her tomorrow to arrange imaging of kidney.

## 2024-03-28 ENCOUNTER — OFFICE VISIT (OUTPATIENT)
Dept: ORTHOPEDICS | Facility: CLINIC | Age: 44
End: 2024-03-28
Payer: MEDICARE

## 2024-03-28 ENCOUNTER — TELEPHONE (OUTPATIENT)
Dept: PAIN MEDICINE | Facility: CLINIC | Age: 44
End: 2024-03-28
Payer: MEDICARE

## 2024-03-28 VITALS — HEIGHT: 65 IN | WEIGHT: 250 LBS | BODY MASS INDEX: 41.65 KG/M2

## 2024-03-28 DIAGNOSIS — M54.31 RIGHT SIDED SCIATICA: Primary | ICD-10-CM

## 2024-03-28 DIAGNOSIS — M54.16 LUMBAR RADICULOPATHY: ICD-10-CM

## 2024-03-28 DIAGNOSIS — M54.31 RIGHT SIDED SCIATICA: ICD-10-CM

## 2024-03-28 PROCEDURE — 99999 PR PBB SHADOW E&M-EST. PATIENT-LVL III: CPT | Mod: PBBFAC,HCNC,, | Performed by: ORTHOPAEDIC SURGERY

## 2024-03-28 PROCEDURE — 99214 OFFICE O/P EST MOD 30 MIN: CPT | Mod: HCNC,S$GLB,, | Performed by: ORTHOPAEDIC SURGERY

## 2024-03-28 PROCEDURE — 1159F MED LIST DOCD IN RCRD: CPT | Mod: HCNC,CPTII,S$GLB, | Performed by: ORTHOPAEDIC SURGERY

## 2024-03-28 PROCEDURE — 3008F BODY MASS INDEX DOCD: CPT | Mod: HCNC,CPTII,S$GLB, | Performed by: ORTHOPAEDIC SURGERY

## 2024-03-28 RX ORDER — PREGABALIN 75 MG/1
75 CAPSULE ORAL 2 TIMES DAILY
Qty: 60 CAPSULE | Refills: 5 | Status: SHIPPED | OUTPATIENT
Start: 2024-03-28 | End: 2024-09-26

## 2024-03-28 NOTE — TELEPHONE ENCOUNTER
----- Message from Zainab Coughlin PA-C sent at 3/28/2024 11:25 AM CDT -----  Regarding: Order for KENDELL NI    Patient Name: KENDELL NI([id number])  Sex: Female  : 1980      PCP: ANAND RUIZ    Center: [facility] CENTRAL BILLING OFFICE     Level of Service:07656     ID OFFICE/OUTPT VISIT, NEW, LEVL IV, 45-59 MIN    Types of orders made on 2024: Medications, Outpatient Referral, Procedure                                         Request    Order Date:3/28/2024  Ordering User:ZAINAB COUGHLIN [[id number]]  Encounter Provider:Zainab Coughlin PA-C [[id number]]  Authorizing Provider: Zainab Coughlin PA-C [[id number]]  Supervising Provider:MARIA ESTHER TORRES [[id number]]  Type of Supervision:Supervision Required  Department:[facility][[id number]]    Common Order Information  Procedure -> Transforaminal Injection (Specify level and late  rality) Cmt: right             L4-5 L5-S1    Order Specific Information  Order: Procedure Order to Pain Management [Custom: [id number]]  Order #:          [id number]: 1 FUTURE    Priority: Routine  Class: Clinic Performed    Future Order Information      Expires on:2025            Expected by:2024                   Comment:Patient requesting ASAP    Associated Diagnoses      M54.3  1 Right sided sciatica      Facility Name: -> [facility]           Priority: Routine  Class: Clinic Performed    Future Order Information      Expires on:2025            Expected by:2024                   Comment:Patient requesting ASAP    Associated Diagnoses      M54.31 Right sided sciatica      Procedure -> Transforaminal Injection (Specify level and laterality) Cmt:                 right L4-5 L  5-S1        Facility Name: -> [facility]

## 2024-04-02 ENCOUNTER — TELEPHONE (OUTPATIENT)
Dept: PAIN MEDICINE | Facility: CLINIC | Age: 44
End: 2024-04-02
Payer: MEDICARE

## 2024-04-05 NOTE — PRE-PROCEDURE INSTRUCTIONS
Patient reviewed on 4/5/2024.  Okay to proceed at [place]. The following pre-procedure instructions and arrival time have been reviewed with patient via phone and sent to patient portal for review.  Patient verbalized an understanding.  Pt to be accompanied by her brother day of procedure as responsible .      Dear Jaylon,     You are scheduled for a procedure with Dr. Scott on 04/9/2024  Your scheduled arrival time is 10:30 am.  This arrival time is roughly 1 hour before your anticipated procedure time to allow sufficient time for pre-op..  Please wear comfortable clothes.  Most patients do not need to change into a gown.  Please do not wear a dress.  This procedure will take place at the [facility] at the corner of [facility] and [facility].  It is in the [place] [facility] next to [facility].  The address is:     [address] [place] [address].  [place] [address] [id number]     After entering the building, you will proceed to the second floor where you can check in with registration. You should take any medications that you routinely take for blood pressure, heart medications, thyroid, cholesterol, etc.      The fasting restrictions are dependent on whether or not you are receiving sedation.  Sedation is not available for all procedures.      Your fasting instructions are as follow:  IV sedation. You should not eat for 8 hours and can only drink clear liquids (water or black coffee without cream/sugar) up until 2 hours before your scheduled time.  You CANNOT drive yourself and must have a .     If you are on blood thinners, you need to follow the anticoagulation instructions that had been discussed previously.  You should only stop the blood thinners if it was approved by your primary care physician or your cardiologist.  In the event that you are not able to stop your blood thinners, a blood thinner was not listed on your medication list, or we were not able to get clearance from  your cardiologist, then the procedure may have to be postponed/canceled.      IF you were told to stop your blood thinners, this is how long you should generally hold some of the more common ones.  Remember that stopping blood thinners is only necessary for certain procedures. If you are unsure of your instructions, please call us.   Aspirin - 5 days  Plavix/Clopidogrel - 7 days  Warfarin / Coumadin - 5 days  Eliquis - 3 days  Pradaxa/Dabigatran - 4 days  Xarelto/Rivaroxaban - 3 days     If you are a diabetic, do not take your medication if you will be fasting, but bring it with you. Please plan on being here for roughly 2 hours.     Please call us if you have been sick (running fever, having any flu-like symptoms) or have been taking antibiotics in the past 2 weeks or had any outpatient procedures other than with us (colonoscopy, endoscopy, OBGYN, dental, etc.). If you have been previously COVID positive, you will need to hold off on your procedure until you are symptom free for 10 days. If you did not have any symptoms, you can have your procedure 10 days from your positive test result.       *HOLD ALL VITAMINS, MINERALS, HERBS (INCLUDING HERBAL TEAS) AND SUPPLEMENTS  *SHOWER WITH ANTIBACTERIAL SOAP (EX. DIAL) NIGHT BEFORE AND MORNING OF PROCEDURE  *DO NOT APPLY ANY LOTIONS, OILS, POWDERS, PERFUME/COLOGNE, OINTMENTS, GELS, CREAMS, MAKEUP OR DEODORANT TO YOUR SKIN MORNING OF PROCEDURE  *LEAVE JEWELRY AND ANY VALUABLES AT HOME  *WEAR LOOSE COMFORTABLE CLOTHING (PREFERABLY A BUTTON UP SHIRT)        Thank you,  [facility] &  Carolyn, LPN [facility]  Pre-Admit

## 2024-04-07 ENCOUNTER — PATIENT MESSAGE (OUTPATIENT)
Dept: INTERNAL MEDICINE | Facility: CLINIC | Age: 44
End: 2024-04-07
Payer: MEDICARE

## 2024-04-09 ENCOUNTER — HOSPITAL ENCOUNTER (OUTPATIENT)
Facility: HOSPITAL | Age: 44
Discharge: HOME | End: 2024-04-09
Attending: EMERGENCY MEDICINE | Admitting: EMERGENCY MEDICINE
Payer: MEDICARE

## 2024-04-09 ENCOUNTER — PATIENT MESSAGE (OUTPATIENT)
Dept: INTERNAL MEDICINE | Facility: CLINIC | Age: 44
End: 2024-04-09
Payer: MEDICARE

## 2024-04-09 VITALS
RESPIRATION RATE: 14 BRPM | WEIGHT: 250 LBS | SYSTOLIC BLOOD PRESSURE: 151 MMHG | OXYGEN SATURATION: 100 % | HEART RATE: 70 BPM | DIASTOLIC BLOOD PRESSURE: 75 MMHG | BODY MASS INDEX: 41.65 KG/M2 | TEMPERATURE: 97 F | HEIGHT: 65 IN

## 2024-04-09 DIAGNOSIS — G89.29 CHRONIC PAIN: ICD-10-CM

## 2024-04-09 PROCEDURE — 63600175 PHARM REV CODE 636 W HCPCS: Performed by: EMERGENCY MEDICINE

## 2024-04-09 PROCEDURE — 99152 MOD SED SAME PHYS/QHP 5/>YRS: CPT | Performed by: EMERGENCY MEDICINE

## 2024-04-09 PROCEDURE — 25500020 PHARM REV CODE 255: Performed by: EMERGENCY MEDICINE

## 2024-04-09 PROCEDURE — 64483 NJX AA&/STRD TFRM EPI L/S 1: CPT | Mod: RT | Performed by: EMERGENCY MEDICINE

## 2024-04-09 PROCEDURE — 64484 NJX AA&/STRD TFRM EPI L/S EA: CPT | Mod: RT | Performed by: EMERGENCY MEDICINE

## 2024-04-09 PROCEDURE — 25000003 PHARM REV CODE 250: Performed by: EMERGENCY MEDICINE

## 2024-04-09 PROCEDURE — 64483 NJX AA&/STRD TFRM EPI L/S 1: CPT | Mod: RT,,, | Performed by: EMERGENCY MEDICINE

## 2024-04-09 PROCEDURE — 64484 NJX AA&/STRD TFRM EPI L/S EA: CPT | Mod: RT,,, | Performed by: EMERGENCY MEDICINE

## 2024-04-09 RX ORDER — LIDOCAINE HYDROCHLORIDE 10 MG/ML
INJECTION, SOLUTION EPIDURAL; INFILTRATION; INTRACAUDAL; PERINEURAL
Status: DISCONTINUED | OUTPATIENT
Start: 2024-04-09 | End: 2024-04-09 | Stop reason: HOSPADM

## 2024-04-09 RX ORDER — FENTANYL CITRATE 50 UG/ML
INJECTION, SOLUTION INTRAMUSCULAR; INTRAVENOUS
Status: DISCONTINUED | OUTPATIENT
Start: 2024-04-09 | End: 2024-04-09 | Stop reason: HOSPADM

## 2024-04-09 RX ORDER — DEXAMETHASONE SODIUM PHOSPHATE 10 MG/ML
INJECTION INTRAMUSCULAR; INTRAVENOUS
Status: DISCONTINUED | OUTPATIENT
Start: 2024-04-09 | End: 2024-04-09 | Stop reason: HOSPADM

## 2024-04-09 RX ORDER — LIDOCAINE HYDROCHLORIDE 20 MG/ML
INJECTION, SOLUTION EPIDURAL; INFILTRATION; INTRACAUDAL; PERINEURAL
Status: DISCONTINUED | OUTPATIENT
Start: 2024-04-09 | End: 2024-04-09 | Stop reason: HOSPADM

## 2024-04-09 RX ORDER — MIDAZOLAM HYDROCHLORIDE 1 MG/ML
INJECTION INTRAMUSCULAR; INTRAVENOUS
Status: DISCONTINUED | OUTPATIENT
Start: 2024-04-09 | End: 2024-04-09 | Stop reason: HOSPADM

## 2024-04-09 NOTE — PLAN OF CARE
Patient AAOx3. Consents need to be signed, H&P needed, brother verified ride home, all concerns addressed. Patient has no complaints at this time.

## 2024-04-09 NOTE — DISCHARGE SUMMARY
Discharge Note  Short Stay      SUMMARY     Admit Date: 4/9/2024    Attending Physician: Geovanni Scott      Discharge Physician: Geovanni Scott      Discharge Date: 4/9/2024 12:53 PM    Procedure(s) (LRB):  TFESI RT L4-5, L5-S1 (Right)    Final Diagnosis: Lumbar radiculopathy [M54.16]    Disposition: Home or self care    Patient Instructions:   Current Discharge Medication List        CONTINUE these medications which have NOT CHANGED    Details   carvediloL (COREG) 25 MG tablet Take 1 tablet (25 mg total) by mouth 2 (two) times daily with meals.  Qty: 180 tablet, Refills: 1    Comments: .  Associated Diagnoses: Essential hypertension      diclofenac (VOLTAREN) 75 MG EC tablet Take 1 tablet (75 mg total) by mouth 2 (two) times daily as needed (pain).  Qty: 30 tablet, Refills: 0      EScitalopram oxalate (LEXAPRO) 20 MG tablet Take 1.5 tablets (30 mg total) by mouth once daily.  Qty: 135 tablet, Refills: 1    Associated Diagnoses: Recurrent depressive disorder, current episode moderate      ferrous sulfate (FEOSOL) 325 mg (65 mg iron) Tab tablet Take 1 tablet (325 mg total) by mouth once daily.  Refills: 0      levothyroxine (SYNTHROID) 112 MCG tablet Take 1 tablet (112 mcg total) by mouth before breakfast.  Qty: 90 tablet, Refills: 1    Associated Diagnoses: Hashimoto's thyroiditis      losartan-hydrochlorothiazide 100-12.5 mg (HYZAAR) 100-12.5 mg Tab Take 1 tablet by mouth once daily.  Qty: 90 tablet, Refills: 1    Comments: .  Associated Diagnoses: Essential hypertension      magnesium 250 mg Tab Take 500 mg by mouth once.      multivitamin with minerals (ONE DAILY COMPLETE ORAL) Take by mouth.      OCREVUS 30 mg/mL Soln       pregabalin (LYRICA) 75 MG capsule Take 1 capsule (75 mg total) by mouth 2 (two) times daily.  Qty: 60 capsule, Refills: 5      !! tiZANidine (ZANAFLEX) 2 MG tablet TAKE 1/2 TABLET BY MOUTH IN THE MORNING AND IN THE AFTERNOON FOR MUSCLE SPASMS  Qty: 30 tablet, Refills: 1    Associated  Diagnoses: Muscle spasm      !! tiZANidine (ZANAFLEX) 4 MG tablet TAKE 1 TABLET(4 MG) BY MOUTH TWICE DAILY AS NEEDED  Qty: 60 tablet, Refills: 5    Associated Diagnoses: Muscle spasm      tramadol-acetaminophen 37.5-325 mg (ULTRACET) 37.5-325 mg Tab Take 1 tablet by mouth every 12 (twelve) hours as needed for Pain (pain).  Qty: 10 tablet, Refills: 0    Comments: <7 day duration  Associated Diagnoses: Chronic right-sided low back pain with right-sided sciatica      amantadine  mg Tab Take 1 tablet (100 mg total) by mouth 2 (two) times a day. Take 1 tablet by mouth in the morning and 1 tablet at noon if needed.  Qty: 90 tablet, Refills: 7    Comments: [email or web address]  Associated Diagnoses: Fatigue, unspecified type; Multiple sclerosis      augmented betamethasone dipropionate (DIPROLENE-AF) 0.05 % cream Apply topically 2 (two) times daily. Use to affected areas for up to 2 weeks then take a 1 week break or decrease to 3 times weekly. Do not apply to groin or face. Use to the rough itchy areas on arms and legs then cover with bandage  Qty: 100 g, Refills: 2    Associated Diagnoses: Eczema, unspecified type; Lichen simplex chronicus      cholecalciferol, vitamin D3, 1,250 mcg (50,000 unit) Tab Take 1 tablet by mouth once a week.  Qty: 12 tablet, Refills: 3    Associated Diagnoses: Multiple sclerosis      coenzyme Q10 100 mg capsule Take 5 capsules (500 mg total) by mouth once daily.  Qty: 150 capsule, Refills: 11    Associated Diagnoses: Fatigue, unspecified type; Multiple sclerosis       !! - Potential duplicate medications found. Please discuss with provider.              Discharge Diagnosis: Lumbar radiculopathy [M54.16]  Condition on Discharge: Stable with no complications to procedure   Diet on Discharge: Same as before.  Activity: as per instruction sheet.  Discharge to: Home with a responsible adult.  Follow up: 2-4 weeks       Please call my office or pager at [phone] if experienced any weakness  or loss of sensation, fever > 101.5, pain uncontrolled with oral medications, persistent nausea/vomiting/or diarrhea, redness or drainage from the incisions, or any other worrisome concerns. If physician on call was not reached or could not communicate with our office for any reason please go to the nearest emergency department

## 2024-04-09 NOTE — DISCHARGE INSTRUCTIONS
[facility] [place]  Dr. Geovanni Scott  Messaging service # [phone]    POST-PROCEDURE INSTRUCTIONS:    Today you had an injection that included a steroid medications.  The steroid may or may not have been mixed with a local anesthetic when it was injected.   If the injection was in the neck, you may feel some pressure, numbness, or slight weakness in the arm after the procedure for a short period of time (this is a normal response), if this persists for longer than 1 day please contact our office or go to the emergency room.  If the injection was in the low back, you may feel some pressure, numbness, or slight weakness in the leg after the procedure for a short period of time (this is a normal response), if this persists for longer than 1 day please contact our office or go to the emergency room.  You may get side effects from the steroid.  This is not uncommon.  Symptoms include: elevated blood sugar, elevated blood pressure, headache, flushing, nausea, insomnia.  These symptoms are transient and will resolve within 1-3 days.  If symptoms last longer than this please contact our office or head to the emergency room.  Steroid medications can take anywhere from 3-14 days to take effect (rarely longer).  You may notice that your pain worsens for a short period of time after the injection, this would not be unusual due to the pressure and trauma from the needle.    If you do not have a follow up appointment scheduled, please contact my office (or the office of the physician who referred you for the procedure) to get a post-procedure follow up scheduled 2-4 weeks after the procedure.  This can be done as a virtual visit if that is more convenient for you.      What you need to do:    Keep a record of your response to the injection you had today.    How much relief did you get?   When did the relief start and how long did it last?  Were you able to decrease the use of any of your pain  medications?  Were you able to increase your level of activity?  How long did the relief last?    What to watch out for:    If you experience any of the following symptoms after your procedure, please notify the messaging service immediately (see above for contact information):   fever (increased oral temperature)   bleeding or swelling at the injection site,    drainage, rash or redness at the injection site    possible signs of infection    increased pain at the injection site   worsening of your usual pain   severe headache   new or worsening numbness    new arm and/or leg weakness, or    changes in bowel and/or bladder function: urinating or defecating on yourself and not knowing that you did it.    PLEASE FOLLOW ALL INSTRUCTIONS CAREFULLY     Do not engage in strenuous activity (e.g., lifting or pushing heavy objects or repeated bending) for 24 hours.     Do not take a bath, swim or use Jacuzzi for 24 hours after procedure. (A shower is fine).   Remove any Band-Aids when you get home.    Use cold/ice, as needed for comfort.  We recommend the use of cold therapy alternating on for 20 minutes, off for 20 minutes.    Do not apply direct heat (heating pad or heat packs) to the injection site for 24 hours.     Resume your usual medications, unless instructed otherwise by your Pain Physician.     If you are on warfarin (Coumadin) or other blood thinner, resume this medication as instructed by your prescribing Physician.    IF AT ANY POINT YOU ARE VERY CONCERNED ABOUT YOUR SYMPTOMS, PLEASE GO TO THE EMERGENCY ROOM.    If you develop worsening pain, weakness, numbness, lose bowel or bladder control (i.e., having an accident where you did not even know you had to go to the bathroom and suddenly noticed you soiled yourself), saddle anesthesia (a loss of sensation restricted to the area of the buttocks, anus and between the legs -- i.e., those parts of your body that would touch a saddle if you were sitting on one) you  need to go immediately to the emergency department for evaluation and treatment.    ----------------------------------------------------------------------------------------------------------------------------------------------------------------  If you received Sedation please read the following instructions:  POST SEDATION INSTRUCTIONS    Today you received intravenous medication (also known as sedation) that was used to help you relax and/or decrease discomfort during your procedure. This medication will be acting in your body for the next 24 hours, so you might feel a little tired or sleepy. This feeling will slowly wear off.   Common side effects associated with these medications include: drowsiness, dizziness, sleepiness, confusion, feeling excited, difficulty remembering things, lack of steadiness with walking or balance, loss of fine muscle control, slowed reflexes, difficulty focusing, and blurred vision.  Some over-the-counter and prescription medications (e.g., muscle relaxants, opioids, mood-altering medications, sedatives/hypnotics, antihistamines) can interact with the intravenous medication you received and cause an increased risk of the side effects listed above in addition to other potentially life threatening side effects. Use extreme caution if you are taking such medications, and consult with your Pain Physician or prescribing physician if you have any questions.  For the next 12-24 hours:    DO NOT--Drive a car, operate machinery or power tools   DO NOT--Drink any alcoholic beverages (not even beer), they may dangerously increase the risk of side effects.    DO NOT--Make any important legal or business decisions or sign important documents.  We advise you to have someone to assist you at home. Move slowly and carefully. Do not make sudden changes in position. Be aware of dizziness or light-headedness and move accordingly.   If you seek medical treatment within 24 hours, let the nurse or doctor  caring for you know that you have received the above medications. If you have any questions or concerns related to your sedation or treatment today please contact us.

## 2024-04-09 NOTE — PLAN OF CARE
Patient awake, alert, and oriented x 4. No apparent distress noted. VS currently stable. Discharge instructions reviewed, patient verbalized understanding.

## 2024-04-09 NOTE — OP NOTE
Lumbar Transforaminal Epidural Steroid Injection under Fluoroscopic Guidance    The procedure, risks, benefits, and options were discussed with the patient. There are no contraindications to the procedure. The patent expressed understanding and agreed to the procedure. Informed written consent was obtained prior to the start of the procedure and can be found in the patient's chart.    PATIENT NAME: Jaylon Bowles   MRN: [id number]     DATE OF PROCEDURE: 04/09/2024    PROCEDURE:  Right  L4/5 and L5/S1 Lumbar Transforaminal Epidural Steroid Injection under Fluoroscopic Guidance    PRE-OP DIAGNOSIS: Lumbar radiculopathy [M54.16] Lumbar radiculopathy [M54.16]    POST-OP DIAGNOSIS: Same    PHYSICIAN: Geovanni Scott MD    MEDICATIONS INJECTED: Preservative-free Decadron 10mg with 5cc of Lidocaine 1% MPF     LOCAL ANESTHETIC INJECTED: Xylocaine 2%     SEDATION: Versed 2mg and Fentanyl 50mcg                                                                                                                                                                                     Conscious sedation ordered by M.D. Patient re-evaluation prior to administration of conscious sedation. No changes noted in patient's status from initial evaluation. The patient's vital signs were monitored by RN and patient remained hemodynamically stable throughout the procedure.    Event Time In   Sedation Start 1236   Sedation End 1251       ESTIMATED BLOOD LOSS: None    COMPLICATIONS: None    TECHNIQUE: Time-out was performed to identify the patient and procedure to be performed. With the patient laying in a prone position, the surgical area was prepped and draped in the usual sterile fashion using ChloraPrep and a fenestrated drape.The levels were determined under fluoroscopy guidance. Skin anesthesia was achieved by injecting Lidocaine 2% over the injection sites. The transforaminal spaces were then approached with a 22 gauge, 5 inch spinal quinke  needle that was introduced under fluoroscopic guidance in the AP and Lateral views. Once the needle tip was in the area of the transforaminal space, and there was no blood, CSF or paraesthesias, contrast dye Omnipaque (300mg/mL) was injected to confirm placement and there was no vascular runoff. Fluoroscopic imaging in the AP and lateral views revealed a clear outline of the spinal nerve with proximal spread of agent through the neural foramen into the epidural space. 3 mL of the medication mixture listed above was injected slowly at each site. Displacement of the radio opaque contrast after injection of the medication confirmed that the medication went into the area of the transforaminal spaces. The needles were removed and bleeding was nil. A sterile dressing was applied. No specimens collected. The patient tolerated the procedure well.     The patient was monitored after the procedure in the recovery area. They were given post-procedure and discharge instructions to follow at home. The patient was discharged in a stable condition.    Geovanni Scott MD

## 2024-04-10 ENCOUNTER — PATIENT MESSAGE (OUTPATIENT)
Dept: PAIN MEDICINE | Facility: CLINIC | Age: 44
End: 2024-04-10
Payer: MEDICARE

## 2024-04-14 ENCOUNTER — PATIENT MESSAGE (OUTPATIENT)
Dept: INTERNAL MEDICINE | Facility: CLINIC | Age: 44
End: 2024-04-14
Payer: MEDICARE

## 2024-04-14 DIAGNOSIS — G89.29 CHRONIC RIGHT-SIDED LOW BACK PAIN WITH RIGHT-SIDED SCIATICA: ICD-10-CM

## 2024-04-14 DIAGNOSIS — M54.41 CHRONIC RIGHT-SIDED LOW BACK PAIN WITH RIGHT-SIDED SCIATICA: ICD-10-CM

## 2024-04-15 NOTE — TELEPHONE ENCOUNTER
No care due was identified.  [facility] Embedded Care Due Messages. Reference number: [id number].   4/15/2024 8:31:53 AM CDT

## 2024-04-16 RX ORDER — TRAMADOL HYDROCHLORIDE AND ACETAMINOPHEN 37.5; 325 MG/1; MG/1
1 TABLET, FILM COATED ORAL EVERY 12 HOURS PRN
Qty: 30 TABLET | Refills: 0 | Status: SHIPPED | OUTPATIENT
Start: 2024-04-16

## 2024-04-16 RX ORDER — DICLOFENAC SODIUM 75 MG/1
75 TABLET, DELAYED RELEASE ORAL 2 TIMES DAILY PRN
Qty: 30 TABLET | Refills: 0 | Status: SHIPPED | OUTPATIENT
Start: 2024-04-16 | End: 2024-05-06

## 2024-05-02 ENCOUNTER — PATIENT MESSAGE (OUTPATIENT)
Dept: PSYCHIATRY | Facility: CLINIC | Age: 44
End: 2024-05-02
Payer: MEDICARE

## 2024-05-05 DIAGNOSIS — G89.29 CHRONIC RIGHT-SIDED LOW BACK PAIN WITH RIGHT-SIDED SCIATICA: ICD-10-CM

## 2024-05-05 DIAGNOSIS — M54.41 CHRONIC RIGHT-SIDED LOW BACK PAIN WITH RIGHT-SIDED SCIATICA: ICD-10-CM

## 2024-05-05 NOTE — TELEPHONE ENCOUNTER
No care due was identified.  [facility] Embedded Care Due Messages. Reference number: [id number].   5/05/2024 5:01:16 PM CDT

## 2024-05-06 ENCOUNTER — PATIENT MESSAGE (OUTPATIENT)
Dept: NEUROLOGY | Facility: CLINIC | Age: 44
End: 2024-05-06
Payer: MEDICARE

## 2024-05-06 DIAGNOSIS — G35 MULTIPLE SCLEROSIS: Primary | ICD-10-CM

## 2024-05-06 RX ORDER — DICLOFENAC SODIUM 75 MG/1
75 TABLET, DELAYED RELEASE ORAL 2 TIMES DAILY PRN
Qty: 30 TABLET | Refills: 0 | Status: SHIPPED | OUTPATIENT
Start: 2024-05-06

## 2024-05-06 NOTE — TELEPHONE ENCOUNTER
Received message from Pt re: bedside commode. Assigning to Jackelyn Pruitt LMSW for further assistance.

## 2024-05-16 NOTE — TELEPHONE ENCOUNTER
Sw reached out to pt to inform of medicare coverage issues. Sw received permission to send referral to [facility].

## 2024-05-20 NOTE — TELEPHONE ENCOUNTER
Sidra received email from Bernadette Ortiz ([email or web address]) with [facility] re: Jaylon's request. Sidra informed [facility] that pt no longer requires services.

## 2024-05-30 ENCOUNTER — DOCUMENTATION ONLY (OUTPATIENT)
Dept: NEUROLOGY | Facility: CLINIC | Age: 44
End: 2024-05-30
Payer: MEDICARE

## 2024-06-03 ENCOUNTER — PATIENT MESSAGE (OUTPATIENT)
Dept: NEUROLOGY | Facility: CLINIC | Age: 44
End: 2024-06-03
Payer: MEDICARE

## 2024-06-03 ENCOUNTER — LAB VISIT (OUTPATIENT)
Dept: LAB | Facility: HOSPITAL | Age: 44
End: 2024-06-03
Payer: MEDICARE

## 2024-06-03 DIAGNOSIS — G35 MULTIPLE SCLEROSIS: ICD-10-CM

## 2024-06-03 LAB
ALBUMIN SERPL BCP-MCNC: 3.8 G/DL (ref 3.5–5.2)
ALP SERPL-CCNC: 68 U/L (ref 55–135)
ALT SERPL W/O P-5'-P-CCNC: 9 U/L (ref 10–44)
ANION GAP SERPL CALC-SCNC: 7 MMOL/L (ref 8–16)
AST SERPL-CCNC: 15 U/L (ref 10–40)
BASOPHILS # BLD AUTO: 0.07 K/UL (ref 0–0.2)
BASOPHILS NFR BLD: 0.6 % (ref 0–1.9)
BILIRUB SERPL-MCNC: 0.3 MG/DL (ref 0.1–1)
BUN SERPL-MCNC: 7 MG/DL (ref 6–20)
CALCIUM SERPL-MCNC: 9.6 MG/DL (ref 8.7–10.5)
CHLORIDE SERPL-SCNC: 105 MMOL/L (ref 95–110)
CO2 SERPL-SCNC: 27 MMOL/L (ref 23–29)
CREAT SERPL-MCNC: 1 MG/DL (ref 0.5–1.4)
DIFFERENTIAL METHOD BLD: ABNORMAL
EOSINOPHIL # BLD AUTO: 0.3 K/UL (ref 0–0.5)
EOSINOPHIL NFR BLD: 2.5 % (ref 0–8)
ERYTHROCYTE [DISTWIDTH] IN BLOOD BY AUTOMATED COUNT: 16 % (ref 11.5–14.5)
EST. GFR  (NO RACE VARIABLE): >60 ML/MIN/1.73 M^2
GLUCOSE SERPL-MCNC: 92 MG/DL (ref 70–110)
HBV CORE AB SERPL QL IA: NORMAL
HBV SURFACE AG SERPL QL IA: NORMAL
HCT VFR BLD AUTO: 38.5 % (ref 37–48.5)
HGB BLD-MCNC: 12.7 G/DL (ref 12–16)
IGA SERPL-MCNC: 365 MG/DL (ref 40–350)
IGG SERPL-MCNC: 1747 MG/DL (ref 650–1600)
IGM SERPL-MCNC: 104 MG/DL (ref 50–300)
IMM GRANULOCYTES # BLD AUTO: 0.03 K/UL (ref 0–0.04)
IMM GRANULOCYTES NFR BLD AUTO: 0.3 % (ref 0–0.5)
LYMPHOCYTES # BLD AUTO: 1.7 K/UL (ref 1–4.8)
LYMPHOCYTES NFR BLD: 15.2 % (ref 18–48)
MCH RBC QN AUTO: 27.5 PG (ref 27–31)
MCHC RBC AUTO-ENTMCNC: 33 G/DL (ref 32–36)
MCV RBC AUTO: 83 FL (ref 82–98)
MONOCYTES # BLD AUTO: 0.8 K/UL (ref 0.3–1)
MONOCYTES NFR BLD: 7.5 % (ref 4–15)
NEUTROPHILS # BLD AUTO: 8.1 K/UL (ref 1.8–7.7)
NEUTROPHILS NFR BLD: 73.9 % (ref 38–73)
NRBC BLD-RTO: 0 /100 WBC
PLATELET # BLD AUTO: 256 K/UL (ref 150–450)
PMV BLD AUTO: 13.3 FL (ref 9.2–12.9)
POTASSIUM SERPL-SCNC: 4.2 MMOL/L (ref 3.5–5.1)
PROT SERPL-MCNC: 7.9 G/DL (ref 6–8.4)
RBC # BLD AUTO: 4.62 M/UL (ref 4–5.4)
SODIUM SERPL-SCNC: 139 MMOL/L (ref 136–145)
WBC # BLD AUTO: 10.94 K/UL (ref 3.9–12.7)

## 2024-06-03 PROCEDURE — 80053 COMPREHEN METABOLIC PANEL: CPT | Mod: HCNC | Performed by: CLINICAL NURSE SPECIALIST

## 2024-06-03 PROCEDURE — 82784 ASSAY IGA/IGD/IGG/IGM EACH: CPT | Mod: 59,HCNC | Performed by: CLINICAL NURSE SPECIALIST

## 2024-06-03 PROCEDURE — 87340 HEPATITIS B SURFACE AG IA: CPT | Mod: HCNC | Performed by: CLINICAL NURSE SPECIALIST

## 2024-06-03 PROCEDURE — 36415 COLL VENOUS BLD VENIPUNCTURE: CPT | Mod: HCNC | Performed by: CLINICAL NURSE SPECIALIST

## 2024-06-03 PROCEDURE — 86704 HEP B CORE ANTIBODY TOTAL: CPT | Mod: HCNC | Performed by: CLINICAL NURSE SPECIALIST

## 2024-06-03 PROCEDURE — 85025 COMPLETE CBC W/AUTO DIFF WBC: CPT | Mod: HCNC | Performed by: CLINICAL NURSE SPECIALIST

## 2024-06-05 DIAGNOSIS — I10 ESSENTIAL HYPERTENSION: ICD-10-CM

## 2024-06-05 RX ORDER — LOSARTAN POTASSIUM AND HYDROCHLOROTHIAZIDE 12.5; 1 MG/1; MG/1
1 TABLET ORAL
Qty: 90 TABLET | Refills: 1 | Status: SHIPPED | OUTPATIENT
Start: 2024-06-05

## 2024-06-10 ENCOUNTER — PATIENT MESSAGE (OUTPATIENT)
Dept: INTERNAL MEDICINE | Facility: CLINIC | Age: 44
End: 2024-06-10
Payer: MEDICARE

## 2024-06-11 NOTE — TELEPHONE ENCOUNTER
Pt would like recommendations of brand of pills to take for gut cleansing, she wants to cleanse her body.

## 2024-06-19 NOTE — TELEPHONE ENCOUNTER
Attempted to call pt to schedule her for a sooner hem/onc appt at either [facility] or [facility]. I will r/s her at [place], per pt's preference, and let her know via portal.

## 2024-06-20 ENCOUNTER — PATIENT MESSAGE (OUTPATIENT)
Dept: INTERNAL MEDICINE | Facility: CLINIC | Age: 44
End: 2024-06-20
Payer: MEDICARE

## 2024-06-21 NOTE — TELEPHONE ENCOUNTER
Pt requesting response from 6/10     Today's message reads:   How are you? You didn't reply to my last message.   I would like to inquire about vraylar the anti depressant pill. How is it different from what I have now?  Can I try it?

## 2024-06-24 DIAGNOSIS — M62.838 MUSCLE SPASM: ICD-10-CM

## 2024-06-25 RX ORDER — TIZANIDINE 2 MG/1
TABLET ORAL
Qty: 30 TABLET | Refills: 1 | Status: SHIPPED | OUTPATIENT
Start: 2024-06-25

## 2024-07-08 ENCOUNTER — OFFICE VISIT (OUTPATIENT)
Dept: NEUROLOGY | Facility: CLINIC | Age: 44
End: 2024-07-08
Payer: MEDICARE

## 2024-07-08 VITALS
DIASTOLIC BLOOD PRESSURE: 80 MMHG | BODY MASS INDEX: 41.32 KG/M2 | HEART RATE: 58 BPM | HEIGHT: 65 IN | SYSTOLIC BLOOD PRESSURE: 128 MMHG | WEIGHT: 248 LBS

## 2024-07-08 DIAGNOSIS — G35 MULTIPLE SCLEROSIS: ICD-10-CM

## 2024-07-08 DIAGNOSIS — E55.9 VITAMIN D DEFICIENCY: ICD-10-CM

## 2024-07-08 PROCEDURE — 3074F SYST BP LT 130 MM HG: CPT | Mod: HCNC,CPTII,S$GLB, | Performed by: STUDENT IN AN ORGANIZED HEALTH CARE EDUCATION/TRAINING PROGRAM

## 2024-07-08 PROCEDURE — 3008F BODY MASS INDEX DOCD: CPT | Mod: HCNC,CPTII,S$GLB, | Performed by: STUDENT IN AN ORGANIZED HEALTH CARE EDUCATION/TRAINING PROGRAM

## 2024-07-08 PROCEDURE — 3079F DIAST BP 80-89 MM HG: CPT | Mod: HCNC,CPTII,S$GLB, | Performed by: STUDENT IN AN ORGANIZED HEALTH CARE EDUCATION/TRAINING PROGRAM

## 2024-07-08 PROCEDURE — 1159F MED LIST DOCD IN RCRD: CPT | Mod: HCNC,CPTII,S$GLB, | Performed by: STUDENT IN AN ORGANIZED HEALTH CARE EDUCATION/TRAINING PROGRAM

## 2024-07-08 PROCEDURE — 99999 PR PBB SHADOW E&M-EST. PATIENT-LVL III: CPT | Mod: PBBFAC,HCNC,, | Performed by: STUDENT IN AN ORGANIZED HEALTH CARE EDUCATION/TRAINING PROGRAM

## 2024-07-08 PROCEDURE — G2211 COMPLEX E/M VISIT ADD ON: HCPCS | Mod: HCNC,S$GLB,, | Performed by: STUDENT IN AN ORGANIZED HEALTH CARE EDUCATION/TRAINING PROGRAM

## 2024-07-08 PROCEDURE — 99215 OFFICE O/P EST HI 40 MIN: CPT | Mod: HCNC,S$GLB,, | Performed by: STUDENT IN AN ORGANIZED HEALTH CARE EDUCATION/TRAINING PROGRAM

## 2024-07-08 NOTE — PROGRESS NOTES
[facility]  Follow Up Patient Visit      Disease Summary     Principle neurological diagnosis: MS     Date of symptom onset: 2006  Date of diagnosis: 2007  Disease type at diagnosis: RR  Disease type currently: SP  Previous therapy: Avonex (ineffective), Copaxone (breakthrough relapse), Betaseron (hives), Rebif (emotional s/e and needle fatigue), Tecfidera 7/2018 - 7/2021 (progression)  Current therapy: Ocrevus 7/7/21 - present  Last MRI Brain: 3/6/24- stable   Last MRI C-spine:  3/6/24  Last MRI T-spine:  3/6/24  CSF: 2007 consistent with MS reportedly  JCV:   Lab Results   Component Value Date    JCVINDEX 0.32 (H) 05/31/2021    JCVANTIBODY INDETERMINATE (A) 05/31/2021     Vit D:   Lab Results   Component Value Date    RQDAVMCW28MA 31 02/28/2024    TPWYFPCH48FS 35 09/22/2023    SSDLTZQN88MW 49 05/10/2023       Interval history:     Denies new symptoms.   Sciatica improved with epidurals, but still has breakthrough pain from intermittently. Has muscle spasms and leg stiffness.  She has been having falls 3x/months.    She did not attend PT/OT.     She is having heat intolerance. Headaches are frequent.   Memory has been okay.   MRI stable    Tolerating infusions well, no infections.     ROS:     SOCIAL HISTORY    Social History     Socioeconomic History    Marital status: Single   Tobacco Use    Smoking status: Never    Smokeless tobacco: Never   Substance and Sexual Activity    Alcohol use: No     Comment: social    Drug use: No     Types: Marijuana    Sexual activity: Not Currently     Partners: Male     Birth control/protection: None     Social Determinants of Health     Financial Resource Strain: Low Risk  (3/26/2024)    Overall Financial Resource Strain (CARDIA)     Difficulty of Paying Living Expenses: Not hard at all   Recent Concern: Financial Resource Strain - Medium Risk (1/19/2024)    Overall Financial Resource Strain (CARDIA)     Difficulty of Paying Living Expenses:  Somewhat hard   Food Insecurity: Food Insecurity Present (1/19/2024)    Hunger Vital Sign     Worried About Running Out of Food in the Last Year: Sometimes true     Ran Out of Food in the Last Year: Never true   Transportation Needs: No Transportation Needs (3/26/2024)    PRAPARE - Transportation     Lack of Transportation (Medical): No     Lack of Transportation (Non-Medical): No   Physical Activity: Insufficiently Active (1/19/2024)    Exercise Vital Sign     Days of Exercise per Week: 2 days     Minutes of Exercise per Session: 30 min   Stress: Stress Concern Present (1/19/2024)    [employer] - Occupational Stress Questionnaire     Feeling of Stress : Very much   Housing Stability: Low Risk  (1/19/2024)    Housing Stability Vital Sign     Unable to Pay for Housing in the Last Year: No     Number of Places Lived in the Last Year: 0     Unstable Housing in the Last Year: No       Patient Employment       Status   Disabled                   7/5/2024    10:20 PM   REVIEW OF SYMPTOMS   Do you feel abnormally tired on most days? No   Do you feel you generally sleep well? Yes   Do you have difficulty controlling your bladder?  No   Do you have difficulty controlling your bowels?  No   Do you have frequent muscle cramps, tightness or spasms in your limbs?  Yes   Do you have new visual symptoms?  No   Do you have worsening difficulty with your memory or thinking? Yes   Do you have worsening symptoms of anxiety or depression?  No   For patients who walk, Do you have more difficulty walking?  No   Have you fallen since your last visit?  Yes   For patients who use wheelchairs: Do you have any skin wounds or breakdown? Not Applicable   Do you have difficulty using your hands?  Yes   Do you have shooting or burning pain? No   Do you have difficulty with sexual function?  No   If you are sexually active, are you using birth control? Y/N  N/A Not Applicable   Do you often choke when  swallowing liquids or solid food?  No   Do you experience worsening symptoms when overheated? Yes   Do you need any new equipment such as a wheelchair, walker or shower chair? No   Do you receive co-pay financial assistance for your principal MS medicine? No   Would you be interested in participating in an MS research trial in the future? No   For patients on Gilenya, Tecfidera, Aubagio, Rituxan, Ocrevus, Tysabri, Lemtrada or Methotrexate, are you aware that you should NOT receive live virus vaccines?  Not Applicable   Do you feel you have adequate family/friend support?  Yes   Do you have health insurance?   Yes   Are you currently employed? No   Do you receive SSDI/SSI?  No   Do you use marijuana or cannabis products? No   How often? Daily   Have you been diagnosed with a urinary tract infection since your last visit here? No   Have you been diagnosed with a respiratory tract infection since your last visit here? No   Have you been to the emergency room since your last visit here? No   Have you been hospitalized since your last visit here?  No         7/5/2024    10:23 PM 12/13/2022     8:16 AM   FSS SCORE & INTERPRETATION   FSS SCORE  51 63   FSS SCORE INTERPRETATION May be suffering from fatigue May be suffering from fatigue         7/5/2024    10:22 PM 12/13/2022     8:15 AM   MS TOÑO-D SCORE & INTERPRETATION   TOÑO-D SCORE  12 15   TOÑO-D INTERPRETATION  No indication of Depression Mild to moderate Depression         7/5/2024    10:21 PM 12/13/2022     8:10 AM   MS MOY-7 SCORE & INTERPRETATION   MOY-7 SCORE  0 1   MOY-7 SCORE INTERPRETATION Normal Normal         7/5/2024    10:24 PM 12/13/2022     8:18 AM   PEQ MS MOS PAIN EFFECTS SCORE & INTERPRETATION   PES SCORE 24 24   PES SCORE INTERPRETATION Scores can range from 6-30.  Items are scaled so that higher scores indicate a greater impact of pain on a patients mood and behavior. Scores can range from 6-30.  Items are scaled so that higher scores indicate a  greater impact of pain on a patients mood and behavior.         9/15/2019    12:06 PM   PEQ MS SEXUAL SATISFACTION SCORE & INTERPRETATION   SSS SCORE  11   SSS SCORE INTERPRETATION Scores can range from 4-24.  Higher scores indicate greater problems with sexual satisfaction.         7/5/2024    10:25 PM 12/13/2022     8:21 AM   MS BLADDER CONTROL SCORE & INTERPRETATION   BLCS SCORE 5 3   BLCS SCORE INTERPRETATION  Scores can range from 0-22, with higher scores indicating greater bladder control problems. Scores can range from 0-22, with higher scores indicating greater bladder control problems.         7/5/2024    10:29 PM 12/13/2022     8:27 AM   MS BOWEL CONTROL SCORE & INTERPRETATION   BWCS SCORE 2 0   BWCS SCORE INTERPRETATION Scores can range from 0-26, with higher scores indicating greater bowel control problems. Scores can range from 0-26, with higher scores indicating greater bowel control problems.         7/5/2024    10:26 PM 12/13/2022     8:22 AM   PEQ MS IMPACT OF VISUAL IMPAIRMENT SCORE & INTERPRETATION   SHELBI SCALE SCORE  4 0   SHELBI SCORE INTERPRETATION Scores can range from 0-15, with higher scores indicating greater impact of visual problems on daily activites. Scores can range from 0-15, with higher scores indicating greater impact of visual problems on daily activites.         7/5/2024    10:28 PM 12/13/2022     8:25 AM   MS PDQ SCORE & INTERPRETATION   PDQ RETROSPECTIVE MEMORY SUBSCALE 2 5   PDQ ATTENTION/CONCENTRATION SUBSCALE 4 7   PDQ PROSPECTIVE MEMORY SUBSCALE 2 4   PDQ PLANNING/ORGANIZATION SUBSCALE 6 10   PDQ TOTAL SCORE 14 26   PDQ SCORE INTERPRETATION Scores can range from 0-80, with higher scores indicating greater perceived cognitive impairment. Scores can range from 0-80, with higher scores indicating greater perceived cognitive impairment.         7/5/2024    10:31 PM 12/13/2022     8:29 AM   MSSS SCORE & INTERPRETATION   MSSS TANGIBLE SUPPORT SUBSCALE 100 100   MSSS  "EMOTIONAL/INFORMATIONAL SUPPORT SUBSCALE 100 100   MSSS AFFECTIONATE SUPPORT SUBSCALE 100 100   MSSS POSITIVE SOCIAL INTERACTION SUBSCALE 100 100   MSSS TOTAL SCORE 100 100   MSSS SCORE INTERPRETATION Scores can range from 0-100, with higher scores indicating greater perceived support. Scores can range from 0-100, with higher scores indicating greater perceived support.       Exam:     Vitals:    07/08/24 1016   BP: 128/80   Pulse: (!) 58   Weight: 112.5 kg (248 lb 0.3 oz)   Height: 5' 5" (1.651 m)          In general, the patient is well nourished and appears to be in no acute distress.     MENTAL STATUS: language is fluent, normal verbal comprehension, short-term and remote memory is intact, attention is normal, patient is alert and oriented, fund of knowlege is appropriate by vocabulary.      CRANIAL NERVE EXAM:  B/l ELMA with subjective dizziness, no facial asymmetry. Facial sensation is intact bilaterally. There is no dysarthria. Uvula is midline, and palate moves symmetrically. Shoulder shrug intact bilaterlly. Tongue protrusion is midline. Hearing is intact to finger rub bilaterally. Neck is supple with full ROM     MOTOR EXAM: Normal bulk and tone throughout UE and LE bilaterally.   No pronator drift; slowed rapid finger tapping in LUE     Strength:  R deltoid 5/5, L deltoid 5/5  R biceps 5/5, L biceps 5/5  R triceps 5/5, L triceps 4/5  R finger flexors 5/5, L finger flexors 4+/5  R finger extensors 5/5, L finger extensors 5/5  R finger abductors 5/5, L finger abductors 4/5  R  hip flexors 4/5, L hip flexors 3-/5  R knee extensors 5/5, L knee extensors 4/5  R knee flexors 5/5, L knee flexors 4+/5  R ankle dorsiflexors 5/5, L ankle dorsiflexors 3+/5  R ankle plantarflexors 5/5, L ankle plantarflexors 4/5     SENSORY EXAM: Decreased PP in RUE and RLE, intact vibration and LT throughout.      COORDINATION: Dysmetria in L on FTN     GAIT: Slowed, ataxic gait, with L circumduction and L foot drop, rollator " dependent.         5/10/2023    12:01 AM 12/21/2023    12:01 AM 7/8/2024    12:01 AM   Timed 25 Foot Walk:   Did patient wear an AFO? No No No   Was assistive device used? Yes Yes Yes   Assistive device used (jewels one): Bilateral Assistance Bilateral Assistance Bilateral Assistance   Bilateral device used Walker/Rollator Walker/Rollator Walker/Rollator   Time for 25 Foot Walk (seconds) 22.7 28.45 36.27   Time for 25 Foot Walk (seconds)  29.38      PST Z-score: -1.68      Imaging (personally reviewed):     Results for orders placed during the hospital encounter of 03/06/24    MRI Brain Demyelinating Without Contrast    Impression  Brain appears stable from prior exam, again demonstrating findings compatible with the reported history of multiple sclerosis.  No new discrete lesions to indicate ongoing demyelination.      Electronically signed by: Brody De Anda  Date:    03/06/2024  Time:    09:22    Results for orders placed during the hospital encounter of 03/06/24    MRI Cervical Spine Demyelinating Without Contrast    Impression  Stable appearance of the cervical and thoracic cord in keeping with patient's reported history of demyelinating process.  No new cord lesions identified.    Stable mild cystic change about the central cord at the C7 level.    Multilevel cervical and thoracic spondylosis, unchanged from comparison imaging.      Electronically signed by: Brody De Anda  Date:    03/06/2024  Time:    09:15    Results for orders placed during the hospital encounter of 03/06/24    MRI Thoracic Spine Demyelinating Without Contrast    Impression  Stable appearance of the cervical and thoracic cord in keeping with patient's reported history of demyelinating process.  No new cord lesions identified.    Stable mild cystic change about the central cord at the C7 level.    Multilevel cervical and thoracic spondylosis, unchanged from comparison imaging.      Electronically signed by: Brody  Neetu  Date:    03/06/2024  Time:    09:15    Results for orders placed during the hospital encounter of 02/27/23    MRI Brain Demyelinating W W/O Contrast    Impression  Brain appears stable from prior exam, again demonstrating findings compatible with the reported history of multiple sclerosis.  No new or enhancing lesions to indicate ongoing or active demyelination.      Electronically signed by: Tonio Cohen MD  Date:    02/27/2023  Time:    14:47    Results for orders placed during the hospital encounter of 05/06/21    MRI Cervical Spine Demyelinating W W/O Contrast    Impression  MRI brain: No significant change from prior.  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    No new cord signal abnormality or abnormal intrathecal enhancement to suggest significant interval or active cord demyelination.    Continued multilevel degenerative change cervical spine as detailed above.      Electronically signed by: Nain Dooley DO  Date:    05/06/2021  Time:    13:31    No results found for this or any previous visit.      Labs:     Lab Results   Component Value Date    NFQWPZPE85ET 31 02/28/2024    LCVEUSIQ21BD 35 09/22/2023    BZOUQGOS62VO 49 05/10/2023     Lab Results   Component Value Date    JCVINDEX 0.32 (H) 05/31/2021    JCVANTIBODY INDETERMINATE (A) 05/31/2021     Lab Results   Component Value Date    HE2SKAEK 70.8 05/31/2021    ABSOLUTECD3 747 05/31/2021    CS4BVLIY 16.7 05/31/2021    ABSOLUTECD8 177 (L) 05/31/2021    KB5DDWCK 55.1 05/31/2021    ABSOLUTECD4 581 05/31/2021    LABCD48 3.29 05/31/2021     Lab Results   Component Value  Date    WBC 10.94 06/03/2024    RBC 4.62 06/03/2024    HGB 12.7 06/03/2024    HCT 38.5 06/03/2024    MCV 83 06/03/2024    MCH 27.5 06/03/2024    MCHC 33.0 06/03/2024    RDW 16.0 (H) 06/03/2024     06/03/2024    MPV 13.3 (H) 06/03/2024    GRAN 8.1 (H) 06/03/2024    GRAN 73.9 (H) 06/03/2024    LYMPH 1.7 06/03/2024    LYMPH 15.2 (L) 06/03/2024    MONO 0.8 06/03/2024    MONO 7.5 06/03/2024    EOS 0.3 06/03/2024    BASO 0.07 06/03/2024    EOSINOPHIL 2.5 06/03/2024    BASOPHIL 0.6 06/03/2024     Sodium   Date Value Ref Range Status   06/03/2024 139 136 - 145 mmol/L Final     Potassium   Date Value Ref Range Status   06/03/2024 4.2 3.5 - 5.1 mmol/L Final     Chloride   Date Value Ref Range Status   06/03/2024 105 95 - 110 mmol/L Final     CO2   Date Value Ref Range Status   06/03/2024 27 23 - 29 mmol/L Final     Glucose   Date Value Ref Range Status   06/03/2024 92 70 - 110 mg/dL Final     BUN   Date Value Ref Range Status   06/03/2024 7 6 - 20 mg/dL Final     Creatinine   Date Value Ref Range Status   06/03/2024 1.0 0.5 - 1.4 mg/dL Final     Calcium   Date Value Ref Range Status   06/03/2024 9.6 8.7 - 10.5 mg/dL Final     Total Protein   Date Value Ref Range Status   06/03/2024 7.9 6.0 - 8.4 g/dL Final     Albumin   Date Value Ref Range Status   06/03/2024 3.8 3.5 - 5.2 g/dL Final     Total Bilirubin   Date Value Ref Range Status   06/03/2024 0.3 0.1 - 1.0 mg/dL Final     Comment:     For infants and newborns, interpretation of results should be based  on gestational age, weight and in agreement with clinical  observations.    Premature Infant recommended reference ranges:  Up to 24 hours.............<8.0 mg/dL  Up to 48 hours............<12.0 mg/dL  3-5 days..................<15.0 mg/dL  6-29 days.................<15.0 mg/dL       Alkaline Phosphatase   Date Value Ref Range Status   06/03/2024 68 55 - 135 U/L Final     AST   Date Value Ref Range Status   06/03/2024 15 10 - 40 U/L Final     ALT   Date Value Ref  Range Status   06/03/2024 9 (L) 10 - 44 U/L Final     Anion Gap   Date Value Ref Range Status   06/03/2024 7 (L) 8 - 16 mmol/L Final     eGFR if    Date Value Ref Range Status   06/25/2022 >60.0 >60 mL/min/1.73 m^2 Final     eGFR if non    Date Value Ref Range Status   06/25/2022 >60.0 >60 mL/min/1.73 m^2 Final     Comment:     Calculation used to obtain the estimated glomerular filtration  rate (eGFR) is the CKD-EPI equation.          Diagnosis/Assessment/Plan:     Multiple Sclerosis  -Assessment: SPMS with significant disability on exam, active progression over the past 2 years. She is tolerating Ocrevus well. Radiographically stable but she does have worsened walk speed likely also contributed by pain, heat intolerance, and deconditioning  -Imaging: Repeat MRI in Mar 2025  -Disease Modifying Therapies: Continue Ocrevus, safety labs reviewed and new set ordered    Symptom management   -Patient to follow up with pain mgmt for continued back pain and sciatica treatment   -Continue weekly vit D, check levels next visit   -Discussed cooling measures and physical endurance building   -Ok to continue CoQ10 and Amantadine for fatigue  Plan discussed and questions were answered to satisfaction.  Return to clinic in 6 months with AP.        NEURO MULTIPLE SCLEROSIS IMPRESSION:   Number of relapses in the past year?:  0  Clinical Progression:  Worsened  Type:  Progressive  MRI Progression:  Stable  MS Classification:  Secondarily Progressive MS  DMT:  No change in management  Symptom Management:  No change in symptom management  Supplements:  Vit D and Co Q 10        Total time spent with the patient: 57 minutes, including face to face consultation, chart review and coordination of care, on the day of the visit. This includes face to face time and non-face to face time preparing to see the patient (eg, review of tests), obtaining and/or reviewing separately obtained history, documenting  clinical information in the electronic or other health record, independently interpreting results and communicating results to the patient/family/caregiver, or care coordination.   I performed a neurobehavioral status examination that included a clinical assessment of thinking, reasoning, and judgment. Please see above HPI and ROS for full details.       Coco Durham MD, MSc  Attending neurologist

## 2024-07-12 ENCOUNTER — PATIENT MESSAGE (OUTPATIENT)
Dept: ORTHOPEDICS | Facility: CLINIC | Age: 44
End: 2024-07-12
Payer: MEDICARE

## 2024-07-15 ENCOUNTER — TELEPHONE (OUTPATIENT)
Dept: PAIN MEDICINE | Facility: CLINIC | Age: 44
End: 2024-07-15
Payer: MEDICARE

## 2024-07-15 DIAGNOSIS — M54.31 RIGHT SIDED SCIATICA: Primary | ICD-10-CM

## 2024-07-15 NOTE — TELEPHONE ENCOUNTER
----- Message from Zainab Coughlin PA-C sent at 7/15/2024  8:38 AM CDT -----  Regarding: Order for KENDELL NI    Patient Name: KENDELL NI([id number])  Sex: Female  : 1980      PCP: ANAND RUIZ    Center: [facility] CENTRAL BILLING OFFICE     Types of orders made on 07/15/2024: Procedure Request    Order Date:7/15/2024  Ordering User:ZAINAB COUGHLIN [[id number]]  Encounter Provider:Zainab Coughlin PA-C [[id number]]  Z1   Authorizing Provider: Zainab Coughlin PA-C [[id number]]  Supervising Provider:MARIA ESTHER TORRES [[id number]]  Type of Supervision:Supervision Required  Department:[facility][[id number]]    Common Order Information  Procedure -> Transforaminal Injection (Specify level and laterality) Cmt: right             L4-5 L5-S1    Order Specific Information  Order: Procedure Order to Pain Management [Custom: [id number]]  Order #:            [id number]: 1 FUTURE    Priority: Routine  Class: Clinic Performed    Future Order Information      Expires on:07/15/2025            Expected by:07/15/2024                   Associated Diagnoses      M54.31 Right sided sciatica      Facility Name: -> [place]           Priority: Routine  Class: Clinic Performed    Future Order Information      Expires on:07/15/2025              Expected by:07/15/2024                   Associated Diagnoses      M54.31 Right sided sciatica      Procedure -> Transforaminal Injection (Specify level and laterality) Cmt:                 right L4-5 L5-S1        Facility Name: -> [place]

## 2024-07-15 NOTE — ADDENDUM NOTE
Addended by: JOSEMANUEL ARMSTRONG on: 7/15/2024 08:54 AM     Modules accepted: Orders, Level of Service

## 2024-07-16 NOTE — TELEPHONE ENCOUNTER
----- Message from Zainab Coughlin PA-C sent at 7/15/2024  8:38 AM CDT -----  Regarding: Order for KENDELL NI    Patient Name: KENDELL NI([id number])  Sex: Female  : 1980      PCP: ANAND RUIZ    Center: [facility] CENTRAL BILLING OFFICE     Types of orders made on 07/15/2024: Procedure Request    Order Date:7/15/2024  Ordering User:ZAINAB COUGHLIN [[id number]]  Encounter Provider:Zainab Coughlin PA-C [[id number]]  Z1   Authorizing Provider: Zainab Coughlin PA-C [[id number]]  Supervising Provider:MARIA ESTHER TORRES [[id number]]  Type of Supervision:Supervision Required  Department:[facility][[id number]]    Common Order Information  Procedure -> Transforaminal Injection (Specify level and laterality) Cmt: right             L4-5 L5-S1    Order Specific Information  Order: Procedure Order to Pain Management [Custom: [id number]]  Order #:            [id number]: 1 FUTURE    Priority: Routine  Class: Clinic Performed    Future Order Information      Expires on:07/15/2025            Expected by:07/15/2024                   Associated Diagnoses      M54.31 Right sided sciatica      Facility Name: -> [place]           Priority: Routine  Class: Clinic Performed    Future Order Information      Expires on:07/15/2025              Expected by:07/15/2024                   Associated Diagnoses      M54.31 Right sided sciatica      Procedure -> Transforaminal Injection (Specify level and laterality) Cmt:                 right L4-5 L5-S1        Facility Name: -> [place]

## 2024-07-17 ENCOUNTER — PATIENT MESSAGE (OUTPATIENT)
Dept: NEUROLOGY | Facility: CLINIC | Age: 44
End: 2024-07-17
Payer: MEDICARE

## 2024-07-19 NOTE — PROGRESS NOTES
PATIENT: Jaylon Bowles  MRN: [id number]  DATE: 7/25/2024    Diagnosis:   1. Granulocytosis    2. 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 Morbid obesity    5. Iron deficiency    6. Advance care planning      Chief Complaint: Abnormal Lab    Oncologic History:      Oncologic History     Oncologic Treatment     Pathology       Subjective:    History of Present Illness: Ms. Bowles is a 43 y.o. female who presents for evaluation and management of granulocytosis. She is referred by Viviana Gentile.    - labs since 2018 reveal an elevated granulocyte percentage.  - previous serum protein electrophoresis (November 2023) revealed a polyclonal gammopathy  - iron studies (2021) revealed an elevated total iron binding capacity.    - today, she endorses fatigue, weakness, headache, sciatic pain. She has multiple sclerosis for which she takes ocrelizumab.  - her mother has amyloidosis.    Past medical, surgical, family, and social histories have been reviewed and updated below.    Past Medical History:   Past Medical History:   Diagnosis Date    Depression     Hypertension     Migraine headache     Multiple sclerosis     Thyroid disease        Past Surgical History:   Past Surgical History:   Procedure Laterality Date    INJECTION, SPINE, LUMBOSACRAL, TRANSFORAMINAL APPROACH Right 4/9/2024    Procedure: TFESI RT L4-5, L5-S1;  Surgeon: Geovanni Scott MD;  Location: [facility] PAIN MANAGEMENT;  Service: Pain Management;  Laterality: Right;  20mins-no ac       Family History:   Family History   Problem Relation Name Age of Onset    Hypertension Mother      Thyroid disease Mother      Diabetes Father      Stroke Father      Vaginal cancer Neg Hx      Endometrial cancer Neg Hx      Cervical cancer Neg Hx      Breast cancer Neg Hx      Ovarian cancer Neg Hx         Social History:  reports that she has never smoked. She has never used smokeless tobacco. She reports that she does not drink alcohol and does not use  drugs.    Allergies:  Review of patient's allergies indicates:   Allergen Reactions    Betaseron [interferon beta-1b] Hives    Gabapentin Hallucinations    Cephalexin Hives, Itching and Rash       Medications:  Current Outpatient Medications   Medication Sig Dispense Refill    amantadine  mg Tab Take 1 tablet (100 mg total) by mouth 2 (two) times a day. Take 1 tablet by mouth in the morning and 1 tablet at noon if needed. 90 tablet 7    augmented betamethasone dipropionate (DIPROLENE-AF) 0.05 % cream Apply topically 2 (two) times daily. Use to affected areas for up to 2 weeks then take a 1 week break or decrease to 3 times weekly. Do not apply to groin or face. Use to the rough itchy areas on arms and legs then cover with bandage 100 g 2    carvediloL (COREG) 25 MG tablet Take 1 tablet (25 mg total) by mouth 2 (two) times daily with meals. 180 tablet 1    cholecalciferol, vitamin D3, 1,250 mcg (50,000 unit) Tab Take 1 tablet by mouth once a week. 12 tablet 3    coenzyme Q10 100 mg capsule Take 5 capsules (500 mg total) by mouth once daily. 150 capsule 11    diclofenac (VOLTAREN) 75 MG EC tablet TAKE 1 TABLET(75 MG) BY MOUTH TWICE DAILY AS NEEDED FOR PAIN 30 tablet 0    EScitalopram oxalate (LEXAPRO) 20 MG tablet Take 1.5 tablets (30 mg total) by mouth once daily. 135 tablet 1    ferrous sulfate (FEOSOL) 325 mg (65 mg iron) Tab tablet Take 1 tablet (325 mg total) by mouth once daily.  0    levothyroxine (SYNTHROID) 112 MCG tablet Take 1 tablet (112 mcg total) by mouth before breakfast. 90 tablet 1    losartan-hydrochlorothiazide 100-12.5 mg (HYZAAR) 100-12.5 mg Tab TAKE 1 TABLET BY MOUTH DAILY 90 tablet 1    magnesium 250 mg Tab Take 500 mg by mouth once.      multivitamin with minerals (ONE DAILY COMPLETE ORAL) Take by mouth.      OCREVUS 30 mg/mL Soln       pregabalin (LYRICA) 75 MG capsule Take 1 capsule (75 mg total) by mouth 2 (two) times daily. 60 capsule 5    tiZANidine (ZANAFLEX) 2 MG tablet TAKE 1/2  TABLET BY MOUTH IN THE MORNING AND IN THE AFTERNOON FOR MUSCLE SPASMS 30 tablet 1    tiZANidine (ZANAFLEX) 4 MG tablet TAKE 1 TABLET(4 MG) BY MOUTH TWICE DAILY AS NEEDED 60 tablet 5    tramadol-acetaminophen 37.5-325 mg (ULTRACET) 37.5-325 mg Tab Take 1 tablet by mouth every 12 (twelve) hours as needed for Pain (pain). 30 tablet 0     No current facility-administered medications for this visit.       Review of Systems   Constitutional:  Negative for appetite change and unexpected weight change.   HENT:  Negative for mouth sores.    Eyes:  Positive for visual disturbance.   Respiratory:  Negative for cough and shortness of breath.    Cardiovascular:  Negative for chest pain.   Gastrointestinal:  Negative for abdominal pain and diarrhea.   Genitourinary:  Positive for frequency.   Musculoskeletal:  Positive for back pain.   Skin:  Positive for rash.   Neurological:  Positive for headaches.   Hematological:  Negative for adenopathy.   Psychiatric/Behavioral:  The patient is not nervous/anxious.        ECOG Performance Status:   ECOG SCORE    1 - Restricted in strenuous activity-ambulatory and able to carry out work of a light nature         Objective:      Vitals:   Vitals:    07/25/24 1130   BP: (!) 145/78   Pulse: 67   SpO2: 98%   Weight: 114.5 kg (252 lb 6.8 oz)     BMI: Body mass index is 42.01 kg/m².    Physical Exam  Vitals and nursing note reviewed.   Constitutional:       Appearance: She is well-developed.   HENT:      Head: Normocephalic and atraumatic.   Eyes:      Pupils: Pupils are equal, round, and reactive to light.   Cardiovascular:      Rate and Rhythm: Normal rate and regular rhythm.   Pulmonary:      Effort: Pulmonary effort is normal.      Breath sounds: Normal breath sounds.   Abdominal:      General: Bowel sounds are normal.      Palpations: Abdomen is soft.   Musculoskeletal:         General: Normal range of motion.      Cervical back: Normal range of motion and neck supple.   Skin:     General:  Skin is warm and dry.   Neurological:      Mental Status: She is alert and oriented to person, place, and time.   Psychiatric:         Behavior: Behavior normal.         Thought Content: Thought content normal.         Judgment: Judgment normal.         Laboratory Data:  Labs have been reviewed.    Lab Results   Component Value Date    WBC 10.94 06/03/2024    HGB 12.7 06/03/2024    HCT 38.5 06/03/2024    MCV 83 06/03/2024     06/03/2024           Imaging:    Assessment:       1. Granulocytosis    2. 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 Morbid obesity    5. Iron deficiency    6. Advance care planning           Plan:     Granulocytosis / iron deficiency  - I have reviewed her chart  - labs since 2018 reveal an elevated granulocyte percentage.  - previous protein electrophoresis revealed a polyclonal gammopathy, suggesting inflammation, which can cause elevated granulocyte percentage.  - previous iron studies revealed an elevated total iron binding capacity, which suggests iron deficiency.  - I will perform a workup of causes of abnormal labs.     ADDENDUM:  Iron studies show iron deficiency. Will arrange for ferric gluconate at [facility]. Return to clinic in 4 months with repeat labs.    2. multiple sclerosis / immunodeficiency due to drugs  - stable symptoms  - on ocrelizumab  - defer management to neurology    3. Morbid obesity  - Body mass index is 42.01 kg/m².      4. Advance Care Planning     Date: 07/25/2024    Power of   I initiated the process of voluntary advance care planning today and explained the importance of this process to the patient.  I introduced the concept of advance directives to the patient, as well. Then the patient received detailed information about the importance of designating a Health Care Power of  (HCPOA). She was also instructed to communicate with this person about their wishes for future healthcare, should she  become sick and lose decision-making capacity. The patient has not previously appointed a HCPOA. After our discussion, the patient has decided to complete a HCPOA and has appointed her  brother Bj Bowles ([phone]), mother Adriane Bowles ([phone]), and sister Michele Do ([phone]) . I encouraged her to communicate with this person about their wishes for future healthcare, should she become sick and lose decision-making capacity.      A total of 16 min was spent on advance care planning, goals of care discussion, emotional support, formulating and communicating prognosis and exploring burden/benefit of various approaches of treatment. This discussion occurred on a fully voluntary basis with the verbal consent of the patient and/or family.     ADDENDUM:  Iron studies show iron deficiency. Will arrange for ferric gluconate at [facility] [address]. Return to clinic in 4 months with repeat labs.      Artemio Egan M.D.  Hematology/Oncology  [facility] - [address], [address]  [place] [address]  Phone: [phone]  Fax: [phone]

## 2024-07-25 ENCOUNTER — TELEPHONE (OUTPATIENT)
Dept: HEMATOLOGY/ONCOLOGY | Facility: CLINIC | Age: 44
End: 2024-07-25

## 2024-07-25 ENCOUNTER — LAB VISIT (OUTPATIENT)
Dept: LAB | Facility: HOSPITAL | Age: 44
End: 2024-07-25
Attending: INTERNAL MEDICINE
Payer: MEDICARE

## 2024-07-25 ENCOUNTER — TELEPHONE (OUTPATIENT)
Dept: NEUROLOGY | Facility: CLINIC | Age: 44
End: 2024-07-25
Payer: MEDICARE

## 2024-07-25 ENCOUNTER — OFFICE VISIT (OUTPATIENT)
Dept: HEMATOLOGY/ONCOLOGY | Facility: CLINIC | Age: 44
End: 2024-07-25
Payer: MEDICARE

## 2024-07-25 ENCOUNTER — PATIENT MESSAGE (OUTPATIENT)
Dept: PSYCHIATRY | Facility: CLINIC | Age: 44
End: 2024-07-25
Payer: MEDICARE

## 2024-07-25 VITALS
BODY MASS INDEX: 42.01 KG/M2 | OXYGEN SATURATION: 98 % | DIASTOLIC BLOOD PRESSURE: 78 MMHG | HEART RATE: 67 BPM | WEIGHT: 252.44 LBS | SYSTOLIC BLOOD PRESSURE: 145 MMHG

## 2024-07-25 DIAGNOSIS — Z71.89 ADVANCE CARE PLANNING: ICD-10-CM

## 2024-07-25 DIAGNOSIS — D72.828 GRANULOCYTOSIS: Primary | ICD-10-CM

## 2024-07-25 DIAGNOSIS — E66.01 MORBID OBESITY: ICD-10-CM

## 2024-07-25 DIAGNOSIS — D84.821 IMMUNODEFICIENCY DUE TO DRUG THERAPY: ICD-10-CM

## 2024-07-25 DIAGNOSIS — D72.828 GRANULOCYTOSIS: ICD-10-CM

## 2024-07-25 DIAGNOSIS — Z86.39 HISTORY OF IRON DEFICIENCY: ICD-10-CM

## 2024-07-25 DIAGNOSIS — G35 MULTIPLE SCLEROSIS: ICD-10-CM

## 2024-07-25 DIAGNOSIS — Z79.899 IMMUNODEFICIENCY DUE TO DRUG THERAPY: ICD-10-CM

## 2024-07-25 DIAGNOSIS — E61.1 IRON DEFICIENCY: ICD-10-CM

## 2024-07-25 LAB
BASOPHILS # BLD AUTO: 0.04 K/UL (ref 0–0.2)
BASOPHILS NFR BLD: 0.4 % (ref 0–1.9)
CRP SERPL-MCNC: 1.6 MG/L (ref 0–8.2)
DIFFERENTIAL METHOD BLD: ABNORMAL
EOSINOPHIL # BLD AUTO: 0.3 K/UL (ref 0–0.5)
EOSINOPHIL NFR BLD: 2.4 % (ref 0–8)
ERYTHROCYTE [DISTWIDTH] IN BLOOD BY AUTOMATED COUNT: 15.7 % (ref 11.5–14.5)
ERYTHROCYTE [SEDIMENTATION RATE] IN BLOOD BY PHOTOMETRIC METHOD: 8 MM/HR (ref 0–36)
FERRITIN SERPL-MCNC: 11 NG/ML (ref 20–300)
HCT VFR BLD AUTO: 36.1 % (ref 37–48.5)
HGB BLD-MCNC: 12.5 G/DL (ref 12–16)
IMM GRANULOCYTES # BLD AUTO: 0.04 K/UL (ref 0–0.04)
IMM GRANULOCYTES NFR BLD AUTO: 0.4 % (ref 0–0.5)
IRON SERPL-MCNC: 28 UG/DL (ref 30–160)
LYMPHOCYTES # BLD AUTO: 1.8 K/UL (ref 1–4.8)
LYMPHOCYTES NFR BLD: 16.5 % (ref 18–48)
MCH RBC QN AUTO: 28.2 PG (ref 27–31)
MCHC RBC AUTO-ENTMCNC: 34.6 G/DL (ref 32–36)
MCV RBC AUTO: 82 FL (ref 82–98)
MONOCYTES # BLD AUTO: 0.8 K/UL (ref 0.3–1)
MONOCYTES NFR BLD: 7.7 % (ref 4–15)
NEUTROPHILS # BLD AUTO: 7.8 K/UL (ref 1.8–7.7)
NEUTROPHILS NFR BLD: 72.6 % (ref 38–73)
NRBC BLD-RTO: 0 /100 WBC
PLATELET # BLD AUTO: 204 K/UL (ref 150–450)
PMV BLD AUTO: 12.6 FL (ref 9.2–12.9)
RBC # BLD AUTO: 4.43 M/UL (ref 4–5.4)
SATURATED IRON: 5 % (ref 20–50)
TOTAL IRON BINDING CAPACITY: 514 UG/DL (ref 250–450)
TRANSFERRIN SERPL-MCNC: 347 MG/DL (ref 200–375)
WBC # BLD AUTO: 10.73 K/UL (ref 3.9–12.7)

## 2024-07-25 PROCEDURE — 85025 COMPLETE CBC W/AUTO DIFF WBC: CPT | Mod: HCNC | Performed by: INTERNAL MEDICINE

## 2024-07-25 PROCEDURE — 84466 ASSAY OF TRANSFERRIN: CPT | Mod: HCNC | Performed by: INTERNAL MEDICINE

## 2024-07-25 PROCEDURE — 1160F RVW MEDS BY RX/DR IN RCRD: CPT | Mod: HCNC,CPTII,S$GLB, | Performed by: INTERNAL MEDICINE

## 2024-07-25 PROCEDURE — 99204 OFFICE O/P NEW MOD 45 MIN: CPT | Mod: HCNC,S$GLB,, | Performed by: INTERNAL MEDICINE

## 2024-07-25 PROCEDURE — 83521 IG LIGHT CHAINS FREE EACH: CPT | Mod: 59,HCNC | Performed by: INTERNAL MEDICINE

## 2024-07-25 PROCEDURE — 3078F DIAST BP <80 MM HG: CPT | Mod: HCNC,CPTII,S$GLB, | Performed by: INTERNAL MEDICINE

## 2024-07-25 PROCEDURE — 3077F SYST BP >= 140 MM HG: CPT | Mod: HCNC,CPTII,S$GLB, | Performed by: INTERNAL MEDICINE

## 2024-07-25 PROCEDURE — 85652 RBC SED RATE AUTOMATED: CPT | Mod: HCNC | Performed by: INTERNAL MEDICINE

## 2024-07-25 PROCEDURE — 86334 IMMUNOFIX E-PHORESIS SERUM: CPT | Mod: HCNC | Performed by: INTERNAL MEDICINE

## 2024-07-25 PROCEDURE — 86334 IMMUNOFIX E-PHORESIS SERUM: CPT | Mod: 26,HCNC,, | Performed by: PATHOLOGY

## 2024-07-25 PROCEDURE — 99497 ADVNCD CARE PLAN 30 MIN: CPT | Mod: HCNC,S$GLB,, | Performed by: INTERNAL MEDICINE

## 2024-07-25 PROCEDURE — 82728 ASSAY OF FERRITIN: CPT | Mod: HCNC | Performed by: INTERNAL MEDICINE

## 2024-07-25 PROCEDURE — 83540 ASSAY OF IRON: CPT | Mod: HCNC | Performed by: INTERNAL MEDICINE

## 2024-07-25 PROCEDURE — 99999 PR PBB SHADOW E&M-EST. PATIENT-LVL IV: CPT | Mod: PBBFAC,HCNC,, | Performed by: INTERNAL MEDICINE

## 2024-07-25 PROCEDURE — 36415 COLL VENOUS BLD VENIPUNCTURE: CPT | Mod: HCNC | Performed by: INTERNAL MEDICINE

## 2024-07-25 PROCEDURE — 1159F MED LIST DOCD IN RCRD: CPT | Mod: HCNC,CPTII,S$GLB, | Performed by: INTERNAL MEDICINE

## 2024-07-25 PROCEDURE — 3008F BODY MASS INDEX DOCD: CPT | Mod: HCNC,CPTII,S$GLB, | Performed by: INTERNAL MEDICINE

## 2024-07-25 PROCEDURE — 86140 C-REACTIVE PROTEIN: CPT | Mod: HCNC | Performed by: INTERNAL MEDICINE

## 2024-07-25 RX ORDER — SODIUM CHLORIDE 0.9 % (FLUSH) 0.9 %
10 SYRINGE (ML) INJECTION
OUTPATIENT
Start: 2024-08-14

## 2024-07-25 RX ORDER — SODIUM CHLORIDE 0.9 % (FLUSH) 0.9 %
10 SYRINGE (ML) INJECTION
OUTPATIENT
Start: 2024-08-21

## 2024-07-25 RX ORDER — SODIUM CHLORIDE 0.9 % (FLUSH) 0.9 %
10 SYRINGE (ML) INJECTION
OUTPATIENT
Start: 2024-08-07

## 2024-07-25 RX ORDER — HEPARIN 100 UNIT/ML
500 SYRINGE INTRAVENOUS
OUTPATIENT
Start: 2024-08-14

## 2024-07-25 RX ORDER — HEPARIN 100 UNIT/ML
500 SYRINGE INTRAVENOUS
OUTPATIENT
Start: 2024-08-07

## 2024-07-25 RX ORDER — SODIUM CHLORIDE 0.9 % (FLUSH) 0.9 %
10 SYRINGE (ML) INJECTION
OUTPATIENT
Start: 2024-07-31

## 2024-07-25 RX ORDER — HEPARIN 100 UNIT/ML
500 SYRINGE INTRAVENOUS
OUTPATIENT
Start: 2024-07-31

## 2024-07-25 RX ORDER — HEPARIN 100 UNIT/ML
500 SYRINGE INTRAVENOUS
OUTPATIENT
Start: 2024-08-21

## 2024-07-25 NOTE — TELEPHONE ENCOUNTER
Informed pt that Dr Egan stated her labs showed iron deficiency, so we will arrange for IV iron at [facility] and see her back in office in 4 months. Pt verbalized understanding. Confirmed lab appt for 11/20/24 and dr real for 11/21/24 at 11am.

## 2024-07-26 LAB
INTERPRETATION SERPL IFE-IMP: NORMAL
KAPPA LC SER QL IA: 3.62 MG/DL (ref 0.33–1.94)
KAPPA LC/LAMBDA SER IA: 1.65 (ref 0.26–1.65)
LAMBDA LC SER QL IA: 2.2 MG/DL (ref 0.57–2.63)

## 2024-07-29 LAB — PATHOLOGIST INTERPRETATION IFE: NORMAL

## 2024-07-29 RX ORDER — DIPHENHYDRAMINE HYDROCHLORIDE 50 MG/ML
50 INJECTION INTRAMUSCULAR; INTRAVENOUS
OUTPATIENT
Start: 2024-07-29

## 2024-07-29 RX ORDER — EPINEPHRINE 0.3 MG/.3ML
0.3 INJECTION SUBCUTANEOUS
OUTPATIENT
Start: 2024-07-29

## 2024-07-29 RX ORDER — SODIUM CHLORIDE 0.9 % (FLUSH) 0.9 %
10 SYRINGE (ML) INJECTION
OUTPATIENT
Start: 2024-07-29

## 2024-07-29 RX ORDER — FAMOTIDINE 10 MG/ML
20 INJECTION INTRAVENOUS
OUTPATIENT
Start: 2024-07-29

## 2024-07-29 RX ORDER — ACETAMINOPHEN 500 MG
1000 TABLET ORAL
OUTPATIENT
Start: 2024-07-29

## 2024-07-29 RX ORDER — HEPARIN 100 UNIT/ML
500 SYRINGE INTRAVENOUS
OUTPATIENT
Start: 2024-07-29

## 2024-08-02 ENCOUNTER — TELEPHONE (OUTPATIENT)
Dept: NEUROLOGY | Facility: CLINIC | Age: 44
End: 2024-08-02
Payer: MEDICARE

## 2024-08-02 NOTE — TELEPHONE ENCOUNTER
Called and spoke to pt to schedule labs in December. Offered December 4 at 9 am at [facility]. Pt accepted.

## 2024-08-02 NOTE — TELEPHONE ENCOUNTER
----- Message from Coco Durham MD sent at 7/22/2024 10:19 AM CDT -----  Can you schedule her for labs in Dec? Thanks!

## 2024-08-05 ENCOUNTER — INFUSION (OUTPATIENT)
Dept: INFUSION THERAPY | Facility: HOSPITAL | Age: 44
End: 2024-08-05
Payer: MEDICARE

## 2024-08-05 ENCOUNTER — PATIENT MESSAGE (OUTPATIENT)
Dept: PSYCHIATRY | Facility: CLINIC | Age: 44
End: 2024-08-05
Payer: MEDICARE

## 2024-08-05 VITALS
OXYGEN SATURATION: 100 % | DIASTOLIC BLOOD PRESSURE: 61 MMHG | HEART RATE: 71 BPM | RESPIRATION RATE: 18 BRPM | SYSTOLIC BLOOD PRESSURE: 121 MMHG | TEMPERATURE: 98 F

## 2024-08-05 DIAGNOSIS — G35 MULTIPLE SCLEROSIS: Primary | ICD-10-CM

## 2024-08-05 PROCEDURE — 96367 TX/PROPH/DG ADDL SEQ IV INF: CPT | Mod: HCNC

## 2024-08-05 PROCEDURE — 96413 CHEMO IV INFUSION 1 HR: CPT | Mod: HCNC

## 2024-08-05 PROCEDURE — 63600175 PHARM REV CODE 636 W HCPCS: Mod: HCNC | Performed by: STUDENT IN AN ORGANIZED HEALTH CARE EDUCATION/TRAINING PROGRAM

## 2024-08-05 PROCEDURE — 96375 TX/PRO/DX INJ NEW DRUG ADDON: CPT | Mod: HCNC

## 2024-08-05 PROCEDURE — 96415 CHEMO IV INFUSION ADDL HR: CPT | Mod: HCNC

## 2024-08-05 PROCEDURE — 25000003 PHARM REV CODE 250: Mod: HCNC | Performed by: STUDENT IN AN ORGANIZED HEALTH CARE EDUCATION/TRAINING PROGRAM

## 2024-08-05 RX ORDER — SODIUM CHLORIDE 0.9 % (FLUSH) 0.9 %
10 SYRINGE (ML) INJECTION
Status: DISCONTINUED | OUTPATIENT
Start: 2024-08-05 | End: 2024-08-05 | Stop reason: HOSPADM

## 2024-08-05 RX ORDER — ACETAMINOPHEN 500 MG
1000 TABLET ORAL
Status: COMPLETED | OUTPATIENT
Start: 2024-08-05 | End: 2024-08-05

## 2024-08-05 RX ORDER — FAMOTIDINE 10 MG/ML
20 INJECTION INTRAVENOUS
Status: COMPLETED | OUTPATIENT
Start: 2024-08-05 | End: 2024-08-05

## 2024-08-05 RX ORDER — DIPHENHYDRAMINE HYDROCHLORIDE 50 MG/ML
50 INJECTION INTRAMUSCULAR; INTRAVENOUS
OUTPATIENT
Start: 2024-12-30

## 2024-08-05 RX ORDER — EPINEPHRINE 0.3 MG/.3ML
0.3 INJECTION SUBCUTANEOUS
OUTPATIENT
Start: 2024-12-30

## 2024-08-05 RX ORDER — HEPARIN 100 UNIT/ML
500 SYRINGE INTRAVENOUS
OUTPATIENT
Start: 2024-12-30

## 2024-08-05 RX ORDER — DIPHENHYDRAMINE HYDROCHLORIDE 50 MG/ML
50 INJECTION INTRAMUSCULAR; INTRAVENOUS
Status: DISCONTINUED | OUTPATIENT
Start: 2024-08-05 | End: 2024-08-05 | Stop reason: HOSPADM

## 2024-08-05 RX ORDER — ACETAMINOPHEN 500 MG
1000 TABLET ORAL
OUTPATIENT
Start: 2024-12-30

## 2024-08-05 RX ORDER — EPINEPHRINE 0.3 MG/.3ML
0.3 INJECTION SUBCUTANEOUS
Status: DISCONTINUED | OUTPATIENT
Start: 2024-08-05 | End: 2024-08-05 | Stop reason: HOSPADM

## 2024-08-05 RX ORDER — FAMOTIDINE 10 MG/ML
20 INJECTION INTRAVENOUS
OUTPATIENT
Start: 2024-12-30

## 2024-08-05 RX ORDER — SODIUM CHLORIDE 0.9 % (FLUSH) 0.9 %
10 SYRINGE (ML) INJECTION
OUTPATIENT
Start: 2024-12-30

## 2024-08-05 RX ORDER — HEPARIN 100 UNIT/ML
500 SYRINGE INTRAVENOUS
Status: DISCONTINUED | OUTPATIENT
Start: 2024-08-05 | End: 2024-08-05 | Stop reason: HOSPADM

## 2024-08-05 RX ADMIN — DEXTROSE 100 MG: 50 INJECTION, SOLUTION INTRAVENOUS at 07:08

## 2024-08-05 RX ADMIN — FAMOTIDINE 20 MG: 10 INJECTION INTRAVENOUS at 07:08

## 2024-08-05 RX ADMIN — ACETAMINOPHEN 1000 MG: 500 TABLET ORAL at 07:08

## 2024-08-05 RX ADMIN — DIPHENHYDRAMINE HYDROCHLORIDE 50 MG: 50 INJECTION, SOLUTION INTRAMUSCULAR; INTRAVENOUS at 08:08

## 2024-08-05 RX ADMIN — SODIUM CHLORIDE: 0.9 INJECTION, SOLUTION INTRAVENOUS at 07:08

## 2024-08-05 RX ADMIN — OCRELIZUMAB 600 MG: 300 INJECTION INTRAVENOUS at 08:08

## 2024-08-09 DIAGNOSIS — M62.838 MUSCLE SPASM: ICD-10-CM

## 2024-08-13 ENCOUNTER — TELEPHONE (OUTPATIENT)
Dept: INTERNAL MEDICINE | Facility: CLINIC | Age: 44
End: 2024-08-13
Payer: MEDICARE

## 2024-08-13 DIAGNOSIS — E03.9 HYPOTHYROIDISM, UNSPECIFIED TYPE: ICD-10-CM

## 2024-08-13 DIAGNOSIS — I10 ESSENTIAL HYPERTENSION: ICD-10-CM

## 2024-08-13 DIAGNOSIS — Z00.00 ANNUAL PHYSICAL EXAM: Primary | ICD-10-CM

## 2024-08-13 DIAGNOSIS — R73.09 ELEVATED RANDOM BLOOD GLUCOSE LEVEL: ICD-10-CM

## 2024-08-13 DIAGNOSIS — F33.1 RECURRENT DEPRESSIVE DISORDER, CURRENT EPISODE MODERATE: ICD-10-CM

## 2024-08-13 RX ORDER — TIZANIDINE 4 MG/1
TABLET ORAL
Qty: 180 TABLET | Refills: 1 | Status: SHIPPED | OUTPATIENT
Start: 2024-08-13

## 2024-08-13 NOTE — TELEPHONE ENCOUNTER
Insurance doesn't cover 1 and 1/2 tablets of lexapro,pharmacy requesting that you send a script for 20mg.and 10mg.to to total 30mg.

## 2024-08-14 RX ORDER — ESCITALOPRAM OXALATE 10 MG/1
10 TABLET ORAL DAILY
Qty: 90 TABLET | Refills: 0 | Status: SHIPPED | OUTPATIENT
Start: 2024-08-14

## 2024-08-14 RX ORDER — ESCITALOPRAM OXALATE 20 MG/1
20 TABLET ORAL DAILY
Qty: 90 TABLET | Refills: 0 | Status: SHIPPED | OUTPATIENT
Start: 2024-08-14

## 2024-08-14 NOTE — TELEPHONE ENCOUNTER
Care Due:                  Date            Visit Type   Department     Provider  --------------------------------------------------------------------------------                                EP -                              PRIMARY      MET INTERNAL  Last Visit: 02-      CARE ([facility])   MEDICINE       Cat Ramachandran  Next Visit: None Scheduled  None         None Found                                                            Last  Test          Frequency    Reason                     Performed    Due Date  --------------------------------------------------------------------------------    TSH.........  12 months..  levothyroxine............  09- 09-    [facility] Embedded Care Due Messages. Reference number: [id number].   8/14/2024 1:59:55 PM CDT

## 2024-08-14 NOTE — TELEPHONE ENCOUNTER
Changed to Lexapro 10mg daily + Lexapro 20mg daily for TOTAL= 30 MG    ANNUAL exam due in September. Please schedule with labs prior (ordered labs)

## 2024-08-22 ENCOUNTER — PATIENT MESSAGE (OUTPATIENT)
Dept: HEMATOLOGY/ONCOLOGY | Facility: CLINIC | Age: 44
End: 2024-08-22
Payer: MEDICARE

## 2024-08-23 ENCOUNTER — TELEPHONE (OUTPATIENT)
Dept: HEMATOLOGY/ONCOLOGY | Facility: CLINIC | Age: 44
End: 2024-08-23
Payer: MEDICARE

## 2024-08-28 ENCOUNTER — PATIENT MESSAGE (OUTPATIENT)
Dept: NEUROLOGY | Facility: CLINIC | Age: 44
End: 2024-08-28
Payer: MEDICARE

## 2024-09-07 NOTE — PROGRESS NOTES
Subjective:          Patient ID: Jaylon Bowles is a 44 y.o. female who presents today for a routine virtual visit for MS.  She was last seen in July by Dr. Durham.       The patient location is: her home   The chief complaint leading to consultation is: MS     Visit type: audiovisual    Face to Face time with patient: 18 minutes   30 minutes of total time spent on the encounter, which includes face to face time and non-face to face time preparing to see the patient (eg, review of tests), Obtaining and/or reviewing separately obtained history, Documenting clinical information in the electronic or other health record, Independently interpreting results (not separately reported) and communicating results to the patient/family/caregiver, or Care coordination (not separately reported).         Each patient to whom he or she provides medical services by telemedicine is:  (1) informed of the relationship between the physician and patient and the respective role of any other health care provider with respect to management of the patient; and (2) notified that he or she may decline to receive medical services by telemedicine and may withdraw from such care at any time.      MS HPI:  DMT: Ocrevus--last infused on 8/5/24   Side effects from DMT? No  Taking vitamin D3 as recommended? Yes  She feels like it takes her a few weeks to recover after her infusion. Since her last infusion, she is getting back to baseline.   She has had more frequent falls. She is interested in getting a lift to help her get up when she falls.   She recently had some ear pain and congestion in the left ear. She has been more off balance, which may be the cause of these falls. She has not gone to her PCP or urgent care to have her ear checked out.   Her left leg has become weaker, and it is harder to  when walking.   She just had a bad cold, but this is getting better. She denies any UTIs.   She is not currently doing PT, but tries to exercise  "at home.   She feels like her vision is changing. It is more blurry, and she has blind spots that come and go. She wears her glasses all the time.   She has been walking more "on her toes" on both feet. She states that her toes curl under, and her left leg is "so stiff that my brother can't bend it sometimes."   She stopped Lyrica because it made her constipated.   Right sciatica pain is returning. She has started taking wild leaf lettuce and is using CBD.   She is interested in a lift to help when she falls on the floor. Her brother, who helps as her caretaker, has issues with pain and is unable to help her get off the floor without risking injury to himself. Her mother is also not able to help her.       Medications:  Current Outpatient Medications   Medication Sig    amantadine  mg Tab Take 1 tablet (100 mg total) by mouth 2 (two) times a day. Take 1 tablet by mouth in the morning and 1 tablet at noon if needed.    augmented betamethasone dipropionate (DIPROLENE-AF) 0.05 % cream Apply topically 2 (two) times daily. Use to affected areas for up to 2 weeks then take a 1 week break or decrease to 3 times weekly. Do not apply to groin or face. Use to the rough itchy areas on arms and legs then cover with bandage    carvediloL (COREG) 25 MG tablet Take 1 tablet (25 mg total) by mouth 2 (two) times daily with meals.    cholecalciferol, vitamin D3, 1,250 mcg (50,000 unit) Tab Take 1 tablet by mouth once a week.    coenzyme Q10 100 mg capsule Take 5 capsules (500 mg total) by mouth once daily.    diclofenac (VOLTAREN) 75 MG EC tablet TAKE 1 TABLET(75 MG) BY MOUTH TWICE DAILY AS NEEDED FOR PAIN    EScitalopram oxalate (LEXAPRO) 10 MG tablet Take 1 tablet (10 mg total) by mouth once daily. TOTAL DOSE=10MG+20MG    EScitalopram oxalate (LEXAPRO) 20 MG tablet Take 1 tablet (20 mg total) by mouth once daily. TOTAL DOSE= 20mg+10mg    ferrous sulfate (FEOSOL) 325 mg (65 mg iron) Tab tablet Take 1 tablet (325 mg total) by " mouth once daily.    levothyroxine (SYNTHROID) 112 MCG tablet Take 1 tablet (112 mcg total) by mouth before breakfast.    losartan-hydrochlorothiazide 100-12.5 mg (HYZAAR) 100-12.5 mg Tab TAKE 1 TABLET BY MOUTH DAILY    magnesium 250 mg Tab Take 500 mg by mouth once.    multivitamin with minerals (ONE DAILY COMPLETE ORAL) Take by mouth.    OCREVUS 30 mg/mL Soln     pregabalin (LYRICA) 75 MG capsule Take 1 capsule (75 mg total) by mouth 2 (two) times daily.    tiZANidine (ZANAFLEX) 2 MG tablet TAKE 1/2 TABLET BY MOUTH IN THE MORNING AND IN THE AFTERNOON FOR MUSCLE SPASMS    tiZANidine (ZANAFLEX) 4 MG tablet TAKE 1 TABLET(4 MG) BY MOUTH TWICE DAILY AS NEEDED    tramadol-acetaminophen 37.5-325 mg (ULTRACET) 37.5-325 mg Tab Take 1 tablet by mouth every 12 (twelve) hours as needed for Pain (pain).     No current facility-administered medications for this visit.       SOCIAL HISTORY  Social History     Tobacco Use    Smoking status: Never    Smokeless tobacco: Never   Substance Use Topics    Alcohol use: No     Comment: social    Drug use: No     Types: Marijuana       Living arrangements - the patient lives with her mom and brother              Objective:        1. 25 foot timed walk:      12/21/2023    12:01 AM 7/8/2024    12:01 AM   Timed 25 Foot Walk:   Did patient wear an AFO? No No   Was assistive device used? Yes Yes   Assistive device used (jewels one): Bilateral Assistance Bilateral Assistance   Bilateral device used Walker/Rollator Walker/Rollator   Time for 25 Foot Walk (seconds) 28.45 36.27   Time for 25 Foot Walk (seconds) 29.38        Neurologic Exam    Deferred     Imaging:     Results for orders placed during the hospital encounter of 03/06/24    MRI Brain Demyelinating Without Contrast    Impression  Brain appears stable from prior exam, again demonstrating findings compatible with the reported history of multiple sclerosis.  No new discrete lesions to indicate ongoing demyelination.      Electronically  signed by: Brody De Anda  Date:    03/06/2024  Time:    09:22    Results for orders placed during the hospital encounter of 03/06/24    MRI Cervical Spine Demyelinating Without Contrast    Impression  Stable appearance of the cervical and thoracic cord in keeping with patient's reported history of demyelinating process.  No new cord lesions identified.    Stable mild cystic change about the central cord at the C7 level.    Multilevel cervical and thoracic spondylosis, unchanged from comparison imaging.      Electronically signed by: Brody Neetu  Date:    03/06/2024  Time:    09:15    Results for orders placed during the hospital encounter of 03/06/24    MRI Thoracic Spine Demyelinating Without Contrast    Impression  Stable appearance of the cervical and thoracic cord in keeping with patient's reported history of demyelinating process.  No new cord lesions identified.    Stable mild cystic change about the central cord at the C7 level.    Multilevel cervical and thoracic spondylosis, unchanged from comparison imaging.      Electronically signed by: Brody De Anda  Date:    03/06/2024  Time:    09:15    Labs:     Lab Results   Component Value Date    OZFIFULJ13ZH 31 02/28/2024    OOEKKQPR42KQ 35 09/22/2023    TULPIOMU59DS 49 05/10/2023         Lab Results   Component Value Date    WBC 10.73 07/25/2024    RBC 4.43 07/25/2024    HGB 12.5 07/25/2024    HCT 36.1 (L) 07/25/2024    MCV 82 07/25/2024    MCH 28.2 07/25/2024    MCHC 34.6 07/25/2024    RDW 15.7 (H) 07/25/2024     07/25/2024    MPV 12.6 07/25/2024    GRAN 7.8 (H) 07/25/2024    GRAN 72.6 07/25/2024    LYMPH 1.8 07/25/2024    LYMPH 16.5 (L) 07/25/2024    MONO 0.8 07/25/2024    MONO 7.7 07/25/2024    EOS 0.3 07/25/2024    BASO 0.04 07/25/2024    EOSINOPHIL 2.4 07/25/2024    BASOPHIL 0.4 07/25/2024     Sodium   Date Value Ref Range Status   06/03/2024 139 136 - 145 mmol/L Final     Potassium   Date Value Ref Range Status   06/03/2024 4.2 3.5 - 5.1 mmol/L Final      Chloride   Date Value Ref Range Status   06/03/2024 105 95 - 110 mmol/L Final     CO2   Date Value Ref Range Status   06/03/2024 27 23 - 29 mmol/L Final     Glucose   Date Value Ref Range Status   06/03/2024 92 70 - 110 mg/dL Final     BUN   Date Value Ref Range Status   06/03/2024 7 6 - 20 mg/dL Final     Creatinine   Date Value Ref Range Status   06/03/2024 1.0 0.5 - 1.4 mg/dL Final     Calcium   Date Value Ref Range Status   06/03/2024 9.6 8.7 - 10.5 mg/dL Final     Total Protein   Date Value Ref Range Status   06/03/2024 7.9 6.0 - 8.4 g/dL Final     Albumin   Date Value Ref Range Status   06/03/2024 3.8 3.5 - 5.2 g/dL Final     Total Bilirubin   Date Value Ref Range Status   06/03/2024 0.3 0.1 - 1.0 mg/dL Final     Comment:     For infants and newborns, interpretation of results should be based  on gestational age, weight and in agreement with clinical  observations.    Premature Infant recommended reference ranges:  Up to 24 hours.............<8.0 mg/dL  Up to 48 hours............<12.0 mg/dL  3-5 days..................<15.0 mg/dL  6-29 days.................<15.0 mg/dL       Alkaline Phosphatase   Date Value Ref Range Status   06/03/2024 68 55 - 135 U/L Final     AST   Date Value Ref Range Status   06/03/2024 15 10 - 40 U/L Final     ALT   Date Value Ref Range Status   06/03/2024 9 (L) 10 - 44 U/L Final     Anion Gap   Date Value Ref Range Status   06/03/2024 7 (L) 8 - 16 mmol/L Final     eGFR if    Date Value Ref Range Status   06/25/2022 >60.0 >60 mL/min/1.73 m^2 Final     eGFR if non    Date Value Ref Range Status   06/25/2022 >60.0 >60 mL/min/1.73 m^2 Final     Comment:     Calculation used to obtain the estimated glomerular filtration  rate (eGFR) is the CKD-EPI equation.        Lab Results   Component Value Date    HEPBSAG Non-reactive 06/03/2024    HEPBSAB >1000.00 10/14/2022    HEPBSAB Reactive 10/14/2022    HEPBCAB Non-reactive 06/03/2024         MS Impression and  Plan:     NEURO MULTIPLE SCLEROSIS IMPRESSION:   DMT:  No change in management  DMT comment:  Next Ocrevus infusion is due in February. We may consider a change in DMT if she feels like she continues to progress. Mavenclad is a consideration, as are some of our clinical trials. We can discuss more at the next visit. She is aware of the risks associated with immunosuppressant therapy, including increased risk of infection.     Symptom Management:  Implement change in symptom management  Implement Change in Symptom Management:  Adaptive Needs (She is interested in a lift to help her get off of the floor after a fall. She has been falling more frequently, so a lift would be helpful to avoid her lying on the floor for extended periods of time while waiting for help.)     The lift would also help to prevent or lessen chance of injury for her brother who is the one who helps to pick her up after a fall. She is interested in an ProHeal Sit to Stand lift, which would help her stand from a seated position on the bed or chair. However, this device does not look like it would help to get her up from a fall on the floor. It could help with fall prevention. The Indee lift device could be more helpful in getting her up off of the floor in the event of a fall. I have sent her a link to look more at this device. If neither of these are covered by insurance, we also discussed a Anant lift option to use in case of falls or emergency. At this time, I do believe that Jaylon is in need of some lift device to help her in the event of a fall. I will discuss this with our social work team.     Referral placed to neuro-ophthalmology to establish care with Dr. Bsaurto.   I will see her back in January.   The visit today is associated with current or anticipated ongoing medical care related to this patient's single serious condition/complex condition of multiple sclerosis.          VERÓNICA Smallwood, CNS    Problem List Items Addressed This  Visit          Neurologic Problems    Multiple sclerosis - Primary    Relevant Orders    Ambulatory referral/consult to Ophthalmology

## 2024-09-11 ENCOUNTER — PATIENT MESSAGE (OUTPATIENT)
Dept: NEUROLOGY | Facility: CLINIC | Age: 44
End: 2024-09-11

## 2024-09-11 ENCOUNTER — OFFICE VISIT (OUTPATIENT)
Dept: NEUROLOGY | Facility: CLINIC | Age: 44
End: 2024-09-11
Payer: MEDICARE

## 2024-09-11 DIAGNOSIS — Z79.899 HIGH RISK MEDICATION USE: ICD-10-CM

## 2024-09-11 DIAGNOSIS — Z71.89 COUNSELING REGARDING GOALS OF CARE: ICD-10-CM

## 2024-09-11 DIAGNOSIS — R29.6 FREQUENT FALLS: ICD-10-CM

## 2024-09-11 DIAGNOSIS — Z74.09 IMPAIRED MOBILITY AND ADLS: ICD-10-CM

## 2024-09-11 DIAGNOSIS — G35 MULTIPLE SCLEROSIS: Primary | ICD-10-CM

## 2024-09-11 DIAGNOSIS — Z29.89 PROPHYLACTIC IMMUNOTHERAPY: ICD-10-CM

## 2024-09-11 DIAGNOSIS — Z78.9 IMPAIRED MOBILITY AND ADLS: ICD-10-CM

## 2024-09-11 PROCEDURE — G2211 COMPLEX E/M VISIT ADD ON: HCPCS | Mod: HCNC,95,, | Performed by: CLINICAL NURSE SPECIALIST

## 2024-09-11 PROCEDURE — 99214 OFFICE O/P EST MOD 30 MIN: CPT | Mod: HCNC,95,, | Performed by: CLINICAL NURSE SPECIALIST

## 2024-09-11 NOTE — Clinical Note
Patient is interested in a lift to help her get off of the floor when she falls. Her brother lives with her and is the one to help, but he also has chronic pain issues and does not want to risk further injury. She sent a link for the Pro-Heal Sit to Stand lift, but I don't think this is the most appropriate one for her. The Indee-Lift would be better. We also discussed a Anant lift. Do we think insurance might cover?

## 2024-09-12 ENCOUNTER — TELEPHONE (OUTPATIENT)
Dept: OPHTHALMOLOGY | Facility: CLINIC | Age: 44
End: 2024-09-12
Payer: MEDICARE

## 2024-09-12 NOTE — TELEPHONE ENCOUNTER
----- Message from Alona Corbett CMA sent at 9/12/2024  1:33 PM CDT -----  Regarding: Scheduling  Good afternoon,     I have the above pt needing to be scheduled with . Please can you contact pt and have her scheduled at your next available, referral has been placed by Ms.Amber Gentile. If you have any questions or concerns I can be reached here or at my ext below.     Ext. [phone]    Alona Crockett

## 2024-09-16 DIAGNOSIS — R93.422 ABNORMAL RADIOLOGIC FINDINGS ON DIAGNOSTIC IMAGING OF LEFT KIDNEY: Primary | ICD-10-CM

## 2024-09-16 DIAGNOSIS — N28.89 LEFT RENAL MASS: ICD-10-CM

## 2024-09-17 ENCOUNTER — PATIENT OUTREACH (OUTPATIENT)
Dept: ADMINISTRATIVE | Facility: HOSPITAL | Age: 44
End: 2024-09-17
Payer: MEDICARE

## 2024-09-17 RX ORDER — SODIUM FERRIC GLUCONATE COMPLEX IN SUCROSE 12.5 MG/ML
125 INJECTION INTRAVENOUS ONCE
Start: 2024-10-03

## 2024-09-17 RX ORDER — SODIUM FERRIC GLUCONATE COMPLEX IN SUCROSE 12.5 MG/ML
125 INJECTION INTRAVENOUS ONCE
Status: CANCELLED
Start: 2024-09-17

## 2024-09-17 RX ORDER — SODIUM FERRIC GLUCONATE COMPLEX IN SUCROSE 12.5 MG/ML
125 INJECTION INTRAVENOUS ONCE
Status: CANCELLED
Start: 2024-09-19

## 2024-09-17 RX ORDER — SODIUM FERRIC GLUCONATE COMPLEX IN SUCROSE 12.5 MG/ML
125 INJECTION INTRAVENOUS ONCE
Start: 2024-09-26

## 2024-09-17 NOTE — PROGRESS NOTES
Population Health Chart Review & Patient Outreach Details      Additional [facility] Health Notes:               Updates Requested / Reviewed:      Care Everywhere, , and Immunizations Reconciliation Completed or Queried: [facility] Topics Overdue:      [facility] Score: 3     Cervical Cancer Screening  Colon Cancer Screening  Mammogram                       Health Maintenance Topic(s) Outreach Outcomes & Actions Taken:    Primary Care Appt - Outreach Outcomes & Actions Taken  : Primary Care Appt Scheduled

## 2024-09-18 ENCOUNTER — INFUSION (OUTPATIENT)
Dept: INFUSION THERAPY | Facility: HOSPITAL | Age: 44
End: 2024-09-18
Payer: MEDICARE

## 2024-09-18 VITALS
HEART RATE: 60 BPM | BODY MASS INDEX: 42.06 KG/M2 | OXYGEN SATURATION: 96 % | RESPIRATION RATE: 16 BRPM | HEIGHT: 65 IN | DIASTOLIC BLOOD PRESSURE: 71 MMHG | WEIGHT: 252.44 LBS | TEMPERATURE: 98 F | SYSTOLIC BLOOD PRESSURE: 134 MMHG

## 2024-09-18 DIAGNOSIS — E61.1 IRON DEFICIENCY: Primary | ICD-10-CM

## 2024-09-18 PROCEDURE — 25000003 PHARM REV CODE 250: Mod: HCNC | Performed by: INTERNAL MEDICINE

## 2024-09-18 PROCEDURE — 63600175 PHARM REV CODE 636 W HCPCS: Mod: HCNC | Performed by: INTERNAL MEDICINE

## 2024-09-18 PROCEDURE — 96374 THER/PROPH/DIAG INJ IV PUSH: CPT | Mod: HCNC

## 2024-09-18 RX ORDER — SODIUM CHLORIDE 0.9 % (FLUSH) 0.9 %
10 SYRINGE (ML) INJECTION
Status: DISCONTINUED | OUTPATIENT
Start: 2024-09-18 | End: 2024-09-18 | Stop reason: HOSPADM

## 2024-09-18 RX ORDER — HEPARIN 100 UNIT/ML
500 SYRINGE INTRAVENOUS
Status: DISCONTINUED | OUTPATIENT
Start: 2024-09-18 | End: 2024-09-18 | Stop reason: HOSPADM

## 2024-09-18 RX ORDER — SODIUM FERRIC GLUCONATE COMPLEX IN SUCROSE 12.5 MG/ML
125 INJECTION INTRAVENOUS ONCE
Status: COMPLETED | OUTPATIENT
Start: 2024-09-18 | End: 2024-09-18

## 2024-09-18 RX ADMIN — SODIUM CHLORIDE: 9 INJECTION, SOLUTION INTRAVENOUS at 04:09

## 2024-09-18 RX ADMIN — SODIUM FERRIC GLUCONATE COMPLEX IN SUCROSE 125 MG: 12.5 INJECTION INTRAVENOUS at 04:09

## 2024-09-18 NOTE — PLAN OF CARE
pt completed ferrlecit injection. Pt tolerated well. Stayted for observation period and no s/sx or reaction noted. Wheeled off floor via wheel chair by brother, NAD.

## 2024-09-24 ENCOUNTER — PATIENT MESSAGE (OUTPATIENT)
Dept: NEUROLOGY | Facility: CLINIC | Age: 44
End: 2024-09-24
Payer: MEDICARE

## 2024-09-25 ENCOUNTER — INFUSION (OUTPATIENT)
Dept: INFUSION THERAPY | Facility: HOSPITAL | Age: 44
End: 2024-09-25
Payer: MEDICARE

## 2024-09-25 ENCOUNTER — TELEPHONE (OUTPATIENT)
Dept: INFUSION THERAPY | Facility: HOSPITAL | Age: 44
End: 2024-09-25
Payer: MEDICARE

## 2024-09-25 VITALS
TEMPERATURE: 98 F | HEIGHT: 65 IN | SYSTOLIC BLOOD PRESSURE: 147 MMHG | WEIGHT: 252.44 LBS | DIASTOLIC BLOOD PRESSURE: 78 MMHG | RESPIRATION RATE: 18 BRPM | OXYGEN SATURATION: 99 % | HEART RATE: 60 BPM | BODY MASS INDEX: 42.06 KG/M2

## 2024-09-25 DIAGNOSIS — E61.1 IRON DEFICIENCY: Primary | ICD-10-CM

## 2024-09-25 PROCEDURE — 96365 THER/PROPH/DIAG IV INF INIT: CPT | Mod: HCNC

## 2024-09-25 PROCEDURE — 25000003 PHARM REV CODE 250: Mod: HCNC | Performed by: INTERNAL MEDICINE

## 2024-09-25 PROCEDURE — 63600175 PHARM REV CODE 636 W HCPCS: Mod: HCNC | Performed by: INTERNAL MEDICINE

## 2024-09-25 RX ORDER — SODIUM CHLORIDE 0.9 % (FLUSH) 0.9 %
10 SYRINGE (ML) INJECTION
Status: DISCONTINUED | OUTPATIENT
Start: 2024-09-25 | End: 2024-09-25 | Stop reason: HOSPADM

## 2024-09-25 RX ORDER — SODIUM FERRIC GLUCONATE COMPLEX IN SUCROSE 12.5 MG/ML
125 INJECTION INTRAVENOUS ONCE
Status: DISCONTINUED | OUTPATIENT
Start: 2024-09-25 | End: 2024-09-25

## 2024-09-25 RX ORDER — HEPARIN 100 UNIT/ML
500 SYRINGE INTRAVENOUS
Status: DISCONTINUED | OUTPATIENT
Start: 2024-09-25 | End: 2024-09-25 | Stop reason: HOSPADM

## 2024-09-25 RX ADMIN — SODIUM CHLORIDE 125 MG: 9 INJECTION, SOLUTION INTRAVENOUS at 02:09

## 2024-09-25 NOTE — PLAN OF CARE
Pt received Ferrlecit today and tolerated well, without complications. VSS throughout infusion. Educated patient about Ferrlecit (indications, side effects, possible reactions,  precautions) and verbalized understanding. PIV positive for blood return, saline locked and removed prior to DC, catheter tip intact. Pt DC with no distress noted, WC off unit, via fx, w/o event, pleased. Pt opted to not wait the 30' obs period.

## 2024-10-01 ENCOUNTER — OFFICE VISIT (OUTPATIENT)
Dept: INTERNAL MEDICINE | Facility: CLINIC | Age: 44
End: 2024-10-01
Payer: MEDICARE

## 2024-10-01 VITALS
RESPIRATION RATE: 16 BRPM | WEIGHT: 249.13 LBS | HEIGHT: 65 IN | HEART RATE: 53 BPM | BODY MASS INDEX: 41.51 KG/M2 | SYSTOLIC BLOOD PRESSURE: 138 MMHG | DIASTOLIC BLOOD PRESSURE: 84 MMHG | TEMPERATURE: 97 F

## 2024-10-01 DIAGNOSIS — N28.89 OTHER SPECIFIED DISORDERS OF KIDNEY AND URETER: ICD-10-CM

## 2024-10-01 DIAGNOSIS — R93.422 ABNORMAL FINDING ON DIAGNOSTIC IMAGING OF LEFT KIDNEY: ICD-10-CM

## 2024-10-01 DIAGNOSIS — M54.41 CHRONIC RIGHT-SIDED LOW BACK PAIN WITH RIGHT-SIDED SCIATICA: ICD-10-CM

## 2024-10-01 DIAGNOSIS — Z12.31 ENCOUNTER FOR SCREENING MAMMOGRAM FOR HIGH-RISK PATIENT: ICD-10-CM

## 2024-10-01 DIAGNOSIS — I10 ESSENTIAL HYPERTENSION: Primary | ICD-10-CM

## 2024-10-01 DIAGNOSIS — E06.3 HASHIMOTO'S THYROIDITIS: ICD-10-CM

## 2024-10-01 DIAGNOSIS — F33.0 RECURRENT DEPRESSIVE DISORDER, CURRENT EPISODE MILD: ICD-10-CM

## 2024-10-01 DIAGNOSIS — G89.29 CHRONIC RIGHT-SIDED LOW BACK PAIN WITH RIGHT-SIDED SCIATICA: ICD-10-CM

## 2024-10-01 DIAGNOSIS — G35 MULTIPLE SCLEROSIS: ICD-10-CM

## 2024-10-01 DIAGNOSIS — E66.01 MORBID OBESITY WITH BMI OF 40.0-44.9, ADULT: ICD-10-CM

## 2024-10-01 DIAGNOSIS — E61.1 IRON DEFICIENCY: ICD-10-CM

## 2024-10-01 DIAGNOSIS — Z00.00 ANNUAL PHYSICAL EXAM: ICD-10-CM

## 2024-10-01 PROBLEM — H60.543 DERMATITIS OF BOTH EAR CANALS: Status: ACTIVE | Noted: 2024-10-01

## 2024-10-01 PROCEDURE — 3075F SYST BP GE 130 - 139MM HG: CPT | Mod: HCNC,CPTII,S$GLB, | Performed by: INTERNAL MEDICINE

## 2024-10-01 PROCEDURE — 3079F DIAST BP 80-89 MM HG: CPT | Mod: HCNC,CPTII,S$GLB, | Performed by: INTERNAL MEDICINE

## 2024-10-01 PROCEDURE — 3008F BODY MASS INDEX DOCD: CPT | Mod: HCNC,CPTII,S$GLB, | Performed by: INTERNAL MEDICINE

## 2024-10-01 PROCEDURE — 99999 PR PBB SHADOW E&M-EST. PATIENT-LVL IV: CPT | Mod: PBBFAC,HCNC,, | Performed by: INTERNAL MEDICINE

## 2024-10-01 PROCEDURE — 1159F MED LIST DOCD IN RCRD: CPT | Mod: HCNC,CPTII,S$GLB, | Performed by: INTERNAL MEDICINE

## 2024-10-01 PROCEDURE — 1160F RVW MEDS BY RX/DR IN RCRD: CPT | Mod: HCNC,CPTII,S$GLB, | Performed by: INTERNAL MEDICINE

## 2024-10-01 PROCEDURE — 99396 PREV VISIT EST AGE 40-64: CPT | Mod: HCNC,S$GLB,, | Performed by: INTERNAL MEDICINE

## 2024-10-01 RX ORDER — IBUPROFEN 800 MG/1
800 TABLET ORAL 2 TIMES DAILY PRN
Qty: 30 TABLET | Refills: 0 | Status: SHIPPED | OUTPATIENT
Start: 2024-10-01

## 2024-10-01 RX ORDER — ESCITALOPRAM OXALATE 20 MG/1
20 TABLET ORAL DAILY
Qty: 90 TABLET | Refills: 3 | Status: SHIPPED | OUTPATIENT
Start: 2024-10-01

## 2024-10-01 RX ORDER — FLUOCINOLONE ACETONIDE 0.11 MG/ML
1 OIL AURICULAR (OTIC) 2 TIMES DAILY
Qty: 20 ML | Refills: 2 | Status: SHIPPED | OUTPATIENT
Start: 2024-10-01

## 2024-10-01 RX ORDER — ESCITALOPRAM OXALATE 10 MG/1
10 TABLET ORAL DAILY
Qty: 90 TABLET | Refills: 3 | Status: SHIPPED | OUTPATIENT
Start: 2024-10-01

## 2024-10-01 RX ORDER — LOSARTAN POTASSIUM AND HYDROCHLOROTHIAZIDE 12.5; 1 MG/1; MG/1
1 TABLET ORAL DAILY
Qty: 90 TABLET | Refills: 3 | Status: SHIPPED | OUTPATIENT
Start: 2024-10-01

## 2024-10-01 NOTE — PROGRESS NOTES
Subjective:     PCP: Cat Ramachandran MD    Jaylon Bowles is a 44 y.o. female who presents for an annual exam.    Hypertension: The patient has been taking medications as instructed, no medication side effects noted, no chest pain on exertion and no dyspnea on exertion.    BP Readings from Last 3 Encounters:   10/01/24 138/84   09/25/24 (!) 147/78   09/18/24 134/71       Thyroid Disease: Presents for follow up of hypothyroidism. Current symptoms: none. Patient denies change in energy level, heat / cold intolerance, and palpitations. Symptoms have been well-controlled.    Depression: She presents for follow up of depression. Current symptoms include fatigue. Symptoms have been gradually improving since that time. Patient denies anhedonia, difficulty concentrating, weight gain, and weight loss. Previous treatment includes: medication. She complains of the following side effects from the treatment: none.    Medical History:   Past Medical History:   Diagnosis Date    Depression     Hypertension     Migraine headache     Multiple sclerosis     Thyroid disease        Family History: family history includes Diabetes in her father; Hypertension in her mother; Stroke in her father; Thyroid disease in her mother.    Surgical History:   Past Surgical History:   Procedure Laterality Date    INJECTION, SPINE, LUMBOSACRAL, TRANSFORAMINAL APPROACH Right 4/9/2024    Procedure: TFESI RT L4-5, L5-S1;  Surgeon: Geovanni Scott MD;  Location: [facility] PAIN MANAGEMENT;  Service: Pain Management;  Laterality: Right;  20mins-no ac        Social History:  reports that she has never smoked. She has never used smokeless tobacco. She reports that she does not drink alcohol and does not use drugs.     Allergies:   Review of patient's allergies indicates:   Allergen Reactions    Betaseron [interferon beta-1b] Hives    Gabapentin Hallucinations    Cephalexin Hives, Itching and Rash       Medications:   Current Outpatient Medications    Medication Sig    amantadine  mg Tab Take 1 tablet (100 mg total) by mouth 2 (two) times a day. Take 1 tablet by mouth in the morning and 1 tablet at noon if needed.    carvediloL (COREG) 25 MG tablet Take 1 tablet (25 mg total) by mouth 2 (two) times daily with meals.    cholecalciferol, vitamin D3, 1,250 mcg (50,000 unit) Tab Take 1 tablet by mouth once a week.    levothyroxine (SYNTHROID) 112 MCG tablet Take 1 tablet (112 mcg total) by mouth before breakfast.    magnesium 250 mg Tab Take 500 mg by mouth once.    OCREVUS 30 mg/mL Soln     tiZANidine (ZANAFLEX) 2 MG tablet TAKE 1/2 TABLET BY MOUTH IN THE MORNING AND IN THE AFTERNOON FOR MUSCLE SPASMS    tiZANidine (ZANAFLEX) 4 MG tablet TAKE 1 TABLET(4 MG) BY MOUTH TWICE DAILY AS NEEDED    EScitalopram oxalate (LEXAPRO) 10 MG tablet Take 1 tablet (10 mg total) by mouth once daily. TOTAL DOSE=10MG+20MG    EScitalopram oxalate (LEXAPRO) 20 MG tablet Take 1 tablet (20 mg total) by mouth once daily. TOTAL DOSE= 20mg+10mg    fluocinolone acetonide oiL 0.01 % Drop Place 1 drop in ear(s) 2 (two) times a day.    ibuprofen (ADVIL,MOTRIN) 800 MG tablet Take 1 tablet (800 mg total) by mouth 2 (two) times daily as needed for Pain.    losartan-hydrochlorothiazide 100-12.5 mg (HYZAAR) 100-12.5 mg Tab Take 1 tablet by mouth once daily.    ZINC ORAL Take by mouth.     No current facility-administered medications for this visit.       Health Maintenance:   Health Maintenance Topics with due status: Not Due       Topic Last Completion Date    TETANUS VACCINE 06/10/2021    Pneumococcal Vaccines (Age 0-64) 04/27/2022    Hemoglobin A1c (Diabetic Prevention Screening) 09/22/2023    Lipid Panel 09/22/2023    RSV Vaccine (Age 60+ and Pregnant patients) Not Due     Lab Results   Component Value Date    HEPCAB Negative 05/31/2021    KGI19XPRX Non-reactive 09/22/2023     Eye Exam: Last Ophthalmology Visit: 5/10/2022 [facility] OPH[facility], due  Dental Exam: due  OB/GYN: due   Pap smear:  8/25/2019  Mammogram: due    Colonoscopy:   due  HIV: 9/2023, neg  Hepatitis C  Ab: 5/2021, neg    Vaccinations:  Immunization History   Administered Date(s) Administered    COVID-19, MRNA, LN-S, PF (Pfizer) (Purple Cap) 04/02/2021, 04/23/2021, 12/21/2021    Hepatitis A, Adult 05/25/2021, 06/10/2021, 06/10/2021    Hepatitis B (recombinant) Adjuvanted, 2 dose 05/25/2021, 04/27/2022    Pneumococcal Conjugate - 13 Valent 05/25/2021    Pneumococcal Polysaccharide - 23 Valent 04/27/2022    Tdap 06/10/2021, 06/10/2021     Influenza: declined  Tetanus: 2021  Pneumovax: 2022  Prevnar-13: 2021  Covid vaccine: declined    Body mass index is 41.46 kg/m².  Wt Readings from Last 3 Encounters:   10/01/24 113 kg (249 lb 1.9 oz)   09/25/24 114.5 kg (252 lb 6.8 oz)   09/18/24 114.5 kg (252 lb 6.8 oz)       Review of Systems   Constitutional:  Positive for fatigue. Negative for chills, diaphoresis and fever.   HENT:  Negative for congestion, dental problem, ear discharge, ear pain, postnasal drip, rhinorrhea, sinus pressure, sore throat and trouble swallowing.    Eyes:  Negative for redness and visual disturbance.   Respiratory:  Negative for cough and shortness of breath.    Cardiovascular:  Negative for chest pain, palpitations and leg swelling.   Gastrointestinal:  Negative for abdominal pain, blood in stool, constipation, diarrhea, nausea and vomiting.   Endocrine: Negative for cold intolerance and heat intolerance.   Genitourinary:  Negative for decreased urine volume, dysuria and hematuria.   Musculoskeletal:  Positive for back pain (chronic lower back pain with right sciatica, s/p LOI with improvement that only lasted for 1-2 months) and gait problem (in wheelchair). Negative for arthralgias and myalgias.   Skin:  Negative for rash and wound.   Neurological:  Positive for weakness (generalized, dependent on rollator) and headaches. Negative for dizziness.   Hematological:  Negative for adenopathy.   Psychiatric/Behavioral:   Negative for dysphoric mood and sleep disturbance. The patient is not nervous/anxious.           Objective:     Physical Exam  Vitals reviewed.   Constitutional:       General: She is awake. She is not in acute distress.     Appearance: Normal appearance. She is well-developed and well-groomed. She is not diaphoretic.   HENT:      Head: Normocephalic and atraumatic.      Right Ear: Hearing, tympanic membrane, ear canal and external ear normal. Tympanic membrane is not erythematous or bulging.      Left Ear: Hearing, tympanic membrane, ear canal and external ear normal. Tympanic membrane is not erythematous or bulging.      Nose: Nose normal. No congestion.      Mouth/Throat:      Mouth: Mucous membranes are moist.      Tongue: No lesions.      Pharynx: Oropharynx is clear. Uvula midline. No oropharyngeal exudate or posterior oropharyngeal erythema.   Eyes:      General: Lids are normal. Vision grossly intact. Gaze aligned appropriately. No scleral icterus.     Conjunctiva/sclera:      Right eye: Right conjunctiva is not injected.      Left eye: Left conjunctiva is not injected.      Pupils: Pupils are equal, round, and reactive to light.   Neck:      Thyroid: No thyroid mass or thyromegaly.   Cardiovascular:      Rate and Rhythm: Normal rate and regular rhythm.      Pulses: Normal pulses.      Heart sounds: Normal heart sounds. No murmur heard.  Pulmonary:      Effort: Pulmonary effort is normal. No respiratory distress.      Breath sounds: Normal breath sounds. No decreased breath sounds or wheezing.   Abdominal:      General: Bowel sounds are normal. There is no distension.      Palpations: Abdomen is soft. Abdomen is not rigid.      Tenderness: There is no abdominal tenderness. There is no guarding or rebound.   Musculoskeletal:         General: Normal range of motion.      Cervical back: Normal range of motion and neck supple.      Right lower leg: No edema.      Left lower leg: No edema.   Lymphadenopathy:       Cervical: No cervical adenopathy.      Upper Body:      Right upper body: No supraclavicular adenopathy.      Left upper body: No supraclavicular adenopathy.   Skin:     General: Skin is warm and dry.      Coloration: Skin is not cyanotic.      Findings: No lesion or rash.      Nails: There is no clubbing.   Neurological:      General: No focal deficit present.      Mental Status: She is alert and oriented to person, place, and time.      Motor: Weakness (lower extremity weakness) present.      Gait: Gait is intact.      Deep Tendon Reflexes: Reflexes are normal and symmetric.   Psychiatric:         Attention and Perception: Attention normal.         Mood and Affect: Mood normal.         Behavior: Behavior is cooperative.              Assessment:        1. Essential hypertension    2. Multiple sclerosis    3. Chronic right-sided low back pain with right-sided sciatica    4. Hashimoto's thyroiditis    5. Recurrent depressive disorder, current episode mild    6. Iron deficiency    7. Other specified disorders of kidney and ureter    8. Abnormal finding on diagnostic imaging of left kidney    9. Annual physical exam    10. Encounter for screening mammogram for high-risk patient    11. Morbid obesity with BMI of 40.0-44.9, adult           Plan:     1. Essential hypertension  - controlled, continue  carvedilol, losartan, HCTZ  - losartan-hydrochlorothiazide 100-12.5 mg (HYZAAR) 100-12.5 mg Tab; Take 1 tablet by mouth once daily.  Dispense: 90 tablet; Refill: 3    2. Multiple sclerosis  - stable on Ocrevus, monitored by Neurology    3. Chronic right-sided low back pain with right-sided sciatica  - slight improvement after LOI but pain returned  - now treats naturally with ice packs, lettuce leaf extracts and stretching  - may take ibuprofen 800mg bid prn pain and stop diclofenac    4. Hashimoto's thyroiditis  - asymptomatic, check TSH  - continue levothyroxine    5. Recurrent depressive disorder, current episode mild  -  reports improvement with Lexapro 30mg daily, continue  - EScitalopram oxalate (LEXAPRO) 10 MG tablet; Take 1 tablet (10 mg total) by mouth once daily. TOTAL DOSE=10MG+20MG  Dispense: 90 tablet; Refill: 3  - EScitalopram oxalate (LEXAPRO) 20 MG tablet; Take 1 tablet (20 mg total) by mouth once daily. TOTAL DOSE= 20mg+10mg  Dispense: 90 tablet; Refill: 3    6. Iron deficiency  - started Ferrlecit infusions with Hematology, improving    7. Other specified disorders of kidney and ureter  - 2.7cm mass seen on left kidney on last MRI, needs follow-up imaging now  - CT Abdomen Without Contrast; Future    8. Abnormal finding on diagnostic imaging of left kidney  - CT Abdomen Without Contrast; Future    9. Annual physical exam  - check CBC, CMP, TSH, lipid panel  - Ambulatory referral/consult to Obstetrics / Gynecology; Future    10. Encounter for screening mammogram for high-risk patient  - Mammo Digital Screening Bilat w/ Bjorn; Future    11. Morbid obesity with BMI of 40.0-44.9, adult  - patient is interested in losing weight, splurges at times but may consider trial of a low-sugar diet    RTC in 4 months for follow-up or sooner if needed (virtual ok)    __________________________    Cat Ramachandran MD, PharmD  [facility]- [facility]  American Board of Obesity Medicine diplomate  Office [phone]

## 2024-10-01 NOTE — Clinical Note
>>>>> OB/GYN referral and needs Mammogram >>>>>>>>>> annual recall 10/2/2025 >>>> 4 mo f/u recall (virtual ok)

## 2024-10-02 ENCOUNTER — TELEPHONE (OUTPATIENT)
Dept: INFUSION THERAPY | Facility: HOSPITAL | Age: 44
End: 2024-10-02
Payer: MEDICARE

## 2024-10-03 ENCOUNTER — HOSPITAL ENCOUNTER (OUTPATIENT)
Dept: RADIOLOGY | Facility: HOSPITAL | Age: 44
Discharge: HOME | End: 2024-10-03
Attending: INTERNAL MEDICINE
Payer: MEDICARE

## 2024-10-03 ENCOUNTER — TELEPHONE (OUTPATIENT)
Dept: INTERNAL MEDICINE | Facility: CLINIC | Age: 44
End: 2024-10-03
Payer: MEDICARE

## 2024-10-03 DIAGNOSIS — N28.89 LEFT RENAL MASS: ICD-10-CM

## 2024-10-03 DIAGNOSIS — N28.89 OTHER SPECIFIED DISORDERS OF KIDNEY AND URETER: ICD-10-CM

## 2024-10-03 DIAGNOSIS — R93.422 ABNORMAL RADIOLOGIC FINDINGS ON DIAGNOSTIC IMAGING OF LEFT KIDNEY: ICD-10-CM

## 2024-10-03 DIAGNOSIS — R93.422 ABNORMAL FINDING ON DIAGNOSTIC IMAGING OF LEFT KIDNEY: ICD-10-CM

## 2024-10-03 PROCEDURE — 25500020 PHARM REV CODE 255: Mod: HCNC | Performed by: INTERNAL MEDICINE

## 2024-10-03 PROCEDURE — 74178 CT ABD&PLV WO CNTR FLWD CNTR: CPT | Mod: TC,HCNC

## 2024-10-03 PROCEDURE — 74178 CT ABD&PLV WO CNTR FLWD CNTR: CPT | Mod: 26,HCNC,, | Performed by: RADIOLOGY

## 2024-10-03 RX ADMIN — IOHEXOL 100 ML: 350 INJECTION, SOLUTION INTRAVENOUS at 12:10

## 2024-10-03 NOTE — TELEPHONE ENCOUNTER
----- Message from Cat Ramachandran MD sent at 10/1/2024  3:20 PM CDT -----  >>>>> OB/GYN referral and needs Mammogram  >>>>>>>>>> annual recall 10/2/2025  >>>> 4 mo f/u recall (virtual ok)

## 2024-10-03 NOTE — TELEPHONE ENCOUNTER
----- Message from Netta sent at 10/3/2024 10:49 AM CDT -----  Contact: [facility] [phone]  [facility] CT radiology is calling to clarify the pt's CT scan.  Patient is currently there.

## 2024-10-04 ENCOUNTER — TELEPHONE (OUTPATIENT)
Dept: PSYCHIATRY | Facility: CLINIC | Age: 44
End: 2024-10-04

## 2024-10-04 DIAGNOSIS — Z78.9 IMPAIRED MOBILITY AND ACTIVITIES OF DAILY LIVING: ICD-10-CM

## 2024-10-04 DIAGNOSIS — G35 MULTIPLE SCLEROSIS: Primary | ICD-10-CM

## 2024-10-04 DIAGNOSIS — Z74.09 IMPAIRED MOBILITY AND ACTIVITIES OF DAILY LIVING: ICD-10-CM

## 2024-10-04 NOTE — LETTER
[facility] [place] [address]  [address]  [facility] [address]  Phone: [phone]     2024      RE: Jaylon Bowles (: 1980)         [employer] ID: [id number]         [facility] [address] [place] [id number]      LETTER OF MEDICAL NECESSITY      To Whom It May Concern:  Miss Bowles is under my care for treatment of multiple sclerosis (G35) and has severe disability that limits her mobility and ability to perform activities of daily living.  She is dependent on power mobility and rollators for mobility and requires maximum to total assistance from caregivers to recover following falls. In fact, she is too weak to rise from the floor to a chair/bed without assistance from a caregiver or the use of a lift.  Unfortunately, her family members, who provide help with her ADLs have their own medical conditions that limit their ability to safely assist Miss Bowles in this way.  Using a manual hydraulic lift for multiple transfers a day will be difficult for her caregivers, thereby placing her at risk of unsafe transfer and injury or not being transferred when needed and thereby unable to perform ADLs.  The IndeeLift PPU-S will allow Miss Bowles to recover herself following a fall and will be easy for a caregiver to use without risk of injury.  I can be reached at the above phone number with questions or concerns about my recommendations.    Sincerely,    Viviana Gentile, AGCNS-BC, APRN, MN, MSCN        AP:priscilla

## 2024-10-04 NOTE — TELEPHONE ENCOUNTER
MING intern phoned pt to discuss lift options. I informed pt that the elva lift would definitely be covered by insurance, but the indee lift is not guaranteed to be covered. However, if she wanted to POP for the indee lift, she could submit a claim to insurance and potentially receive 80% of the cost back. Pt made the decision to get the elva lift because she márquez not want to go through the process of POP then submitting a claim. MING cruz will discuss with DANICA Gentile to get an ordered started.

## 2024-10-09 ENCOUNTER — PATIENT MESSAGE (OUTPATIENT)
Dept: NEUROLOGY | Facility: CLINIC | Age: 44
End: 2024-10-09
Payer: MEDICARE

## 2024-10-15 ENCOUNTER — INFUSION (OUTPATIENT)
Dept: INFUSION THERAPY | Facility: HOSPITAL | Age: 44
End: 2024-10-15
Payer: MEDICARE

## 2024-10-15 VITALS
SYSTOLIC BLOOD PRESSURE: 172 MMHG | HEART RATE: 58 BPM | HEIGHT: 65 IN | DIASTOLIC BLOOD PRESSURE: 83 MMHG | OXYGEN SATURATION: 99 % | BODY MASS INDEX: 41.51 KG/M2 | WEIGHT: 249.13 LBS | RESPIRATION RATE: 16 BRPM

## 2024-10-15 DIAGNOSIS — E61.1 IRON DEFICIENCY: Primary | ICD-10-CM

## 2024-10-15 PROCEDURE — 96365 THER/PROPH/DIAG IV INF INIT: CPT | Mod: HCNC

## 2024-10-15 PROCEDURE — 25000003 PHARM REV CODE 250: Mod: HCNC | Performed by: INTERNAL MEDICINE

## 2024-10-15 PROCEDURE — 63600175 PHARM REV CODE 636 W HCPCS: Mod: HCNC | Performed by: INTERNAL MEDICINE

## 2024-10-15 RX ORDER — SODIUM FERRIC GLUCONATE COMPLEX IN SUCROSE 12.5 MG/ML
125 INJECTION INTRAVENOUS ONCE
Status: DISCONTINUED | OUTPATIENT
Start: 2024-10-15 | End: 2024-10-15 | Stop reason: SDUPTHER

## 2024-10-15 RX ORDER — SODIUM CHLORIDE 0.9 % (FLUSH) 0.9 %
10 SYRINGE (ML) INJECTION
Status: DISCONTINUED | OUTPATIENT
Start: 2024-10-15 | End: 2024-10-15 | Stop reason: HOSPADM

## 2024-10-15 RX ORDER — HEPARIN 100 UNIT/ML
500 SYRINGE INTRAVENOUS
Status: DISCONTINUED | OUTPATIENT
Start: 2024-10-15 | End: 2024-10-15 | Stop reason: HOSPADM

## 2024-10-15 RX ADMIN — SODIUM CHLORIDE: 9 INJECTION, SOLUTION INTRAVENOUS at 03:10

## 2024-10-15 RX ADMIN — SODIUM CHLORIDE 125 MG: 9 INJECTION, SOLUTION INTRAVENOUS at 04:10

## 2024-10-15 NOTE — PLAN OF CARE
Pt sitting in chair, VSS, assessment complete. PIV inserted flushed with blood return infusing NS @ 25 cc/hr while waiting for Ferrlecit. WCTM for safety.

## 2024-10-15 NOTE — PLAN OF CARE
Pt tolerated Ferrlecit infusion with no issues. PIV removed. Pt escorted via WC by  off unit with no issues.

## 2024-10-18 NOTE — TELEPHONE ENCOUNTER
Pt shared with DANICA Gentile a few weeks ago that she did not have enough space for a elva lift in her house. SW intern phoned pt to get her opinion on the IdeeLift since it would be smaller than the elva lift. Pt said she is getting her couch removed in about a week, so she will have room for the elva lift. Pt wants to wait to send the order until the couch is out of her house, so she is going to call back next at the end of next week (10/25-20/26) to let me know she is ready for me to send in the order.

## 2024-10-31 NOTE — TELEPHONE ENCOUNTER
SW intern phoned pt to follow up on the process of getting her couch removed. She said it will take another week and will call me when it is out of her house.

## 2024-11-04 ENCOUNTER — PATIENT MESSAGE (OUTPATIENT)
Dept: NEUROLOGY | Facility: CLINIC | Age: 44
End: 2024-11-04
Payer: MEDICARE

## 2024-11-04 ENCOUNTER — PATIENT MESSAGE (OUTPATIENT)
Dept: INTERNAL MEDICINE | Facility: CLINIC | Age: 44
End: 2024-11-04
Payer: MEDICARE

## 2024-11-04 DIAGNOSIS — Z74.09 IMPAIRED MOBILITY AND ACTIVITIES OF DAILY LIVING: ICD-10-CM

## 2024-11-04 DIAGNOSIS — G35 MULTIPLE SCLEROSIS: Primary | ICD-10-CM

## 2024-11-04 DIAGNOSIS — Z78.9 IMPAIRED MOBILITY AND ACTIVITIES OF DAILY LIVING: ICD-10-CM

## 2024-11-05 ENCOUNTER — PATIENT MESSAGE (OUTPATIENT)
Dept: DERMATOLOGY | Facility: CLINIC | Age: 44
End: 2024-11-05
Payer: MEDICARE

## 2024-11-10 ENCOUNTER — PATIENT MESSAGE (OUTPATIENT)
Dept: INTERNAL MEDICINE | Facility: CLINIC | Age: 44
End: 2024-11-10
Payer: MEDICARE

## 2024-11-11 NOTE — TELEPHONE ENCOUNTER
LOV 10/01/2024  Upcoming 02/03/2025  Lf- 10/01/2024    Patient is also requesting refill on medication, Diclofenac but not on current medication list.

## 2024-11-11 NOTE — TELEPHONE ENCOUNTER
No care due was identified.  Health [facility] Embedded Care Due Messages. Reference number: [id number].   11/11/2024 9:06:09 AM CST

## 2024-11-12 RX ORDER — IBUPROFEN 800 MG/1
800 TABLET ORAL 2 TIMES DAILY PRN
Qty: 30 TABLET | Refills: 1 | Status: SHIPPED | OUTPATIENT
Start: 2024-11-12

## 2024-11-21 NOTE — TELEPHONE ENCOUNTER
Pt messaged DANICA Gentile and decided she wanted the indee lift. Francheska HAMPTON Created a letter of medical necessity for pt. MSW phoned pt to discuss order process. Pt said she was still not ready to make the order yet. I let her know that once she is ready she has to POP for the indeelift from the website and then send letter of medical necessity, lift order, and progress notes to her insurance for a reimbursement claim.       Supportive documents for reimbursement were sent to pt email.

## 2024-12-16 ENCOUNTER — PATIENT MESSAGE (OUTPATIENT)
Dept: PSYCHIATRY | Facility: CLINIC | Age: 44
End: 2024-12-16
Payer: MEDICARE

## 2024-12-23 ENCOUNTER — PATIENT MESSAGE (OUTPATIENT)
Dept: NEUROLOGY | Facility: CLINIC | Age: 44
End: 2024-12-23
Payer: MEDICARE

## 2024-12-23 DIAGNOSIS — M62.838 MUSCLE SPASM: ICD-10-CM

## 2024-12-23 RX ORDER — TIZANIDINE 2 MG/1
TABLET ORAL
Qty: 90 TABLET | Refills: 1 | Status: SHIPPED | OUTPATIENT
Start: 2024-12-23

## 2024-12-23 RX ORDER — TIZANIDINE 4 MG/1
TABLET ORAL
Qty: 180 TABLET | Refills: 1 | Status: SHIPPED | OUTPATIENT
Start: 2024-12-23

## 2025-01-07 ENCOUNTER — PATIENT MESSAGE (OUTPATIENT)
Dept: NEUROLOGY | Facility: CLINIC | Age: 45
End: 2025-01-07
Payer: MEDICARE

## 2025-01-08 ENCOUNTER — OFFICE VISIT (OUTPATIENT)
Dept: NEUROLOGY | Facility: CLINIC | Age: 45
End: 2025-01-08
Payer: MEDICARE

## 2025-01-08 DIAGNOSIS — Z71.89 COUNSELING REGARDING GOALS OF CARE: ICD-10-CM

## 2025-01-08 DIAGNOSIS — F32.A DEPRESSION, UNSPECIFIED DEPRESSION TYPE: ICD-10-CM

## 2025-01-08 DIAGNOSIS — Z79.899 OTHER LONG TERM (CURRENT) DRUG THERAPY: ICD-10-CM

## 2025-01-08 DIAGNOSIS — E66.01 CLASS 3 SEVERE OBESITY WITH BODY MASS INDEX (BMI) OF 40.0 TO 44.9 IN ADULT, UNSPECIFIED OBESITY TYPE, UNSPECIFIED WHETHER SERIOUS COMORBIDITY PRESENT: ICD-10-CM

## 2025-01-08 DIAGNOSIS — Z79.899 HIGH RISK MEDICATION USE: ICD-10-CM

## 2025-01-08 DIAGNOSIS — R26.9 GAIT DISTURBANCE: ICD-10-CM

## 2025-01-08 DIAGNOSIS — Z29.89 PROPHYLACTIC IMMUNOTHERAPY: ICD-10-CM

## 2025-01-08 DIAGNOSIS — E66.813 CLASS 3 SEVERE OBESITY WITH BODY MASS INDEX (BMI) OF 40.0 TO 44.9 IN ADULT, UNSPECIFIED OBESITY TYPE, UNSPECIFIED WHETHER SERIOUS COMORBIDITY PRESENT: ICD-10-CM

## 2025-01-08 DIAGNOSIS — G35 MULTIPLE SCLEROSIS: Primary | ICD-10-CM

## 2025-01-08 DIAGNOSIS — R53.83 FATIGUE, UNSPECIFIED TYPE: ICD-10-CM

## 2025-01-08 NOTE — Clinical Note
Hi, social workers! Jaylon would like some help applying for transportation for the disabled. She is also interested in aide services--would she be eligible to be added to a wait list for waiver? Also, I'm referring her to counseling. She would need virtual sessions. Thanks!

## 2025-01-08 NOTE — PROGRESS NOTES
Subjective:          Patient ID: Jaylon Bowles is a 44 y.o. female who presents today for a routine virtual visit for MS.  She was last seen in September 2024. The history has been provided by the patient.     The patient location is: her home   The chief complaint leading to consultation is: MS     Visit type: audiovisual    Face to Face time with patient: 35 minutes   45 minutes of total time spent on the encounter, which includes face to face time and non-face to face time preparing to see the patient (eg, review of tests), Obtaining and/or reviewing separately obtained history, Documenting clinical information in the electronic or other health record, Independently interpreting results (not separately reported) and communicating results to the patient/family/caregiver, or Care coordination (not separately reported).       Each patient to whom he or she provides medical services by telemedicine is:  (1) informed of the relationship between the physician and patient and the respective role of any other health care provider with respect to management of the patient; and (2) notified that he or she may decline to receive medical services by telemedicine and may withdraw from such care at any time.      MS HPI:  DMT: Ocrevus--last infused in August 2024; due next in February  Side effects from DMT? No  Taking vitamin D3 as recommended? Hasn't been consistent   She was getting iron infusions, last one in October.   She walks at home with bare feet. She cannot  her left leg. She has trouble getting to the bathroom in time, which leads to accidents. It's hard to bend over. She feels like her legs are weak. Her most significant concern is her walking. She does not want to do home health physical therapy.    She had been falling frequently, but this has been better. She is being more cautious. She did not get the Indee Lift yet. She needs to move a couch out of her room before she can get the lift.   She does  "not use her scooter around the house because there is not enough space. There is also not enough space for a manual or power chair.   She lives with her mom. She has one friend that is willing to help. Her brother helps when he is not working.   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 She receives SSDI.   She has been struggling with right sciatica pain. She is trying to manager her sciatica "holistically." She is taking thiamine and Nervive.   She denies any recent infections.   She inquires about aide services--someone to help her with cleaning, getting in and out of the shower, preparing meals.   She is taking [employer] Chai to help with cognition.   She does not remember ever taking amantadine.   She is interested in a weight management program. She currently weighs about 250lb.      Medications:  Current Outpatient Medications   Medication Sig    amantadine  mg Tab Take 1 tablet (100 mg total) by mouth 2 (two) times a day. Take 1 tablet by mouth in the morning and 1 tablet at noon if needed.    carvediloL (COREG) 25 MG tablet Take 1 tablet (25 mg total) by mouth 2 (two) times daily with meals.    cholecalciferol, vitamin D3, 1,250 mcg (50,000 unit) Tab Take 1 tablet by mouth once a week.    EScitalopram oxalate (LEXAPRO) 10 MG tablet Take 1 tablet (10 mg total) by mouth once daily. TOTAL DOSE=10MG+20MG    EScitalopram oxalate (LEXAPRO) 20 MG tablet Take 1 tablet (20 mg total) by mouth once daily. TOTAL DOSE= 20mg+10mg    fluocinolone acetonide oiL 0.01 % Drop Place 1 drop in ear(s) 2 (two) times a day.    ibuprofen (ADVIL,MOTRIN) 800 MG tablet Take 1 tablet (800 mg total) by mouth 2 (two) times daily as needed for Pain.    levothyroxine (SYNTHROID) 112 MCG tablet Take 1 tablet (112 mcg total) by mouth before breakfast.    losartan-hydrochlorothiazide 100-12.5 mg (HYZAAR) 100-12.5 mg Tab Take 1 tablet by mouth once daily.    magnesium 250 mg Tab Take 500 mg by mouth once.    " OCREVUS 30 mg/mL Soln     tiZANidine (ZANAFLEX) 2 MG tablet TAKE 1/2 TABLET BY MOUTH IN THE MORNING AND IN THE AFTERNOON FOR MUSCLE SPASMS    tiZANidine (ZANAFLEX) 4 MG tablet TAKE 1 TABLET(4 MG) BY MOUTH TWICE DAILY AS NEEDED    ZINC ORAL Take by mouth.     No current facility-administered medications for this visit.       SOCIAL HISTORY  Social History     Tobacco Use    Smoking status: Never    Smokeless tobacco: Never   Substance Use Topics    Alcohol use: No     Comment: social    Drug use: No     Types: Marijuana       Living arrangements - the patient lives with her mom     ROS:      1/8/25    REVIEW OF SYMPTOMS   Do you feel abnormally tired on most days? Yes   Do you feel you generally sleep well? No--does not sleep well; wakes up in the middle of the night;    Do you have difficulty controlling your bladder?  No    Do you have difficulty controlling your bowels?  No    Do you have frequent muscle cramps, tightness or spasms in your limbs?  Yes --she is having cramping in her toes, which also makes it hard to walk. This is more so in the left leg. She does not feel like tizanidine helps.    Do you have new visual symptoms?  No --she needs to make an eye doctor appt    Do you have worsening difficulty with your memory or thinking? Yes --Lyndsay Paula helps    Do you have worsening symptoms of anxiety or depression?  Yes--she is depressed most days; takes Lexapro; open to counseling   For patients who walk, Do you have more difficulty walking?  Yes    Have you fallen since your last visit?  Yes    For patients who use wheelchairs: Do you have any skin wounds or breakdown? Not Applicable    Do you have difficulty using your hands?  Yes ; generally ok, but cramps sometimes   Do you have shooting or burning pain? No --right sciatica pain                Do you experience worsening symptoms when overheated? Yes --does not feel sensitive to cold    Do you need any new equipment such as a wheelchair, walker or shower  chair? No    Do you receive co-pay financial assistance for your principal MS medicine? No    Would you be interested in participating in an MS research trial in the future? No    For patients on Gilenya, Tecfidera, Aubagio, Rituxan, Ocrevus, Tysabri, Lemtrada or Methotrexate, are you aware that you should NOT receive live virus vaccines?  Discussed with the patient    Do you feel you have adequate family/friend support?  Yes    Do you have health insurance?   Yes    Are you currently employed? No    Do you receive SSDI/SSI?  No    Do you use marijuana or cannabis products? No    How often? Daily    Have you been diagnosed with a urinary tract infection since your last visit here? No    Have you been diagnosed with a respiratory tract infection since your last visit here? No    Have you been to the emergency room since your last visit here? No    Have you been hospitalized since your last visit here?  No        Patient-reported            Objective:        1. 25 foot timed walk:      12/21/2023     8:40 AM 7/8/2024    10:00 AM   Timed 25 Foot Walk:   Did patient wear an AFO? No No   Was assistive device used? Yes Yes   Assistive device used (jewels one): Bilateral Assistance Bilateral Assistance   Bilateral device used Walker/Rollator Walker/Rollator   Time for 25 Foot Walk (seconds) 28.45 36.27   Time for 25 Foot Walk (seconds) 29.38        Neurological Exam    Deferred   Imaging:     Results for orders placed during the hospital encounter of 03/06/24    MRI Brain Demyelinating Without Contrast    Impression  Brain appears stable from prior exam, again demonstrating findings compatible with the reported history of multiple sclerosis.  No new discrete lesions to indicate ongoing demyelination.      Electronically signed by: Brody De Anda  Date:    03/06/2024  Time:    09:22    Results for orders placed during the hospital encounter of 03/06/24    MRI Cervical Spine Demyelinating Without Contrast    Impression  Stable  appearance of the cervical and thoracic cord in keeping with patient's reported history of demyelinating process.  No new cord lesions identified.    Stable mild cystic change about the central cord at the C7 level.    Multilevel cervical and thoracic spondylosis, unchanged from comparison imaging.      Electronically signed by: Brody De Anda  Date:    03/06/2024  Time:    09:15    Results for orders placed during the hospital encounter of 03/06/24    MRI Thoracic Spine Demyelinating Without Contrast    Impression  Stable appearance of the cervical and thoracic cord in keeping with patient's reported history of demyelinating process.  No new cord lesions identified.    Stable mild cystic change about the central cord at the C7 level.    Multilevel cervical and thoracic spondylosis, unchanged from comparison imaging.      Electronically signed by: Brody Neetu  Date:    03/06/2024  Time:    09:15    Labs:     Lab Results   Component Value Date    GCRIECDU70HR 31 02/28/2024    XIGXLDDA53XS 35 09/22/2023    UBVTOTZA96JK 49 05/10/2023       Lab Results   Component Value Date    WBC 11.68 10/01/2024    RBC 4.49 10/01/2024    HGB 12.2 10/01/2024    HCT 38.0 10/01/2024    MCV 85 10/01/2024    MCH 27.2 10/01/2024    MCHC 32.1 10/01/2024    RDW 15.7 (H) 10/01/2024     10/01/2024    MPV 13.2 (H) 10/01/2024    GRAN 9.0 (H) 10/01/2024    GRAN 77.2 (H) 10/01/2024    LYMPH 1.6 10/01/2024    LYMPH 13.5 (L) 10/01/2024    MONO 0.7 10/01/2024    MONO 6.2 10/01/2024    EOS 0.2 10/01/2024    BASO 0.07 10/01/2024    EOSINOPHIL 2.0 10/01/2024    BASOPHIL 0.6 10/01/2024     Sodium   Date Value Ref Range Status   10/01/2024 137 136 - 145 mmol/L Final     Potassium   Date Value Ref Range Status   10/01/2024 4.1 3.5 - 5.1 mmol/L Final     Chloride   Date Value Ref Range Status   10/01/2024 103 95 - 110 mmol/L Final     CO2   Date Value Ref Range Status   10/01/2024 25 23 - 29 mmol/L Final     Glucose   Date Value Ref Range Status    10/01/2024 66 (L) 70 - 110 mg/dL Final     BUN   Date Value Ref Range Status   10/01/2024 11 6 - 20 mg/dL Final     Creatinine   Date Value Ref Range Status   10/01/2024 0.9 0.5 - 1.4 mg/dL Final     Calcium   Date Value Ref Range Status   10/01/2024 9.9 8.7 - 10.5 mg/dL Final     Total Protein   Date Value Ref Range Status   10/01/2024 8.2 6.0 - 8.4 g/dL Final     Albumin   Date Value Ref Range Status   10/01/2024 3.8 3.5 - 5.2 g/dL Final     Total Bilirubin   Date Value Ref Range Status   10/01/2024 0.5 0.1 - 1.0 mg/dL Final     Comment:     For infants and newborns, interpretation of results should be based  on gestational age, weight and in agreement with clinical  observations.    Premature Infant recommended reference ranges:  Up to 24 hours.............<8.0 mg/dL  Up to 48 hours............<12.0 mg/dL  3-5 days..................<15.0 mg/dL  6-29 days.................<15.0 mg/dL       Alkaline Phosphatase   Date Value Ref Range Status   10/01/2024 76 55 - 135 U/L Final     AST   Date Value Ref Range Status   10/01/2024 22 10 - 40 U/L Final     ALT   Date Value Ref Range Status   10/01/2024 15 10 - 44 U/L Final     Anion Gap   Date Value Ref Range Status   10/01/2024 9 8 - 16 mmol/L Final     eGFR if    Date Value Ref Range Status   06/25/2022 >60.0 >60 mL/min/1.73 m^2 Final     eGFR if non    Date Value Ref Range Status   06/25/2022 >60.0 >60 mL/min/1.73 m^2 Final     Comment:     Calculation used to obtain the estimated glomerular filtration  rate (eGFR) is the CKD-EPI equation.        Lab Results   Component Value Date    HEPBSAG Non-reactive 06/03/2024    HEPBSAB >1000.00 10/14/2022    HEPBSAB Reactive 10/14/2022    HEPBCAB Non-reactive 06/03/2024           MS Impression and Plan:     NEURO MULTIPLE SCLEROSIS IMPRESSION:   Number of relapses in the past year?:  0  Number of relapses in the past year? comment:  No clear relapses, but she feels like her walking has declined.    Clinical Progression:  Worsened  Current DMT: ocrelizumab  DMT:  No change in management  DMT comment:  Continue Ocrevus and Vitamin D. Her next infusion is due in February. We will check safety labs this month. She is aware of the risks associated with immunosuppressant therapy, including increased risk of infection.     Symptom Management:  Implement change in symptom management  Implement Change in Symptom Management:  Adaptive Needs, Mood and Fatigue  Implement Change in Symptom Management comment: Message sent to social work to help her get set up for transportation for the disabled and help her apply for waiver waitlist for aide services. Referral also placed for counseling to address depression.   Will restart amantadine to treat fatigue.   Additional Impressions:   Offered either home health or outpatient physical therapy. She defers for now and prefers to exercise at home.   Referral placed to weight management program. We also discussed the Mediterranean diet.         VERÓNICA Smallwood, CNS      Problem List Items Addressed This Visit          Neurologic Problems    Multiple sclerosis - Primary    Relevant Medications    amantadine  mg Tab    Other Relevant Orders    CBC Auto Differential    Comprehensive Metabolic Panel    Hepatitis B Core Antibody, Total    Hepatitis B Surface Antigen    Immunoglobulins (IgG, IgA, IgM) Quantitative    Vitamin D    Ambulatory referral/consult to Multiple Sclerosis Social Work     Other Visit Diagnoses       Depression, unspecified depression type        Relevant Orders    Ambulatory referral/consult to Multiple Sclerosis Social Work    Other long term (current) drug therapy        Relevant Orders    Vitamin D    Fatigue, unspecified type        Relevant Medications    amantadine  mg Tab    Class 3 severe obesity with body mass index (BMI) of 40.0 to 44.9 in adult, unspecified obesity type, unspecified whether serious comorbidity present        Relevant  Orders    Ambulatory referral/consult to Weight Management Program    Gait disturbance        Counseling regarding goals of care        Prophylactic immunotherapy        High risk medication use

## 2025-01-09 RX ORDER — AMANTADINE HYDROCHLORIDE 100 MG/1
TABLET ORAL
Qty: 60 TABLET | Refills: 5 | Status: SHIPPED | OUTPATIENT
Start: 2025-01-09

## 2025-01-10 ENCOUNTER — TELEPHONE (OUTPATIENT)
Dept: BARIATRICS | Facility: CLINIC | Age: 45
End: 2025-01-10
Payer: MEDICARE

## 2025-01-10 ENCOUNTER — TELEPHONE (OUTPATIENT)
Dept: OPHTHALMOLOGY | Facility: CLINIC | Age: 45
End: 2025-01-10
Payer: MEDICARE

## 2025-01-10 ENCOUNTER — TELEPHONE (OUTPATIENT)
Dept: NEUROLOGY | Facility: CLINIC | Age: 45
End: 2025-01-10
Payer: MEDICARE

## 2025-01-10 NOTE — TELEPHONE ENCOUNTER
Spoke to pt in regards to getting her scheduled for labs this month, pt is scheduled 1/18/25 and f/u with JF on 4/10/25.

## 2025-01-23 ENCOUNTER — TELEPHONE (OUTPATIENT)
Dept: PSYCHIATRY | Facility: CLINIC | Age: 45
End: 2025-01-23

## 2025-01-23 NOTE — TELEPHONE ENCOUNTER
MING received referral from VERÓNICA Ross to assist Pt with application for [employer], potential aide services, and to be added to the counseling wait list. MING called Pt to discuss, she requested a c/b tomorrow morning.

## 2025-01-25 ENCOUNTER — LAB VISIT (OUTPATIENT)
Dept: LAB | Facility: HOSPITAL | Age: 45
End: 2025-01-25
Payer: MEDICARE

## 2025-01-25 DIAGNOSIS — Z79.899 OTHER LONG TERM (CURRENT) DRUG THERAPY: ICD-10-CM

## 2025-01-25 DIAGNOSIS — G35 MULTIPLE SCLEROSIS: ICD-10-CM

## 2025-01-25 LAB
25(OH)D3+25(OH)D2 SERPL-MCNC: 36 NG/ML (ref 30–96)
ALBUMIN SERPL BCP-MCNC: 3.7 G/DL (ref 3.5–5.2)
ALP SERPL-CCNC: 71 U/L (ref 40–150)
ALT SERPL W/O P-5'-P-CCNC: 10 U/L (ref 10–44)
ANION GAP SERPL CALC-SCNC: 8 MMOL/L (ref 8–16)
AST SERPL-CCNC: 16 U/L (ref 10–40)
BASOPHILS # BLD AUTO: 0.06 K/UL (ref 0–0.2)
BASOPHILS NFR BLD: 0.6 % (ref 0–1.9)
BILIRUB SERPL-MCNC: 0.3 MG/DL (ref 0.1–1)
BUN SERPL-MCNC: 13 MG/DL (ref 6–20)
CALCIUM SERPL-MCNC: 9.7 MG/DL (ref 8.7–10.5)
CHLORIDE SERPL-SCNC: 104 MMOL/L (ref 95–110)
CO2 SERPL-SCNC: 26 MMOL/L (ref 23–29)
CREAT SERPL-MCNC: 1 MG/DL (ref 0.5–1.4)
DIFFERENTIAL METHOD BLD: ABNORMAL
EOSINOPHIL # BLD AUTO: 0.2 K/UL (ref 0–0.5)
EOSINOPHIL NFR BLD: 1.9 % (ref 0–8)
ERYTHROCYTE [DISTWIDTH] IN BLOOD BY AUTOMATED COUNT: 14 % (ref 11.5–14.5)
EST. GFR  (NO RACE VARIABLE): >60 ML/MIN/1.73 M^2
GLUCOSE SERPL-MCNC: 83 MG/DL (ref 70–110)
HBV CORE AB SERPL QL IA: NORMAL
HBV SURFACE AG SERPL QL IA: NORMAL
HCT VFR BLD AUTO: 38.8 % (ref 37–48.5)
HGB BLD-MCNC: 13.1 G/DL (ref 12–16)
IGA SERPL-MCNC: 363 MG/DL (ref 40–350)
IGG SERPL-MCNC: 1826 MG/DL (ref 650–1600)
IGM SERPL-MCNC: 102 MG/DL (ref 50–300)
IMM GRANULOCYTES # BLD AUTO: 0.04 K/UL (ref 0–0.04)
IMM GRANULOCYTES NFR BLD AUTO: 0.4 % (ref 0–0.5)
LYMPHOCYTES # BLD AUTO: 1.3 K/UL (ref 1–4.8)
LYMPHOCYTES NFR BLD: 12.9 % (ref 18–48)
MCH RBC QN AUTO: 29 PG (ref 27–31)
MCHC RBC AUTO-ENTMCNC: 33.8 G/DL (ref 32–36)
MCV RBC AUTO: 86 FL (ref 82–98)
MONOCYTES # BLD AUTO: 0.8 K/UL (ref 0.3–1)
MONOCYTES NFR BLD: 7.5 % (ref 4–15)
NEUTROPHILS # BLD AUTO: 7.9 K/UL (ref 1.8–7.7)
NEUTROPHILS NFR BLD: 76.7 % (ref 38–73)
NRBC BLD-RTO: 0 /100 WBC
PLATELET # BLD AUTO: 323 K/UL (ref 150–450)
PMV BLD AUTO: 12.6 FL (ref 9.2–12.9)
POTASSIUM SERPL-SCNC: 4.3 MMOL/L (ref 3.5–5.1)
PROT SERPL-MCNC: 8.2 G/DL (ref 6–8.4)
RBC # BLD AUTO: 4.52 M/UL (ref 4–5.4)
SODIUM SERPL-SCNC: 138 MMOL/L (ref 136–145)
WBC # BLD AUTO: 10.31 K/UL (ref 3.9–12.7)

## 2025-01-25 PROCEDURE — 82306 VITAMIN D 25 HYDROXY: CPT | Mod: HCNC | Performed by: CLINICAL NURSE SPECIALIST

## 2025-01-25 PROCEDURE — 85025 COMPLETE CBC W/AUTO DIFF WBC: CPT | Mod: HCNC | Performed by: CLINICAL NURSE SPECIALIST

## 2025-01-25 PROCEDURE — 87340 HEPATITIS B SURFACE AG IA: CPT | Mod: HCNC | Performed by: CLINICAL NURSE SPECIALIST

## 2025-01-25 PROCEDURE — 86704 HEP B CORE ANTIBODY TOTAL: CPT | Mod: HCNC | Performed by: CLINICAL NURSE SPECIALIST

## 2025-01-25 PROCEDURE — 36415 COLL VENOUS BLD VENIPUNCTURE: CPT | Mod: HCNC | Performed by: CLINICAL NURSE SPECIALIST

## 2025-01-25 PROCEDURE — 80053 COMPREHEN METABOLIC PANEL: CPT | Mod: HCNC | Performed by: CLINICAL NURSE SPECIALIST

## 2025-01-25 PROCEDURE — 82784 ASSAY IGA/IGD/IGG/IGM EACH: CPT | Mod: 59,HCNC | Performed by: CLINICAL NURSE SPECIALIST

## 2025-01-26 ENCOUNTER — PATIENT MESSAGE (OUTPATIENT)
Dept: NEUROLOGY | Facility: CLINIC | Age: 45
End: 2025-01-26
Payer: MEDICARE

## 2025-01-27 ENCOUNTER — PATIENT MESSAGE (OUTPATIENT)
Dept: INTERNAL MEDICINE | Facility: CLINIC | Age: 45
End: 2025-01-27
Payer: MEDICARE

## 2025-01-31 ENCOUNTER — PATIENT MESSAGE (OUTPATIENT)
Dept: NEUROLOGY | Facility: CLINIC | Age: 45
End: 2025-01-31
Payer: MEDICARE

## 2025-02-03 ENCOUNTER — OFFICE VISIT (OUTPATIENT)
Dept: INTERNAL MEDICINE | Facility: CLINIC | Age: 45
End: 2025-02-03
Payer: MEDICARE

## 2025-02-03 DIAGNOSIS — G35 MULTIPLE SCLEROSIS: ICD-10-CM

## 2025-02-03 DIAGNOSIS — E66.01 SEVERE OBESITY (BMI >= 40): ICD-10-CM

## 2025-02-03 DIAGNOSIS — R53.83 FATIGUE, UNSPECIFIED TYPE: ICD-10-CM

## 2025-02-03 DIAGNOSIS — I10 ESSENTIAL HYPERTENSION: Primary | ICD-10-CM

## 2025-02-03 DIAGNOSIS — F33.0 RECURRENT DEPRESSIVE DISORDER, CURRENT EPISODE MILD: ICD-10-CM

## 2025-02-03 DIAGNOSIS — E06.3 HASHIMOTO'S THYROIDITIS: ICD-10-CM

## 2025-02-03 DIAGNOSIS — R71.8 OTHER ABNORMALITY OF RED BLOOD CELLS: ICD-10-CM

## 2025-02-03 NOTE — PROGRESS NOTES
Primary Care Telemedicine Note    The patient location is:  Home     The chief complaint leading to consultation is: HTN, Thyroid disease, Depression    Total time spent with patient: 40 min    Visit type: Virtual visit with synchronous audio only and video      Each patient to whom he or she provides medical services by telemedicine is:  (1) informed of the relationship between the physician and patient and the respective role of any other health care provider with respect to management of the patient; and (2) notified that he or she may decline to receive medical services by telemedicine and may withdraw from such care at any time.        Subjective:        Patient ID: Jaylon Bowles is a 44 y.o. female.    Chief Complaint: Hypertension, Thyroid Problem, and Depression      HPI    Hypertension: The patient has been taking medications as instructed, no medication side effects noted, no TIAs, no chest pain on exertion, no dyspnea on exertion.    BP Readings from Last 3 Encounters:   02/21/25 135/88   10/15/24 (!) 172/83   10/01/24 138/84       Thyroid Disease: Presents for follow up of hypothyroidism. Current symptoms: none. Patient denies diarrhea, heat / cold intolerance, and palpitations. Symptoms have been basically asymptomatic.    Depression: She presents for follow up of depression. Current symptoms include depressed mood. Symptoms have been sometimes ok but sometimes, she cries.  She feels alone sometimes. Patient denies hypersomnia, weight gain, and weight loss. She does not feel hopeless. Previous treatment includes: medication. She complains of the following side effects from the treatment: none. She has been working regularly.        Review of Systems   Constitutional:  Positive for fatigue (chronic). Negative for chills, diaphoresis and fever.   HENT:  Negative for congestion, ear pain and sinus pressure.    Eyes:  Negative for discharge and visual disturbance.   Respiratory:  Negative for cough and  shortness of breath.    Cardiovascular:  Negative for chest pain and palpitations.   Gastrointestinal:  Negative for abdominal pain, constipation, diarrhea, nausea and vomiting.   Musculoskeletal:  Positive for back pain (lower back pain, takes zanaflex, ibuprofen and has been stretching) and gait problem. Negative for arthralgias and myalgias.   Neurological:  Positive for weakness. Negative for dizziness and headaches.   Psychiatric/Behavioral:  Positive for dysphoric mood. The patient is nervous/anxious.            Objective:        Physical Exam  Constitutional:       General: She is not in acute distress.     Appearance: Normal appearance. She is well-developed. She is not toxic-appearing or diaphoretic.   HENT:      Head: Normocephalic and atraumatic.      Right Ear: Hearing normal.      Left Ear: Hearing normal.      Nose: No congestion.   Eyes:      General: Lids are normal.   Pulmonary:      Effort: No tachypnea or respiratory distress.   Musculoskeletal:      Cervical back: Normal range of motion.   Neurological:      Mental Status: She is alert and oriented to person, place, and time.      Comments: Alert and oriented   Psychiatric:         Attention and Perception: Attention normal.         Mood and Affect: Mood normal.         Behavior: Behavior is cooperative.               Assessment:       1. Essential hypertension    2. Recurrent depressive disorder, current episode mild    3. Fatigue, unspecified type    4. Multiple sclerosis    5. Hashimoto's thyroiditis    6. Severe obesity (BMI >= 40)    7. Other abnormality of red blood cells              Plan:     1. Essential hypertension  - control, continue Coreg, losartan, hydrochlorothiazide    2. Recurrent depressive disorder, current episode mild  - symptoms continue with Lexapro helped  - patient agrees to see psychiatrist, will enter referral  -  Ambulatory referral/consult to Psychiatry; Future    3. Fatigue, unspecified type  - CBC Auto  Differential; Future  - TSH; Future  - Ferritin; Future  - Iron and TIBC; Future    4. Multiple sclerosis  - symptoms stable, sees Neurology regularly    5. Hashimoto's thyroiditis  - symptoms are stable but TSH sometimes out or normal range, repeat TSH  - continue levothyroxine    6. Severe obesity (BMI >= 40)  - limited  mobility due to MS, monitor    7. Other abnormality of red blood cells  - Ferritin; Future  - Iron and TIBC; Future  - will replace if needed    RTC in 3 months for follow-up or sooner if need    __________________________    Cat Ramachandran MD, PharmD  [facility]- Internal Medicine  American Board of Obesity Medicine diplomate  Office [phone]

## 2025-02-05 ENCOUNTER — TELEPHONE (OUTPATIENT)
Dept: NEUROLOGY | Facility: CLINIC | Age: 45
End: 2025-02-05
Payer: MEDICARE

## 2025-02-05 NOTE — TELEPHONE ENCOUNTER
----- Message from Viviana Gentile sent at 1/31/2025  5:03 PM CST -----  Normal WBC and ALC  Stable elevated IgG and IgA  Negative hep B labs   Vit D =36--will inquire about dose   Normal CMP     Ok to infuse

## 2025-02-12 ENCOUNTER — TELEPHONE (OUTPATIENT)
Dept: BARIATRICS | Facility: CLINIC | Age: 45
End: 2025-02-12
Payer: MEDICARE

## 2025-02-12 NOTE — TELEPHONE ENCOUNTER
----- Message from Toney sent at 1/10/2025 11:27 AM CST -----  Regarding: called to schedule medical financial consult for 2/11/25  called to schedule medical financial consult for 2/11/25

## 2025-02-14 ENCOUNTER — TELEPHONE (OUTPATIENT)
Dept: BARIATRICS | Facility: CLINIC | Age: 45
End: 2025-02-14
Payer: MEDICARE

## 2025-02-21 ENCOUNTER — INFUSION (OUTPATIENT)
Dept: INFUSION THERAPY | Facility: HOSPITAL | Age: 45
End: 2025-02-21
Payer: MEDICARE

## 2025-02-21 VITALS
BODY MASS INDEX: 41.65 KG/M2 | SYSTOLIC BLOOD PRESSURE: 135 MMHG | WEIGHT: 250 LBS | TEMPERATURE: 98 F | RESPIRATION RATE: 18 BRPM | DIASTOLIC BLOOD PRESSURE: 88 MMHG | HEIGHT: 65 IN | HEART RATE: 56 BPM

## 2025-02-21 DIAGNOSIS — G35 MULTIPLE SCLEROSIS: Primary | ICD-10-CM

## 2025-02-21 PROCEDURE — 96375 TX/PRO/DX INJ NEW DRUG ADDON: CPT | Mod: HCNC

## 2025-02-21 PROCEDURE — 96366 THER/PROPH/DIAG IV INF ADDON: CPT | Mod: HCNC

## 2025-02-21 PROCEDURE — 63600175 PHARM REV CODE 636 W HCPCS: Mod: JZ,TB,HCNC | Performed by: STUDENT IN AN ORGANIZED HEALTH CARE EDUCATION/TRAINING PROGRAM

## 2025-02-21 PROCEDURE — 96367 TX/PROPH/DG ADDL SEQ IV INF: CPT | Mod: HCNC

## 2025-02-21 PROCEDURE — 25000003 PHARM REV CODE 250: Mod: HCNC | Performed by: STUDENT IN AN ORGANIZED HEALTH CARE EDUCATION/TRAINING PROGRAM

## 2025-02-21 PROCEDURE — 96365 THER/PROPH/DIAG IV INF INIT: CPT | Mod: HCNC

## 2025-02-21 RX ORDER — SODIUM CHLORIDE 0.9 % (FLUSH) 0.9 %
10 SYRINGE (ML) INJECTION
OUTPATIENT
Start: 2025-07-11

## 2025-02-21 RX ORDER — DIPHENHYDRAMINE HYDROCHLORIDE 50 MG/ML
50 INJECTION INTRAMUSCULAR; INTRAVENOUS
OUTPATIENT
Start: 2025-07-11

## 2025-02-21 RX ORDER — HEPARIN 100 UNIT/ML
500 SYRINGE INTRAVENOUS
Status: DISCONTINUED | OUTPATIENT
Start: 2025-02-21 | End: 2025-02-21 | Stop reason: HOSPADM

## 2025-02-21 RX ORDER — FAMOTIDINE 10 MG/ML
20 INJECTION INTRAVENOUS
OUTPATIENT
Start: 2025-07-11

## 2025-02-21 RX ORDER — DIPHENHYDRAMINE HYDROCHLORIDE 50 MG/ML
50 INJECTION INTRAMUSCULAR; INTRAVENOUS
Status: DISCONTINUED | OUTPATIENT
Start: 2025-02-21 | End: 2025-02-21 | Stop reason: HOSPADM

## 2025-02-21 RX ORDER — ACETAMINOPHEN 500 MG
1000 TABLET ORAL
OUTPATIENT
Start: 2025-07-11

## 2025-02-21 RX ORDER — ACETAMINOPHEN 500 MG
1000 TABLET ORAL
Status: COMPLETED | OUTPATIENT
Start: 2025-02-21 | End: 2025-02-21

## 2025-02-21 RX ORDER — EPINEPHRINE 0.3 MG/.3ML
0.3 INJECTION SUBCUTANEOUS
OUTPATIENT
Start: 2025-07-11

## 2025-02-21 RX ORDER — FAMOTIDINE 10 MG/ML
20 INJECTION INTRAVENOUS
Status: COMPLETED | OUTPATIENT
Start: 2025-02-21 | End: 2025-02-21

## 2025-02-21 RX ORDER — EPINEPHRINE 0.3 MG/.3ML
0.3 INJECTION SUBCUTANEOUS
Status: DISCONTINUED | OUTPATIENT
Start: 2025-02-21 | End: 2025-02-21 | Stop reason: HOSPADM

## 2025-02-21 RX ORDER — SODIUM CHLORIDE 0.9 % (FLUSH) 0.9 %
10 SYRINGE (ML) INJECTION
Status: DISCONTINUED | OUTPATIENT
Start: 2025-02-21 | End: 2025-02-21 | Stop reason: HOSPADM

## 2025-02-21 RX ORDER — HEPARIN 100 UNIT/ML
500 SYRINGE INTRAVENOUS
OUTPATIENT
Start: 2025-07-11

## 2025-02-21 RX ADMIN — ACETAMINOPHEN 1000 MG: 500 TABLET ORAL at 11:02

## 2025-02-21 RX ADMIN — SODIUM CHLORIDE: 9 INJECTION, SOLUTION INTRAVENOUS at 11:02

## 2025-02-21 RX ADMIN — FAMOTIDINE 20 MG: 10 INJECTION INTRAVENOUS at 11:02

## 2025-02-21 RX ADMIN — DEXTROSE MONOHYDRATE 100 MG: 50 INJECTION, SOLUTION INTRAVENOUS at 12:02

## 2025-02-21 RX ADMIN — DIPHENHYDRAMINE HYDROCHLORIDE 50 MG: 50 INJECTION INTRAMUSCULAR; INTRAVENOUS at 11:02

## 2025-02-21 RX ADMIN — OCRELIZUMAB 600 MG: 300 INJECTION INTRAVENOUS at 01:02

## 2025-02-21 NOTE — PLAN OF CARE
1130 pt here for maintenance Ocrevus infusion, hx, meds, allergies reviewed, pt with no new complaints at this time, reclined in chair, warm blanket provided, continue to monitor

## 2025-02-22 ENCOUNTER — PATIENT MESSAGE (OUTPATIENT)
Dept: INTERNAL MEDICINE | Facility: CLINIC | Age: 45
End: 2025-02-22
Payer: MEDICARE

## 2025-02-22 DIAGNOSIS — E06.3 HASHIMOTO'S THYROIDITIS: ICD-10-CM

## 2025-02-24 DIAGNOSIS — E06.3 HASHIMOTO'S THYROIDITIS: ICD-10-CM

## 2025-02-24 RX ORDER — LEVOTHYROXINE SODIUM 112 UG/1
112 TABLET ORAL
Qty: 90 TABLET | Refills: 1 | OUTPATIENT
Start: 2025-02-24

## 2025-02-24 RX ORDER — LEVOTHYROXINE SODIUM 112 UG/1
112 TABLET ORAL
Qty: 90 TABLET | Refills: 2 | Status: SHIPPED | OUTPATIENT
Start: 2025-02-24

## 2025-02-24 NOTE — TELEPHONE ENCOUNTER
No care due was identified.  [facility] Embedded Care Due Messages. Reference number: [id number].   2/24/2025 10:07:07 AM CST

## 2025-02-24 NOTE — TELEPHONE ENCOUNTER
Refill Decision Note   Jaylon Bowles  is requesting a refill authorization.  Brief Assessment and Rationale for Refill:  Quick Discontinue     Medication Therapy Plan:        Comments:     Note composed:10:18 AM 02/24/2025

## 2025-02-24 NOTE — TELEPHONE ENCOUNTER
Refill Decision Note   Jaylon Bowles  is requesting a refill authorization.  Brief Assessment and Rationale for Refill:  Approve     Medication Therapy Plan:         Comments:     Note composed:10:15 AM 02/24/2025

## 2025-03-05 ENCOUNTER — PATIENT MESSAGE (OUTPATIENT)
Dept: INTERNAL MEDICINE | Facility: CLINIC | Age: 45
End: 2025-03-05
Payer: MEDICARE

## 2025-03-06 ENCOUNTER — TELEPHONE (OUTPATIENT)
Dept: PSYCHIATRY | Facility: CLINIC | Age: 45
End: 2025-03-06
Payer: MEDICARE

## 2025-03-06 NOTE — TELEPHONE ENCOUNTER
SW intern RENARD Patel phoned pt to see if she was still interested in counseling services. Pt is still interested but would prefer a licensed therapist (SATISH Pinto or AMOR Rendon). MING intern let pt know she will stay on the waiting list and one of the licensed therapists would get in touch with her whenever they have availability.

## 2025-03-07 ENCOUNTER — PATIENT MESSAGE (OUTPATIENT)
Dept: PSYCHIATRY | Facility: CLINIC | Age: 45
End: 2025-03-07
Payer: MEDICARE

## 2025-05-13 ENCOUNTER — PATIENT MESSAGE (OUTPATIENT)
Dept: PSYCHIATRY | Facility: CLINIC | Age: 45
End: 2025-05-13
Payer: MEDICARE

## 2025-05-14 ENCOUNTER — PATIENT MESSAGE (OUTPATIENT)
Dept: ORTHOPEDICS | Facility: CLINIC | Age: 45
End: 2025-05-14
Payer: MEDICARE

## 2025-05-15 ENCOUNTER — TELEPHONE (OUTPATIENT)
Dept: PAIN MEDICINE | Facility: CLINIC | Age: 45
End: 2025-05-15
Payer: MEDICARE

## 2025-05-15 ENCOUNTER — PATIENT MESSAGE (OUTPATIENT)
Dept: INTERNAL MEDICINE | Facility: CLINIC | Age: 45
End: 2025-05-15
Payer: MEDICARE

## 2025-05-15 DIAGNOSIS — M54.16 LUMBAR RADICULOPATHY: Primary | ICD-10-CM

## 2025-05-15 DIAGNOSIS — M54.31 RIGHT SIDED SCIATICA: Primary | ICD-10-CM

## 2025-05-15 RX ORDER — IBUPROFEN 800 MG/1
800 TABLET, FILM COATED ORAL 2 TIMES DAILY PRN
Qty: 30 TABLET | Refills: 0 | Status: SHIPPED | OUTPATIENT
Start: 2025-05-15

## 2025-05-15 NOTE — TELEPHONE ENCOUNTER
No care due was identified.  [facility] Embedded Care Due Messages. Reference number: [id number].   5/15/2025 11:32:52 AM CDT

## 2025-05-15 NOTE — PROGRESS NOTES
Spoke with pt virtually. Pt was last seen 3/28/24 and continues to have right leg pain. Pt has tried home exercises and lyrica with no relief. Pain is 8/10. I provided the patient with a home exercise program. It is the [facility] spine conditioning program. Exercises include head rolls, kneeling back extension, sitting rotation stretch, modified seated side straddle, knee to chest, bird dog, plank, modified seated plank, hip bridges, abdominal bracing, and abdominal crunch. Pt completes each exercise daily for one hour with worsening of pain. MRI demonstrates mild degenerative changes at L4-5 L5-S1. She had a right L4-5 L5-S1 TFESI on 4/9/24 with 100% relief for a year. Will order repeat right L4-5 L5-S1 TFESI with pain management. Pt will fu if pain persists.

## 2025-05-15 NOTE — TELEPHONE ENCOUNTER
----- Message from Zainab Coughlin PA-C sent at 5/15/2025  8:06 AM CDT -----  Regarding: Order for KENDELL NI    Patient Name: KENDELL NI([id number])  Sex: Female  : 1980      PCP: ANAND RUIZ    Center: [facility] CENTRAL BILLING OFFICE     Types of orders made on 05/15/2025: Procedure Request    Order Date:5/15/2025  Ordering User:ZAINAB COUGHLIN [[id number]]  Encounter Provider:Zainab Coughlin PA-C [[id number]]  Authorizing Provider: Zainab Coughlin PA-C [[id number]]  Supervising Provider:MARIA ESTHER TORRES [[id number]]  Type of Supervision:Supervision Required  Department:[facility][[id number]]    Common Order Information  Procedure -> Transforaminal Injection (Specify level and laterality) Cmt: right             L4-5 L5-S1    Order Specific Information  Order: Procedure Order to Pain Management [Custom: [id number]]  Order #:          [id number]: 1 FUTURE    Priority: Routine  Class: Clinic Performed    Future Order Information      Expires on:05/15/2026            Expected by:05/15/2025                   Associated Diagnoses      M54.31 Right sided sciatica      Facility Name: -> [facility]           Priority: Routine  Class: Clinic Performed    Future Order Information      Expires on:05/15/2026            Expected by:05/15/2025                   Associated Diagnoses      M54.31 Right sided sciatica      Procedure -> Transforaminal Injection (Specify level and laterality) Cmt:                 right L4-5 L5-S1        Facility Name: -> [facility]

## 2025-05-16 NOTE — TELEPHONE ENCOUNTER
----- Message from Zainab Coughlin PA-C sent at 5/15/2025  8:06 AM CDT -----  Regarding: Order for KENDELL NI    Patient Name: KENDELL NI([id number])  Sex: Female  : 1980      PCP: ANAND RUIZ    Center: [facility] CENTRAL BILLING OFFICE     Types of orders made on 05/15/2025: Procedure Request    Order Date:5/15/2025  Ordering User:ZAINAB COUGHLIN [[id number]]  Encounter Provider:Zainab Coughlin PA-C [[id number]]  Authorizing Provider: Zainab Coughlin PA-C [[id number]]  Supervising Provider:MARIA ESTHER TORRES [[id number]]  Type of Supervision:Supervision Required  Department:[facility][[id number]]    Common Order Information  Procedure -> Transforaminal Injection (Specify level and laterality) Cmt: right             L4-5 L5-S1    Order Specific Information  Order: Procedure Order to Pain Management [Custom: [id number]]  Order #:          [id number]: 1 FUTURE    Priority: Routine  Class: Clinic Performed    Future Order Information      Expires on:05/15/2026            Expected by:05/15/2025                   Associated Diagnoses      M54.31 Right sided sciatica      Facility Name: -> [place]           Priority: Routine  Class: Clinic Performed    Future Order Information      Expires on:05/15/2026            Expected by:05/15/2025                   Associated Diagnoses      M54.31 Right sided sciatica      Procedure -> Transforaminal Injection (Specify level and laterality) Cmt:                 right L4-5 L5-S1        Facility Name: -> [place]

## 2025-05-20 ENCOUNTER — OFFICE VISIT (OUTPATIENT)
Dept: INTERNAL MEDICINE | Facility: CLINIC | Age: 45
End: 2025-05-20
Payer: MEDICARE

## 2025-05-20 DIAGNOSIS — G89.29 CHRONIC RIGHT-SIDED LOW BACK PAIN WITH RIGHT-SIDED SCIATICA: ICD-10-CM

## 2025-05-20 DIAGNOSIS — Z91.81 HISTORY OF FALLING: ICD-10-CM

## 2025-05-20 DIAGNOSIS — G35 MULTIPLE SCLEROSIS: ICD-10-CM

## 2025-05-20 DIAGNOSIS — M54.41 CHRONIC RIGHT-SIDED LOW BACK PAIN WITH RIGHT-SIDED SCIATICA: ICD-10-CM

## 2025-05-20 DIAGNOSIS — F33.0 RECURRENT DEPRESSIVE DISORDER, CURRENT EPISODE MILD: ICD-10-CM

## 2025-05-20 DIAGNOSIS — R26.9 GAIT DISORDER: ICD-10-CM

## 2025-05-20 DIAGNOSIS — I10 ESSENTIAL HYPERTENSION: Primary | ICD-10-CM

## 2025-05-20 NOTE — PROGRESS NOTES
Primary Care Telemedicine Note    The patient location is:  Home     The chief complaint leading to consultation is: HTN, Depression, Back Pain, Mobility issues    Total time spent with patient: 40 min    Visit type: Virtual visit with synchronous audio only and video      Each patient to whom he or she provides medical services by telemedicine is:  (1) informed of the relationship between the physician and patient and the respective role of any other health care provider with respect to management of the patient; and (2) notified that he or she may decline to receive medical services by telemedicine and may withdraw from such care at any time.        Subjective:        Patient ID: Jaylon Bowles is a 44 y.o. female.    Chief Complaint: Back Pain, Hypertension, and Depression      HPI    Back Pain: The patient has had recurrent self limited episodes of low back pain in the past and previous osteoarthritis of lumbar spine. Symptoms have been present for several months and are unchanged.  Onset was related to / precipitated by no known injury.   Symptoms are improved by acetaminophen, muscle relaxants, and rest.     She has been evaluated by Ortho and planning R L4-L5, L5-S1 LOI on 5/23/25. She is currently trying to arrange transportation.      Depression: She presents for follow up of depression that she reports worsened in the last few months. Part of her difficulties are related to family problems. Current symptoms include depressed mood, fatigue, and hopelessness. Symptoms have been gradually improving since that time. Patient denies impaired memory. Previous treatment includes: medication. She complains of the following side effects from the treatment: none.    She reports some improvement in mood. She want to start focusing on caring for herself. She started crafting and making jewelry. She now has plans for the future.      Hypertension: The patient has been taking medications as instructed, no medication  side effects noted, no chest pain on exertion, no dyspnea on exertion, and no swelling of ankles.    BP Readings from Last 3 Encounters:   02/21/25 135/88   10/15/24 (!) 172/83   10/01/24 138/84       She has questions about Medicare.  Discussed [employer] transportation      Review of Systems   Constitutional:  Positive for fatigue. Negative for chills, diaphoresis and fever.   HENT:  Negative for congestion, ear pain and sinus pressure.    Eyes:  Negative for discharge and redness.   Respiratory:  Negative for cough and shortness of breath.    Cardiovascular:  Negative for chest pain and palpitations.   Gastrointestinal:  Negative for abdominal pain, constipation, diarrhea, nausea and vomiting.   Genitourinary:  Positive for menstrual problem (reports menstrual cramps now but she takes ibuprofen to control\). Negative for dysuria and frequency.   Musculoskeletal:  Positive for arthralgias, back pain and gait problem (ambulates with walker). Negative for myalgias.   Neurological:  Positive for weakness (legs). Negative for dizziness, tremors and headaches.   Psychiatric/Behavioral:  Positive for dysphoric mood. The patient is nervous/anxious.            Objective:        Physical Exam  Constitutional:       General: She is not in acute distress.     Appearance: Normal appearance. She is well-developed. She is not toxic-appearing or diaphoretic.   HENT:      Head: Normocephalic and atraumatic.      Right Ear: Hearing normal.      Left Ear: Hearing normal.      Nose: No congestion.   Eyes:      General: Lids are normal.   Pulmonary:      Effort: No tachypnea or respiratory distress.   Musculoskeletal:      Cervical back: Normal range of motion.   Neurological:      Mental Status: She is alert and oriented to person, place, and time.      Motor: Weakness present.      Gait: Gait abnormal.      Comments: Alert and oriented   Psychiatric:         Attention and Perception: Attention normal.         Mood and Affect: Mood is  depressed.         Behavior: Behavior is cooperative.               Assessment:       1. Essential hypertension    2. Recurrent depressive disorder, current episode mild    3. Chronic right-sided low back pain with right-sided sciatica    4. Multiple sclerosis    5. Gait disorder    6. History of falling              Plan:     1. Essential hypertension  - encouraged patient to check BP regularly and call with readings  - she does not take Carvedilol as prescribed and advised her to set an alarm on phone to ensure that she takes Carvedilol BID  - Basic Metabolic Panel; Future    2. Recurrent depressive disorder, current episode mild  - spoke about her mood for a while  - mood has been stable although she seems to still be depressed, continue surrent regimen with Lexapro 30mg/d  - advised that she call Psychology for telemedicine visits  - mobility issue and MS impair her ability to attend appointments or interact socially, referral to Case Management for help  - Ambulatory referral/consult to Outpatient Case Management    3. Chronic right-sided low back pain with right-sided sciatica  - planning LOI, will monitor    4. Multiple sclerosis  - Ambulatory referral/consult to Outpatient Case Management    5. Gait disorder  - advised patient to look into [facility] for transportation  - Ambulatory referral/consult to Outpatient Case Management    6. History of falling  - Ambulatory referral/consult to Outpatient Case Management    RTC in October 2025 for annual exam or sooner if needed    __________________________    Cat Ramachandran MD, PharmD  [facility]- Internal Medicine  American Board of Obesity Medicine diplomate  Office [phone]        Over 50% of 40 minute visit was spent reviewing medical records, education and discussion of patient's medical conditions and medical management.

## 2025-05-21 ENCOUNTER — TELEPHONE (OUTPATIENT)
Dept: PAIN MEDICINE | Facility: HOSPITAL | Age: 45
End: 2025-05-21
Payer: MEDICARE

## 2025-05-21 ENCOUNTER — PATIENT OUTREACH (OUTPATIENT)
Dept: ADMINISTRATIVE | Facility: OTHER | Age: 45
End: 2025-05-21
Payer: MEDICARE

## 2025-05-21 NOTE — PROGRESS NOTES
"CHW - Initial Contact    This Community Health Worker verified the Social Determinant of Health questionnaire with patient via telephone today.      Pt identified barriers of most importance are: Referral states "Has MS and depression and in wheelchair. Need services to provide transportation and supportive services "      CHW called [facility] (call ref # [id number])    Rep stated pt's plan only includes emergency and nonemergency ambulance (copay $315), its doesn't cover routine NEMT     CHW spoke with pt and informed her about [employer] only providing ambulance transportation pt verbalized understanding    CHW asked pt if she uses any DME or has any disability, pt stated yes she uses a walker, CHW explained the [employer] transportation, pt stated she would like to apply for it and gave CHW permission to complete application and fax it to [employer]      Support and Services: CHW completed the [employer] application and faxed it to their office     Your fax has been successfully sent to [phone] at [phone].  ------------------------------------------------------------  From: ravi khoury  ------------------------------------------------------------  5/22/2025 11:11:17 AM Transmission Record          Sent to [phone] with remote ID "[id number]"          Result: (0/339;0/0) Success          Page record: 1 - 6          Elapsed time: 02:06 on channel 9        Follow up required: yes    Follow-up Outreach - Due: 5/26/2025   "

## 2025-05-23 ENCOUNTER — TELEPHONE (OUTPATIENT)
Dept: PAIN MEDICINE | Facility: CLINIC | Age: 45
End: 2025-05-23
Payer: MEDICARE

## 2025-05-27 ENCOUNTER — PATIENT OUTREACH (OUTPATIENT)
Dept: ADMINISTRATIVE | Facility: OTHER | Age: 45
End: 2025-05-27
Payer: MEDICARE

## 2025-05-27 NOTE — PROGRESS NOTES
CHW - Case Closure    This Community Health Worker spoke to patient via telephone today.     Pt/Caregiver reported: pt stated rep with [employer] called her yesterday and informed pt she was approved for [employer] transportation services for two years    Pt also stated since she uses a walker when she has appointments at [facility] if she needs assistance getting to the designated floor does [facility] have attendants that can help    [facility] must ask help desk or  at the hospital and they can arrange transport services for pt     Pt verbalized understanding     Pt/Caregiver denied any additional needs at this time and agrees with episode closure at this time.  Provided patient with Community Health Worker's contact information and encouraged him/her to contact this Community Health Worker if additional needs arise.

## 2025-06-19 ENCOUNTER — PATIENT MESSAGE (OUTPATIENT)
Dept: PSYCHIATRY | Facility: CLINIC | Age: 45
End: 2025-06-19
Payer: MEDICARE

## 2025-07-07 ENCOUNTER — PATIENT MESSAGE (OUTPATIENT)
Dept: NEUROLOGY | Facility: CLINIC | Age: 45
End: 2025-07-07

## 2025-07-07 ENCOUNTER — OFFICE VISIT (OUTPATIENT)
Dept: NEUROLOGY | Facility: CLINIC | Age: 45
End: 2025-07-07
Payer: MEDICARE

## 2025-07-07 DIAGNOSIS — Z79.899 HIGH RISK MEDICATION USE: ICD-10-CM

## 2025-07-07 DIAGNOSIS — M62.838 MUSCLE SPASM: ICD-10-CM

## 2025-07-07 DIAGNOSIS — Z29.89 PROPHYLACTIC IMMUNOTHERAPY: ICD-10-CM

## 2025-07-07 DIAGNOSIS — Z71.89 COUNSELING REGARDING GOALS OF CARE: ICD-10-CM

## 2025-07-07 DIAGNOSIS — Z79.899 OTHER LONG TERM (CURRENT) DRUG THERAPY: ICD-10-CM

## 2025-07-07 DIAGNOSIS — G35 MULTIPLE SCLEROSIS: Primary | ICD-10-CM

## 2025-07-07 DIAGNOSIS — R26.9 GAIT DISTURBANCE: ICD-10-CM

## 2025-07-07 DIAGNOSIS — R53.83 FATIGUE, UNSPECIFIED TYPE: ICD-10-CM

## 2025-07-07 PROCEDURE — 98006 SYNCH AUDIO-VIDEO EST MOD 30: CPT | Mod: HCNC,95,, | Performed by: CLINICAL NURSE SPECIALIST

## 2025-07-07 PROCEDURE — 1159F MED LIST DOCD IN RCRD: CPT | Mod: CPTII,HCNC,95, | Performed by: CLINICAL NURSE SPECIALIST

## 2025-07-07 PROCEDURE — G2211 COMPLEX E/M VISIT ADD ON: HCPCS | Mod: HCNC,95,, | Performed by: CLINICAL NURSE SPECIALIST

## 2025-07-07 NOTE — PROGRESS NOTES
"Subjective:          Patient ID: Jaylon Bowles is a 44 y.o. female who presents today for a routine virtual visit for MS.  She was last seen in January 2025. The history has been provided by the patient.     The patient location is: her home   The chief complaint leading to consultation is: MS     Visit type: audiovisual    Face to Face time with patient: 24 minutes   35 minutes of total time spent on the encounter, which includes face to face time and non-face to face time preparing to see the patient (eg, review of tests), Obtaining and/or reviewing separately obtained history, Documenting clinical information in the electronic or other health record, Independently interpreting results (not separately reported) and communicating results to the patient/family/caregiver, or Care coordination (not separately reported).     Each patient to whom he or she provides medical services by telemedicine is:  (1) informed of the relationship between the physician and patient and the respective role of any other health care provider with respect to management of the patient; and (2) notified that he or she may decline to receive medical services by telemedicine and may withdraw from such care at any time.    MS HPI:  DMT: Ocrevus--due in August   Side effects from DMT? No  Taking vitamin D3 as recommended? Yes - 50,000 units weekly   She has not started amantadine.   She is taking Lions Chai/Cordyceps/Reishi.   She is off of work for the season. She starts back in September.   Walking--her toes "crunch up" when she walks. She has muscle spasms in her feet when walking and at rest. She does not feel like tizanidine is helpful for spasms, but it does help her to sleep.   She has not been exercising or stretching, but she does plan to start going to the gym soon with a friend.    She has had 1-2 falls since the last visit, but none with injury.   She denies any recent infections, but she feels some congestion in her ears.   She " was scheduled for lumbar epidural in May, but did not have transportation home after the procedure, so it was canceled. Her brother has not been helping at all. 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   She walks around her house with her rollator.   She has not been able to get in the shower. She is not able to lift her left leg over the side of the tub.    Medications:  Current Outpatient Medications   Medication Sig    carvediloL (COREG) 25 MG tablet Take 1 tablet (25 mg total) by mouth 2 (two) times daily with meals.    cholecalciferol, vitamin D3, 1,250 mcg (50,000 unit) Tab Take 1 tablet by mouth once a week.    EScitalopram oxalate (LEXAPRO) 10 MG tablet Take 1 tablet (10 mg total) by mouth once daily. TOTAL DOSE=10MG+20MG    EScitalopram oxalate (LEXAPRO) 20 MG tablet Take 1 tablet (20 mg total) by mouth once daily. TOTAL DOSE= 20mg+10mg    ibuprofen (ADVIL,MOTRIN) 800 MG tablet Take 1 tablet (800 mg total) by mouth 2 (two) times daily as needed for Pain.    losartan-hydrochlorothiazide 100-12.5 mg (HYZAAR) 100-12.5 mg Tab Take 1 tablet by mouth once daily.    magnesium 250 mg Tab Take 500 mg by mouth once.    OCREVUS 30 mg/mL Soln     tiZANidine (ZANAFLEX) 2 MG tablet TAKE 1/2 TABLET BY MOUTH IN THE MORNING AND IN THE AFTERNOON FOR MUSCLE SPASMS    tiZANidine (ZANAFLEX) 4 MG tablet TAKE 1 TABLET(4 MG) BY MOUTH TWICE DAILY AS NEEDED    amantadine  mg Tab Take 1 tablet by mouth in the morning and 1 tablet at noon if needed.    fluocinolone acetonide oiL 0.01 % Drop Place 1 drop in ear(s) 2 (two) times a day. (Patient not taking: Reported on 7/7/2025)    levothyroxine (SYNTHROID) 112 MCG tablet Take 1 tablet (112 mcg total) by mouth before breakfast.     No current facility-administered medications for this visit.       SOCIAL HISTORY  Social History[1]    Living arrangements - the patient lives with her mother              Objective:        1. 25 foot timed walk:      12/21/2023     8:40 AM  7/8/2024    10:00 AM   Timed 25 Foot Walk:   Did patient wear an AFO? No No   Was assistive device used? Yes Yes   Assistive device used (jewels one): Bilateral Assistance Bilateral Assistance   Bilateral device used Walker/Rollator Walker/Rollator   Time for 25 Foot Walk (seconds) 28.45 36.27   Time for 25 Foot Walk (seconds) 29.38        Neurological Exam    Deferred   Imaging:     Results for orders placed during the hospital encounter of 03/06/24    MRI Brain Demyelinating Without Contrast    Impression  Brain appears stable from prior exam, again demonstrating findings compatible with the reported history of multiple sclerosis.  No new discrete lesions to indicate ongoing demyelination.      Electronically signed by: Brody De Anda  Date:    03/06/2024  Time:    09:22    Results for orders placed during the hospital encounter of 03/06/24    MRI Cervical Spine Demyelinating Without Contrast    Impression  Stable appearance of the cervical and thoracic cord in keeping with patient's reported history of demyelinating process.  No new cord lesions identified.    Stable mild cystic change about the central cord at the C7 level.    Multilevel cervical and thoracic spondylosis, unchanged from comparison imaging.      Electronically signed by: Brody De Anda  Date:    03/06/2024  Time:    09:15    Results for orders placed during the hospital encounter of 03/06/24    MRI Thoracic Spine Demyelinating Without Contrast    Impression  Stable appearance of the cervical and thoracic cord in keeping with patient's reported history of demyelinating process.  No new cord lesions identified.    Stable mild cystic change about the central cord at the C7 level.    Multilevel cervical and thoracic spondylosis, unchanged from comparison imaging.      Electronically signed by: Brody De Anda  Date:    03/06/2024  Time:    09:15      Labs:     Lab Results   Component Value Date    URSTONXX87TB 36 01/25/2025    HOPSSVCY30TZ 31 02/28/2024     VWTIBAEG43XQ 35 09/22/2023     Lab Results   Component Value Date    JCVINDEX 0.32 (H) 05/31/2021    JCVANTIBODY INDETERMINATE (A) 05/31/2021     Lab Results   Component Value Date    JU7WJNLC 70.8 05/31/2021    ABSOLUTECD3 747 05/31/2021    DA8UMISS 16.7 05/31/2021    ABSOLUTECD8 177 (L) 05/31/2021    QO6LIPDM 55.1 05/31/2021    ABSOLUTECD4 581 05/31/2021    LABCD48 3.29 05/31/2021     Lab Results   Component Value Date    WBC 10.31 01/25/2025    RBC 4.52 01/25/2025    HGB 13.1 01/25/2025    HCT 38.8 01/25/2025    MCV 86 01/25/2025    MCH 29.0 01/25/2025    MCHC 33.8 01/25/2025    RDW 14.0 01/25/2025     01/25/2025    MPV 12.6 01/25/2025    GRAN 7.9 (H) 01/25/2025    GRAN 76.7 (H) 01/25/2025    LYMPH 1.3 01/25/2025    LYMPH 12.9 (L) 01/25/2025    MONO 0.8 01/25/2025    MONO 7.5 01/25/2025    EOS 0.2 01/25/2025    BASO 0.06 01/25/2025    EOSINOPHIL 1.9 01/25/2025    BASOPHIL 0.6 01/25/2025     Sodium   Date Value Ref Range Status   01/25/2025 138 136 - 145 mmol/L Final     Potassium   Date Value Ref Range Status   01/25/2025 4.3 3.5 - 5.1 mmol/L Final     Chloride   Date Value Ref Range Status   01/25/2025 104 95 - 110 mmol/L Final     CO2   Date Value Ref Range Status   01/25/2025 26 23 - 29 mmol/L Final     Glucose   Date Value Ref Range Status   01/25/2025 83 70 - 110 mg/dL Final     BUN   Date Value Ref Range Status   01/25/2025 13 6 - 20 mg/dL Final     Creatinine   Date Value Ref Range Status   01/25/2025 1.0 0.5 - 1.4 mg/dL Final     Calcium   Date Value Ref Range Status   01/25/2025 9.7 8.7 - 10.5 mg/dL Final     Total Protein   Date Value Ref Range Status   01/25/2025 8.2 6.0 - 8.4 g/dL Final     Albumin   Date Value Ref Range Status   01/25/2025 3.7 3.5 - 5.2 g/dL Final     Total Bilirubin   Date Value Ref Range Status   01/25/2025 0.3 0.1 - 1.0 mg/dL Final     Comment:     For infants and newborns, interpretation of results should be based  on gestational age, weight and in agreement with  clinical  observations.    Premature Infant recommended reference ranges:  Up to 24 hours.............<8.0 mg/dL  Up to 48 hours............<12.0 mg/dL  3-5 days..................<15.0 mg/dL  6-29 days.................<15.0 mg/dL       Alkaline Phosphatase   Date Value Ref Range Status   01/25/2025 71 40 - 150 U/L Final     AST   Date Value Ref Range Status   01/25/2025 16 10 - 40 U/L Final     ALT   Date Value Ref Range Status   01/25/2025 10 10 - 44 U/L Final     Anion Gap   Date Value Ref Range Status   01/25/2025 8 8 - 16 mmol/L Final     eGFR if    Date Value Ref Range Status   06/25/2022 >60.0 >60 mL/min/1.73 m^2 Final     eGFR if non    Date Value Ref Range Status   06/25/2022 >60.0 >60 mL/min/1.73 m^2 Final     Comment:     Calculation used to obtain the estimated glomerular filtration  rate (eGFR) is the CKD-EPI equation.        Lab Results   Component Value Date    HEPBSAG Non-reactive 01/25/2025    HEPBSAB >1000.00 10/14/2022    HEPBSAB Reactive 10/14/2022    HEPBCAB Non-reactive 01/25/2025       MS Impression and Plan:     NEURO MULTIPLE SCLEROSIS IMPRESSION:   Number of relapses in the past year?:  0  Clinical Progression comment:  Difficult to assess for progression in virtual visit, but she endorses ongoing challenges with mobility that make it difficult to get in the shower, get in cars, etc. She also has fatigue and muscle spasms. Her support system is minimal.   MS Classification:  Secondarily Progressive MS  Current DMT: ocrelizumab  Current DMT comment: Continue Ocrevus and Vitamin D. Her next infusion is due in August. We will check safety labs this month. She is aware of the risks associated with immunosuppressant therapy, including increased risk of infection.     Symptom Management:  Implement change in symptom management  Implement Change in Symptom Management:  Spasticity and Fatigue  Implement Change in Symptom Management comment: Continue tizanidine. She  has not yet started amantadine, but would like to do so. She will try to sign in to [employer] website to make sure she is still active. I will resend Rx to them on Wednesday.   MRI brain soon    She will follow up with Dr. Durham in December. The visit today is associated with current or anticipated ongoing medical care related to this patient's single serious condition/complex condition of multiple sclerosis.          VERÓNICA Smallwood, CNS    Problem List Items Addressed This Visit          Neurologic Problems    Multiple sclerosis - Primary    Relevant Orders    CBC Auto Differential    Comprehensive Metabolic Panel    Hepatitis B Core Antibody, Total    Hepatitis B Surface Antigen    Immunoglobulins (IgG, IgA, IgM) Quantitative    Vitamin D    MRI Brain Demyelinating Without Contrast    Pregnancy, urine rapid     Other Visit Diagnoses         Other long term (current) drug therapy        Relevant Orders    Hepatitis B Core Antibody, Total    Hepatitis B Surface Antigen    Vitamin D      Gait disturbance          Counseling regarding goals of care          Prophylactic immunotherapy          High risk medication use          Fatigue, unspecified type          Muscle spasm                       [1]   Social History  Tobacco Use    Smoking status: Never    Smokeless tobacco: Never   Substance Use Topics    Alcohol use: No     Comment: social    Drug use: No     Types: Marijuana

## 2025-07-07 NOTE — Clinical Note
Can someone send her the phone number for [employer]? I think she is approved, but doesn't seem to know how to contact them.   Also, she inquired about finding out some information on how she can become her mom's PCA. I believe her mom is bedbound, and she has no other help.

## 2025-07-09 ENCOUNTER — TELEPHONE (OUTPATIENT)
Dept: PSYCHIATRY | Facility: CLINIC | Age: 45
End: 2025-07-09
Payer: MEDICARE

## 2025-07-09 NOTE — TELEPHONE ENCOUNTER
I called the pt and gave her the phone number for [employer]. I am consulting with my supervisor about how she may be able to become her mom's PCA when she is not currently receiving any in home care. I also informed the Pt that because she is on SS disability, she would have to report any income to  and not make more than the maximum allowed on disability.

## 2025-07-10 ENCOUNTER — TELEPHONE (OUTPATIENT)
Dept: NEUROLOGY | Facility: CLINIC | Age: 45
End: 2025-07-10
Payer: MEDICARE

## 2025-07-11 NOTE — TELEPHONE ENCOUNTER
I called the pt again today to speak with her about applying for waiver programs to get her mom a personal care assistant before she would be able to become a PCA for her. I emailed her the links to the flyers for the different waiver programs her mom may be eligible for.

## 2025-07-25 ENCOUNTER — TELEPHONE (OUTPATIENT)
Dept: NEUROLOGY | Facility: CLINIC | Age: 45
End: 2025-07-25
Payer: MEDICARE

## 2025-07-25 DIAGNOSIS — G35 MULTIPLE SCLEROSIS: ICD-10-CM

## 2025-07-25 DIAGNOSIS — R53.83 FATIGUE, UNSPECIFIED TYPE: ICD-10-CM

## 2025-07-25 RX ORDER — AMANTADINE HYDROCHLORIDE 100 MG/1
TABLET ORAL
Qty: 60 TABLET | Refills: 5 | Status: SHIPPED | OUTPATIENT
Start: 2025-07-25

## 2025-07-27 ENCOUNTER — PATIENT MESSAGE (OUTPATIENT)
Dept: INTERNAL MEDICINE | Facility: CLINIC | Age: 45
End: 2025-07-27
Payer: MEDICARE

## 2025-07-28 RX ORDER — IBUPROFEN 800 MG/1
800 TABLET, FILM COATED ORAL 2 TIMES DAILY PRN
Qty: 30 TABLET | Refills: 1 | Status: SHIPPED | OUTPATIENT
Start: 2025-07-28

## 2025-07-28 RX ORDER — IBUPROFEN 800 MG/1
800 TABLET, FILM COATED ORAL 2 TIMES DAILY PRN
Qty: 30 TABLET | Refills: 0 | Status: CANCELLED | OUTPATIENT
Start: 2025-07-28

## 2025-07-28 NOTE — TELEPHONE ENCOUNTER
Care Due:                  Date            Visit Type   Department     Provider  --------------------------------------------------------------------------------                                ESTABLISHED                              PATIENT -    [facility] INTERNAL  Last Visit: 05-      [facility]       Cat Ramachandran  Next Visit: None Scheduled  None         None Found                                                            Last  Test          Frequency    Reason                     Performed    Due Date  --------------------------------------------------------------------------------    TSH.........  12 months..  levothyroxine............  10-   09-    [facility] Embedded Care Due Messages. Reference number: [id number].   7/28/2025 7:16:15 AM CDT

## 2025-07-28 NOTE — TELEPHONE ENCOUNTER
No care due was identified.  [facility] Embedded Care Due Messages. Reference number: [id number].   7/28/2025 7:17:48 AM CDT

## 2025-07-30 ENCOUNTER — PATIENT MESSAGE (OUTPATIENT)
Dept: PSYCHIATRY | Facility: CLINIC | Age: 45
End: 2025-07-30
Payer: MEDICARE

## 2025-08-01 ENCOUNTER — PATIENT MESSAGE (OUTPATIENT)
Dept: PSYCHIATRY | Facility: CLINIC | Age: 45
End: 2025-08-01
Payer: MEDICARE

## 2025-08-02 ENCOUNTER — PATIENT MESSAGE (OUTPATIENT)
Dept: NEUROLOGY | Facility: CLINIC | Age: 45
End: 2025-08-02
Payer: MEDICARE

## 2025-08-05 ENCOUNTER — HOSPITAL ENCOUNTER (OUTPATIENT)
Dept: RADIOLOGY | Facility: HOSPITAL | Age: 45
Discharge: HOME | End: 2025-08-05
Attending: CLINICAL NURSE SPECIALIST
Payer: MEDICARE

## 2025-08-05 DIAGNOSIS — G35 MULTIPLE SCLEROSIS: ICD-10-CM

## 2025-08-05 PROCEDURE — 70551 MRI BRAIN STEM W/O DYE: CPT | Mod: 26,HCNC,, | Performed by: RADIOLOGY

## 2025-08-05 PROCEDURE — 70551 MRI BRAIN STEM W/O DYE: CPT | Mod: TC,HCNC

## 2025-08-08 ENCOUNTER — PATIENT MESSAGE (OUTPATIENT)
Dept: NEUROLOGY | Facility: CLINIC | Age: 45
End: 2025-08-08
Payer: MEDICARE

## 2025-08-09 DIAGNOSIS — E61.1 IRON DEFICIENCY: Primary | ICD-10-CM

## 2025-08-12 ENCOUNTER — TELEPHONE (OUTPATIENT)
Dept: NEUROLOGY | Facility: CLINIC | Age: 45
End: 2025-08-12
Payer: MEDICARE

## 2025-08-21 ENCOUNTER — PATIENT MESSAGE (OUTPATIENT)
Dept: NEUROLOGY | Facility: CLINIC | Age: 45
End: 2025-08-21
Payer: MEDICARE

## 2025-08-22 ENCOUNTER — PATIENT MESSAGE (OUTPATIENT)
Dept: ADMINISTRATIVE | Facility: HOSPITAL | Age: 45
End: 2025-08-22
Payer: MEDICARE

## 2025-08-23 DIAGNOSIS — Z12.11 SCREENING FOR COLON CANCER: ICD-10-CM

## 2025-08-26 ENCOUNTER — OFFICE VISIT (OUTPATIENT)
Dept: NEUROLOGY | Facility: CLINIC | Age: 45
End: 2025-08-26
Payer: MEDICARE

## 2025-08-26 DIAGNOSIS — Z79.899 HIGH RISK MEDICATION USE: ICD-10-CM

## 2025-08-26 DIAGNOSIS — Z29.89 PROPHYLACTIC IMMUNOTHERAPY: ICD-10-CM

## 2025-08-26 DIAGNOSIS — R25.2 SPASTICITY: ICD-10-CM

## 2025-08-26 DIAGNOSIS — G35 MULTIPLE SCLEROSIS: Primary | ICD-10-CM

## 2025-08-26 DIAGNOSIS — Z71.89 COUNSELING REGARDING GOALS OF CARE: ICD-10-CM

## 2025-08-26 DIAGNOSIS — R26.9 GAIT DISTURBANCE: ICD-10-CM

## 2025-08-26 DIAGNOSIS — Z79.899 OTHER LONG TERM (CURRENT) DRUG THERAPY: ICD-10-CM

## 2025-08-26 PROCEDURE — 1159F MED LIST DOCD IN RCRD: CPT | Mod: CPTII,HCNC,95, | Performed by: CLINICAL NURSE SPECIALIST

## 2025-08-26 PROCEDURE — 98006 SYNCH AUDIO-VIDEO EST MOD 30: CPT | Mod: HCNC,95,, | Performed by: CLINICAL NURSE SPECIALIST

## 2025-08-27 ENCOUNTER — TELEPHONE (OUTPATIENT)
Dept: PHYSICAL MEDICINE AND REHAB | Facility: CLINIC | Age: 45
End: 2025-08-27
Payer: MEDICARE

## 2025-09-02 ENCOUNTER — INFUSION (OUTPATIENT)
Dept: INFUSION THERAPY | Facility: HOSPITAL | Age: 45
End: 2025-09-02
Payer: MEDICARE

## 2025-09-02 VITALS
TEMPERATURE: 98 F | OXYGEN SATURATION: 99 % | WEIGHT: 250 LBS | HEART RATE: 65 BPM | BODY MASS INDEX: 41.65 KG/M2 | HEIGHT: 65 IN | SYSTOLIC BLOOD PRESSURE: 150 MMHG | DIASTOLIC BLOOD PRESSURE: 71 MMHG | RESPIRATION RATE: 18 BRPM

## 2025-09-02 DIAGNOSIS — G35 MULTIPLE SCLEROSIS: Primary | ICD-10-CM

## 2025-09-02 PROCEDURE — 96375 TX/PRO/DX INJ NEW DRUG ADDON: CPT | Mod: HCNC

## 2025-09-02 PROCEDURE — 96367 TX/PROPH/DG ADDL SEQ IV INF: CPT | Mod: HCNC

## 2025-09-02 PROCEDURE — 96413 CHEMO IV INFUSION 1 HR: CPT | Mod: HCNC

## 2025-09-02 PROCEDURE — 25000003 PHARM REV CODE 250: Mod: HCNC | Performed by: CLINICAL NURSE SPECIALIST

## 2025-09-02 PROCEDURE — 96415 CHEMO IV INFUSION ADDL HR: CPT | Mod: HCNC

## 2025-09-02 PROCEDURE — 63600175 PHARM REV CODE 636 W HCPCS: Mod: JZ,TB,HCNC | Performed by: CLINICAL NURSE SPECIALIST

## 2025-09-02 RX ORDER — HEPARIN 100 UNIT/ML
500 SYRINGE INTRAVENOUS
OUTPATIENT
Start: 2025-09-02

## 2025-09-02 RX ORDER — EPINEPHRINE 0.3 MG/.3ML
0.3 INJECTION SUBCUTANEOUS
OUTPATIENT
Start: 2025-09-02

## 2025-09-02 RX ORDER — FAMOTIDINE 10 MG/ML
20 INJECTION, SOLUTION INTRAVENOUS
OUTPATIENT
Start: 2025-09-02 | End: 2025-09-02

## 2025-09-02 RX ORDER — HEPARIN 100 UNIT/ML
500 SYRINGE INTRAVENOUS
Status: DISCONTINUED | OUTPATIENT
Start: 2025-09-02 | End: 2025-09-02 | Stop reason: HOSPADM

## 2025-09-02 RX ORDER — FAMOTIDINE 10 MG/ML
20 INJECTION, SOLUTION INTRAVENOUS
Status: COMPLETED | OUTPATIENT
Start: 2025-09-02 | End: 2025-09-02

## 2025-09-02 RX ORDER — DIPHENHYDRAMINE HYDROCHLORIDE 50 MG/ML
50 INJECTION, SOLUTION INTRAMUSCULAR; INTRAVENOUS
Status: DISCONTINUED | OUTPATIENT
Start: 2025-09-02 | End: 2025-09-02 | Stop reason: HOSPADM

## 2025-09-02 RX ORDER — DIPHENHYDRAMINE HYDROCHLORIDE 50 MG/ML
50 INJECTION, SOLUTION INTRAMUSCULAR; INTRAVENOUS
OUTPATIENT
Start: 2025-09-02

## 2025-09-02 RX ORDER — SODIUM CHLORIDE 0.9 % (FLUSH) 0.9 %
10 SYRINGE (ML) INJECTION
OUTPATIENT
Start: 2025-09-02

## 2025-09-02 RX ORDER — ACETAMINOPHEN 500 MG
1000 TABLET ORAL
OUTPATIENT
Start: 2025-09-02 | End: 2025-09-02

## 2025-09-02 RX ORDER — ACETAMINOPHEN 500 MG
1000 TABLET ORAL
Status: COMPLETED | OUTPATIENT
Start: 2025-09-02 | End: 2025-09-02

## 2025-09-02 RX ORDER — EPINEPHRINE 0.3 MG/.3ML
0.3 INJECTION SUBCUTANEOUS
Status: DISCONTINUED | OUTPATIENT
Start: 2025-09-02 | End: 2025-09-02 | Stop reason: HOSPADM

## 2025-09-02 RX ORDER — SODIUM CHLORIDE 0.9 % (FLUSH) 0.9 %
10 SYRINGE (ML) INJECTION
Status: DISCONTINUED | OUTPATIENT
Start: 2025-09-02 | End: 2025-09-02 | Stop reason: HOSPADM

## 2025-09-02 RX ADMIN — DEXTROSE 100 MG: 50 INJECTION, SOLUTION INTRAVENOUS at 11:09

## 2025-09-02 RX ADMIN — FAMOTIDINE 20 MG: 10 INJECTION INTRAVENOUS at 11:09

## 2025-09-02 RX ADMIN — DIPHENHYDRAMINE HYDROCHLORIDE 50 MG: 50 INJECTION INTRAMUSCULAR; INTRAVENOUS at 11:09

## 2025-09-02 RX ADMIN — OCRELIZUMAB 600 MG: 300 INJECTION INTRAVENOUS at 12:09

## 2025-09-02 RX ADMIN — ACETAMINOPHEN 1000 MG: 500 TABLET ORAL at 11:09

## 2025-09-02 RX ADMIN — SODIUM CHLORIDE: 0.9 INJECTION, SOLUTION INTRAVENOUS at 11:09

## 2025-09-03 ENCOUNTER — TELEPHONE (OUTPATIENT)
Dept: NEUROLOGY | Facility: CLINIC | Age: 45
End: 2025-09-03
Payer: MEDICARE